# Patient Record
Sex: MALE | Race: WHITE | NOT HISPANIC OR LATINO
[De-identification: names, ages, dates, MRNs, and addresses within clinical notes are randomized per-mention and may not be internally consistent; named-entity substitution may affect disease eponyms.]

---

## 2018-07-24 ENCOUNTER — APPOINTMENT (OUTPATIENT)
Dept: UROLOGY | Facility: CLINIC | Age: 48
End: 2018-07-24

## 2018-08-02 ENCOUNTER — APPOINTMENT (OUTPATIENT)
Dept: UROLOGY | Facility: CLINIC | Age: 48
End: 2018-08-02
Payer: COMMERCIAL

## 2018-08-02 VITALS
DIASTOLIC BLOOD PRESSURE: 96 MMHG | BODY MASS INDEX: 28.7 KG/M2 | WEIGHT: 205 LBS | SYSTOLIC BLOOD PRESSURE: 140 MMHG | HEIGHT: 71 IN | RESPIRATION RATE: 18 BRPM | HEART RATE: 72 BPM

## 2018-08-02 PROCEDURE — 99204 OFFICE O/P NEW MOD 45 MIN: CPT

## 2018-08-06 LAB
LH SERPL-ACNC: 7.2 IU/L
PROLACTIN SERPL-MCNC: 13.9 NG/ML
PSA SERPL-MCNC: 0.93 NG/ML
SHBG SERPL-SCNC: 11 NMOL/L
TSH SERPL-ACNC: 4.05 UIU/ML

## 2018-08-23 ENCOUNTER — OTHER (OUTPATIENT)
Age: 48
End: 2018-08-23

## 2018-08-23 LAB
TESTOST BND SERPL-MCNC: 9.8 PG/ML
TESTOST SERPL-MCNC: 239.1 NG/DL

## 2018-11-29 ENCOUNTER — OUTPATIENT (OUTPATIENT)
Dept: OUTPATIENT SERVICES | Facility: HOSPITAL | Age: 48
LOS: 1 days | End: 2018-11-29
Payer: COMMERCIAL

## 2018-11-29 ENCOUNTER — APPOINTMENT (OUTPATIENT)
Dept: CT IMAGING | Facility: HOSPITAL | Age: 48
End: 2018-11-29
Payer: COMMERCIAL

## 2018-11-29 DIAGNOSIS — N52.9 MALE ERECTILE DYSFUNCTION, UNSPECIFIED: ICD-10-CM

## 2018-11-29 PROCEDURE — 74176 CT ABD & PELVIS W/O CONTRAST: CPT

## 2018-11-29 PROCEDURE — 74176 CT ABD & PELVIS W/O CONTRAST: CPT | Mod: 26

## 2018-12-14 ENCOUNTER — APPOINTMENT (OUTPATIENT)
Dept: UROLOGY | Facility: CLINIC | Age: 48
End: 2018-12-14
Payer: COMMERCIAL

## 2018-12-14 VITALS
HEART RATE: 76 BPM | RESPIRATION RATE: 18 BRPM | OXYGEN SATURATION: 98 % | SYSTOLIC BLOOD PRESSURE: 148 MMHG | DIASTOLIC BLOOD PRESSURE: 90 MMHG

## 2018-12-14 PROCEDURE — 99213 OFFICE O/P EST LOW 20 MIN: CPT | Mod: 25

## 2018-12-14 PROCEDURE — 52000 CYSTOURETHROSCOPY: CPT

## 2018-12-17 LAB — BACTERIA UR CULT: NORMAL

## 2018-12-20 ENCOUNTER — OUTPATIENT (OUTPATIENT)
Dept: OUTPATIENT SERVICES | Facility: HOSPITAL | Age: 48
LOS: 1 days | End: 2018-12-20
Payer: COMMERCIAL

## 2018-12-20 VITALS
DIASTOLIC BLOOD PRESSURE: 81 MMHG | WEIGHT: 218.04 LBS | RESPIRATION RATE: 18 BRPM | HEIGHT: 71 IN | SYSTOLIC BLOOD PRESSURE: 127 MMHG | TEMPERATURE: 98 F | HEART RATE: 81 BPM | OXYGEN SATURATION: 99 %

## 2018-12-20 DIAGNOSIS — N21.0 CALCULUS IN BLADDER: ICD-10-CM

## 2018-12-20 DIAGNOSIS — N32.89 OTHER SPECIFIED DISORDERS OF BLADDER: ICD-10-CM

## 2018-12-20 DIAGNOSIS — Z90.89 ACQUIRED ABSENCE OF OTHER ORGANS: Chronic | ICD-10-CM

## 2018-12-20 DIAGNOSIS — Z01.818 ENCOUNTER FOR OTHER PREPROCEDURAL EXAMINATION: ICD-10-CM

## 2018-12-20 LAB
ANION GAP SERPL CALC-SCNC: 8 MMOL/L — SIGNIFICANT CHANGE UP (ref 5–17)
APPEARANCE UR: ABNORMAL
APTT BLD: 32 SEC — SIGNIFICANT CHANGE UP (ref 27.5–36.3)
BACTERIA # UR AUTO: ABNORMAL /HPF
BILIRUB UR-MCNC: NEGATIVE — SIGNIFICANT CHANGE UP
BUN SERPL-MCNC: 28 MG/DL — HIGH (ref 7–18)
CALCIUM SERPL-MCNC: 9.2 MG/DL — SIGNIFICANT CHANGE UP (ref 8.4–10.5)
CHLORIDE SERPL-SCNC: 105 MMOL/L — SIGNIFICANT CHANGE UP (ref 96–108)
CO2 SERPL-SCNC: 29 MMOL/L — SIGNIFICANT CHANGE UP (ref 22–31)
COLOR SPEC: YELLOW — SIGNIFICANT CHANGE UP
CREAT SERPL-MCNC: 1.21 MG/DL — SIGNIFICANT CHANGE UP (ref 0.5–1.3)
DIFF PNL FLD: ABNORMAL
EPI CELLS # UR: SIGNIFICANT CHANGE UP /HPF
GLUCOSE SERPL-MCNC: 89 MG/DL — SIGNIFICANT CHANGE UP (ref 70–99)
GLUCOSE UR QL: NEGATIVE — SIGNIFICANT CHANGE UP
HCT VFR BLD CALC: 46.5 % — SIGNIFICANT CHANGE UP (ref 39–50)
HGB BLD-MCNC: 15.2 G/DL — SIGNIFICANT CHANGE UP (ref 13–17)
KETONES UR-MCNC: NEGATIVE — SIGNIFICANT CHANGE UP
LEUKOCYTE ESTERASE UR-ACNC: ABNORMAL
MCHC RBC-ENTMCNC: 30.8 PG — SIGNIFICANT CHANGE UP (ref 27–34)
MCHC RBC-ENTMCNC: 32.6 GM/DL — SIGNIFICANT CHANGE UP (ref 32–36)
MCV RBC AUTO: 94.4 FL — SIGNIFICANT CHANGE UP (ref 80–100)
NITRITE UR-MCNC: NEGATIVE — SIGNIFICANT CHANGE UP
PH UR: 6.5 — SIGNIFICANT CHANGE UP (ref 5–8)
PLATELET # BLD AUTO: 176 K/UL — SIGNIFICANT CHANGE UP (ref 150–400)
POTASSIUM SERPL-MCNC: 4.2 MMOL/L — SIGNIFICANT CHANGE UP (ref 3.5–5.3)
POTASSIUM SERPL-SCNC: 4.2 MMOL/L — SIGNIFICANT CHANGE UP (ref 3.5–5.3)
PROT UR-MCNC: 100
RBC # BLD: 4.93 M/UL — SIGNIFICANT CHANGE UP (ref 4.2–5.8)
RBC # FLD: 12.6 % — SIGNIFICANT CHANGE UP (ref 10.3–14.5)
RBC CASTS # UR COMP ASSIST: ABNORMAL /HPF (ref 0–2)
SODIUM SERPL-SCNC: 142 MMOL/L — SIGNIFICANT CHANGE UP (ref 135–145)
SP GR SPEC: 1.01 — SIGNIFICANT CHANGE UP (ref 1.01–1.02)
UROBILINOGEN FLD QL: NEGATIVE — SIGNIFICANT CHANGE UP
WBC # BLD: 10 K/UL — SIGNIFICANT CHANGE UP (ref 3.8–10.5)
WBC # FLD AUTO: 10 K/UL — SIGNIFICANT CHANGE UP (ref 3.8–10.5)
WBC UR QL: ABNORMAL /HPF (ref 0–5)

## 2018-12-20 PROCEDURE — 85730 THROMBOPLASTIN TIME PARTIAL: CPT

## 2018-12-20 PROCEDURE — 36415 COLL VENOUS BLD VENIPUNCTURE: CPT

## 2018-12-20 PROCEDURE — 85027 COMPLETE CBC AUTOMATED: CPT

## 2018-12-20 PROCEDURE — 87086 URINE CULTURE/COLONY COUNT: CPT

## 2018-12-20 PROCEDURE — 80048 BASIC METABOLIC PNL TOTAL CA: CPT

## 2018-12-20 PROCEDURE — G0463: CPT

## 2018-12-20 PROCEDURE — 81001 URINALYSIS AUTO W/SCOPE: CPT

## 2018-12-20 RX ORDER — SODIUM CHLORIDE 9 MG/ML
3 INJECTION INTRAMUSCULAR; INTRAVENOUS; SUBCUTANEOUS EVERY 8 HOURS
Qty: 0 | Refills: 0 | Status: DISCONTINUED | OUTPATIENT
Start: 2018-12-31 | End: 2018-12-31

## 2018-12-20 NOTE — H&P PST ADULT - NSANTHOSAYNRD_GEN_A_CORE
No. AMARA screening performed.  STOP BANG Legend: 0-2 = LOW Risk; 3-4 = INTERMEDIATE Risk; 5-8 = HIGH Risk

## 2018-12-20 NOTE — H&P PST ADULT - NEGATIVE GASTROINTESTINAL SYMPTOMS
no abdominal pain/no vomiting/no nausea/no diarrhea/no flatulence/no constipation/no change in bowel habits

## 2018-12-20 NOTE — H&P PST ADULT - NEGATIVE CARDIOVASCULAR SYMPTOMS
no chest pain/no paroxysmal nocturnal dyspnea/no peripheral edema/no claudication/no dyspnea on exertion/no orthopnea/no palpitations

## 2018-12-20 NOTE — H&P PST ADULT - HISTORY OF PRESENT ILLNESS
48 yr old male with 48 yr old male with PMH of hyperlipidemia, renal stones-passed them a few years ago presents with c/o intermittent hematuria and bilateral flank pain. CT scan revealed large calcified bladder mass and renal stones. Pt reports weak urinary stream and intermittent dysuria. Pt for cystolitholapaxy, transurethral resection of bladder tumor on 12/31/2018.

## 2018-12-20 NOTE — H&P PST ADULT - GASTROINTESTINAL DETAILS
no bruit/no rebound tenderness/no masses palpable/bowel sounds normal/no guarding/soft/no distention/normal

## 2018-12-20 NOTE — H&P PST ADULT - FAMILY HISTORY
Father  Still living? Yes, Estimated age: Age Unknown  Family history of Parkinson's disease, Age at diagnosis: Age Unknown     Mother  Still living? Yes, Estimated age: Age Unknown  Family history of dementia, Age at diagnosis: Age Unknown  Family history of hypertension in mother, Age at diagnosis: Age Unknown

## 2018-12-20 NOTE — H&P PST ADULT - RS GEN PE MLT RESP DETAILS PC
no chest wall tenderness/no rhonchi/breath sounds equal/respirations non-labored/no rales/no subcutaneous emphysema/normal/good air movement/airway patent/clear to auscultation bilaterally/no intercostal retractions/no wheezes

## 2018-12-20 NOTE — H&P PST ADULT - NEGATIVE ALLERGY TYPES
no indoor environmental allergies/no reactions to insect bites/no outdoor environmental allergies/no reactions to animals/no reactions to food

## 2018-12-21 LAB
CULTURE RESULTS: NO GROWTH — SIGNIFICANT CHANGE UP
SPECIMEN SOURCE: SIGNIFICANT CHANGE UP

## 2018-12-30 ENCOUNTER — TRANSCRIPTION ENCOUNTER (OUTPATIENT)
Age: 48
End: 2018-12-30

## 2018-12-31 ENCOUNTER — RESULT REVIEW (OUTPATIENT)
Age: 48
End: 2018-12-31

## 2018-12-31 ENCOUNTER — APPOINTMENT (OUTPATIENT)
Dept: UROLOGY | Facility: HOSPITAL | Age: 48
End: 2018-12-31

## 2018-12-31 ENCOUNTER — OUTPATIENT (OUTPATIENT)
Dept: OUTPATIENT SERVICES | Facility: HOSPITAL | Age: 48
LOS: 1 days | End: 2018-12-31
Payer: COMMERCIAL

## 2018-12-31 VITALS
SYSTOLIC BLOOD PRESSURE: 141 MMHG | RESPIRATION RATE: 16 BRPM | TEMPERATURE: 97 F | DIASTOLIC BLOOD PRESSURE: 83 MMHG | HEART RATE: 88 BPM | OXYGEN SATURATION: 100 %

## 2018-12-31 VITALS
RESPIRATION RATE: 17 BRPM | HEIGHT: 71 IN | WEIGHT: 218.04 LBS | HEART RATE: 99 BPM | TEMPERATURE: 97 F | DIASTOLIC BLOOD PRESSURE: 90 MMHG | OXYGEN SATURATION: 100 % | SYSTOLIC BLOOD PRESSURE: 150 MMHG

## 2018-12-31 DIAGNOSIS — N21.0 CALCULUS IN BLADDER: ICD-10-CM

## 2018-12-31 DIAGNOSIS — Z01.818 ENCOUNTER FOR OTHER PREPROCEDURAL EXAMINATION: ICD-10-CM

## 2018-12-31 DIAGNOSIS — Z90.89 ACQUIRED ABSENCE OF OTHER ORGANS: Chronic | ICD-10-CM

## 2018-12-31 LAB
TESTOST BND SERPL-MCNC: 15 PG/ML
TESTOST SERPL-MCNC: 391.8 NG/DL

## 2018-12-31 PROCEDURE — C1889: CPT

## 2018-12-31 PROCEDURE — 52240 CYSTOSCOPY AND TREATMENT: CPT

## 2018-12-31 PROCEDURE — 88307 TISSUE EXAM BY PATHOLOGIST: CPT

## 2018-12-31 PROCEDURE — 36415 COLL VENOUS BLD VENIPUNCTURE: CPT

## 2018-12-31 PROCEDURE — 52318 REMOVE BLADDER STONE: CPT

## 2018-12-31 PROCEDURE — 88307 TISSUE EXAM BY PATHOLOGIST: CPT | Mod: 26

## 2018-12-31 PROCEDURE — 82365 CALCULUS SPECTROSCOPY: CPT

## 2018-12-31 PROCEDURE — 86901 BLOOD TYPING SEROLOGIC RH(D): CPT

## 2018-12-31 PROCEDURE — 86900 BLOOD TYPING SEROLOGIC ABO: CPT

## 2018-12-31 PROCEDURE — 86850 RBC ANTIBODY SCREEN: CPT

## 2018-12-31 RX ORDER — OXYCODONE AND ACETAMINOPHEN 5; 325 MG/1; MG/1
1 TABLET ORAL EVERY 6 HOURS
Qty: 0 | Refills: 0 | Status: DISCONTINUED | OUTPATIENT
Start: 2018-12-31 | End: 2018-12-31

## 2018-12-31 RX ORDER — SODIUM CHLORIDE 9 MG/ML
1000 INJECTION, SOLUTION INTRAVENOUS
Qty: 0 | Refills: 0 | Status: DISCONTINUED | OUTPATIENT
Start: 2018-12-31 | End: 2018-12-31

## 2018-12-31 RX ORDER — HYDROMORPHONE HYDROCHLORIDE 2 MG/ML
0.5 INJECTION INTRAMUSCULAR; INTRAVENOUS; SUBCUTANEOUS
Qty: 0 | Refills: 0 | Status: DISCONTINUED | OUTPATIENT
Start: 2018-12-31 | End: 2018-12-31

## 2018-12-31 RX ORDER — ONDANSETRON 8 MG/1
4 TABLET, FILM COATED ORAL ONCE
Qty: 0 | Refills: 0 | Status: DISCONTINUED | OUTPATIENT
Start: 2018-12-31 | End: 2018-12-31

## 2018-12-31 RX ORDER — ONDANSETRON 8 MG/1
4 TABLET, FILM COATED ORAL EVERY 6 HOURS
Qty: 0 | Refills: 0 | Status: DISCONTINUED | OUTPATIENT
Start: 2018-12-31 | End: 2019-01-08

## 2018-12-31 RX ORDER — ACETAMINOPHEN WITH CODEINE 300MG-30MG
1 TABLET ORAL
Qty: 16 | Refills: 0 | OUTPATIENT
Start: 2018-12-31 | End: 2019-01-03

## 2018-12-31 RX ORDER — MOXIFLOXACIN HYDROCHLORIDE TABLETS, 400 MG 400 MG/1
1 TABLET, FILM COATED ORAL
Qty: 10 | Refills: 0 | OUTPATIENT
Start: 2018-12-31 | End: 2019-01-04

## 2018-12-31 RX ADMIN — HYDROMORPHONE HYDROCHLORIDE 0.5 MILLIGRAM(S): 2 INJECTION INTRAMUSCULAR; INTRAVENOUS; SUBCUTANEOUS at 10:32

## 2018-12-31 RX ADMIN — OXYCODONE AND ACETAMINOPHEN 1 TABLET(S): 5; 325 TABLET ORAL at 12:05

## 2018-12-31 RX ADMIN — OXYCODONE AND ACETAMINOPHEN 1 TABLET(S): 5; 325 TABLET ORAL at 12:35

## 2018-12-31 RX ADMIN — HYDROMORPHONE HYDROCHLORIDE 0.5 MILLIGRAM(S): 2 INJECTION INTRAMUSCULAR; INTRAVENOUS; SUBCUTANEOUS at 10:17

## 2018-12-31 NOTE — ASU DISCHARGE PLAN (ADULT/PEDIATRIC). - NOTIFY
Fever greater than 101/Bleeding that does not stop/Pain not relieved by Medications/Unable to Urinate

## 2018-12-31 NOTE — ASU DISCHARGE PLAN (ADULT/PEDIATRIC). - MEDICATION SUMMARY - MEDICATIONS TO TAKE
I will START or STAY ON the medications listed below when I get home from the hospital:    acetaminophen-codeine 300 mg-30 mg oral tablet  -- 1 tab(s) by mouth every 6 hours, As Needed -for moderate pain MDD:4 tablets   -- Caution federal law prohibits the transfer of this drug to any person other  than the person for whom it was prescribed.  May cause drowsiness.  Alcohol may intensify this effect.  Use care when operating dangerous machinery.  This product contains acetaminophen.  Do not use  with any other product containing acetaminophen to prevent possible liver damage.  Using more of this medication than prescribed may cause serious breathing problems.    -- Indication: For Calculus of urinary bladder    Tylenol 500 mg oral tablet  -- 2 tab(s) by mouth every 6 hours, As Needed  -- Indication: For pain    atorvastatin 10 mg oral tablet  -- 1 tab(s) by mouth once a day (at bedtime)  -- Indication: For Cholesterol    Cipro 500 mg oral tablet  -- 1 tab(s) by mouth every 12 hours   -- Avoid prolonged or excessive exposure to direct and/or artificial sunlight while taking this medication.  Check with your doctor before becoming pregnant.  Do not take dairy products, antacids, or iron preparations within one hour of this medication.  Finish all this medication unless otherwise directed by prescriber.  Medication should be taken with plenty of water.    -- Indication: For Calculus of urinary bladder

## 2018-12-31 NOTE — ASU PREOP CHECKLIST - BMI (KG/M2)

## 2019-01-02 ENCOUNTER — APPOINTMENT (OUTPATIENT)
Dept: UROLOGY | Facility: CLINIC | Age: 49
End: 2019-01-02
Payer: COMMERCIAL

## 2019-01-02 ENCOUNTER — MOBILE ON CALL (OUTPATIENT)
Age: 49
End: 2019-01-02

## 2019-01-02 ENCOUNTER — EMERGENCY (EMERGENCY)
Facility: HOSPITAL | Age: 49
LOS: 1 days | Discharge: HOME | End: 2019-01-02
Attending: EMERGENCY MEDICINE | Admitting: EMERGENCY MEDICINE
Payer: COMMERCIAL

## 2019-01-02 VITALS
RESPIRATION RATE: 16 BRPM | SYSTOLIC BLOOD PRESSURE: 144 MMHG | HEART RATE: 86 BPM | TEMPERATURE: 98 F | OXYGEN SATURATION: 96 % | DIASTOLIC BLOOD PRESSURE: 78 MMHG

## 2019-01-02 VITALS
OXYGEN SATURATION: 100 % | WEIGHT: 218.04 LBS | RESPIRATION RATE: 15 BRPM | DIASTOLIC BLOOD PRESSURE: 95 MMHG | HEIGHT: 71 IN | TEMPERATURE: 98 F | HEART RATE: 81 BPM | SYSTOLIC BLOOD PRESSURE: 152 MMHG

## 2019-01-02 VITALS
HEIGHT: 71 IN | BODY MASS INDEX: 30.52 KG/M2 | WEIGHT: 218 LBS | DIASTOLIC BLOOD PRESSURE: 88 MMHG | SYSTOLIC BLOOD PRESSURE: 146 MMHG

## 2019-01-02 DIAGNOSIS — Z90.89 ACQUIRED ABSENCE OF OTHER ORGANS: Chronic | ICD-10-CM

## 2019-01-02 PROBLEM — N21.0 CALCULUS IN BLADDER: Chronic | Status: ACTIVE | Noted: 2018-12-20

## 2019-01-02 LAB
APPEARANCE UR: ABNORMAL
BILIRUB UR-MCNC: NEGATIVE — SIGNIFICANT CHANGE UP
COLOR SPEC: YELLOW — SIGNIFICANT CHANGE UP
DIFF PNL FLD: ABNORMAL
GLUCOSE UR QL: NEGATIVE MG/DL — SIGNIFICANT CHANGE UP
KETONES UR-MCNC: NEGATIVE — SIGNIFICANT CHANGE UP
LEUKOCYTE ESTERASE UR-ACNC: ABNORMAL
NITRITE UR-MCNC: NEGATIVE — SIGNIFICANT CHANGE UP
PH UR: 6 — SIGNIFICANT CHANGE UP (ref 5–8)
PROT UR-MCNC: 30 MG/DL
SP GR SPEC: 1.01 — SIGNIFICANT CHANGE UP (ref 1.01–1.02)
UROBILINOGEN FLD QL: NEGATIVE MG/DL — SIGNIFICANT CHANGE UP

## 2019-01-02 PROCEDURE — 81001 URINALYSIS AUTO W/SCOPE: CPT

## 2019-01-02 PROCEDURE — 87086 URINE CULTURE/COLONY COUNT: CPT

## 2019-01-02 PROCEDURE — 99213 OFFICE O/P EST LOW 20 MIN: CPT

## 2019-01-02 PROCEDURE — 99283 EMERGENCY DEPT VISIT LOW MDM: CPT | Mod: 25

## 2019-01-02 PROCEDURE — 99283 EMERGENCY DEPT VISIT LOW MDM: CPT

## 2019-01-02 RX ORDER — ACETAMINOPHEN 500 MG
2 TABLET ORAL
Qty: 0 | Refills: 0 | COMMUNITY

## 2019-01-02 RX ORDER — ATORVASTATIN CALCIUM 80 MG/1
1 TABLET, FILM COATED ORAL
Qty: 0 | Refills: 0 | COMMUNITY

## 2019-01-02 NOTE — ED PROVIDER NOTE - PROGRESS NOTE DETAILS
goins placed by RN without incident, immediate return of blood stained (light pink) urine followed by yellow urine, sample collected,

## 2019-01-02 NOTE — ED PROVIDER NOTE - OBJECTIVE STATEMENT
49 y/o M pt with hx of HLD and bladder mass presents to the ED c/o urinary retention and mild hematuria since this morning.  Pt states that he had a bladder surgery for a mass removal on New Years [place] after which he had a urinary catheter placed. He states that he was seen by the urologist today in the office and had the catheter removed. He states that he did urinate immediately after the catheter removal and once after coming home at 1pm. Pt states that he has had a few drops of urine after that with blood mixed. Denies fever, chills, nausea, or vomiting. No other complaints at this time.      Dr. Correa- urologist 49 y/o M pt with hx of HLD and bladder mass presents to the ED c/o urinary retention and mild hematuria since this morning.  Pt states that he had a bladder surgery for a mass removal on New Years [place] after which he had a urinary catheter placed. He states that he was seen by the urologist today in the office and had the catheter removed. He states that he did urinate immediately after the catheter removal and once after coming home at 1pm. Pt states that he has had a few drops of urine after that with blood mixed. Denies fever, chills, nausea, or vomiting. No other complaints at this time. on an antibiotic currently from urology     Dr. Correa- urologist

## 2019-01-02 NOTE — ED ADULT TRIAGE NOTE - CHIEF COMPLAINT QUOTE
"I can't urinate, had bladder surgery new years liv and they placed catheter, it is removed today and since this afternoon, I can't urinate"

## 2019-01-02 NOTE — ED ADULT NURSE NOTE - NSIMPLEMENTINTERV_GEN_ALL_ED
Implemented All Universal Safety Interventions:  [place] to call system. Call bell, personal items and telephone within reach. Instruct patient to call for assistance. Room bathroom lighting operational. Non-slip footwear when patient is off stretcher. Physically safe environment: no spills, clutter or unnecessary equipment. Stretcher in lowest position, wheels locked, appropriate side rails in place.

## 2019-01-02 NOTE — ED ADULT NURSE NOTE - OBJECTIVE STATEMENT
48 yr old male c/o urinary retention since this am; pt had bladder surgery 12/31/2018; goins removed this am in urology office; pt voided 2x s/p removal of goins; last void at 1330. Scant amount of blood on tip of penis

## 2019-01-02 NOTE — ED ADULT NURSE REASSESSMENT NOTE - NS ED NURSE REASSESS COMMENT FT1
indwelling Greenwood placed without incident; draining well; beginning urine slightly pink urine and rest of urine clear yellow; pt tolerated well.

## 2019-01-04 ENCOUNTER — APPOINTMENT (OUTPATIENT)
Dept: UROLOGY | Facility: CLINIC | Age: 49
End: 2019-01-04
Payer: COMMERCIAL

## 2019-01-04 LAB
CULTURE RESULTS: NO GROWTH — SIGNIFICANT CHANGE UP
SPECIMEN SOURCE: SIGNIFICANT CHANGE UP

## 2019-01-04 PROCEDURE — 51702 INSERT TEMP BLADDER CATH: CPT

## 2019-01-09 ENCOUNTER — APPOINTMENT (OUTPATIENT)
Dept: UROLOGY | Facility: CLINIC | Age: 49
End: 2019-01-09
Payer: COMMERCIAL

## 2019-01-09 VITALS — SYSTOLIC BLOOD PRESSURE: 166 MMHG | HEART RATE: 114 BPM | DIASTOLIC BLOOD PRESSURE: 72 MMHG

## 2019-01-09 PROCEDURE — 99213 OFFICE O/P EST LOW 20 MIN: CPT

## 2019-01-10 ENCOUNTER — MESSAGE (OUTPATIENT)
Age: 49
End: 2019-01-10

## 2019-01-10 ENCOUNTER — EMERGENCY (EMERGENCY)
Facility: HOSPITAL | Age: 49
LOS: 1 days | Discharge: HOME | End: 2019-01-10
Attending: EMERGENCY MEDICINE | Admitting: EMERGENCY MEDICINE
Payer: COMMERCIAL

## 2019-01-10 VITALS
HEART RATE: 97 BPM | DIASTOLIC BLOOD PRESSURE: 88 MMHG | SYSTOLIC BLOOD PRESSURE: 131 MMHG | WEIGHT: 218.04 LBS | TEMPERATURE: 98 F | OXYGEN SATURATION: 95 % | RESPIRATION RATE: 15 BRPM | HEIGHT: 71 IN

## 2019-01-10 VITALS
TEMPERATURE: 98 F | HEART RATE: 82 BPM | SYSTOLIC BLOOD PRESSURE: 108 MMHG | RESPIRATION RATE: 16 BRPM | DIASTOLIC BLOOD PRESSURE: 71 MMHG | OXYGEN SATURATION: 93 %

## 2019-01-10 DIAGNOSIS — Z90.89 ACQUIRED ABSENCE OF OTHER ORGANS: Chronic | ICD-10-CM

## 2019-01-10 LAB
APPEARANCE UR: ABNORMAL
BILIRUB UR-MCNC: NEGATIVE — SIGNIFICANT CHANGE UP
COLOR SPEC: ABNORMAL
DIFF PNL FLD: ABNORMAL
EPI CELLS # UR: SIGNIFICANT CHANGE UP
GLUCOSE UR QL: NEGATIVE MG/DL — SIGNIFICANT CHANGE UP
KETONES UR-MCNC: ABNORMAL
LEUKOCYTE ESTERASE UR-ACNC: NEGATIVE — SIGNIFICANT CHANGE UP
NITRITE UR-MCNC: NEGATIVE — SIGNIFICANT CHANGE UP
PH UR: 7 — SIGNIFICANT CHANGE UP (ref 5–8)
PROT UR-MCNC: 500 MG/DL
RBC CASTS # UR COMP ASSIST: >50 /HPF (ref 0–4)
SP GR SPEC: 1.01 — SIGNIFICANT CHANGE UP (ref 1.01–1.02)
UROBILINOGEN FLD QL: NEGATIVE MG/DL — SIGNIFICANT CHANGE UP
WBC UR QL: SIGNIFICANT CHANGE UP

## 2019-01-10 PROCEDURE — 81001 URINALYSIS AUTO W/SCOPE: CPT

## 2019-01-10 PROCEDURE — 99283 EMERGENCY DEPT VISIT LOW MDM: CPT | Mod: 25

## 2019-01-10 PROCEDURE — 87086 URINE CULTURE/COLONY COUNT: CPT

## 2019-01-10 PROCEDURE — 51702 INSERT TEMP BLADDER CATH: CPT

## 2019-01-10 PROCEDURE — 99283 EMERGENCY DEPT VISIT LOW MDM: CPT

## 2019-01-10 RX ORDER — AZTREONAM 2 G
1 VIAL (EA) INJECTION
Qty: 14 | Refills: 0 | OUTPATIENT
Start: 2019-01-10 | End: 2019-01-16

## 2019-01-10 RX ADMIN — Medication 1 TABLET(S): at 21:17

## 2019-01-10 NOTE — ED PROVIDER NOTE - NS_ ATTENDINGSCRIBEDETAILS _ED_A_ED_FT
Jasson Mc MD - The scribe's documentation has been prepared under my direction and personally reviewed by me in its entirety. I confirm that the note above accurately reflects all work, treatment, procedures, and medical decision making performed by me.

## 2019-01-10 NOTE — ED PROVIDER NOTE - OBJECTIVE STATEMENT
49 y/o M pt with PMHx of bladder mass, bladder calculi presents to the ED c/o sudden onset of urinary retention, blood clots in urine today. Confirms bladder discomfort. Pt had bladder surgery 12/31/2018, his catheter came out 1/2/2018. States the night after catheter removal, he had difficulties urinating and had catheter reinserted. Pt had catheter removed again 5-6 days ago, it was then reinserted and removed again yesterday. States he was urinating normally yesterday afternoon and night. Pt took Flomax at 6:20 pm. Denies having abd pain, CVA pain. No further complaints at this time.

## 2019-01-10 NOTE — ED ADULT NURSE NOTE - OBJECTIVE STATEMENT
pt here with c/o urinary retention , s/p bladder surgery 0n 12/31/18. since then urinary catheter removed 3 times and had to insert back  due to urinary retention, pt c/o hematuria with some clots, currently have abdominal discomfort. denies fever/ chills.

## 2019-01-10 NOTE — ED ADULT TRIAGE NOTE - CHIEF COMPLAINT QUOTE
"I can't empty my bladder, I had removed goins cath yesterday."   Pt has removed mass in bladder recently and placed goins after that. took flomax .4mg at 630pm

## 2019-01-10 NOTE — ED PROVIDER NOTE - PRINCIPAL DIAGNOSIS
L pseudojones fx  - will obtain boot after surgery  - WB status based on hip postop   Urinary retention

## 2019-01-10 NOTE — ED PROVIDER NOTE - MEDICAL DECISION MAKING DETAILS
49 y/o M pt presents to the ED c/o sudden onset of urinary retention, blood clots in urine today. Will put in catheter and irrigate bladder then reassess. Pt to FU with outpatient urologist as needed.

## 2019-01-10 NOTE — ED ADULT NURSE REASSESSMENT NOTE - NS ED NURSE REASSESS COMMENT FT1
2100: 16 Fr Greenwood inserted under sterile technique, bloody urine noted in return, catheter irrigated by Dr. Mc, clots noted in return  and become clear .  2120: urine specimen sent to lab, Bactrim given as ordered, Greenwood catheter connected to leg bag. pt verbalize improvement of abdominal discomfort.  2125 : pt reevaluated by MD, d/c home with f/u instructions.

## 2019-01-10 NOTE — ED ADULT NURSE NOTE - NSIMPLEMENTINTERV_GEN_ALL_ED
Implemented All Universal Safety Interventions:  [place] to call system. Call bell, personal items and telephone within reach. Instruct patient to call for assistance. Room bathroom lighting operational. Non-slip footwear when patient is off stretcher. Physically safe environment: no spills, clutter or unnecessary equipment. Stretcher in lowest position, wheels locked, appropriate side rails in place.

## 2019-01-10 NOTE — ED PROVIDER NOTE - PROGRESS NOTE DETAILS
Greenwood placed. Clots removed thru irrigation with clearing of blood. Patient more comfortable Patient noted more clots in bag. Irrigation repeated with clearing of urine. Patient states he can see his urologist in the am

## 2019-01-10 NOTE — ED PROVIDER NOTE - GASTROINTESTINAL, MLM
Abdomen soft, no guarding. Suprapubic discomfort upon palpation with fullness suggestive of large bladder. No masses or organomegaly.

## 2019-01-11 ENCOUNTER — MESSAGE (OUTPATIENT)
Age: 49
End: 2019-01-11

## 2019-01-12 LAB
CULTURE RESULTS: SIGNIFICANT CHANGE UP
SPECIMEN SOURCE: SIGNIFICANT CHANGE UP

## 2019-01-14 ENCOUNTER — MESSAGE (OUTPATIENT)
Age: 49
End: 2019-01-14

## 2019-01-16 ENCOUNTER — APPOINTMENT (OUTPATIENT)
Dept: UROLOGY | Facility: CLINIC | Age: 49
End: 2019-01-16
Payer: COMMERCIAL

## 2019-01-16 VITALS — BODY MASS INDEX: 30.41 KG/M2 | SYSTOLIC BLOOD PRESSURE: 132 MMHG | WEIGHT: 218 LBS | DIASTOLIC BLOOD PRESSURE: 82 MMHG

## 2019-01-16 PROCEDURE — 99213 OFFICE O/P EST LOW 20 MIN: CPT

## 2019-01-16 NOTE — ASSESSMENT
[FreeTextEntry1] : pvr still al ittle high \par double tamsuloisn \par slow down miralax \par plan for restage in feb

## 2019-01-16 NOTE — HISTORY OF PRESENT ILLNESS
[FreeTextEntry1] : cc f/u turbt \par another episode of clot retention \par urine has been clear\par  goins removed yesterday \par voiding without difficulty \par bm nl on miralax

## 2019-01-23 ENCOUNTER — APPOINTMENT (OUTPATIENT)
Dept: UROLOGY | Facility: CLINIC | Age: 49
End: 2019-01-23
Payer: COMMERCIAL

## 2019-01-23 PROCEDURE — 51798 US URINE CAPACITY MEASURE: CPT

## 2019-01-23 PROCEDURE — 99214 OFFICE O/P EST MOD 30 MIN: CPT | Mod: 25

## 2019-01-28 LAB
APPEARANCE: CLEAR
BACTERIA UR CULT: ABNORMAL
BACTERIA: NEGATIVE
BILIRUBIN URINE: NEGATIVE
BLOOD URINE: ABNORMAL
COLOR: YELLOW
GLUCOSE QUALITATIVE U: NEGATIVE MG/DL
KETONES URINE: NEGATIVE
LEUKOCYTE ESTERASE URINE: NEGATIVE
MICROSCOPIC-UA: NORMAL
NITRITE URINE: POSITIVE
PH URINE: 7.5
PROTEIN URINE: NEGATIVE MG/DL
PSA FREE FLD-MCNC: 17 %
PSA FREE SERPL-MCNC: 0.24 NG/ML
PSA SERPL-MCNC: 1.45 NG/ML
RED BLOOD CELLS URINE: 17 /HPF
SPECIFIC GRAVITY URINE: 1.02
SQUAMOUS EPITHELIAL CELLS: 1 /HPF
UROBILINOGEN URINE: NEGATIVE MG/DL
WHITE BLOOD CELLS URINE: 3 /HPF

## 2019-01-29 NOTE — ASSESSMENT
[FreeTextEntry1] : path reviewed\par will need restaging turbt \par goins removed \par call for difficulty voiding \par

## 2019-01-29 NOTE — PHYSICAL EXAM
[Normal Appearance] : normal appearance [Well Groomed] : well groomed [Abdomen Soft] : soft [Abdomen Tenderness] : non-tender [Urethral Meatus] : meatus normal [Urinary Bladder Findings] : the bladder was normal on palpation

## 2019-01-30 ENCOUNTER — RX RENEWAL (OUTPATIENT)
Age: 49
End: 2019-01-30

## 2019-02-05 ENCOUNTER — APPOINTMENT (OUTPATIENT)
Dept: UROLOGY | Facility: CLINIC | Age: 49
End: 2019-02-05
Payer: COMMERCIAL

## 2019-02-05 VITALS
HEIGHT: 71 IN | DIASTOLIC BLOOD PRESSURE: 86 MMHG | SYSTOLIC BLOOD PRESSURE: 137 MMHG | HEART RATE: 103 BPM | WEIGHT: 216 LBS | TEMPERATURE: 98.1 F | RESPIRATION RATE: 18 BRPM | BODY MASS INDEX: 30.24 KG/M2

## 2019-02-05 DIAGNOSIS — Z87.448 PERSONAL HISTORY OF OTHER DISEASES OF URINARY SYSTEM: ICD-10-CM

## 2019-02-05 DIAGNOSIS — N32.89 OTHER SPECIFIED DISORDERS OF BLADDER: ICD-10-CM

## 2019-02-05 DIAGNOSIS — Z78.9 OTHER SPECIFIED HEALTH STATUS: ICD-10-CM

## 2019-02-05 DIAGNOSIS — C67.9 MALIGNANT NEOPLASM OF BLADDER, UNSPECIFIED: ICD-10-CM

## 2019-02-05 PROCEDURE — 99214 OFFICE O/P EST MOD 30 MIN: CPT

## 2019-02-06 ENCOUNTER — FORM ENCOUNTER (OUTPATIENT)
Age: 49
End: 2019-02-06

## 2019-02-06 PROBLEM — N32.89 BLADDER MASS: Status: RESOLVED | Noted: 2018-12-14 | Resolved: 2019-02-06

## 2019-02-06 PROBLEM — Z87.448 HISTORY OF BLADDER STONE: Status: RESOLVED | Noted: 2018-12-14 | Resolved: 2019-02-06

## 2019-02-06 PROBLEM — Z78.9 SOCIAL ALCOHOL USE: Status: ACTIVE | Noted: 2019-02-06

## 2019-02-06 NOTE — ASSESSMENT
[FreeTextEntry1] : Here for second opinion regarding management.\par \par Reviewed natural history of non muscle invasive bladder cancer.  Reviewed risks of recurrence and progression as well as risk classification.  \par \par Recommend to proceed with repeat transurethral resection of bladder tumor to rule out muscle invasive disease and to improve response to intravesical therapy.  If confirmed on repeat TUR to be HG TCC, T1, then will recommend induction 6 week intravesical BCG.  \par \par CT urogram ordered (prior CT was non contrast).\par \par All questions answered.

## 2019-02-06 NOTE — HISTORY OF PRESENT ILLNESS
[FreeTextEntry1] : H/o kidney stone about 3 years ago, passed spontaneously.\par 6 months ago had intermittent episodes of gross hematuria with vigorous exercise.  Frequency of hematuria worsened and then underwent CT a/p w/o contrast.  Findings showed a calcified lesion in the bladder.  Underwent bladder stone removal and TURBT on 12-31-18.  Path: HG TCC, pT1, no muscle in the specimen.  Sent home from TURBT with catheter, removed two days later.  Had three episodes of retention.  Now urine has been clear and voiding symptoms have improved.  Remains on tamsulosin.

## 2019-02-07 ENCOUNTER — OUTPATIENT (OUTPATIENT)
Dept: OUTPATIENT SERVICES | Facility: HOSPITAL | Age: 49
LOS: 1 days | End: 2019-02-07
Payer: COMMERCIAL

## 2019-02-07 ENCOUNTER — APPOINTMENT (OUTPATIENT)
Dept: CT IMAGING | Facility: IMAGING CENTER | Age: 49
End: 2019-02-07

## 2019-02-07 DIAGNOSIS — C67.2 MALIGNANT NEOPLASM OF LATERAL WALL OF BLADDER: ICD-10-CM

## 2019-02-07 DIAGNOSIS — Z90.89 ACQUIRED ABSENCE OF OTHER ORGANS: Chronic | ICD-10-CM

## 2019-02-07 PROCEDURE — 74178 CT ABD&PLV WO CNTR FLWD CNTR: CPT

## 2019-02-07 PROCEDURE — 74178 CT ABD&PLV WO CNTR FLWD CNTR: CPT | Mod: 26

## 2019-02-11 ENCOUNTER — OUTPATIENT (OUTPATIENT)
Dept: OUTPATIENT SERVICES | Facility: HOSPITAL | Age: 49
LOS: 1 days | End: 2019-02-11

## 2019-02-11 VITALS
HEIGHT: 71 IN | HEART RATE: 77 BPM | WEIGHT: 233.91 LBS | TEMPERATURE: 99 F | DIASTOLIC BLOOD PRESSURE: 72 MMHG | SYSTOLIC BLOOD PRESSURE: 142 MMHG | RESPIRATION RATE: 16 BRPM

## 2019-02-11 DIAGNOSIS — C67.2 MALIGNANT NEOPLASM OF LATERAL WALL OF BLADDER: ICD-10-CM

## 2019-02-11 DIAGNOSIS — C67.9 MALIGNANT NEOPLASM OF BLADDER, UNSPECIFIED: Chronic | ICD-10-CM

## 2019-02-11 DIAGNOSIS — Z90.89 ACQUIRED ABSENCE OF OTHER ORGANS: Chronic | ICD-10-CM

## 2019-02-11 DIAGNOSIS — N32.89 OTHER SPECIFIED DISORDERS OF BLADDER: ICD-10-CM

## 2019-02-11 LAB
ANION GAP SERPL CALC-SCNC: 13 MMO/L — SIGNIFICANT CHANGE UP (ref 7–14)
APPEARANCE UR: SIGNIFICANT CHANGE UP
BACTERIA # UR AUTO: SIGNIFICANT CHANGE UP
BASOPHILS # BLD AUTO: 0.03 K/UL — SIGNIFICANT CHANGE UP (ref 0–0.2)
BASOPHILS NFR BLD AUTO: 0.4 % — SIGNIFICANT CHANGE UP (ref 0–2)
BILIRUB UR-MCNC: NEGATIVE — SIGNIFICANT CHANGE UP
BLOOD UR QL VISUAL: SIGNIFICANT CHANGE UP
BUN SERPL-MCNC: 22 MG/DL — SIGNIFICANT CHANGE UP (ref 7–23)
CALCIUM SERPL-MCNC: 9.9 MG/DL — SIGNIFICANT CHANGE UP (ref 8.4–10.5)
CHLORIDE SERPL-SCNC: 102 MMOL/L — SIGNIFICANT CHANGE UP (ref 98–107)
CO2 SERPL-SCNC: 27 MMOL/L — SIGNIFICANT CHANGE UP (ref 22–31)
COLOR SPEC: YELLOW — SIGNIFICANT CHANGE UP
CREAT SERPL-MCNC: 1.09 MG/DL — SIGNIFICANT CHANGE UP (ref 0.5–1.3)
EOSINOPHIL # BLD AUTO: 0.09 K/UL — SIGNIFICANT CHANGE UP (ref 0–0.5)
EOSINOPHIL NFR BLD AUTO: 1.1 % — SIGNIFICANT CHANGE UP (ref 0–6)
EPI CELLS # UR: SIGNIFICANT CHANGE UP
GLUCOSE SERPL-MCNC: 97 MG/DL — SIGNIFICANT CHANGE UP (ref 70–99)
GLUCOSE UR-MCNC: NEGATIVE — SIGNIFICANT CHANGE UP
HCT VFR BLD CALC: 43.3 % — SIGNIFICANT CHANGE UP (ref 39–50)
HGB BLD-MCNC: 14.2 G/DL — SIGNIFICANT CHANGE UP (ref 13–17)
IMM GRANULOCYTES NFR BLD AUTO: 0.5 % — SIGNIFICANT CHANGE UP (ref 0–1.5)
KETONES UR-MCNC: NEGATIVE — SIGNIFICANT CHANGE UP
LEUKOCYTE ESTERASE UR-ACNC: HIGH
LYMPHOCYTES # BLD AUTO: 2.82 K/UL — SIGNIFICANT CHANGE UP (ref 1–3.3)
LYMPHOCYTES # BLD AUTO: 34.9 % — SIGNIFICANT CHANGE UP (ref 13–44)
MCHC RBC-ENTMCNC: 30.7 PG — SIGNIFICANT CHANGE UP (ref 27–34)
MCHC RBC-ENTMCNC: 32.8 % — SIGNIFICANT CHANGE UP (ref 32–36)
MCV RBC AUTO: 93.7 FL — SIGNIFICANT CHANGE UP (ref 80–100)
MONOCYTES # BLD AUTO: 0.61 K/UL — SIGNIFICANT CHANGE UP (ref 0–0.9)
MONOCYTES NFR BLD AUTO: 7.5 % — SIGNIFICANT CHANGE UP (ref 2–14)
MUCOUS THREADS # UR AUTO: SIGNIFICANT CHANGE UP
NEUTROPHILS # BLD AUTO: 4.49 K/UL — SIGNIFICANT CHANGE UP (ref 1.8–7.4)
NEUTROPHILS NFR BLD AUTO: 55.6 % — SIGNIFICANT CHANGE UP (ref 43–77)
NITRITE UR-MCNC: POSITIVE — SIGNIFICANT CHANGE UP
NRBC # FLD: 0 K/UL — LOW (ref 25–125)
PH UR: 6.5 — SIGNIFICANT CHANGE UP (ref 5–8)
PLATELET # BLD AUTO: 238 K/UL — SIGNIFICANT CHANGE UP (ref 150–400)
PMV BLD: 11.5 FL — SIGNIFICANT CHANGE UP (ref 7–13)
POTASSIUM SERPL-MCNC: 3.7 MMOL/L — SIGNIFICANT CHANGE UP (ref 3.5–5.3)
POTASSIUM SERPL-SCNC: 3.7 MMOL/L — SIGNIFICANT CHANGE UP (ref 3.5–5.3)
PROT UR-MCNC: 10 — SIGNIFICANT CHANGE UP
RBC # BLD: 4.62 M/UL — SIGNIFICANT CHANGE UP (ref 4.2–5.8)
RBC # FLD: 12.9 % — SIGNIFICANT CHANGE UP (ref 10.3–14.5)
RBC CASTS # UR COMP ASSIST: SIGNIFICANT CHANGE UP (ref 0–?)
SODIUM SERPL-SCNC: 142 MMOL/L — SIGNIFICANT CHANGE UP (ref 135–145)
SP GR SPEC: 1.02 — SIGNIFICANT CHANGE UP (ref 1–1.04)
UROBILINOGEN FLD QL: NORMAL — SIGNIFICANT CHANGE UP
WBC # BLD: 8.08 K/UL — SIGNIFICANT CHANGE UP (ref 3.8–10.5)
WBC # FLD AUTO: 8.08 K/UL — SIGNIFICANT CHANGE UP (ref 3.8–10.5)
WBC UR QL: HIGH (ref 0–?)

## 2019-02-11 NOTE — H&P PST ADULT - NEGATIVE GENERAL GENITOURINARY SYMPTOMS
no hematuria/normal urinary frequency/no dysuria/no urinary hesitancy/no flank pain L/no flank pain R

## 2019-02-11 NOTE — H&P PST ADULT - PROBLEM SELECTOR PLAN 1
Patient scheduled cystoscopy transurethral resection of the bladder tumor on 2/14/19.   Pre op instructions given and explained.  Avoid NSAIDs and OTC supplements.   Patient verbalized understanding

## 2019-02-11 NOTE — H&P PST ADULT - MUSCULOSKELETAL
detailed exam no joint warmth/no joint swelling/no joint erythema/normal strength/ROM intact details…

## 2019-02-11 NOTE — H&P PST ADULT - ASSESSMENT
47 y/o male presents to [facility] for scheduled cystoscopy transurethral resection of the bladder tumor on 2/14/19.

## 2019-02-13 ENCOUNTER — TRANSCRIPTION ENCOUNTER (OUTPATIENT)
Age: 49
End: 2019-02-13

## 2019-02-13 LAB
BACTERIA UR CULT: SIGNIFICANT CHANGE UP
SPECIMEN SOURCE: SIGNIFICANT CHANGE UP

## 2019-02-14 ENCOUNTER — APPOINTMENT (OUTPATIENT)
Dept: UROLOGY | Facility: AMBULATORY SURGERY CENTER | Age: 49
End: 2019-02-14

## 2019-02-14 ENCOUNTER — OUTPATIENT (OUTPATIENT)
Dept: OUTPATIENT SERVICES | Facility: HOSPITAL | Age: 49
LOS: 1 days | Discharge: HOME | End: 2019-02-14
Payer: COMMERCIAL

## 2019-02-14 ENCOUNTER — RESULT REVIEW (OUTPATIENT)
Age: 49
End: 2019-02-14

## 2019-02-14 VITALS
RESPIRATION RATE: 16 BRPM | HEART RATE: 108 BPM | SYSTOLIC BLOOD PRESSURE: 133 MMHG | WEIGHT: 233.91 LBS | TEMPERATURE: 100 F | HEIGHT: 71 IN | OXYGEN SATURATION: 99 % | DIASTOLIC BLOOD PRESSURE: 85 MMHG

## 2019-02-14 VITALS
OXYGEN SATURATION: 96 % | TEMPERATURE: 99 F | HEART RATE: 94 BPM | DIASTOLIC BLOOD PRESSURE: 88 MMHG | SYSTOLIC BLOOD PRESSURE: 121 MMHG

## 2019-02-14 DIAGNOSIS — C67.2 MALIGNANT NEOPLASM OF LATERAL WALL OF BLADDER: ICD-10-CM

## 2019-02-14 DIAGNOSIS — Z90.89 ACQUIRED ABSENCE OF OTHER ORGANS: Chronic | ICD-10-CM

## 2019-02-14 DIAGNOSIS — C67.9 MALIGNANT NEOPLASM OF BLADDER, UNSPECIFIED: Chronic | ICD-10-CM

## 2019-02-14 PROCEDURE — 88305 TISSUE EXAM BY PATHOLOGIST: CPT | Mod: 26

## 2019-02-14 PROCEDURE — 88307 TISSUE EXAM BY PATHOLOGIST: CPT | Mod: 26

## 2019-02-14 PROCEDURE — 52240 CYSTOSCOPY AND TREATMENT: CPT

## 2019-02-14 NOTE — ASU DISCHARGE PLAN (ADULT/PEDIATRIC). - MEDICATION SUMMARY - MEDICATIONS TO TAKE
I will START or STAY ON the medications listed below when I get home from the hospital:    Flomax 0.4 mg oral capsule  -- 2 cap(s) by mouth once a day  -- Indication: For home

## 2019-02-14 NOTE — ASU DISCHARGE PLAN (ADULT/PEDIATRIC). - NOTIFY
Bleeding that does not stop/Numbness, color, or temperature change to extremity/Persistent Nausea and Vomiting/Fever greater than 101 Unable to Urinate/Bleeding that does not stop/Numbness, color, or temperature change to extremity/Pain not relieved by Medications/Fever greater than 101/Persistent Nausea and Vomiting

## 2019-02-14 NOTE — ASU DISCHARGE PLAN (ADULT/PEDIATRIC). - INSTRUCTIONS
Progress to regular diet as tolerated.  Keep well hydrated. Call MD office for follow-up appointment

## 2019-02-14 NOTE — ASU DISCHARGE PLAN (ADULT/PEDIATRIC). - SPECIAL INSTRUCTIONS
Home with goins catheter.  goins to leg bag Large bag at night.  as per RN instructions  Follow up with Dr. Holland for gions removal on Tuesday 2/19  [phone]    Tylenol for pain as needed

## 2019-02-14 NOTE — ASU DISCHARGE PLAN (ADULT/PEDIATRIC). - ITEMS TO FOLLOWUP WITH YOUR PHYSICIAN'S
Home with goins catheter  Follow up with Dr. Holland for goins removal on Tuesday 2/19  [phone]

## 2019-02-15 LAB — SPECIMEN SOURCE: SIGNIFICANT CHANGE UP

## 2019-02-17 LAB — BACTERIA UR CULT: SIGNIFICANT CHANGE UP

## 2019-02-19 ENCOUNTER — APPOINTMENT (OUTPATIENT)
Dept: UROLOGY | Facility: CLINIC | Age: 49
End: 2019-02-19
Payer: COMMERCIAL

## 2019-02-19 VITALS
DIASTOLIC BLOOD PRESSURE: 97 MMHG | WEIGHT: 219 LBS | TEMPERATURE: 98.9 F | SYSTOLIC BLOOD PRESSURE: 175 MMHG | HEIGHT: 71 IN | BODY MASS INDEX: 30.66 KG/M2 | HEART RATE: 109 BPM | RESPIRATION RATE: 18 BRPM

## 2019-02-19 DIAGNOSIS — N13.8 BENIGN PROSTATIC HYPERPLASIA WITH LOWER URINARY TRACT SYMPMS: ICD-10-CM

## 2019-02-19 DIAGNOSIS — N40.1 BENIGN PROSTATIC HYPERPLASIA WITH LOWER URINARY TRACT SYMPMS: ICD-10-CM

## 2019-02-19 PROCEDURE — 99214 OFFICE O/P EST MOD 30 MIN: CPT

## 2019-02-22 LAB — SURGICAL PATHOLOGY STUDY: SIGNIFICANT CHANGE UP

## 2019-02-24 PROBLEM — N40.1 BENIGN LOCALIZED HYPERPLASIA OF PROSTATE WITH URINARY OBSTRUCTION: Status: ACTIVE | Noted: 2019-01-16

## 2019-02-24 NOTE — ADDENDUM
[FreeTextEntry1] : This note was authored by Radha Garcia working as a scribe for Dr. Del Holland.\par I, Dr. Del Holland, have reviewed the content of this note and confirm it is true and accurate. I personally performed the history and physical examination and made all the decisions.\par 2/19/19\par

## 2019-02-24 NOTE — PHYSICAL EXAM
[General Appearance - Well Developed] : well developed [General Appearance - Well Nourished] : well nourished [Normal Appearance] : normal appearance [Well Groomed] : well groomed [General Appearance - In No Acute Distress] : no acute distress [Abdomen Soft] : soft [Abdomen Tenderness] : non-tender [Costovertebral Angle Tenderness] : no ~M costovertebral angle tenderness [Edema] : no peripheral edema [] : no respiratory distress [Respiration, Rhythm And Depth] : normal respiratory rhythm and effort [Exaggerated Use Of Accessory Muscles For Inspiration] : no accessory muscle use [Oriented To Time, Place, And Person] : oriented to person, place, and time [Affect] : the affect was normal [Mood] : the mood was normal [Not Anxious] : not anxious [Normal Station and Gait] : the gait and station were normal for the patient's age [No Focal Deficits] : no focal deficits [No Palpable Adenopathy] : no palpable adenopathy [Heart Rate And Rhythm] : Heart rate and rhythm were normal [Skin Color & Pigmentation] : normal skin color and pigmentation [Skin Turgor] : supple [Cervical Lymph Nodes Enlarged Posterior Bilaterally] : posterior cervical [Cervical Lymph Nodes Enlarged Anterior Bilaterally] : anterior cervical [Supraclavicular Lymph Nodes Enlarged Bilaterally] : supraclavicular [FreeTextEntry1] : No dorsiflexion tenderness.

## 2019-02-24 NOTE — HISTORY OF PRESENT ILLNESS
[FreeTextEntry1] : H/o kidney stone about 3 years ago, passed spontaneously.\par 6 months ago had intermittent episodes of gross hematuria with vigorous exercise.  Frequency of hematuria worsened and then underwent CT a/p w/o contrast.  Findings showed a calcified lesion in the bladder.  Underwent bladder stone removal and TURBT on 12-31-18.  Path: HG TCC, pT1, no muscle in the specimen.  Sent home from TURBT with catheter, removed two days later.  Had three episodes of retention.  Now urine has been clear and voiding symptoms have improved.  Remains on tamsulosin. \par \par 2/19/19: The patient returned today s/p repeat TURBT five days ago by Dr. Villegas. Patient had a catheter placed after the procedure. Complains of difficulty walking and that the bottom of his feet and calf muscles hurt ever since the surgery. The pathology report isn't ready today. CT abdomen and pelvis from 2/7/19 findings showed no bladder mass and no evidence of metastatic disease. Denies tobacco use and family history of bladder cancer.

## 2019-02-25 ENCOUNTER — APPOINTMENT (OUTPATIENT)
Dept: UROLOGY | Facility: HOSPITAL | Age: 49
End: 2019-02-25

## 2019-02-26 ENCOUNTER — OTHER (OUTPATIENT)
Age: 49
End: 2019-02-26

## 2019-03-11 ENCOUNTER — RX RENEWAL (OUTPATIENT)
Age: 49
End: 2019-03-11

## 2019-03-21 ENCOUNTER — APPOINTMENT (OUTPATIENT)
Dept: UROLOGY | Facility: CLINIC | Age: 49
End: 2019-03-21
Payer: COMMERCIAL

## 2019-03-21 ENCOUNTER — OUTPATIENT (OUTPATIENT)
Dept: OUTPATIENT SERVICES | Facility: HOSPITAL | Age: 49
LOS: 1 days | End: 2019-03-21
Payer: COMMERCIAL

## 2019-03-21 VITALS
SYSTOLIC BLOOD PRESSURE: 139 MMHG | HEART RATE: 101 BPM | TEMPERATURE: 98.2 F | DIASTOLIC BLOOD PRESSURE: 91 MMHG | RESPIRATION RATE: 16 BRPM

## 2019-03-21 VITALS — SYSTOLIC BLOOD PRESSURE: 149 MMHG | DIASTOLIC BLOOD PRESSURE: 82 MMHG | HEART RATE: 98 BPM

## 2019-03-21 DIAGNOSIS — Z90.89 ACQUIRED ABSENCE OF OTHER ORGANS: Chronic | ICD-10-CM

## 2019-03-21 DIAGNOSIS — C67.9 MALIGNANT NEOPLASM OF BLADDER, UNSPECIFIED: Chronic | ICD-10-CM

## 2019-03-21 DIAGNOSIS — R35.0 FREQUENCY OF MICTURITION: ICD-10-CM

## 2019-03-21 PROCEDURE — 51720 TREATMENT OF BLADDER LESION: CPT

## 2019-03-21 RX ORDER — BACILLUS CALMETTE-GUERIN 50 MG/50ML
50 POWDER, FOR SUSPENSION INTRAVESICAL
Qty: 0 | Refills: 0 | Status: COMPLETED | OUTPATIENT
Start: 2019-03-21

## 2019-03-21 RX ORDER — BACILLUS CALMETTE-GUERIN 50 MG/50ML
50 POWDER, FOR SUSPENSION INTRAVESICAL
Qty: 1 | Refills: 0 | Status: COMPLETED | OUTPATIENT
Start: 2019-03-21 | End: 2019-03-21

## 2019-03-21 RX ADMIN — BACILLUS CALMETTE-GUERIN 0 MG: 50 POWDER, FOR SUSPENSION INTRAVESICAL at 00:00

## 2019-03-22 PROCEDURE — 51720 TREATMENT OF BLADDER LESION: CPT

## 2019-03-22 NOTE — H&P PST ADULT - HISTORY OF PRESENT ILLNESS
49 y/o male presents to [facility] for scheduled cystoscopy transurethral resection of the bladder tumor on 2/14/19. Patient s/p bladder ca and removal of mass 12/31/18 at [place], now requiring f/u. Secondary Intention Text (Leave Blank If You Do Not Want): The defect will heal with secondary intention.

## 2019-03-25 DIAGNOSIS — C67.2 MALIGNANT NEOPLASM OF LATERAL WALL OF BLADDER: ICD-10-CM

## 2019-03-28 ENCOUNTER — APPOINTMENT (OUTPATIENT)
Dept: UROLOGY | Facility: CLINIC | Age: 49
End: 2019-03-28
Payer: COMMERCIAL

## 2019-03-28 ENCOUNTER — OUTPATIENT (OUTPATIENT)
Dept: OUTPATIENT SERVICES | Facility: HOSPITAL | Age: 49
LOS: 1 days | End: 2019-03-28
Payer: COMMERCIAL

## 2019-03-28 VITALS
TEMPERATURE: 98.7 F | RESPIRATION RATE: 16 BRPM | SYSTOLIC BLOOD PRESSURE: 147 MMHG | HEIGHT: 71 IN | DIASTOLIC BLOOD PRESSURE: 90 MMHG | BODY MASS INDEX: 30.66 KG/M2 | WEIGHT: 219 LBS | HEART RATE: 102 BPM

## 2019-03-28 VITALS
TEMPERATURE: 98 F | RESPIRATION RATE: 16 BRPM | HEART RATE: 92 BPM | BODY MASS INDEX: 30.66 KG/M2 | HEIGHT: 71 IN | WEIGHT: 219 LBS | SYSTOLIC BLOOD PRESSURE: 145 MMHG | DIASTOLIC BLOOD PRESSURE: 95 MMHG

## 2019-03-28 DIAGNOSIS — Z90.89 ACQUIRED ABSENCE OF OTHER ORGANS: Chronic | ICD-10-CM

## 2019-03-28 DIAGNOSIS — R35.0 FREQUENCY OF MICTURITION: ICD-10-CM

## 2019-03-28 DIAGNOSIS — C67.9 MALIGNANT NEOPLASM OF BLADDER, UNSPECIFIED: Chronic | ICD-10-CM

## 2019-03-28 PROCEDURE — 51720 TREATMENT OF BLADDER LESION: CPT

## 2019-03-28 RX ORDER — BACILLUS CALMETTE-GUERIN 50 MG/50ML
50 POWDER, FOR SUSPENSION INTRAVESICAL
Qty: 0 | Refills: 0 | Status: COMPLETED | OUTPATIENT
Start: 2019-03-28

## 2019-03-28 RX ORDER — BACILLUS CALMETTE-GUERIN 50 MG/50ML
50 POWDER, FOR SUSPENSION INTRAVESICAL
Qty: 1 | Refills: 0 | Status: COMPLETED | OUTPATIENT
Start: 2019-03-28 | End: 2019-03-28

## 2019-03-28 RX ADMIN — BACILLUS CALMETTE-GUERIN 0 MG: 50 POWDER, FOR SUSPENSION INTRAVESICAL at 00:00

## 2019-04-04 ENCOUNTER — APPOINTMENT (OUTPATIENT)
Dept: UROLOGY | Facility: CLINIC | Age: 49
End: 2019-04-04
Payer: COMMERCIAL

## 2019-04-04 ENCOUNTER — OUTPATIENT (OUTPATIENT)
Dept: OUTPATIENT SERVICES | Facility: HOSPITAL | Age: 49
LOS: 1 days | End: 2019-04-04
Payer: COMMERCIAL

## 2019-04-04 VITALS
TEMPERATURE: 96.7 F | WEIGHT: 219 LBS | OXYGEN SATURATION: 96 % | HEART RATE: 92 BPM | BODY MASS INDEX: 30.66 KG/M2 | DIASTOLIC BLOOD PRESSURE: 84 MMHG | SYSTOLIC BLOOD PRESSURE: 150 MMHG | RESPIRATION RATE: 16 BRPM | HEIGHT: 71 IN

## 2019-04-04 DIAGNOSIS — C67.9 MALIGNANT NEOPLASM OF BLADDER, UNSPECIFIED: Chronic | ICD-10-CM

## 2019-04-04 DIAGNOSIS — R35.0 FREQUENCY OF MICTURITION: ICD-10-CM

## 2019-04-04 DIAGNOSIS — Z90.89 ACQUIRED ABSENCE OF OTHER ORGANS: Chronic | ICD-10-CM

## 2019-04-04 DIAGNOSIS — C67.2 MALIGNANT NEOPLASM OF LATERAL WALL OF BLADDER: ICD-10-CM

## 2019-04-04 PROCEDURE — 51720 TREATMENT OF BLADDER LESION: CPT

## 2019-04-04 RX ORDER — BACILLUS CALMETTE-GUERIN 50 MG/50ML
50 POWDER, FOR SUSPENSION INTRAVESICAL
Qty: 1 | Refills: 0 | Status: COMPLETED | OUTPATIENT
Start: 2019-04-04 | End: 2019-04-04

## 2019-04-04 RX ORDER — BACILLUS CALMETTE-GUERIN 50 MG/50ML
50 POWDER, FOR SUSPENSION INTRAVESICAL
Qty: 0 | Refills: 0 | Status: COMPLETED | OUTPATIENT
Start: 2019-04-04

## 2019-04-04 RX ADMIN — BACILLUS CALMETTE-GUERIN 0 MG: 50 POWDER, FOR SUSPENSION INTRAVESICAL at 00:00

## 2019-04-11 ENCOUNTER — OUTPATIENT (OUTPATIENT)
Dept: OUTPATIENT SERVICES | Facility: HOSPITAL | Age: 49
LOS: 1 days | End: 2019-04-11
Payer: COMMERCIAL

## 2019-04-11 ENCOUNTER — APPOINTMENT (OUTPATIENT)
Dept: UROLOGY | Facility: CLINIC | Age: 49
End: 2019-04-11
Payer: COMMERCIAL

## 2019-04-11 VITALS
TEMPERATURE: 98.7 F | OXYGEN SATURATION: 97 % | HEIGHT: 71 IN | HEART RATE: 87 BPM | SYSTOLIC BLOOD PRESSURE: 147 MMHG | BODY MASS INDEX: 30.66 KG/M2 | RESPIRATION RATE: 16 BRPM | WEIGHT: 219 LBS | DIASTOLIC BLOOD PRESSURE: 89 MMHG

## 2019-04-11 VITALS
TEMPERATURE: 97.7 F | WEIGHT: 219 LBS | OXYGEN SATURATION: 97 % | BODY MASS INDEX: 30.66 KG/M2 | HEIGHT: 71 IN | SYSTOLIC BLOOD PRESSURE: 139 MMHG | DIASTOLIC BLOOD PRESSURE: 84 MMHG | RESPIRATION RATE: 16 BRPM | HEART RATE: 94 BPM

## 2019-04-11 DIAGNOSIS — C67.2 MALIGNANT NEOPLASM OF LATERAL WALL OF BLADDER: ICD-10-CM

## 2019-04-11 DIAGNOSIS — R35.0 FREQUENCY OF MICTURITION: ICD-10-CM

## 2019-04-11 DIAGNOSIS — C67.9 MALIGNANT NEOPLASM OF BLADDER, UNSPECIFIED: Chronic | ICD-10-CM

## 2019-04-11 DIAGNOSIS — Z90.89 ACQUIRED ABSENCE OF OTHER ORGANS: Chronic | ICD-10-CM

## 2019-04-11 PROCEDURE — 51720 TREATMENT OF BLADDER LESION: CPT

## 2019-04-11 RX ORDER — GEMCITABINE 2 G/50ML
2 INJECTION, POWDER, LYOPHILIZED, FOR SOLUTION INTRAVENOUS
Qty: 1 | Refills: 0 | Status: DISCONTINUED | OUTPATIENT
Start: 2019-04-11 | End: 2019-04-11

## 2019-04-11 RX ORDER — BACILLUS CALMETTE-GUERIN 50 MG/50ML
50 POWDER, FOR SUSPENSION INTRAVESICAL
Qty: 0 | Refills: 0 | Status: COMPLETED | OUTPATIENT
Start: 2019-04-11

## 2019-04-11 RX ADMIN — BACILLUS CALMETTE-GUERIN 0 MG: 50 POWDER, FOR SUSPENSION INTRAVESICAL at 00:00

## 2019-04-12 DIAGNOSIS — C67.2 MALIGNANT NEOPLASM OF LATERAL WALL OF BLADDER: ICD-10-CM

## 2019-04-12 RX ORDER — BACILLUS CALMETTE-GUERIN 50 MG/50ML
50 POWDER, FOR SUSPENSION INTRAVESICAL
Qty: 1 | Refills: 0 | Status: DISCONTINUED | OUTPATIENT
Start: 2019-04-11 | End: 2019-04-12

## 2019-04-18 ENCOUNTER — OUTPATIENT (OUTPATIENT)
Dept: OUTPATIENT SERVICES | Facility: HOSPITAL | Age: 49
LOS: 1 days | End: 2019-04-18
Payer: COMMERCIAL

## 2019-04-18 ENCOUNTER — APPOINTMENT (OUTPATIENT)
Dept: UROLOGY | Facility: CLINIC | Age: 49
End: 2019-04-18
Payer: COMMERCIAL

## 2019-04-18 VITALS — HEART RATE: 94 BPM | SYSTOLIC BLOOD PRESSURE: 121 MMHG | DIASTOLIC BLOOD PRESSURE: 75 MMHG

## 2019-04-18 VITALS
DIASTOLIC BLOOD PRESSURE: 90 MMHG | HEART RATE: 91 BPM | RESPIRATION RATE: 16 BRPM | TEMPERATURE: 98 F | SYSTOLIC BLOOD PRESSURE: 150 MMHG

## 2019-04-18 DIAGNOSIS — C67.9 MALIGNANT NEOPLASM OF BLADDER, UNSPECIFIED: Chronic | ICD-10-CM

## 2019-04-18 DIAGNOSIS — Z90.89 ACQUIRED ABSENCE OF OTHER ORGANS: Chronic | ICD-10-CM

## 2019-04-18 DIAGNOSIS — R35.0 FREQUENCY OF MICTURITION: ICD-10-CM

## 2019-04-18 PROCEDURE — 51720 TREATMENT OF BLADDER LESION: CPT

## 2019-04-18 RX ORDER — BACILLUS CALMETTE-GUERIN 50 MG/50ML
50 POWDER, FOR SUSPENSION INTRAVESICAL
Qty: 0 | Refills: 0 | Status: COMPLETED | OUTPATIENT
Start: 2019-04-18

## 2019-04-18 RX ORDER — BACILLUS CALMETTE-GUERIN 50 MG/50ML
50 POWDER, FOR SUSPENSION INTRAVESICAL
Qty: 1 | Refills: 0 | Status: COMPLETED | OUTPATIENT
Start: 2019-04-18 | End: 2019-04-18

## 2019-04-18 RX ADMIN — BACILLUS CALMETTE-GUERIN 0 MG: 50 POWDER, FOR SUSPENSION INTRAVESICAL at 00:00

## 2019-04-24 DIAGNOSIS — C67.2 MALIGNANT NEOPLASM OF LATERAL WALL OF BLADDER: ICD-10-CM

## 2019-04-30 ENCOUNTER — OUTPATIENT (OUTPATIENT)
Dept: OUTPATIENT SERVICES | Facility: HOSPITAL | Age: 49
LOS: 1 days | End: 2019-04-30
Payer: COMMERCIAL

## 2019-04-30 ENCOUNTER — APPOINTMENT (OUTPATIENT)
Dept: UROLOGY | Facility: CLINIC | Age: 49
End: 2019-04-30
Payer: COMMERCIAL

## 2019-04-30 VITALS — SYSTOLIC BLOOD PRESSURE: 126 MMHG | DIASTOLIC BLOOD PRESSURE: 76 MMHG | HEART RATE: 86 BPM

## 2019-04-30 VITALS — RESPIRATION RATE: 18 BRPM | HEART RATE: 91 BPM | SYSTOLIC BLOOD PRESSURE: 151 MMHG | DIASTOLIC BLOOD PRESSURE: 92 MMHG

## 2019-04-30 DIAGNOSIS — Z90.89 ACQUIRED ABSENCE OF OTHER ORGANS: Chronic | ICD-10-CM

## 2019-04-30 DIAGNOSIS — R35.0 FREQUENCY OF MICTURITION: ICD-10-CM

## 2019-04-30 DIAGNOSIS — C67.9 MALIGNANT NEOPLASM OF BLADDER, UNSPECIFIED: Chronic | ICD-10-CM

## 2019-04-30 PROCEDURE — 51720 TREATMENT OF BLADDER LESION: CPT

## 2019-04-30 RX ORDER — BACILLUS CALMETTE-GUERIN 50 MG/50ML
50 POWDER, FOR SUSPENSION INTRAVESICAL
Qty: 0 | Refills: 0 | Status: COMPLETED | OUTPATIENT
Start: 2019-04-30

## 2019-04-30 RX ORDER — BACILLUS CALMETTE-GUERIN 50 MG/50ML
50 POWDER, FOR SUSPENSION INTRAVESICAL
Qty: 1 | Refills: 0 | Status: COMPLETED | OUTPATIENT
Start: 2019-04-30 | End: 2019-04-30

## 2019-04-30 RX ADMIN — Medication 0 MG: at 00:00

## 2019-05-07 ENCOUNTER — APPOINTMENT (OUTPATIENT)
Dept: UROLOGY | Facility: CLINIC | Age: 49
End: 2019-05-07

## 2019-05-14 DIAGNOSIS — C67.2 MALIGNANT NEOPLASM OF LATERAL WALL OF BLADDER: ICD-10-CM

## 2019-08-06 ENCOUNTER — APPOINTMENT (OUTPATIENT)
Dept: UROLOGY | Facility: CLINIC | Age: 49
End: 2019-08-06
Payer: COMMERCIAL

## 2019-08-06 ENCOUNTER — OUTPATIENT (OUTPATIENT)
Dept: OUTPATIENT SERVICES | Facility: HOSPITAL | Age: 49
LOS: 1 days | End: 2019-08-06
Payer: COMMERCIAL

## 2019-08-06 DIAGNOSIS — Z90.89 ACQUIRED ABSENCE OF OTHER ORGANS: Chronic | ICD-10-CM

## 2019-08-06 DIAGNOSIS — C67.9 MALIGNANT NEOPLASM OF BLADDER, UNSPECIFIED: Chronic | ICD-10-CM

## 2019-08-06 DIAGNOSIS — R35.0 FREQUENCY OF MICTURITION: ICD-10-CM

## 2019-08-06 PROCEDURE — 52000 CYSTOURETHROSCOPY: CPT

## 2019-08-09 LAB
ANION GAP SERPL CALC-SCNC: 22 MMOL/L
BASOPHILS # BLD AUTO: 0.04 K/UL
BASOPHILS NFR BLD AUTO: 0.4 %
BUN SERPL-MCNC: 14 MG/DL
CALCIUM SERPL-MCNC: 10 MG/DL
CHLORIDE SERPL-SCNC: 105 MMOL/L
CO2 SERPL-SCNC: 17 MMOL/L
CREAT SERPL-MCNC: 1.12 MG/DL
EOSINOPHIL # BLD AUTO: 0.06 K/UL
EOSINOPHIL NFR BLD AUTO: 0.6 %
GLUCOSE SERPL-MCNC: 77 MG/DL
HCT VFR BLD CALC: 48 %
HGB BLD-MCNC: 15.8 G/DL
IMM GRANULOCYTES NFR BLD AUTO: 0.5 %
LYMPHOCYTES # BLD AUTO: 2.56 K/UL
LYMPHOCYTES NFR BLD AUTO: 26.1 %
MAN DIFF?: NORMAL
MCHC RBC-ENTMCNC: 30.4 PG
MCHC RBC-ENTMCNC: 32.9 GM/DL
MCV RBC AUTO: 92.3 FL
MONOCYTES # BLD AUTO: 1 K/UL
MONOCYTES NFR BLD AUTO: 10.2 %
NEUTROPHILS # BLD AUTO: 6.11 K/UL
NEUTROPHILS NFR BLD AUTO: 62.2 %
PLATELET # BLD AUTO: 212 K/UL
POTASSIUM SERPL-SCNC: 3.8 MMOL/L
RBC # BLD: 5.2 M/UL
RBC # FLD: 13.5 %
SODIUM SERPL-SCNC: 144 MMOL/L
TESTOST BND SERPL-MCNC: 14.2 PG/ML
TESTOST SERPL-MCNC: 419 NG/DL
URINE CYTOLOGY: NORMAL
WBC # FLD AUTO: 9.82 K/UL

## 2019-08-13 DIAGNOSIS — C67.2 MALIGNANT NEOPLASM OF LATERAL WALL OF BLADDER: ICD-10-CM

## 2019-11-13 ENCOUNTER — OTHER (OUTPATIENT)
Age: 49
End: 2019-11-13

## 2019-12-23 ENCOUNTER — OUTPATIENT (OUTPATIENT)
Dept: OUTPATIENT SERVICES | Facility: HOSPITAL | Age: 49
LOS: 1 days | End: 2019-12-23

## 2019-12-23 VITALS
DIASTOLIC BLOOD PRESSURE: 82 MMHG | SYSTOLIC BLOOD PRESSURE: 140 MMHG | HEART RATE: 94 BPM | HEIGHT: 70 IN | RESPIRATION RATE: 16 BRPM | OXYGEN SATURATION: 98 % | WEIGHT: 220.02 LBS | TEMPERATURE: 99 F

## 2019-12-23 DIAGNOSIS — Z90.89 ACQUIRED ABSENCE OF OTHER ORGANS: Chronic | ICD-10-CM

## 2019-12-23 DIAGNOSIS — C67.9 MALIGNANT NEOPLASM OF BLADDER, UNSPECIFIED: Chronic | ICD-10-CM

## 2019-12-23 DIAGNOSIS — C67.2 MALIGNANT NEOPLASM OF LATERAL WALL OF BLADDER: ICD-10-CM

## 2019-12-23 DIAGNOSIS — T78.40XA ALLERGY, UNSPECIFIED, INITIAL ENCOUNTER: ICD-10-CM

## 2019-12-23 LAB
ANION GAP SERPL CALC-SCNC: 12 MMO/L — SIGNIFICANT CHANGE UP (ref 7–14)
BASOPHILS # BLD AUTO: 0.04 K/UL — SIGNIFICANT CHANGE UP (ref 0–0.2)
BASOPHILS NFR BLD AUTO: 0.4 % — SIGNIFICANT CHANGE UP (ref 0–2)
BUN SERPL-MCNC: 16 MG/DL — SIGNIFICANT CHANGE UP (ref 7–23)
CALCIUM SERPL-MCNC: 10 MG/DL — SIGNIFICANT CHANGE UP (ref 8.4–10.5)
CHLORIDE SERPL-SCNC: 101 MMOL/L — SIGNIFICANT CHANGE UP (ref 98–107)
CO2 SERPL-SCNC: 27 MMOL/L — SIGNIFICANT CHANGE UP (ref 22–31)
CREAT SERPL-MCNC: 1.15 MG/DL — SIGNIFICANT CHANGE UP (ref 0.5–1.3)
EOSINOPHIL # BLD AUTO: 0.07 K/UL — SIGNIFICANT CHANGE UP (ref 0–0.5)
EOSINOPHIL NFR BLD AUTO: 0.7 % — SIGNIFICANT CHANGE UP (ref 0–6)
GLUCOSE SERPL-MCNC: 63 MG/DL — LOW (ref 70–99)
HCT VFR BLD CALC: 48.5 % — SIGNIFICANT CHANGE UP (ref 39–50)
HGB BLD-MCNC: 16 G/DL — SIGNIFICANT CHANGE UP (ref 13–17)
IMM GRANULOCYTES NFR BLD AUTO: 0.3 % — SIGNIFICANT CHANGE UP (ref 0–1.5)
LYMPHOCYTES # BLD AUTO: 29.6 % — SIGNIFICANT CHANGE UP (ref 13–44)
LYMPHOCYTES # BLD AUTO: 3.04 K/UL — SIGNIFICANT CHANGE UP (ref 1–3.3)
MCHC RBC-ENTMCNC: 31.1 PG — SIGNIFICANT CHANGE UP (ref 27–34)
MCHC RBC-ENTMCNC: 33 % — SIGNIFICANT CHANGE UP (ref 32–36)
MCV RBC AUTO: 94.4 FL — SIGNIFICANT CHANGE UP (ref 80–100)
MONOCYTES # BLD AUTO: 0.94 K/UL — HIGH (ref 0–0.9)
MONOCYTES NFR BLD AUTO: 9.2 % — SIGNIFICANT CHANGE UP (ref 2–14)
NEUTROPHILS # BLD AUTO: 6.15 K/UL — SIGNIFICANT CHANGE UP (ref 1.8–7.4)
NEUTROPHILS NFR BLD AUTO: 59.8 % — SIGNIFICANT CHANGE UP (ref 43–77)
NRBC # FLD: 0 K/UL — SIGNIFICANT CHANGE UP (ref 0–0)
PLATELET # BLD AUTO: 181 K/UL — SIGNIFICANT CHANGE UP (ref 150–400)
PMV BLD: 13.5 FL — HIGH (ref 7–13)
POTASSIUM SERPL-MCNC: 3.6 MMOL/L — SIGNIFICANT CHANGE UP (ref 3.5–5.3)
POTASSIUM SERPL-SCNC: 3.6 MMOL/L — SIGNIFICANT CHANGE UP (ref 3.5–5.3)
RBC # BLD: 5.14 M/UL — SIGNIFICANT CHANGE UP (ref 4.2–5.8)
RBC # FLD: 13 % — SIGNIFICANT CHANGE UP (ref 10.3–14.5)
SODIUM SERPL-SCNC: 140 MMOL/L — SIGNIFICANT CHANGE UP (ref 135–145)
WBC # BLD: 10.27 K/UL — SIGNIFICANT CHANGE UP (ref 3.8–10.5)
WBC # FLD AUTO: 10.27 K/UL — SIGNIFICANT CHANGE UP (ref 3.8–10.5)

## 2019-12-23 NOTE — H&P PST ADULT - NSICDXPROBLEM_GEN_ALL_CORE_FT
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   Verbal and written pre-op instructions provided to patient. Patient verbalized understanding.   Pepcid for GI prophylaxis provided. 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   Verbal and written pre-op instructions provided to patient. Patient verbalized understanding.   Pepcid for GI prophylaxis provided.       Problem: Allergy  Assessment and Plan: OR booking notified of allergies.

## 2019-12-23 NOTE — H&P PST ADULT - HISTORY OF PRESENT ILLNESS
50 y/o male with history of bladder cancer presents to [facility] for scheduled for cystoscopy, bladder biopsy on 01/10/2020.  Patient s/p bladder ca and removal of mass December 2018, February 2019, now requiring f/u.

## 2019-12-23 NOTE — H&P PST ADULT - NSICDXFAMILYHX_GEN_ALL_CORE_FT
FAMILY HISTORY:  Father  Still living? Yes, Estimated age: Age Unknown  Family history of Parkinson's disease, Age at diagnosis: Age Unknown    Mother  Still living? Yes, Estimated age: Age Unknown  Family history of dementia, Age at diagnosis: Age Unknown  Family history of hypertension in mother, Age at diagnosis: Age Unknown

## 2019-12-23 NOTE — H&P PST ADULT - NEGATIVE GENERAL GENITOURINARY SYMPTOMS
normal urinary frequency/no flank pain L/no hematuria/no urinary hesitancy/no flank pain R/no dysuria

## 2019-12-23 NOTE — H&P PST ADULT - MUSCULOSKELETAL
details… detailed exam normal strength/ROM intact/no joint warmth/no joint swelling/no joint erythema

## 2019-12-25 LAB
BACTERIA UR CULT: SIGNIFICANT CHANGE UP
SPECIMEN SOURCE: SIGNIFICANT CHANGE UP

## 2019-12-30 ENCOUNTER — CHART COPY (OUTPATIENT)
Age: 49
End: 2019-12-30

## 2020-01-05 ENCOUNTER — EMERGENCY (EMERGENCY)
Facility: HOSPITAL | Age: 50
LOS: 1 days | Discharge: ANOTHER HOSPITAL | End: 2020-01-05
Attending: EMERGENCY MEDICINE | Admitting: EMERGENCY MEDICINE
Payer: COMMERCIAL

## 2020-01-05 ENCOUNTER — INPATIENT (INPATIENT)
Facility: HOSPITAL | Age: 50
LOS: 2 days | Discharge: HOME | End: 2020-01-08
Attending: UROLOGY | Admitting: UROLOGY
Payer: COMMERCIAL

## 2020-01-05 VITALS
DIASTOLIC BLOOD PRESSURE: 90 MMHG | HEART RATE: 81 BPM | TEMPERATURE: 97 F | SYSTOLIC BLOOD PRESSURE: 181 MMHG | HEIGHT: 70 IN | RESPIRATION RATE: 16 BRPM | OXYGEN SATURATION: 91 %

## 2020-01-05 VITALS
SYSTOLIC BLOOD PRESSURE: 142 MMHG | HEIGHT: 70 IN | TEMPERATURE: 97 F | WEIGHT: 220.02 LBS | HEART RATE: 82 BPM | RESPIRATION RATE: 20 BRPM | OXYGEN SATURATION: 98 % | DIASTOLIC BLOOD PRESSURE: 89 MMHG

## 2020-01-05 VITALS
TEMPERATURE: 98 F | SYSTOLIC BLOOD PRESSURE: 136 MMHG | RESPIRATION RATE: 169 BRPM | DIASTOLIC BLOOD PRESSURE: 70 MMHG | HEART RATE: 82 BPM

## 2020-01-05 DIAGNOSIS — N12 TUBULO-INTERSTITIAL NEPHRITIS, NOT SPECIFIED AS ACUTE OR CHRONIC: ICD-10-CM

## 2020-01-05 DIAGNOSIS — Z90.89 ACQUIRED ABSENCE OF OTHER ORGANS: Chronic | ICD-10-CM

## 2020-01-05 DIAGNOSIS — C67.9 MALIGNANT NEOPLASM OF BLADDER, UNSPECIFIED: Chronic | ICD-10-CM

## 2020-01-05 LAB
ALBUMIN SERPL ELPH-MCNC: 4.2 G/DL — SIGNIFICANT CHANGE UP (ref 3.3–5)
ALBUMIN SERPL ELPH-MCNC: 4.2 G/DL — SIGNIFICANT CHANGE UP (ref 3.3–5)
ALP SERPL-CCNC: 59 U/L — SIGNIFICANT CHANGE UP (ref 40–120)
ALP SERPL-CCNC: 60 U/L — SIGNIFICANT CHANGE UP (ref 30–120)
ALT FLD-CCNC: 40 U/L — SIGNIFICANT CHANGE UP (ref 4–41)
ALT FLD-CCNC: 67 U/L DA — HIGH (ref 10–60)
ANION GAP SERPL CALC-SCNC: 11 MMOL/L — SIGNIFICANT CHANGE UP (ref 5–17)
ANION GAP SERPL CALC-SCNC: 20 MMO/L — HIGH (ref 7–14)
APPEARANCE UR: ABNORMAL
AST SERPL-CCNC: 27 U/L — SIGNIFICANT CHANGE UP (ref 10–40)
AST SERPL-CCNC: 36 U/L — SIGNIFICANT CHANGE UP (ref 4–40)
BACTERIA # UR AUTO: ABNORMAL
BASOPHILS # BLD AUTO: 0.03 K/UL — SIGNIFICANT CHANGE UP (ref 0–0.2)
BASOPHILS # BLD AUTO: 0.04 K/UL — SIGNIFICANT CHANGE UP (ref 0–0.2)
BASOPHILS NFR BLD AUTO: 0.2 % — SIGNIFICANT CHANGE UP (ref 0–2)
BASOPHILS NFR BLD AUTO: 0.4 % — SIGNIFICANT CHANGE UP (ref 0–2)
BILIRUB SERPL-MCNC: 0.5 MG/DL — SIGNIFICANT CHANGE UP (ref 0.2–1.2)
BILIRUB SERPL-MCNC: 0.6 MG/DL — SIGNIFICANT CHANGE UP (ref 0.2–1.2)
BILIRUB UR-MCNC: NEGATIVE — SIGNIFICANT CHANGE UP
BUN SERPL-MCNC: 20 MG/DL — SIGNIFICANT CHANGE UP (ref 7–23)
BUN SERPL-MCNC: 22 MG/DL — SIGNIFICANT CHANGE UP (ref 7–23)
CALCIUM SERPL-MCNC: 9.1 MG/DL — SIGNIFICANT CHANGE UP (ref 8.4–10.5)
CALCIUM SERPL-MCNC: 9.3 MG/DL — SIGNIFICANT CHANGE UP (ref 8.4–10.5)
CHLORIDE SERPL-SCNC: 103 MMOL/L — SIGNIFICANT CHANGE UP (ref 96–108)
CHLORIDE SERPL-SCNC: 104 MMOL/L — SIGNIFICANT CHANGE UP (ref 98–107)
CO2 SERPL-SCNC: 16 MMOL/L — LOW (ref 22–31)
CO2 SERPL-SCNC: 25 MMOL/L — SIGNIFICANT CHANGE UP (ref 22–31)
COLOR SPEC: YELLOW — SIGNIFICANT CHANGE UP
CREAT SERPL-MCNC: 1.46 MG/DL — HIGH (ref 0.5–1.3)
CREAT SERPL-MCNC: 1.72 MG/DL — HIGH (ref 0.5–1.3)
DIFF PNL FLD: ABNORMAL
EOSINOPHIL # BLD AUTO: 0.01 K/UL — SIGNIFICANT CHANGE UP (ref 0–0.5)
EOSINOPHIL # BLD AUTO: 0.08 K/UL — SIGNIFICANT CHANGE UP (ref 0–0.5)
EOSINOPHIL NFR BLD AUTO: 0.1 % — SIGNIFICANT CHANGE UP (ref 0–6)
EOSINOPHIL NFR BLD AUTO: 0.7 % — SIGNIFICANT CHANGE UP (ref 0–6)
EPI CELLS # UR: ABNORMAL
GLUCOSE SERPL-MCNC: 154 MG/DL — HIGH (ref 70–99)
GLUCOSE SERPL-MCNC: 95 MG/DL — SIGNIFICANT CHANGE UP (ref 70–99)
GLUCOSE UR QL: NEGATIVE MG/DL — SIGNIFICANT CHANGE UP
HCT VFR BLD CALC: 44.4 % — SIGNIFICANT CHANGE UP (ref 39–50)
HCT VFR BLD CALC: 47.6 % — SIGNIFICANT CHANGE UP (ref 39–50)
HGB BLD-MCNC: 15 G/DL — SIGNIFICANT CHANGE UP (ref 13–17)
HGB BLD-MCNC: 16 G/DL — SIGNIFICANT CHANGE UP (ref 13–17)
IMM GRANULOCYTES NFR BLD AUTO: 0.5 % — SIGNIFICANT CHANGE UP (ref 0–1.5)
IMM GRANULOCYTES NFR BLD AUTO: 0.6 % — SIGNIFICANT CHANGE UP (ref 0–1.5)
KETONES UR-MCNC: ABNORMAL
LEUKOCYTE ESTERASE UR-ACNC: ABNORMAL
LIDOCAIN IGE QN: 73 U/L — SIGNIFICANT CHANGE UP (ref 73–393)
LYMPHOCYTES # BLD AUTO: 1.54 K/UL — SIGNIFICANT CHANGE UP (ref 1–3.3)
LYMPHOCYTES # BLD AUTO: 10.3 % — LOW (ref 13–44)
LYMPHOCYTES # BLD AUTO: 27.7 % — SIGNIFICANT CHANGE UP (ref 13–44)
LYMPHOCYTES # BLD AUTO: 3.11 K/UL — SIGNIFICANT CHANGE UP (ref 1–3.3)
MCHC RBC-ENTMCNC: 30.5 PG — SIGNIFICANT CHANGE UP (ref 27–34)
MCHC RBC-ENTMCNC: 30.7 PG — SIGNIFICANT CHANGE UP (ref 27–34)
MCHC RBC-ENTMCNC: 33.6 GM/DL — SIGNIFICANT CHANGE UP (ref 32–36)
MCHC RBC-ENTMCNC: 33.8 % — SIGNIFICANT CHANGE UP (ref 32–36)
MCV RBC AUTO: 90.4 FL — SIGNIFICANT CHANGE UP (ref 80–100)
MCV RBC AUTO: 91.4 FL — SIGNIFICANT CHANGE UP (ref 80–100)
MONOCYTES # BLD AUTO: 0.74 K/UL — SIGNIFICANT CHANGE UP (ref 0–0.9)
MONOCYTES # BLD AUTO: 1.15 K/UL — HIGH (ref 0–0.9)
MONOCYTES NFR BLD AUTO: 6.6 % — SIGNIFICANT CHANGE UP (ref 2–14)
MONOCYTES NFR BLD AUTO: 7.7 % — SIGNIFICANT CHANGE UP (ref 2–14)
NEUTROPHILS # BLD AUTO: 12.12 K/UL — HIGH (ref 1.8–7.4)
NEUTROPHILS # BLD AUTO: 7.19 K/UL — SIGNIFICANT CHANGE UP (ref 1.8–7.4)
NEUTROPHILS NFR BLD AUTO: 64 % — SIGNIFICANT CHANGE UP (ref 43–77)
NEUTROPHILS NFR BLD AUTO: 81.2 % — HIGH (ref 43–77)
NITRITE UR-MCNC: POSITIVE
NRBC # BLD: 0 /100 WBCS — SIGNIFICANT CHANGE UP (ref 0–0)
NRBC # FLD: 0 K/UL — SIGNIFICANT CHANGE UP (ref 0–0)
PH UR: 5 — SIGNIFICANT CHANGE UP (ref 5–8)
PLATELET # BLD AUTO: 150 K/UL — SIGNIFICANT CHANGE UP (ref 150–400)
PLATELET # BLD AUTO: 189 K/UL — SIGNIFICANT CHANGE UP (ref 150–400)
PMV BLD: 12 FL — SIGNIFICANT CHANGE UP (ref 7–13)
POTASSIUM SERPL-MCNC: 4 MMOL/L — SIGNIFICANT CHANGE UP (ref 3.5–5.3)
POTASSIUM SERPL-MCNC: 4.4 MMOL/L — SIGNIFICANT CHANGE UP (ref 3.5–5.3)
POTASSIUM SERPL-SCNC: 4 MMOL/L — SIGNIFICANT CHANGE UP (ref 3.5–5.3)
POTASSIUM SERPL-SCNC: 4.4 MMOL/L — SIGNIFICANT CHANGE UP (ref 3.5–5.3)
PROT SERPL-MCNC: 6.9 G/DL — SIGNIFICANT CHANGE UP (ref 6–8.3)
PROT SERPL-MCNC: 7.9 G/DL — SIGNIFICANT CHANGE UP (ref 6–8.3)
PROT UR-MCNC: 30 MG/DL
RBC # BLD: 4.91 M/UL — SIGNIFICANT CHANGE UP (ref 4.2–5.8)
RBC # BLD: 5.21 M/UL — SIGNIFICANT CHANGE UP (ref 4.2–5.8)
RBC # FLD: 13 % — SIGNIFICANT CHANGE UP (ref 10.3–14.5)
RBC # FLD: 13.1 % — SIGNIFICANT CHANGE UP (ref 10.3–14.5)
RBC CASTS # UR COMP ASSIST: ABNORMAL /HPF (ref 0–4)
SODIUM SERPL-SCNC: 139 MMOL/L — SIGNIFICANT CHANGE UP (ref 135–145)
SODIUM SERPL-SCNC: 140 MMOL/L — SIGNIFICANT CHANGE UP (ref 135–145)
SP GR SPEC: 1.02 — SIGNIFICANT CHANGE UP (ref 1.01–1.02)
UROBILINOGEN FLD QL: 1 MG/DL
WBC # BLD: 11.23 K/UL — HIGH (ref 3.8–10.5)
WBC # BLD: 14.92 K/UL — HIGH (ref 3.8–10.5)
WBC # FLD AUTO: 11.23 K/UL — HIGH (ref 3.8–10.5)
WBC # FLD AUTO: 14.92 K/UL — HIGH (ref 3.8–10.5)
WBC UR QL: ABNORMAL

## 2020-01-05 PROCEDURE — 36415 COLL VENOUS BLD VENIPUNCTURE: CPT

## 2020-01-05 PROCEDURE — 87086 URINE CULTURE/COLONY COUNT: CPT

## 2020-01-05 PROCEDURE — 99285 EMERGENCY DEPT VISIT HI MDM: CPT

## 2020-01-05 PROCEDURE — 99222 1ST HOSP IP/OBS MODERATE 55: CPT | Mod: AI,57

## 2020-01-05 PROCEDURE — 74176 CT ABD & PELVIS W/O CONTRAST: CPT

## 2020-01-05 PROCEDURE — 83690 ASSAY OF LIPASE: CPT

## 2020-01-05 PROCEDURE — 85027 COMPLETE CBC AUTOMATED: CPT

## 2020-01-05 PROCEDURE — 96375 TX/PRO/DX INJ NEW DRUG ADDON: CPT

## 2020-01-05 PROCEDURE — 74176 CT ABD & PELVIS W/O CONTRAST: CPT | Mod: 26

## 2020-01-05 PROCEDURE — 81001 URINALYSIS AUTO W/SCOPE: CPT

## 2020-01-05 PROCEDURE — 96376 TX/PRO/DX INJ SAME DRUG ADON: CPT

## 2020-01-05 PROCEDURE — 99285 EMERGENCY DEPT VISIT HI MDM: CPT | Mod: 25

## 2020-01-05 PROCEDURE — 80053 COMPREHEN METABOLIC PANEL: CPT

## 2020-01-05 PROCEDURE — 96365 THER/PROPH/DIAG IV INF INIT: CPT

## 2020-01-05 RX ORDER — MORPHINE SULFATE 50 MG/1
4 CAPSULE, EXTENDED RELEASE ORAL ONCE
Refills: 0 | Status: DISCONTINUED | OUTPATIENT
Start: 2020-01-05 | End: 2020-01-05

## 2020-01-05 RX ORDER — INFLUENZA VIRUS VACCINE 15; 15; 15; 15 UG/.5ML; UG/.5ML; UG/.5ML; UG/.5ML
0.5 SUSPENSION INTRAMUSCULAR ONCE
Refills: 0 | Status: COMPLETED | OUTPATIENT
Start: 2020-01-05 | End: 2020-01-05

## 2020-01-05 RX ORDER — SODIUM CHLORIDE 9 MG/ML
1000 INJECTION INTRAMUSCULAR; INTRAVENOUS; SUBCUTANEOUS ONCE
Refills: 0 | Status: COMPLETED | OUTPATIENT
Start: 2020-01-05 | End: 2020-01-05

## 2020-01-05 RX ORDER — KETOROLAC TROMETHAMINE 30 MG/ML
30 SYRINGE (ML) INJECTION ONCE
Refills: 0 | Status: DISCONTINUED | OUTPATIENT
Start: 2020-01-05 | End: 2020-01-05

## 2020-01-05 RX ORDER — HEPARIN SODIUM 5000 [USP'U]/ML
5000 INJECTION INTRAVENOUS; SUBCUTANEOUS EVERY 8 HOURS
Refills: 0 | Status: DISCONTINUED | OUTPATIENT
Start: 2020-01-05 | End: 2020-01-08

## 2020-01-05 RX ORDER — ACETAMINOPHEN 500 MG
650 TABLET ORAL EVERY 6 HOURS
Refills: 0 | Status: DISCONTINUED | OUTPATIENT
Start: 2020-01-05 | End: 2020-01-08

## 2020-01-05 RX ORDER — MORPHINE SULFATE 50 MG/1
6 CAPSULE, EXTENDED RELEASE ORAL EVERY 4 HOURS
Refills: 0 | Status: DISCONTINUED | OUTPATIENT
Start: 2020-01-05 | End: 2020-01-05

## 2020-01-05 RX ORDER — SENNA PLUS 8.6 MG/1
2 TABLET ORAL AT BEDTIME
Refills: 0 | Status: DISCONTINUED | OUTPATIENT
Start: 2020-01-05 | End: 2020-01-08

## 2020-01-05 RX ORDER — CEFTRIAXONE 500 MG/1
1000 INJECTION, POWDER, FOR SOLUTION INTRAMUSCULAR; INTRAVENOUS EVERY 24 HOURS
Refills: 0 | Status: DISCONTINUED | OUTPATIENT
Start: 2020-01-06 | End: 2020-01-07

## 2020-01-05 RX ORDER — ONDANSETRON 8 MG/1
4 TABLET, FILM COATED ORAL EVERY 6 HOURS
Refills: 0 | Status: DISCONTINUED | OUTPATIENT
Start: 2020-01-05 | End: 2020-01-08

## 2020-01-05 RX ORDER — OXYCODONE HYDROCHLORIDE 5 MG/1
5 TABLET ORAL EVERY 4 HOURS
Refills: 0 | Status: DISCONTINUED | OUTPATIENT
Start: 2020-01-05 | End: 2020-01-08

## 2020-01-05 RX ORDER — MORPHINE SULFATE 50 MG/1
2 CAPSULE, EXTENDED RELEASE ORAL ONCE
Refills: 0 | Status: DISCONTINUED | OUTPATIENT
Start: 2020-01-05 | End: 2020-01-05

## 2020-01-05 RX ORDER — CEFTRIAXONE 500 MG/1
1000 INJECTION, POWDER, FOR SOLUTION INTRAMUSCULAR; INTRAVENOUS ONCE
Refills: 0 | Status: COMPLETED | OUTPATIENT
Start: 2020-01-05 | End: 2020-01-05

## 2020-01-05 RX ORDER — ONDANSETRON 8 MG/1
4 TABLET, FILM COATED ORAL ONCE
Refills: 0 | Status: COMPLETED | OUTPATIENT
Start: 2020-01-05 | End: 2020-01-05

## 2020-01-05 RX ORDER — OXYCODONE HYDROCHLORIDE 5 MG/1
10 TABLET ORAL EVERY 6 HOURS
Refills: 0 | Status: DISCONTINUED | OUTPATIENT
Start: 2020-01-05 | End: 2020-01-08

## 2020-01-05 RX ORDER — TAMSULOSIN HYDROCHLORIDE 0.4 MG/1
0.4 CAPSULE ORAL AT BEDTIME
Refills: 0 | Status: DISCONTINUED | OUTPATIENT
Start: 2020-01-05 | End: 2020-01-08

## 2020-01-05 RX ORDER — SODIUM CHLORIDE 9 MG/ML
1000 INJECTION INTRAMUSCULAR; INTRAVENOUS; SUBCUTANEOUS
Refills: 0 | Status: DISCONTINUED | OUTPATIENT
Start: 2020-01-05 | End: 2020-01-07

## 2020-01-05 RX ORDER — MORPHINE SULFATE 50 MG/1
4 CAPSULE, EXTENDED RELEASE ORAL EVERY 4 HOURS
Refills: 0 | Status: DISCONTINUED | OUTPATIENT
Start: 2020-01-05 | End: 2020-01-05

## 2020-01-05 RX ADMIN — MORPHINE SULFATE 2 MILLIGRAM(S): 50 CAPSULE, EXTENDED RELEASE ORAL at 12:49

## 2020-01-05 RX ADMIN — Medication 30 MILLIGRAM(S): at 06:53

## 2020-01-05 RX ADMIN — MORPHINE SULFATE 2 MILLIGRAM(S): 50 CAPSULE, EXTENDED RELEASE ORAL at 08:12

## 2020-01-05 RX ADMIN — SODIUM CHLORIDE 1000 MILLILITER(S): 9 INJECTION INTRAMUSCULAR; INTRAVENOUS; SUBCUTANEOUS at 06:32

## 2020-01-05 RX ADMIN — MORPHINE SULFATE 4 MILLIGRAM(S): 50 CAPSULE, EXTENDED RELEASE ORAL at 17:54

## 2020-01-05 RX ADMIN — MORPHINE SULFATE 4 MILLIGRAM(S): 50 CAPSULE, EXTENDED RELEASE ORAL at 07:02

## 2020-01-05 RX ADMIN — SODIUM CHLORIDE 150 MILLILITER(S): 9 INJECTION INTRAMUSCULAR; INTRAVENOUS; SUBCUTANEOUS at 19:11

## 2020-01-05 RX ADMIN — SODIUM CHLORIDE 1000 MILLILITER(S): 9 INJECTION INTRAMUSCULAR; INTRAVENOUS; SUBCUTANEOUS at 17:54

## 2020-01-05 RX ADMIN — HEPARIN SODIUM 5000 UNIT(S): 5000 INJECTION INTRAVENOUS; SUBCUTANEOUS at 22:09

## 2020-01-05 RX ADMIN — ONDANSETRON 4 MILLIGRAM(S): 8 TABLET, FILM COATED ORAL at 06:23

## 2020-01-05 RX ADMIN — MORPHINE SULFATE 4 MILLIGRAM(S): 50 CAPSULE, EXTENDED RELEASE ORAL at 06:53

## 2020-01-05 RX ADMIN — MORPHINE SULFATE 4 MILLIGRAM(S): 50 CAPSULE, EXTENDED RELEASE ORAL at 22:09

## 2020-01-05 RX ADMIN — SODIUM CHLORIDE 1000 MILLILITER(S): 9 INJECTION INTRAMUSCULAR; INTRAVENOUS; SUBCUTANEOUS at 06:52

## 2020-01-05 RX ADMIN — MORPHINE SULFATE 2 MILLIGRAM(S): 50 CAPSULE, EXTENDED RELEASE ORAL at 13:04

## 2020-01-05 RX ADMIN — Medication 30 MILLIGRAM(S): at 06:23

## 2020-01-05 RX ADMIN — CEFTRIAXONE 1000 MILLIGRAM(S): 500 INJECTION, POWDER, FOR SOLUTION INTRAMUSCULAR; INTRAVENOUS at 08:32

## 2020-01-05 RX ADMIN — ONDANSETRON 4 MILLIGRAM(S): 8 TABLET, FILM COATED ORAL at 17:55

## 2020-01-05 RX ADMIN — TAMSULOSIN HYDROCHLORIDE 0.4 MILLIGRAM(S): 0.4 CAPSULE ORAL at 22:09

## 2020-01-05 RX ADMIN — MORPHINE SULFATE 4 MILLIGRAM(S): 50 CAPSULE, EXTENDED RELEASE ORAL at 22:30

## 2020-01-05 RX ADMIN — MORPHINE SULFATE 4 MILLIGRAM(S): 50 CAPSULE, EXTENDED RELEASE ORAL at 06:23

## 2020-01-05 RX ADMIN — ONDANSETRON 4 MILLIGRAM(S): 8 TABLET, FILM COATED ORAL at 13:08

## 2020-01-05 RX ADMIN — MORPHINE SULFATE 2 MILLIGRAM(S): 50 CAPSULE, EXTENDED RELEASE ORAL at 08:25

## 2020-01-05 RX ADMIN — MORPHINE SULFATE 4 MILLIGRAM(S): 50 CAPSULE, EXTENDED RELEASE ORAL at 06:32

## 2020-01-05 RX ADMIN — CEFTRIAXONE 100 MILLIGRAM(S): 500 INJECTION, POWDER, FOR SOLUTION INTRAMUSCULAR; INTRAVENOUS at 08:00

## 2020-01-05 NOTE — ED PROVIDER NOTE - OBJECTIVE STATEMENT
50y/o M w/ h/o bladder CA s/p chemotherapy (last dose 6mo ago), radiation and bladder mass removal transferred from OSH p/w R flank pain. Pt woke up at 2am this morning with R sided flank pain worse with movement went to [facility] and was found to have infected kidney stone. Was given morphine and Abx and is transferred to [facility] to see Dr. Villegas, patient's urologist. Currently has pain and nausea. Denies fevers, chills, vomiting, chest pain, sob, abdominal pain.

## 2020-01-05 NOTE — ED PROVIDER NOTE - PROGRESS NOTE DETAILS
case dw Edwin covering [place] , Tx to PV, but PV has 18 holds in the ED as per Gregor case dw Vera () and Ivis (ED at [facility])

## 2020-01-05 NOTE — ED PROVIDER NOTE - NS ED ROS FT
GENERAL: denies fever, chills  HEENT: denies headaches, lightheadedness, dizziness  CARDIAC: denies chest pain, palpitations  PULM: denies SOB, cough  GI: +nausea; denies abdominal pain, vomiting, diarrhea, constipation, melena, hematochezia  : +flank pain; denies dysuria, frequency, incontinence, hematuria  NEURO: denies motor weakness, sensory changes  MSK: denies joint, muscle pain  SKIN: denies new rashes, hives  HEME: denies active bleeding, bruising

## 2020-01-05 NOTE — ED ADULT TRIAGE NOTE - CHIEF COMPLAINT QUOTE
Pt a transfer from [place] for urology admission for right sided kidney stones. Pt needs renal stent. hx bladder cancer.

## 2020-01-05 NOTE — ED ADULT NURSE REASSESSMENT NOTE - NS ED NURSE REASSESS COMMENT FT1
antibiotic complete no s/s allergic reaction
no s/s allergic reaction
no s/s allergic reaction
pt feels better pain better pt pending transfer report to [facility] and pt to be transported within hour pt and family aware and agree with transfer
pain better after morphine but c/o mild nausea ems in ed to transfer pt and pt medicated for nausea prior to transfer
pt received on stretcher aaox3 skin warm and dry pains better after meds awaiting ct scan denies nausea
returned from ct scan c/o increase in pains antibiotics initiated and pt aware of possible cross sensitivity pt pending observation and dispo

## 2020-01-05 NOTE — ED PROVIDER NOTE - CHPI ED SYMPTOMS NEG
no chills/no blood in stool/no burning urination/no fever/no dysuria/no abdominal distension/no hematuria

## 2020-01-05 NOTE — PATIENT PROFILE ADULT - VISION (WITH CORRECTIVE LENSES IF THE PATIENT USUALLY WEARS THEM):
Normal vision: sees adequately in most situations; can see medication labels, newsprint
Multiple sclerosis  relapsing/remitting - recent exacerbation June 2015 required hospitalization & steroid treament  Ovarian cyst    Patient is [place]    Uterine leiomyoma

## 2020-01-05 NOTE — ED PROVIDER NOTE - PHYSICAL EXAMINATION
GENERAL: no acute distress, non-toxic appearing, AAOx3  HEAD: normocephalic, atraumatic  HEENT: normal conjunctiva, oral mucosa moist, neck supple  CARDIAC: regular rate and rhythm, normal S1 and S2, no appreciable murmurs  PULM: normal breath sounds, clear to ascultation bilaterally, no rales, rhonchi, or wheezing  GI: abdomen nondistended, soft, nontender, no guarding or rebound tenderness  : R sided CVA tenderness, no suprapubic tenderness  NEURO: no focal motor or sensory deficits  MSK: no peripheral edema, no visible deformities  SKIN: well-perfused, extremities warm, no visible rashes  PSYCH: appropriate mood and affect

## 2020-01-05 NOTE — H&P ADULT - HISTORY OF PRESENT ILLNESS
48y/o M w/ h/o bladder CA transferred from [facility] p/w R flank pain. Pt woke up at 2am this morning with R sided flank pain worse with movement went to [facility] and was found to have R ureteral stones x2. Currently has pain (well-controlled with morphine) and nausea. Denies fevers, chills, vomiting, chest pain, sob, abdominal pain.    PAST MEDICAL & SURGICAL HISTORY:  HLD (hyperlipidemia)  Bladder calculus  Bladder mass  Bladder cancer: mass removal x2  History of tonsillectomy    MEDICATIONS  (STANDING):  morphine  - Injectable 4 milliGRAM(s) IV Push Once  sodium chloride 0.9% Bolus 1000 milliLiter(s) IV Bolus once    FAMILY HISTORY:  Family history of hypertension in mother (Mother)  Family history of dementia (Mother)  Family history of Parkinson's disease (Father)    Allergies  penicillin (Unknown)    REVIEW OF SYSTEMS: Pertinent positives and negatives as stated in HPI, otherwise negative    Vital signs  T(C): 36.6, Max: 36.6 ( @ 12:44)  HR: 94  BP: 158/72  SpO2: 95%    Physical Exam  Gen: NAD  Abd: Soft, NT, ND  : Mild R CVAT    LABS:   @ 06:20  WBC 11.23 / Hct 47.6  / SCr 1.46       139  |  103  |  20  ----------------------------<  154<H>  4.0   |  25  |  1.46<H>    Ca    9.1      2020 06:20    TPro  7.9  /  Alb  4.2  /  TBili  0.5  /  DBili  x   /  AST  27  /  ALT  67<H>  /  AlkPhos  60      Urinalysis Basic - ( 2020 06:44 )  Color: Yellow / Appearance: Turbid / S.025 / pH: x  Gluc: x / Ketone: Trace  / Bili: Negative / Urobili: 1 mg/dL   Blood: x / Protein: 30 mg/dL / Nitrite: Positive   Leuk Esterase: Moderate / RBC: 11-25 /HPF / WBC 26-50   Sq Epi: x / Non Sq Epi: Moderate / Bacteria: Moderate    Urine Cx: p    RADIOLOGY:    FINDINGS:  LOWER CHEST: Coronary artery calcification  LIVER: Mild steatosis  BILE DUCTS: Normal caliber.  GALLBLADDER: Within normal limits.  SPLEEN: Within normal limits.  PANCREAS: Within normal limits.  ADRENALS: Within normal limits.  KIDNEYS/URETERS: Mild right hydronephrosis and proximal hydroureter secondary to 2 calculi in the proximal third of the right ureter measuring 7 mm and 5 mm respectively. Ipsilateral perirenal stranding.  BLADDER: Diffuse bladder wall thickening and stranding decreased compared to prior, may represent cystitis. Correlate with symptomatology.  REPRODUCTIVE ORGANS: Normal prostate  BOWEL: No bowel obstruction. Appendix normal  PERITONEUM: No ascites.  VESSELS: Nonaneurysmal.  RETROPERITONEUM/LYMPH NODES: No lymphadenopathy.    ABDOMINAL WALL: Mild widening right inguinal ring with fat  BONES: No acute or aggressive pathology    IMPRESSION:   Mild right hydroureteronephrosis secondary to calculi in the proximal right ureter as described.  Possible cystitis. Correlate with symptomatology and urinalysis

## 2020-01-05 NOTE — ED PROVIDER NOTE - ATTENDING CONTRIBUTION TO CARE
49M p/w R flank pain.  2AM dull pain R side, pt had diarrhea / vomiting, pt went to [place], had CT showed kidney stones and mild R hydronephrosis and found to have UTI also.  Dr Villegas from Uro was called and he's expecting the pt.  No fever.  H/o kidney stones 4 years ago as well.  No previous surgery for kidney stones.  Stones are measured as 7mm and 5mm.  Pt with h/o bladder CA resection and BCG intravesicular.  Pain is worse with movement.  No dysuria.  CTX was given.  (+)Nausea.  Still having pain now and feeling like he has to urinate.  PMHX - bladder CA.  No T.  PSHX - bladder sx, T & A.  All - PCN - rash.  Rpt labs, check bl cultures, give fluids, pain meds, nausea meds, NPO, uro consult.  VS:  unremarkable except htn    GEN - mild distress R flank pain, malaise; A+O x3   HEAD - NC/AT     ENT - PEERL, EOMI, mucous membranes dry , no discharge      NECK: Neck supple, non-tender without lymphadenopathy, no masses, no JVD  PULM - CTA b/l,  symmetric breath sounds  COR -  normal heart sounds    ABD - , ND, RLQ ttp, soft,  BACK - no CVA tenderness, nontender spine     EXTREMS - no edema, no deformity, warm and well perfused    SKIN - no rash or bruising      NEUROLOGIC - alert, CN 2-12 intact, sensation nl, motor no focal deficit.

## 2020-01-05 NOTE — ED ADULT NURSE NOTE - NSIMPLEMENTINTERV_GEN_ALL_ED
Implemented All Universal Safety Interventions:  [place] to call system. Call bell, personal items and telephone within reach. Instruct patient to call for assistance. Room bathroom lighting operational. Non-slip footwear when patient is off stretcher. Physically safe environment: no spills, clutter or unnecessary equipment. Stretcher in lowest position, wheels locked, appropriate side rails in place.

## 2020-01-05 NOTE — ED PROVIDER NOTE - CLINICAL SUMMARY MEDICAL DECISION MAKING FREE TEXT BOX
50y/o M h/o bladder CA s/p resection transfer for infected kidney stone. Will repeat labs and admit to urology for urgent stent placement.

## 2020-01-05 NOTE — H&P ADULT - ASSESSMENT
49yoM with hx bladder CA, now with R ureteral stones, UTI    -Admit to inpatient  -Urine culture  -Start CTX for presumed UTI  -Trial of medical expulsive therapy  -Flomax 0.4mg qHS   -Percocet/Morphine PRN pain  -Zofran PRN nausea  -Senna, colace, miralax  -Aggressive hydration  -Strain urine for calculi  -Add on for OR

## 2020-01-05 NOTE — ED PROVIDER NOTE - OBJECTIVE STATEMENT
Patient c/o right sided back pain for about 4 hours. Started out dull, now sharp and severe. +n/v/d. No fever. No urinary symptoms. States history of same or similar pain from kidney stones in the past. Has history of bladder ca with resection of tumor

## 2020-01-05 NOTE — ED PROVIDER NOTE - CONSTITUTIONAL, MLM
normal... Well appearing, awake, alert, oriented to person, place, time/situation and in mild apparent distress secondary to pain

## 2020-01-05 NOTE — ED CLERICAL - NS ED CLERK NOTE PRE-ARRIVAL INFORMATION; ADDITIONAL PRE-ARRIVAL INFORMATION
Pt with 2 kidney stones- sent for urology eval(Dr Villegas) - poss stent.  Elevated WBC.  Treated with MS, Zofran, Rocephen, Toradol.  Allergic to PCN.  Hx bladder Ca.

## 2020-01-05 NOTE — ED ADULT TRIAGE NOTE - PAIN RATING/NUMBER SCALE (0-10): ACTIVITY
10 pt w/uri symptoms x 1d, pt states dry cough, scratchy throat, dry cough, subjective fevers, well appearing, suspect viral etiology, no abx indicated, pt initially requested std testing (routine- pt asymptomatic) but when informed that results won't be available today - declined saying he's gonna get it done elsewhere

## 2020-01-06 ENCOUNTER — TRANSCRIPTION ENCOUNTER (OUTPATIENT)
Age: 50
End: 2020-01-06

## 2020-01-06 DIAGNOSIS — Z29.9 ENCOUNTER FOR PROPHYLACTIC MEASURES, UNSPECIFIED: ICD-10-CM

## 2020-01-06 DIAGNOSIS — E78.5 HYPERLIPIDEMIA, UNSPECIFIED: ICD-10-CM

## 2020-01-06 DIAGNOSIS — N17.9 ACUTE KIDNEY FAILURE, UNSPECIFIED: ICD-10-CM

## 2020-01-06 DIAGNOSIS — N13.2 HYDRONEPHROSIS WITH RENAL AND URETERAL CALCULOUS OBSTRUCTION: ICD-10-CM

## 2020-01-06 DIAGNOSIS — N32.89 OTHER SPECIFIED DISORDERS OF BLADDER: ICD-10-CM

## 2020-01-06 DIAGNOSIS — N30.90 CYSTITIS, UNSPECIFIED WITHOUT HEMATURIA: ICD-10-CM

## 2020-01-06 LAB
ANION GAP SERPL CALC-SCNC: 11 MMO/L — SIGNIFICANT CHANGE UP (ref 7–14)
BASOPHILS # BLD AUTO: 0.04 K/UL — SIGNIFICANT CHANGE UP (ref 0–0.2)
BASOPHILS NFR BLD AUTO: 0.3 % — SIGNIFICANT CHANGE UP (ref 0–2)
BUN SERPL-MCNC: 21 MG/DL — SIGNIFICANT CHANGE UP (ref 7–23)
CALCIUM SERPL-MCNC: 8.7 MG/DL — SIGNIFICANT CHANGE UP (ref 8.4–10.5)
CHLORIDE SERPL-SCNC: 103 MMOL/L — SIGNIFICANT CHANGE UP (ref 98–107)
CO2 SERPL-SCNC: 23 MMOL/L — SIGNIFICANT CHANGE UP (ref 22–31)
CREAT SERPL-MCNC: 2.08 MG/DL — HIGH (ref 0.5–1.3)
CULTURE RESULTS: SIGNIFICANT CHANGE UP
EOSINOPHIL # BLD AUTO: 0.01 K/UL — SIGNIFICANT CHANGE UP (ref 0–0.5)
EOSINOPHIL NFR BLD AUTO: 0.1 % — SIGNIFICANT CHANGE UP (ref 0–6)
GLUCOSE SERPL-MCNC: 113 MG/DL — HIGH (ref 70–99)
HCT VFR BLD CALC: 41.9 % — SIGNIFICANT CHANGE UP (ref 39–50)
HGB BLD-MCNC: 13.9 G/DL — SIGNIFICANT CHANGE UP (ref 13–17)
IMM GRANULOCYTES NFR BLD AUTO: 0.5 % — SIGNIFICANT CHANGE UP (ref 0–1.5)
LYMPHOCYTES # BLD AUTO: 13.8 % — SIGNIFICANT CHANGE UP (ref 13–44)
LYMPHOCYTES # BLD AUTO: 2 K/UL — SIGNIFICANT CHANGE UP (ref 1–3.3)
MCHC RBC-ENTMCNC: 30 PG — SIGNIFICANT CHANGE UP (ref 27–34)
MCHC RBC-ENTMCNC: 33.2 % — SIGNIFICANT CHANGE UP (ref 32–36)
MCV RBC AUTO: 90.5 FL — SIGNIFICANT CHANGE UP (ref 80–100)
MONOCYTES # BLD AUTO: 1.36 K/UL — HIGH (ref 0–0.9)
MONOCYTES NFR BLD AUTO: 9.4 % — SIGNIFICANT CHANGE UP (ref 2–14)
NEUTROPHILS # BLD AUTO: 11 K/UL — HIGH (ref 1.8–7.4)
NEUTROPHILS NFR BLD AUTO: 75.9 % — SIGNIFICANT CHANGE UP (ref 43–77)
NRBC # FLD: 0 K/UL — SIGNIFICANT CHANGE UP (ref 0–0)
PLATELET # BLD AUTO: 152 K/UL — SIGNIFICANT CHANGE UP (ref 150–400)
PMV BLD: 12.5 FL — SIGNIFICANT CHANGE UP (ref 7–13)
POTASSIUM SERPL-MCNC: 3.8 MMOL/L — SIGNIFICANT CHANGE UP (ref 3.5–5.3)
POTASSIUM SERPL-SCNC: 3.8 MMOL/L — SIGNIFICANT CHANGE UP (ref 3.5–5.3)
RBC # BLD: 4.63 M/UL — SIGNIFICANT CHANGE UP (ref 4.2–5.8)
RBC # FLD: 13.2 % — SIGNIFICANT CHANGE UP (ref 10.3–14.5)
SODIUM SERPL-SCNC: 137 MMOL/L — SIGNIFICANT CHANGE UP (ref 135–145)
SPECIMEN SOURCE: SIGNIFICANT CHANGE UP
WBC # BLD: 14.48 K/UL — HIGH (ref 3.8–10.5)
WBC # FLD AUTO: 14.48 K/UL — HIGH (ref 3.8–10.5)

## 2020-01-06 PROCEDURE — 99223 1ST HOSP IP/OBS HIGH 75: CPT

## 2020-01-06 RX ORDER — HYDROMORPHONE HYDROCHLORIDE 2 MG/ML
1 INJECTION INTRAMUSCULAR; INTRAVENOUS; SUBCUTANEOUS ONCE
Refills: 0 | Status: DISCONTINUED | OUTPATIENT
Start: 2020-01-06 | End: 2020-01-06

## 2020-01-06 RX ORDER — ACETAMINOPHEN 500 MG
1000 TABLET ORAL ONCE
Refills: 0 | Status: COMPLETED | OUTPATIENT
Start: 2020-01-06 | End: 2020-01-06

## 2020-01-06 RX ORDER — HYDROMORPHONE HYDROCHLORIDE 2 MG/ML
0.5 INJECTION INTRAMUSCULAR; INTRAVENOUS; SUBCUTANEOUS ONCE
Refills: 0 | Status: DISCONTINUED | OUTPATIENT
Start: 2020-01-06 | End: 2020-01-06

## 2020-01-06 RX ADMIN — CEFTRIAXONE 100 MILLIGRAM(S): 500 INJECTION, POWDER, FOR SOLUTION INTRAMUSCULAR; INTRAVENOUS at 05:33

## 2020-01-06 RX ADMIN — OXYCODONE HYDROCHLORIDE 10 MILLIGRAM(S): 5 TABLET ORAL at 23:00

## 2020-01-06 RX ADMIN — OXYCODONE HYDROCHLORIDE 10 MILLIGRAM(S): 5 TABLET ORAL at 22:12

## 2020-01-06 RX ADMIN — HYDROMORPHONE HYDROCHLORIDE 0.5 MILLIGRAM(S): 2 INJECTION INTRAMUSCULAR; INTRAVENOUS; SUBCUTANEOUS at 18:37

## 2020-01-06 RX ADMIN — TAMSULOSIN HYDROCHLORIDE 0.4 MILLIGRAM(S): 0.4 CAPSULE ORAL at 22:12

## 2020-01-06 RX ADMIN — Medication 400 MILLIGRAM(S): at 14:55

## 2020-01-06 RX ADMIN — OXYCODONE HYDROCHLORIDE 10 MILLIGRAM(S): 5 TABLET ORAL at 10:30

## 2020-01-06 RX ADMIN — OXYCODONE HYDROCHLORIDE 10 MILLIGRAM(S): 5 TABLET ORAL at 09:32

## 2020-01-06 RX ADMIN — OXYCODONE HYDROCHLORIDE 10 MILLIGRAM(S): 5 TABLET ORAL at 02:50

## 2020-01-06 RX ADMIN — HYDROMORPHONE HYDROCHLORIDE 0.5 MILLIGRAM(S): 2 INJECTION INTRAMUSCULAR; INTRAVENOUS; SUBCUTANEOUS at 18:55

## 2020-01-06 RX ADMIN — HYDROMORPHONE HYDROCHLORIDE 1 MILLIGRAM(S): 2 INJECTION INTRAMUSCULAR; INTRAVENOUS; SUBCUTANEOUS at 01:00

## 2020-01-06 RX ADMIN — HEPARIN SODIUM 5000 UNIT(S): 5000 INJECTION INTRAVENOUS; SUBCUTANEOUS at 22:12

## 2020-01-06 RX ADMIN — ONDANSETRON 4 MILLIGRAM(S): 8 TABLET, FILM COATED ORAL at 22:12

## 2020-01-06 RX ADMIN — SODIUM CHLORIDE 150 MILLILITER(S): 9 INJECTION INTRAMUSCULAR; INTRAVENOUS; SUBCUTANEOUS at 09:32

## 2020-01-06 RX ADMIN — HEPARIN SODIUM 5000 UNIT(S): 5000 INJECTION INTRAVENOUS; SUBCUTANEOUS at 05:32

## 2020-01-06 RX ADMIN — HYDROMORPHONE HYDROCHLORIDE 1 MILLIGRAM(S): 2 INJECTION INTRAMUSCULAR; INTRAVENOUS; SUBCUTANEOUS at 00:36

## 2020-01-06 RX ADMIN — OXYCODONE HYDROCHLORIDE 10 MILLIGRAM(S): 5 TABLET ORAL at 03:45

## 2020-01-06 RX ADMIN — OXYCODONE HYDROCHLORIDE 10 MILLIGRAM(S): 5 TABLET ORAL at 16:45

## 2020-01-06 RX ADMIN — OXYCODONE HYDROCHLORIDE 10 MILLIGRAM(S): 5 TABLET ORAL at 15:49

## 2020-01-06 RX ADMIN — HEPARIN SODIUM 5000 UNIT(S): 5000 INJECTION INTRAVENOUS; SUBCUTANEOUS at 14:55

## 2020-01-06 RX ADMIN — SODIUM CHLORIDE 150 MILLILITER(S): 9 INJECTION INTRAMUSCULAR; INTRAVENOUS; SUBCUTANEOUS at 05:33

## 2020-01-06 RX ADMIN — SODIUM CHLORIDE 150 MILLILITER(S): 9 INJECTION INTRAMUSCULAR; INTRAVENOUS; SUBCUTANEOUS at 22:13

## 2020-01-06 NOTE — CONSULT NOTE ADULT - SUBJECTIVE AND OBJECTIVE BOX
Rosamaria Irizarry MD  Pager [phone]    HPI:  50y/o M w/ h/o hld, bladder stone 3 years ago (passed on its own), bladder CA s/p TURBT on 2018, who was transferred from [facility] for acute onset of severe right flank pain associated with nausea/vomiting and chills. Patient woke up at 2am on  with R sided flank pain that's worse with movement. He went to [facility] where he was found to have obstructing right ureteral stone x2 with hydronephrosis and TIGRE. He was given toradol and morphine with some relief. Patient denies fever or gross hematuria. Denies h/o cardiopulmonary disease or chest pain/sob/dizziness/abdominal pain/dysuria.   In the hospital, lab notable for leukocytosis WBC 14 with pyuria, with TIGRE (creat bump from baseline 1.0 to 2.0), CT showed mild right hydronephrosis and proximal hydroureter secondary to 2 calculi in the proximal third of the right ureter measuring 7 mm and 5 mm respectively. He is afebrile but has chills and was started on Ceftriaxone.     PAST MEDICAL & SURGICAL HISTORY:  HLD (hyperlipidemia)  Bladder calculus  Bladder mass  Bladder cancer: mass removal x2  History of tonsillectomy    MEDICATIONS  (STANDING):  morphine  - Injectable 4 milliGRAM(s) IV Push Once  sodium chloride 0.9% Bolus 1000 milliLiter(s) IV Bolus once        Allergies  penicillin (Unknown)    Review of Systems:   CONSTITUTIONAL: No fever, weight loss, or fatigue  EYES: No eye pain, visual disturbances, or discharge  ENMT:  No difficulty hearing, tinnitus, vertigo; No sinus or throat pain  NECK: No pain or stiffness  BREASTS: No pain, masses, or nipple discharge  RESPIRATORY: No cough, wheezing, chills or hemoptysis; No shortness of breath  CARDIOVASCULAR: No chest pain, palpitations, dizziness, or leg swelling  GASTROINTESTINAL: No abdominal or epigastric pain. No nausea, vomiting, or hematemesis; No diarrhea or constipation. No melena or hematochezia.  GENITOURINARY: No dysuria, frequency, hematuria, or incontinence  NEUROLOGICAL: No headaches, memory loss, loss of strength, numbness, or tremors  SKIN: No itching, burning, rashes, or lesions   LYMPH NODES: No enlarged glands  ENDOCRINE: No heat or cold intolerance; No hair loss  MUSCULOSKELETAL: No joint pain or swelling; No muscle, back, or extremity pain  PSYCHIATRIC: No depression, anxiety, mood swings, or difficulty sleeping  HEME/LYMPH: No easy bruising, or bleeding gums  ALLERY AND IMMUNOLOGIC: No hives or eczema    Social History:  denies smoking/alcohol use     FAMILY HISTORY:  Family history of hypertension in mother  Family history of dementia  Family history of Parkinson's disease      Home Medications:  No medications reported:  (23 Dec 2019 18:07)      MEDICATIONS  (STANDING):  cefTRIAXone   IVPB 1000 milliGRAM(s) IV Intermittent every 24 hours  heparin  Injectable 5000 Unit(s) SubCutaneous every 8 hours  influenza   Vaccine 0.5 milliLiter(s) IntraMuscular once  sodium chloride 0.9%. 1000 milliLiter(s) (150 mL/Hr) IV Continuous <Continuous>  tamsulosin 0.4 milliGRAM(s) Oral at bedtime    MEDICATIONS  (PRN):  acetaminophen   Tablet .. 650 milliGRAM(s) Oral every 6 hours PRN Temp greater or equal to 38C (100.4F), Mild Pain (1 - 3)  ondansetron Injectable 4 milliGRAM(s) IV Push every 6 hours PRN Nausea  oxyCODONE    IR 10 milliGRAM(s) Oral every 6 hours PRN Severe Pain (7 - 10)  oxyCODONE    IR 5 milliGRAM(s) Oral every 4 hours PRN Moderate Pain (4 - 6)  senna 2 Tablet(s) Oral at bedtime PRN Constipation  LABS:   @ 06:20  WBC 11.23 / Hct 47.6  / SCr 1.46       139  |  103  |  20  ----------------------------<  154<H>  4.0   |  25  |  1.46<H>    Ca    9.1      2020 06:20    TPro  7.9  /  Alb  4.2  /  TBili  0.5  /  DBili  x   /  AST  27  /  ALT  67<H>  /  AlkPhos  60      Urinalysis Basic - ( 2020 06:44 )  Color: Yellow / Appearance: Turbid / S.025 / pH: x  Gluc: x / Ketone: Trace  / Bili: Negative / Urobili: 1 mg/dL   Blood: x / Protein: 30 mg/dL / Nitrite: Positive   Leuk Esterase: Moderate / RBC: 11-25 /HPF / WBC 26-50   Sq Epi: x / Non Sq Epi: Moderate / Bacteria: Moderate    Urine Cx: p      Vital Signs Last 24 Hrs  T(C): 37.7 (2020 09:23), Max: 37.7 (2020 09:23)  T(F): 99.8 (2020 09:23), Max: 99.8 (2020 09:23)  HR: 92 (:) (68 - 94)  BP: 115/64 (:) (115/64 - 181/90)  BP(mean): --  RR: 17 (:) (16 - 18)  SpO2: 96% (:) (91% - 100%)  CAPILLARY BLOOD GLUCOSE        I&O's Summary    2020 07:01  -  2020 07:00  --------------------------------------------------------  IN: 150 mL / OUT: 825 mL / NET: -675 mL    2020 07:01  -  2020 10:36  --------------------------------------------------------  IN: 450 mL / OUT: 200 mL / NET: 250 mL        PHYSICAL EXAM:  GENERAL: NAD, well-developed  HEAD:  Atraumatic, Normocephalic  EYES: EOMI, PERRLA, conjunctiva and sclera clear  NECK: Supple, No JVD  CHEST/LUNG: Clear to auscultation bilaterally; No wheeze  HEART: Regular rate and rhythm; No murmurs, rubs, or gallops  ABDOMEN: Soft, Nontender, mild R CVAT Nondistended; Bowel sounds present  EXTREMITIES:  2+ Peripheral Pulses, No clubbing, cyanosis, or edema  PSYCH: AAOx3  NEUROLOGY: non-focal  SKIN: No rashes or lesions    LABS:                        13.9   14.48 )-----------( 152      ( 2020 07:15 )             41.9     01-06    137  |  103  |  21  ----------------------------<  113<H>  3.8   |  23  |  2.08<H>    Ca    8.7      2020 07:15    TPro  6.9  /  Alb  4.2  /  TBili  0.6  /  DBili  x   /  AST  36  /  ALT  40  /  AlkPhos  59        Urinalysis Basic - ( 2020 06:44 )    Color: Yellow / Appearance: Turbid / S.025 / pH: x  Gluc: x / Ketone: Trace  / Bili: Negative / Urobili: 1 mg/dL   Blood: x / Protein: 30 mg/dL / Nitrite: Positive   Leuk Esterase: Moderate / RBC: 11-25 /HPF / WBC 26-50   Sq Epi: x / Non Sq Epi: Moderate / Bacteria: Moderate      Microbiology     RADIOLOGY & ADDITIONAL TESTS:    Imaging Personally Reviewed:  < from: CT Renal Stone Hunt (20 @ 07:57) >  KIDNEYS/URETERS: Mild right hydronephrosis and proximal hydroureter secondary to 2 calculi in the proximal third of the right ureter measuring 7 mm and 5 mm respectively. Ipsilateral perirenal stranding.    BLADDER: Diffuse bladder wall thickening and stranding decreased compared to prior, may represent cystitis. Correlate with symptomatology.  REPRODUCTIVE ORGANS: Normal prostate    BOWEL: No bowel obstruction. Appendix normal  PERITONEUM: No ascites.  VESSELS: Nonaneurysmal.  RETROPERITONEUM/LYMPH NODES: No lymphadenopathy.    ABDOMINAL WALL: Mild widening right inguinal ring with fat  BONES: No acute or aggressive pathology    IMPRESSION:     Mild right hydroureteronephrosis secondary to calculi in the proximal right ureter as described.  Possible cystitis. Correlate with symptomatology and urinalysis      Consultant(s) Notes Reviewed:      Care Discussed with Consultants/Other Providers: urology ZACK Bro/Yumiko, plan for cysto/stent today for obstructing stone

## 2020-01-06 NOTE — CONSULT NOTE ADULT - PROBLEM SELECTOR RECOMMENDATION 2
-acute cystitis with leukocytosis and pyuria in the setting of obstructing ureteral stones  -c/w ceftriaxone, f/u urine and OR cx

## 2020-01-06 NOTE — CONSULT NOTE ADULT - PROBLEM SELECTOR RECOMMENDATION 3
in the setting of obstructive uropathy d/t obstructing right ureteral stones  also got toradol at [facility] which can worsen TIGRE  IVF, avoid nephrotoxins (nsaids, contrast)  trend creat/UO, renal fxn should improve after cysto/stent

## 2020-01-06 NOTE — CONSULT NOTE ADULT - ASSESSMENT
48y/o M w/ h/o hld, bladder stone 3 years ago (passed on its own), bladder CA s/p TURBT on 12/31/2018, who was transferred from [facility] for acute right renal colic d/t obstructing ureteral stones x2, plan for cysto/stent today

## 2020-01-06 NOTE — CONSULT NOTE ADULT - PROBLEM SELECTOR RECOMMENDATION 5
-h/o TURBT for bladder CA in Dec 2018  -pt states he was scheduled for cysto/biopsy on 1/10/2020 with Dr. Villegas  -management per urology

## 2020-01-06 NOTE — CONSULT NOTE ADULT - PROBLEM SELECTOR RECOMMENDATION 9
-right renal colic d/t obstructing right ureteral stones x2  -CT renal hunt showed mild right hydronephrosis and proximal hydroureter secondary to 2 calculi in the proximal third of the right ureter measuring 7 mm and 5 mm  -IVF, flomax, abx (ceftriaxone), f/u cultures  -plan for cysto/stent today per urology team

## 2020-01-07 LAB
ANION GAP SERPL CALC-SCNC: 12 MMO/L — SIGNIFICANT CHANGE UP (ref 7–14)
BUN SERPL-MCNC: 16 MG/DL — SIGNIFICANT CHANGE UP (ref 7–23)
CALCIUM SERPL-MCNC: 9 MG/DL — SIGNIFICANT CHANGE UP (ref 8.4–10.5)
CHLORIDE SERPL-SCNC: 104 MMOL/L — SIGNIFICANT CHANGE UP (ref 98–107)
CO2 SERPL-SCNC: 23 MMOL/L — SIGNIFICANT CHANGE UP (ref 22–31)
CREAT SERPL-MCNC: 1.69 MG/DL — HIGH (ref 0.5–1.3)
CULTURE - ACID FAST SMEAR CONCENTRATED: SIGNIFICANT CHANGE UP
GLUCOSE SERPL-MCNC: 159 MG/DL — HIGH (ref 70–99)
HCT VFR BLD CALC: 40.3 % — SIGNIFICANT CHANGE UP (ref 39–50)
HGB BLD-MCNC: 13.7 G/DL — SIGNIFICANT CHANGE UP (ref 13–17)
MCHC RBC-ENTMCNC: 30.8 PG — SIGNIFICANT CHANGE UP (ref 27–34)
MCHC RBC-ENTMCNC: 34 % — SIGNIFICANT CHANGE UP (ref 32–36)
MCV RBC AUTO: 90.6 FL — SIGNIFICANT CHANGE UP (ref 80–100)
NRBC # FLD: 0 K/UL — SIGNIFICANT CHANGE UP (ref 0–0)
PLATELET # BLD AUTO: 132 K/UL — LOW (ref 150–400)
PMV BLD: 12.1 FL — SIGNIFICANT CHANGE UP (ref 7–13)
POTASSIUM SERPL-MCNC: 3.9 MMOL/L — SIGNIFICANT CHANGE UP (ref 3.5–5.3)
POTASSIUM SERPL-SCNC: 3.9 MMOL/L — SIGNIFICANT CHANGE UP (ref 3.5–5.3)
RBC # BLD: 4.45 M/UL — SIGNIFICANT CHANGE UP (ref 4.2–5.8)
RBC # FLD: 12.9 % — SIGNIFICANT CHANGE UP (ref 10.3–14.5)
SODIUM SERPL-SCNC: 139 MMOL/L — SIGNIFICANT CHANGE UP (ref 135–145)
SPECIMEN SOURCE: SIGNIFICANT CHANGE UP
SPECIMEN SOURCE: SIGNIFICANT CHANGE UP
WBC # BLD: 11.71 K/UL — HIGH (ref 3.8–10.5)
WBC # FLD AUTO: 11.71 K/UL — HIGH (ref 3.8–10.5)

## 2020-01-07 PROCEDURE — 74420 UROGRAPHY RTRGR +-KUB: CPT | Mod: 26

## 2020-01-07 PROCEDURE — 99233 SBSQ HOSP IP/OBS HIGH 50: CPT

## 2020-01-07 PROCEDURE — 52332 CYSTOSCOPY AND TREATMENT: CPT | Mod: RT

## 2020-01-07 RX ORDER — VANCOMYCIN HCL 1 G
750 VIAL (EA) INTRAVENOUS EVERY 12 HOURS
Refills: 0 | Status: DISCONTINUED | OUTPATIENT
Start: 2020-01-07 | End: 2020-01-08

## 2020-01-07 RX ORDER — VANCOMYCIN HCL 1 G
VIAL (EA) INTRAVENOUS
Refills: 0 | Status: DISCONTINUED | OUTPATIENT
Start: 2020-01-07 | End: 2020-01-08

## 2020-01-07 RX ORDER — SODIUM CHLORIDE 9 MG/ML
1000 INJECTION, SOLUTION INTRAVENOUS
Refills: 0 | Status: DISCONTINUED | OUTPATIENT
Start: 2020-01-07 | End: 2020-01-07

## 2020-01-07 RX ORDER — VANCOMYCIN HCL 1 G
750 VIAL (EA) INTRAVENOUS ONCE
Refills: 0 | Status: COMPLETED | OUTPATIENT
Start: 2020-01-07 | End: 2020-01-07

## 2020-01-07 RX ADMIN — Medication 250 MILLIGRAM(S): at 12:18

## 2020-01-07 RX ADMIN — HEPARIN SODIUM 5000 UNIT(S): 5000 INJECTION INTRAVENOUS; SUBCUTANEOUS at 21:08

## 2020-01-07 RX ADMIN — OXYCODONE HYDROCHLORIDE 5 MILLIGRAM(S): 5 TABLET ORAL at 04:45

## 2020-01-07 RX ADMIN — Medication 250 MILLIGRAM(S): at 23:17

## 2020-01-07 RX ADMIN — OXYCODONE HYDROCHLORIDE 5 MILLIGRAM(S): 5 TABLET ORAL at 11:30

## 2020-01-07 RX ADMIN — OXYCODONE HYDROCHLORIDE 5 MILLIGRAM(S): 5 TABLET ORAL at 15:11

## 2020-01-07 RX ADMIN — OXYCODONE HYDROCHLORIDE 5 MILLIGRAM(S): 5 TABLET ORAL at 11:00

## 2020-01-07 RX ADMIN — HEPARIN SODIUM 5000 UNIT(S): 5000 INJECTION INTRAVENOUS; SUBCUTANEOUS at 14:05

## 2020-01-07 RX ADMIN — OXYCODONE HYDROCHLORIDE 5 MILLIGRAM(S): 5 TABLET ORAL at 03:57

## 2020-01-07 RX ADMIN — HEPARIN SODIUM 5000 UNIT(S): 5000 INJECTION INTRAVENOUS; SUBCUTANEOUS at 05:20

## 2020-01-07 RX ADMIN — SODIUM CHLORIDE 150 MILLILITER(S): 9 INJECTION INTRAMUSCULAR; INTRAVENOUS; SUBCUTANEOUS at 01:45

## 2020-01-07 RX ADMIN — SODIUM CHLORIDE 75 MILLILITER(S): 9 INJECTION, SOLUTION INTRAVENOUS at 10:01

## 2020-01-07 RX ADMIN — TAMSULOSIN HYDROCHLORIDE 0.4 MILLIGRAM(S): 0.4 CAPSULE ORAL at 21:08

## 2020-01-07 RX ADMIN — OXYCODONE HYDROCHLORIDE 5 MILLIGRAM(S): 5 TABLET ORAL at 15:45

## 2020-01-07 RX ADMIN — CEFTRIAXONE 100 MILLIGRAM(S): 500 INJECTION, POWDER, FOR SOLUTION INTRAMUSCULAR; INTRAVENOUS at 05:20

## 2020-01-07 RX ADMIN — OXYCODONE HYDROCHLORIDE 5 MILLIGRAM(S): 5 TABLET ORAL at 20:25

## 2020-01-07 RX ADMIN — OXYCODONE HYDROCHLORIDE 5 MILLIGRAM(S): 5 TABLET ORAL at 19:55

## 2020-01-07 NOTE — PROGRESS NOTE ADULT - SUBJECTIVE AND OBJECTIVE BOX
POD #1  Afeb 124/58 89 93%RA    Pt has c/o frequency, R. sided pain (stent symptoms)  Abd- soft NT ND   Void 850

## 2020-01-07 NOTE — PROGRESS NOTE ADULT - PROBLEM SELECTOR PLAN 2
-acute cystitis with leukocytosis and pyuria in the setting of obstructing ureteral stones  -Ucx and OR cx coag neg staph, staph epidermidis  -was on Ceftriaxone, now changed to vancomycin d/t above Cx findings, f/u sensitivity

## 2020-01-07 NOTE — PROGRESS NOTE ADULT - SUBJECTIVE AND OBJECTIVE BOX
Dr. Rosamaria Irizarry  Pager [phone]    PROGRESS NOTE:     Patient is a 49y old  Male who presents with a chief complaint of R ureteral stones x2 (07 Jan 2020 07:41)      SUBJECTIVE / OVERNIGHT EVENTS: pt feels better, no chest pain/flank pain/fever  ADDITIONAL REVIEW OF SYSTEMS: afebrile     MEDICATIONS  (STANDING):  dextrose 5% + sodium chloride 0.45%. 1000 milliLiter(s) (75 mL/Hr) IV Continuous <Continuous>  heparin  Injectable 5000 Unit(s) SubCutaneous every 8 hours  influenza   Vaccine 0.5 milliLiter(s) IntraMuscular once  tamsulosin 0.4 milliGRAM(s) Oral at bedtime  vancomycin  IVPB      vancomycin  IVPB 750 milliGRAM(s) IV Intermittent every 12 hours    MEDICATIONS  (PRN):  acetaminophen   Tablet .. 650 milliGRAM(s) Oral every 6 hours PRN Temp greater or equal to 38C (100.4F), Mild Pain (1 - 3)  ondansetron Injectable 4 milliGRAM(s) IV Push every 6 hours PRN Nausea  oxyCODONE    IR 10 milliGRAM(s) Oral every 6 hours PRN Severe Pain (7 - 10)  oxyCODONE    IR 5 milliGRAM(s) Oral every 4 hours PRN Moderate Pain (4 - 6)  senna 2 Tablet(s) Oral at bedtime PRN Constipation      CAPILLARY BLOOD GLUCOSE        I&O's Summary    06 Jan 2020 07:01  -  07 Jan 2020 07:00  --------------------------------------------------------  IN: 1950 mL / OUT: 2150 mL / NET: -200 mL    07 Jan 2020 07:01  -  07 Jan 2020 13:18  --------------------------------------------------------  IN: 0 mL / OUT: 1100 mL / NET: -1100 mL        PHYSICAL EXAM:  Vital Signs Last 24 Hrs  T(C): 36.9 (07 Jan 2020 09:59), Max: 37.5 (06 Jan 2020 22:33)  T(F): 98.5 (07 Jan 2020 09:59), Max: 99.5 (06 Jan 2020 22:33)  HR: 88 (07 Jan 2020 09:59) (78 - 94)  BP: 135/78 (07 Jan 2020 09:59) (123/79 - 155/87)  BP(mean): 82 (07 Jan 2020 03:00) (82 - 93)  RR: 17 (07 Jan 2020 09:59) (11 - 18)  SpO2: 95% (07 Jan 2020 09:59) (92% - 99%)  CONSTITUTIONAL: NAD, well-developed  RESPIRATORY: Normal respiratory effort; lungs are clear to auscultation bilaterally  CARDIOVASCULAR: Regular rate and rhythm, normal S1 and S2, no murmur/rub/gallop; No lower extremity edema; Peripheral pulses are 2+ bilaterally  ABDOMEN: Nontender to palpation, normoactive bowel sounds, no rebound/guarding; No hepatosplenomegaly  MUSCULOSKELETAL: no clubbing or cyanosis of digits; no joint swelling or tenderness to palpation  PSYCH: A+O to person, place, and time; affect appropriate    LABS:                        13.7   11.71 )-----------( 132      ( 07 Jan 2020 06:36 )             40.3     01-07    139  |  104  |  16  ----------------------------<  159<H>  3.9   |  23  |  1.69<H>    Ca    9.0      07 Jan 2020 06:36    TPro  6.9  /  Alb  4.2  /  TBili  0.6  /  DBili  x   /  AST  36  /  ALT  40  /  AlkPhos  59  01-05      Culture - Urine (collected 05 Jan 2020 22:37)  Source: URINE MIDSTREAM  Preliminary Report (07 Jan 2020 12:49):    STEP^Staphylococcus epidermidis    COLONY COUNT: 10,000-49,000 CFU/mL    Culture - Urine (collected 05 Jan 2020 13:29)  Source: .Urine Clean Catch (Midstream)  Final Report (06 Jan 2020 19:09):    >100,000 CFU/ml Coag Negative Staphylococcus "Susceptibilities not    performed"        RADIOLOGY & ADDITIONAL TESTS:  Results Reviewed:   Imaging Personally Reviewed:  Electrocardiogram Personally Reviewed:    COORDINATION OF CARE:  Care Discussed with Consultants/Other Providers [Y/N]: urology PA Adali, s/p cysto/stent, OR cx coag neg staph, abx adjusted   Prior or Outpatient Records Reviewed [Y/N]:

## 2020-01-07 NOTE — BRIEF OPERATIVE NOTE - OPERATION/FINDINGS
coleen R RGP, R stent  hydronephrotic drip, inflamed bladder, infected appearing urine from kidney  Dictation ID# [id number]

## 2020-01-07 NOTE — PROGRESS NOTE ADULT - ASSESSMENT
50y/o M w/ h/o hld, bladder stone 3 years ago (passed on its own), bladder CA s/p TURBT on 12/31/2018, who was transferred from [facility] for acute right renal colic d/t obstructing ureteral stones x2, s/p cysto/right stent 1/7/2020

## 2020-01-07 NOTE — PROGRESS NOTE ADULT - PROBLEM SELECTOR PLAN 5
-h/o TURBT for bladder CA in Dec 2018  -pt states he was scheduled for cysto/biopsy on 1/10/2020 with Dr. Villegas  -management per urology.

## 2020-01-07 NOTE — BRIEF OPERATIVE NOTE - NSICDXBRIEFPROCEDURE_GEN_ALL_CORE_FT
PROCEDURES:  Cystoscopy, with retrograde pyelogram and ureteral stent insertion 07-Jan-2020 01:22:27  Faiza Marion

## 2020-01-07 NOTE — PROGRESS NOTE ADULT - PROBLEM SELECTOR PLAN 3
in the setting of obstructive uropathy d/t obstructing right ureteral stones  also got toradol at [facility] which can worsen TIGRE  IVF, avoid nephrotoxins (nsaids, contrast)  s/p cysto/stent, renal fxn improving, trend creat/UO

## 2020-01-07 NOTE — PROGRESS NOTE ADULT - PROBLEM SELECTOR PLAN 1
-right renal colic d/t obstructing right ureteral stones x2  -CT renal hunt showed mild right hydronephrosis and proximal hydroureter secondary to 2 calculi in the proximal third of the right ureter measuring 7 mm and 5 mm  -IVF, flomax, abx changed to vanco as Ucx and OR cx grew staph epidermidis, f/u Ucx  -s/p cysto/right ureteral stent on 1/7/2020

## 2020-01-07 NOTE — CHART NOTE - NSCHARTNOTEFT_GEN_A_CORE
Subjective  Patient denies abdominal pain, no nausea, no vomiting.  Objective    Vital signs  T(F): , Max: 100.3 (01-06-20 @ 13:10)  HR: 87 (01-07-20 @ 03:48)  BP: 145/79 (01-07-20 @ 03:48)  SpO2: 95% (01-07-20 @ 03:48)  Wt(kg): --    Output     01-05 @ 07:01  -  01-06 @ 07:00  --------------------------------------------------------  IN: 150 mL / OUT: 825 mL / NET: -675 mL    01-06 @ 07:01  -  01-07 @ 04:57  --------------------------------------------------------  IN: 1950 mL / OUT: 2150 mL / NET: -200 mL        Gen: No distress observed  Abd: Soft, non-tender  : voided    Labs      01-06 @ 07:15    WBC 14.48 / Hct 41.9  / SCr 2.08     01-05 @ 17:00    WBC 14.92 / Hct 44.4  / SCr 1.72       Urine Cx: ?  Blood Cx: ?    Imaging    A/P: s/p right stent placement  - Pain management  -IVF  - Strict I&O's

## 2020-01-07 NOTE — PHARMACOTHERAPY INTERVENTION NOTE - COMMENTS
48 yo M patient with voided urine culture growing CoNS s/p stent placement.  Pt has a documented PCN allergy in the chart.    Recommendation(s):  1)  Would wait on OR urine culture to dictate therapy.  2) CoNS would best be covered by vancomycin  mg q12h, based on his renal function.    Erick DumasD  Infectious Diseases Clinical Pharmacist  Spectra extension [phone]  .

## 2020-01-08 ENCOUNTER — TRANSCRIPTION ENCOUNTER (OUTPATIENT)
Age: 50
End: 2020-01-08

## 2020-01-08 VITALS
SYSTOLIC BLOOD PRESSURE: 138 MMHG | HEART RATE: 90 BPM | DIASTOLIC BLOOD PRESSURE: 83 MMHG | OXYGEN SATURATION: 96 % | RESPIRATION RATE: 16 BRPM | TEMPERATURE: 98 F

## 2020-01-08 LAB
-  CEFAZOLIN: SIGNIFICANT CHANGE UP
-  CIPROFLOXACIN: SIGNIFICANT CHANGE UP
-  GENTAMICIN: SIGNIFICANT CHANGE UP
-  LEVOFLOXACIN: SIGNIFICANT CHANGE UP
-  OXACILLIN: SIGNIFICANT CHANGE UP
-  RIFAMPIN.: SIGNIFICANT CHANGE UP
-  TETRACYCLINE: SIGNIFICANT CHANGE UP
-  TRIMETHOPRIM/SULFAMETHOXAZOLE: SIGNIFICANT CHANGE UP
-  VANCOMYCIN: SIGNIFICANT CHANGE UP
BACTERIA UR CULT: SIGNIFICANT CHANGE UP
METHOD TYPE: SIGNIFICANT CHANGE UP
ORGANISM # SPEC MICROSCOPIC CNT: SIGNIFICANT CHANGE UP
ORGANISM # SPEC MICROSCOPIC CNT: SIGNIFICANT CHANGE UP
SPECIMEN SOURCE: SIGNIFICANT CHANGE UP

## 2020-01-08 PROCEDURE — 99233 SBSQ HOSP IP/OBS HIGH 50: CPT

## 2020-01-08 PROCEDURE — 99238 HOSP IP/OBS DSCHRG MGMT 30/<: CPT

## 2020-01-08 RX ORDER — ACETAMINOPHEN 500 MG
2 TABLET ORAL
Qty: 0 | Refills: 0 | DISCHARGE
Start: 2020-01-08

## 2020-01-08 RX ORDER — TAMSULOSIN HYDROCHLORIDE 0.4 MG/1
1 CAPSULE ORAL
Qty: 14 | Refills: 0
Start: 2020-01-08 | End: 2020-01-21

## 2020-01-08 RX ORDER — CEPHALEXIN 500 MG
1 CAPSULE ORAL
Qty: 21 | Refills: 0
Start: 2020-01-08 | End: 2020-01-14

## 2020-01-08 RX ADMIN — HEPARIN SODIUM 5000 UNIT(S): 5000 INJECTION INTRAVENOUS; SUBCUTANEOUS at 05:10

## 2020-01-08 RX ADMIN — OXYCODONE HYDROCHLORIDE 5 MILLIGRAM(S): 5 TABLET ORAL at 05:41

## 2020-01-08 RX ADMIN — OXYCODONE HYDROCHLORIDE 5 MILLIGRAM(S): 5 TABLET ORAL at 00:56

## 2020-01-08 RX ADMIN — OXYCODONE HYDROCHLORIDE 5 MILLIGRAM(S): 5 TABLET ORAL at 09:27

## 2020-01-08 RX ADMIN — OXYCODONE HYDROCHLORIDE 5 MILLIGRAM(S): 5 TABLET ORAL at 05:11

## 2020-01-08 RX ADMIN — OXYCODONE HYDROCHLORIDE 5 MILLIGRAM(S): 5 TABLET ORAL at 10:00

## 2020-01-08 RX ADMIN — OXYCODONE HYDROCHLORIDE 5 MILLIGRAM(S): 5 TABLET ORAL at 00:26

## 2020-01-08 NOTE — PROGRESS NOTE ADULT - PROBLEM SELECTOR PLAN 2
-acute cystitis with leukocytosis and pyuria in the setting of obstructing ureteral stones  -Ucx and OR cx coag neg staph, staph epidermidis  -on vancomycin, d/c home on keflex per urology team

## 2020-01-08 NOTE — DISCHARGE NOTE NURSING/CASE MANAGEMENT/SOCIAL WORK - NSDCPNDISPN_GEN_ALL_CORE
Opioids not applicable/not prescribed/Side effects of pain management treatment/Activities of daily living, including home environment that might     exacerbate pain or reduce effectiveness of the pain management plan of care as well as strategies to address these issues/Education provided on the pain management plan of care

## 2020-01-08 NOTE — DISCHARGE NOTE PROVIDER - NSDCCPTREATMENT_GEN_ALL_CORE_FT
PRINCIPAL PROCEDURE  Procedure: Cystoscopy, with retrograde pyelogram and ureteral stent insertion  Findings and Treatment:

## 2020-01-08 NOTE — DISCHARGE NOTE NURSING/CASE MANAGEMENT/SOCIAL WORK - NSDCPECAREGIVERED_GEN_ALL_CORE
Medline and carenotes for surgical procedure Cysto with Stent placement, as well as DC Medications and side effects literature for patient reference.

## 2020-01-08 NOTE — PROGRESS NOTE ADULT - PROBLEM SELECTOR PLAN 5
-h/o TURBT for bladder CA in Dec 2018  -pt states he was scheduled for cysto/biopsy on 1/10/2020 with Dr. Villegas  -management per urology, eventual outpt plan for stone removal, bladder mas bx and stent removal

## 2020-01-08 NOTE — PROGRESS NOTE ADULT - ASSESSMENT
50y/o M w/ h/o hld, bladder stone 3 years ago (passed on its own), bladder CA s/p TURBT on 12/31/2018, who was transferred from [facility] for acute right renal colic d/t obstructing ureteral stones x2, s/p cysto/right stent 1/7/2020

## 2020-01-08 NOTE — PROGRESS NOTE ADULT - SUBJECTIVE AND OBJECTIVE BOX
Dr. Rosamaria Irizarry  Pager [phone]    PROGRESS NOTE:     Patient is a 49y old  Male who presents with a chief complaint of R ureteral stones x2 (08 Jan 2020 10:53)      SUBJECTIVE / OVERNIGHT EVENTS: pt feels better, no chest pain/sob/fever/flank pain  ADDITIONAL REVIEW OF SYSTEMS: afebrile    MEDICATIONS  (STANDING):  heparin  Injectable 5000 Unit(s) SubCutaneous every 8 hours  tamsulosin 0.4 milliGRAM(s) Oral at bedtime  vancomycin  IVPB      vancomycin  IVPB 750 milliGRAM(s) IV Intermittent every 12 hours    MEDICATIONS  (PRN):  acetaminophen   Tablet .. 650 milliGRAM(s) Oral every 6 hours PRN Temp greater or equal to 38C (100.4F), Mild Pain (1 - 3)  ondansetron Injectable 4 milliGRAM(s) IV Push every 6 hours PRN Nausea  oxyCODONE    IR 10 milliGRAM(s) Oral every 6 hours PRN Severe Pain (7 - 10)  oxyCODONE    IR 5 milliGRAM(s) Oral every 4 hours PRN Moderate Pain (4 - 6)  senna 2 Tablet(s) Oral at bedtime PRN Constipation      CAPILLARY BLOOD GLUCOSE        I&O's Summary    07 Jan 2020 07:01  -  08 Jan 2020 07:00  --------------------------------------------------------  IN: 0 mL / OUT: 2650 mL / NET: -2650 mL    08 Jan 2020 07:01  -  08 Jan 2020 12:00  --------------------------------------------------------  IN: 0 mL / OUT: 300 mL / NET: -300 mL        PHYSICAL EXAM:  Vital Signs Last 24 Hrs  T(C): 36.9 (08 Jan 2020 09:20), Max: 37.1 (07 Jan 2020 14:04)  T(F): 98.4 (08 Jan 2020 09:20), Max: 98.8 (07 Jan 2020 14:04)  HR: 90 (08 Jan 2020 09:20) (69 - 90)  BP: 138/83 (08 Jan 2020 09:20) (123/61 - 144/80)  BP(mean): --  RR: 16 (08 Jan 2020 09:20) (14 - 17)  SpO2: 96% (08 Jan 2020 09:20) (95% - 98%)  CONSTITUTIONAL: NAD, well-developed  RESPIRATORY: Normal respiratory effort; lungs are clear to auscultation bilaterally  CARDIOVASCULAR: Regular rate and rhythm, normal S1 and S2, no murmur/rub/gallop; No lower extremity edema; Peripheral pulses are 2+ bilaterally  ABDOMEN: Nontender to palpation, normoactive bowel sounds, no rebound/guarding; No hepatosplenomegaly  MUSCULOSKELETAL: no clubbing or cyanosis of digits; no joint swelling or tenderness to palpation  PSYCH: A+O to person, place, and time; affect appropriate    LABS:                        13.7   11.71 )-----------( 132      ( 07 Jan 2020 06:36 )             40.3     01-07    139  |  104  |  16  ----------------------------<  159<H>  3.9   |  23  |  1.69<H>    Ca    9.0      07 Jan 2020 06:36                Culture - Urine (collected 07 Jan 2020 01:31)  Source: URINE KIDNEY  Preliminary Report (08 Jan 2020 10:18):    GPCID^Gram Pos Cocci to be IDed    COLONY COUNT: FEW    Culture - Acid Fast Smear Concentrated (collected 07 Jan 2020 01:31)  Source: URINE KIDNEY  Final Report (07 Jan 2020 15:06):    AFB SMEAR= NO ACID FAST BACILLI SEEN    Culture - Urine (collected 05 Jan 2020 22:37)  Source: URINE MIDSTREAM  Preliminary Report (07 Jan 2020 12:49):    STEP^Staphylococcus epidermidis    COLONY COUNT: 10,000-49,000 CFU/mL    Culture - Urine (collected 05 Jan 2020 13:29)  Source: .Urine Clean Catch (Midstream)  Final Report (06 Jan 2020 19:09):    >100,000 CFU/ml Coag Negative Staphylococcus "Susceptibilities not    performed"        RADIOLOGY & ADDITIONAL TESTS:  Results Reviewed:   Imaging Personally Reviewed:  Electrocardiogram Personally Reviewed:    COORDINATION OF CARE:  Care Discussed with Consultants/Other Providers [Y/N]: urology ZACK Bro, d/c home today on keflex  Prior or Outpatient Records Reviewed [Y/N]:

## 2020-01-08 NOTE — DISCHARGE NOTE PROVIDER - NSDCMRMEDTOKEN_GEN_ALL_CORE_FT
acetaminophen 325 mg oral tablet: 2 tab(s) orally every 6 hours, As needed, Temp greater or equal to 38C (100.4F), Mild Pain (1 - 3)  Keflex 500 mg oral capsule: 1 cap(s) orally every 8 hours   tamsulosin 0.4 mg oral capsule: 1 cap(s) orally once a day (at bedtime)

## 2020-01-08 NOTE — DISCHARGE NOTE NURSING/CASE MANAGEMENT/SOCIAL WORK - NSDCFUADDAPPT_GEN_ALL_CORE_FT
Follow-up with  Dr. Villegas in the office as instructed for post-op check as well as with PMD for continuity of care.

## 2020-01-08 NOTE — DISCHARGE NOTE PROVIDER - HOSPITAL COURSE
Pt admitted with R. ureteral stones; had low grade temp; urine with staph epi; went to OR and had stent placed; no fevers; feels well; ambulating and voiding well; was on vanco for a few doses as per stanley ID; home today on keflex; will keep on culture list

## 2020-01-08 NOTE — PROGRESS NOTE ADULT - REASON FOR ADMISSION
R ureteral stones x2

## 2020-01-08 NOTE — DISCHARGE NOTE NURSING/CASE MANAGEMENT/SOCIAL WORK - NSDCPNINST_GEN_ALL_CORE
Call MD with any signs of infection ie. fever/shaking chills, cloudy foul-smelling urine, or with signs of bleeding, or persistent nausea, or with increased unrelieved flank pain. Continue to drink plenty of fluids and avoid straining as well as constipation which may be a side effect from taking narcotic pain meds. Follow-up with MD in office for post-op check as well as with PMD as advised for continuity of care.

## 2020-01-08 NOTE — PROGRESS NOTE ADULT - PROBLEM SELECTOR PLAN 3
in the setting of obstructive uropathy d/t obstructing right ureteral stones  also got toradol at [facility] which can worsen TIGRE  IVF, avoid nephrotoxins (nsaids, contrast)  s/p cysto/stent, renal fxn improving, trend creat/UO

## 2020-01-08 NOTE — DISCHARGE NOTE NURSING/CASE MANAGEMENT/SOCIAL WORK - NSDPDISTO_GEN_ALL_CORE
Pt  A+O X 4. VS stable Afebrile. pt with positive bowel sounds adia po diet. Voiding without difficulty. seen by MD and cleared for DC to home as pe safe Dc plan./Home

## 2020-01-08 NOTE — DISCHARGE NOTE NURSING/CASE MANAGEMENT/SOCIAL WORK - PATIENT PORTAL LINK FT
You can access the FollowMyHealth Patient Portal offered by [facility] by registering at the following website: [email or web address]. By joining [employer]’s FollowMyHealth portal, you will also be able to view your health information using other applications (apps) compatible with our system.

## 2020-01-08 NOTE — DISCHARGE NOTE PROVIDER - CARE PROVIDER_API CALL
Titus Villegas)  Urology  [address] [place] [address], [place] [address]  Phone: [phone]  Fax: [phone]  Follow Up Time:

## 2020-01-08 NOTE — DISCHARGE NOTE PROVIDER - NSDCCPCAREPLAN_GEN_ALL_CORE_FT
PRINCIPAL DISCHARGE DIAGNOSIS  Diagnosis: Ureteral stone with hydronephrosis  Assessment and Plan of Treatment: Drink plenty of fluids; Dr. Villegas's office will call you with OR date      SECONDARY DISCHARGE DIAGNOSES  Diagnosis: Nephrolithiasis  Assessment and Plan of Treatment:

## 2020-01-08 NOTE — PROGRESS NOTE ADULT - SUBJECTIVE AND OBJECTIVE BOX
ANESTHESIA POSTOP CHECK    49y Male POSTOP DAY 1 S/P cystoscopy with stent placement     Vital Signs Last 24 Hrs  T(C): 36.9 (08 Jan 2020 09:20), Max: 37.1 (07 Jan 2020 14:04)  T(F): 98.4 (08 Jan 2020 09:20), Max: 98.8 (07 Jan 2020 14:04)  HR: 90 (08 Jan 2020 09:20) (69 - 90)  BP: 138/83 (08 Jan 2020 09:20) (123/61 - 144/80)  BP(mean): --  RR: 16 (08 Jan 2020 09:20) (14 - 17)  SpO2: 96% (08 Jan 2020 09:20) (95% - 98%)  I&O's Summary    07 Jan 2020 07:01  -  08 Jan 2020 07:00  --------------------------------------------------------  IN: 0 mL / OUT: 2650 mL / NET: -2650 mL    08 Jan 2020 07:01  -  08 Jan 2020 10:53  --------------------------------------------------------  IN: 0 mL / OUT: 300 mL / NET: -300 mL        [ x] NO APPARENT ANESTHESIA COMPLICATIONS

## 2020-01-09 LAB
-  CEFAZOLIN: SIGNIFICANT CHANGE UP
-  CIPROFLOXACIN: SIGNIFICANT CHANGE UP
-  GENTAMICIN: SIGNIFICANT CHANGE UP
-  LEVOFLOXACIN: SIGNIFICANT CHANGE UP
-  OXACILLIN: SIGNIFICANT CHANGE UP
-  PENICILLIN: SIGNIFICANT CHANGE UP
-  RIFAMPIN.: SIGNIFICANT CHANGE UP
-  TETRACYCLINE: SIGNIFICANT CHANGE UP
-  TRIMETHOPRIM/SULFAMETHOXAZOLE: SIGNIFICANT CHANGE UP
-  VANCOMYCIN: SIGNIFICANT CHANGE UP
BACTERIA UR CULT: SIGNIFICANT CHANGE UP
METHOD TYPE: SIGNIFICANT CHANGE UP
ORGANISM # SPEC MICROSCOPIC CNT: SIGNIFICANT CHANGE UP
ORGANISM # SPEC MICROSCOPIC CNT: SIGNIFICANT CHANGE UP

## 2020-01-10 ENCOUNTER — APPOINTMENT (OUTPATIENT)
Dept: UROLOGY | Facility: AMBULATORY SURGERY CENTER | Age: 50
End: 2020-01-10

## 2020-01-14 LAB — SPECIMEN SOURCE: SIGNIFICANT CHANGE UP

## 2020-01-22 DIAGNOSIS — N20.9 URINARY CALCULUS, UNSPECIFIED: ICD-10-CM

## 2020-02-03 ENCOUNTER — OUTPATIENT (OUTPATIENT)
Dept: OUTPATIENT SERVICES | Facility: HOSPITAL | Age: 50
LOS: 1 days | End: 2020-02-03
Payer: COMMERCIAL

## 2020-02-03 VITALS
OXYGEN SATURATION: 97 % | HEART RATE: 97 BPM | SYSTOLIC BLOOD PRESSURE: 110 MMHG | RESPIRATION RATE: 16 BRPM | HEIGHT: 70 IN | DIASTOLIC BLOOD PRESSURE: 80 MMHG | WEIGHT: 223.99 LBS | TEMPERATURE: 99 F

## 2020-02-03 DIAGNOSIS — N20.1 CALCULUS OF URETER: ICD-10-CM

## 2020-02-03 DIAGNOSIS — Z90.89 ACQUIRED ABSENCE OF OTHER ORGANS: Chronic | ICD-10-CM

## 2020-02-03 DIAGNOSIS — C67.9 MALIGNANT NEOPLASM OF BLADDER, UNSPECIFIED: Chronic | ICD-10-CM

## 2020-02-03 LAB
ALBUMIN SERPL ELPH-MCNC: 4.8 G/DL — SIGNIFICANT CHANGE UP (ref 3.3–5)
ALP SERPL-CCNC: 59 U/L — SIGNIFICANT CHANGE UP (ref 40–120)
ALT FLD-CCNC: 46 U/L — HIGH (ref 4–41)
ANION GAP SERPL CALC-SCNC: 14 MMO/L — SIGNIFICANT CHANGE UP (ref 7–14)
AST SERPL-CCNC: 26 U/L — SIGNIFICANT CHANGE UP (ref 4–40)
BILIRUB SERPL-MCNC: 0.4 MG/DL — SIGNIFICANT CHANGE UP (ref 0.2–1.2)
BUN SERPL-MCNC: 29 MG/DL — HIGH (ref 7–23)
CALCIUM SERPL-MCNC: 10.3 MG/DL — SIGNIFICANT CHANGE UP (ref 8.4–10.5)
CHLORIDE SERPL-SCNC: 95 MMOL/L — LOW (ref 98–107)
CO2 SERPL-SCNC: 26 MMOL/L — SIGNIFICANT CHANGE UP (ref 22–31)
CREAT SERPL-MCNC: 1.44 MG/DL — HIGH (ref 0.5–1.3)
GLUCOSE SERPL-MCNC: 74 MG/DL — SIGNIFICANT CHANGE UP (ref 70–99)
HCT VFR BLD CALC: 44.5 % — SIGNIFICANT CHANGE UP (ref 39–50)
HGB BLD-MCNC: 15 G/DL — SIGNIFICANT CHANGE UP (ref 13–17)
MCHC RBC-ENTMCNC: 30.7 PG — SIGNIFICANT CHANGE UP (ref 27–34)
MCHC RBC-ENTMCNC: 33.7 % — SIGNIFICANT CHANGE UP (ref 32–36)
MCV RBC AUTO: 91.2 FL — SIGNIFICANT CHANGE UP (ref 80–100)
NRBC # FLD: 0 K/UL — SIGNIFICANT CHANGE UP (ref 0–0)
PLATELET # BLD AUTO: 176 K/UL — SIGNIFICANT CHANGE UP (ref 150–400)
PMV BLD: 12.9 FL — SIGNIFICANT CHANGE UP (ref 7–13)
POTASSIUM SERPL-MCNC: 3.6 MMOL/L — SIGNIFICANT CHANGE UP (ref 3.5–5.3)
POTASSIUM SERPL-SCNC: 3.6 MMOL/L — SIGNIFICANT CHANGE UP (ref 3.5–5.3)
PROT SERPL-MCNC: 7.8 G/DL — SIGNIFICANT CHANGE UP (ref 6–8.3)
RBC # BLD: 4.88 M/UL — SIGNIFICANT CHANGE UP (ref 4.2–5.8)
RBC # FLD: 13 % — SIGNIFICANT CHANGE UP (ref 10.3–14.5)
SODIUM SERPL-SCNC: 135 MMOL/L — SIGNIFICANT CHANGE UP (ref 135–145)
WBC # BLD: 9.43 K/UL — SIGNIFICANT CHANGE UP (ref 3.8–10.5)
WBC # FLD AUTO: 9.43 K/UL — SIGNIFICANT CHANGE UP (ref 3.8–10.5)

## 2020-02-03 PROCEDURE — 93010 ELECTROCARDIOGRAM REPORT: CPT

## 2020-02-03 NOTE — H&P PST ADULT - NEGATIVE MUSCULOSKELETAL SYMPTOMS
no back pain/no leg pain R/no joint swelling/no myalgia/no muscle cramps/no neck pain/no arm pain L/no stiffness/no arm pain R/no leg pain L/no arthritis/no muscle weakness

## 2020-02-03 NOTE — H&P PST ADULT - HISTORY OF PRESENT ILLNESS
50 y/o male with history of bladder cancer presents to PST w/ a preop dx of calculus of ureter and for an evaluation for a scheduled cystoscopy, right ureteroscopy with laser lithotripsy, stone extraction, ureteral stent placement, bladder biopsy on 2/10/2020. Patient is s/p bladder ca, s/p chemo via cath and removal of mass December 2018, and February 2019. Pt f/u regularly w/ cystoscopies. States last december had thee last cystoscopy. Also states went to [facility] last 1/5/2020 due to  severe flank pain. CT scan done and stones noted. P Transfered to [facility] where a stent was placed and some stones removed. Infection and mild kidney failure noted but resolved after axb treatment. pt now recommended sx to remove remaining stones, stent extraction and bladder bx.

## 2020-02-03 NOTE — H&P PST ADULT - NEGATIVE GENERAL SYMPTOMS
no sweating/no weight loss/no weight gain/no polyphagia/no fatigue/no chills/no anorexia/no polyuria/no fever/no malaise/no polydipsia

## 2020-02-03 NOTE — H&P PST ADULT - ASSESSMENT
48 yo male with history of bladder cancer w/ a dx of calculus of ureter and to be evaluated for a scheduled for cystoscopy, right ureteroscopy with laser lithotripsy, stone extraction, ureteral stent placement, bladder biopsy on 2/10/2020    Plan: 48 yo male with history of bladder cancer w/ a dx of calculus of ureter and to be evaluated for a scheduled for cystoscopy, right ureteroscopy with laser lithotripsy, stone extraction, ureteral stent placement, bladder biopsy on 2/10/2020    Plan:   Preop instructions provided including npo status, no NSAIDs or otc herbals or mvi starting today. BW sent and awaiting results.

## 2020-02-03 NOTE — H&P PST ADULT - NEGATIVE OPHTHALMOLOGIC SYMPTOMS
no photophobia/no loss of vision L/no scleral injection R/no diplopia/no lacrimation L/no lacrimation R/no blurred vision L/no pain R/no irritation L/no loss of vision R/no scleral injection L/no discharge R/no blurred vision R/no pain L/no irritation R

## 2020-02-03 NOTE — H&P PST ADULT - NEGATIVE ENMT SYMPTOMS
no dysphagia/no nasal obstruction/no ear pain/no hearing difficulty/no nasal congestion/no post-nasal discharge/no nose bleeds/no dry mouth/no nasal discharge/no throat pain/no tinnitus/no sinus symptoms/no vertigo/no recurrent cold sores/no abnormal taste sensation/no gum bleeding

## 2020-02-03 NOTE — H&P PST ADULT - NEGATIVE CARDIOVASCULAR SYMPTOMS
no claudication/no paroxysmal nocturnal dyspnea/no palpitations/no chest pain/no orthopnea/no dyspnea on exertion/no peripheral edema

## 2020-02-03 NOTE — H&P PST ADULT - RS GEN PE MLT RESP DETAILS PC
no wheezes/no chest wall tenderness/no rhonchi/clear to auscultation bilaterally/respirations non-labored/no subcutaneous emphysema/airway patent/no intercostal retractions/no rales/breath sounds equal/good air movement

## 2020-02-03 NOTE — H&P PST ADULT - NEGATIVE NEUROLOGICAL SYMPTOMS
no focal seizures/no difficulty walking/no hemiparesis/no weakness/no generalized seizures/no loss of consciousness/no headache/no confusion/no vertigo/no tremors/no loss of sensation/no facial palsy/no transient paralysis/no paresthesias

## 2020-02-03 NOTE — H&P PST ADULT - NEGATIVE SKIN SYMPTOMS
no itching/no dryness/no hair loss/no brittle nails/no change in size/color of mole/no tumor/no pitted nails/no rash

## 2020-02-03 NOTE — H&P PST ADULT - NSICDXFAMILYHX_GEN_ALL_CORE_FT
FAMILY HISTORY:  Family history of dementia  Family history of hypertension in mother  Family history of Parkinson's disease

## 2020-02-03 NOTE — H&P PST ADULT - GASTROINTESTINAL DETAILS
no guarding/no rigidity/no rebound tenderness/no distention/no organomegaly/nontender/no masses palpable/bowel sounds normal/soft

## 2020-02-03 NOTE — H&P PST ADULT - NEGATIVE GASTROINTESTINAL SYMPTOMS
no constipation/no vomiting/no nausea/no melena/no jaundice/no diarrhea/no change in bowel habits/no flatulence/no hematochezia/no hiccoughs/no abdominal pain/no steatorrhea

## 2020-02-03 NOTE — H&P PST ADULT - NEGATIVE GENERAL GENITOURINARY SYMPTOMS
no incontinence/no nocturia/no dysuria/normal urinary frequency/no hematuria/no flank pain L/no urinary hesitancy/no flank pain R/no urine discoloration/no bladder infections

## 2020-02-04 LAB — SPECIMEN SOURCE: SIGNIFICANT CHANGE UP

## 2020-02-05 LAB — BACTERIA UR CULT: SIGNIFICANT CHANGE UP

## 2020-02-09 ENCOUNTER — TRANSCRIPTION ENCOUNTER (OUTPATIENT)
Age: 50
End: 2020-02-09

## 2020-02-10 ENCOUNTER — INPATIENT (INPATIENT)
Facility: HOSPITAL | Age: 50
LOS: 0 days | Discharge: HOME | End: 2020-02-11
Attending: UROLOGY | Admitting: UROLOGY
Payer: COMMERCIAL

## 2020-02-10 ENCOUNTER — RESULT REVIEW (OUTPATIENT)
Age: 50
End: 2020-02-10

## 2020-02-10 ENCOUNTER — APPOINTMENT (OUTPATIENT)
Dept: UROLOGY | Facility: HOSPITAL | Age: 50
End: 2020-02-10

## 2020-02-10 VITALS — HEIGHT: 70 IN | TEMPERATURE: 98 F | WEIGHT: 223.99 LBS | OXYGEN SATURATION: 96 %

## 2020-02-10 DIAGNOSIS — E78.5 HYPERLIPIDEMIA, UNSPECIFIED: ICD-10-CM

## 2020-02-10 DIAGNOSIS — N32.89 OTHER SPECIFIED DISORDERS OF BLADDER: ICD-10-CM

## 2020-02-10 DIAGNOSIS — N20.1 CALCULUS OF URETER: ICD-10-CM

## 2020-02-10 DIAGNOSIS — Z29.9 ENCOUNTER FOR PROPHYLACTIC MEASURES, UNSPECIFIED: ICD-10-CM

## 2020-02-10 DIAGNOSIS — Z90.89 ACQUIRED ABSENCE OF OTHER ORGANS: Chronic | ICD-10-CM

## 2020-02-10 DIAGNOSIS — C67.9 MALIGNANT NEOPLASM OF BLADDER, UNSPECIFIED: Chronic | ICD-10-CM

## 2020-02-10 LAB
ANION GAP SERPL CALC-SCNC: 15 MMO/L — HIGH (ref 7–14)
BUN SERPL-MCNC: 15 MG/DL — SIGNIFICANT CHANGE UP (ref 7–23)
CALCIUM SERPL-MCNC: 9.6 MG/DL — SIGNIFICANT CHANGE UP (ref 8.4–10.5)
CHLORIDE SERPL-SCNC: 101 MMOL/L — SIGNIFICANT CHANGE UP (ref 98–107)
CO2 SERPL-SCNC: 22 MMOL/L — SIGNIFICANT CHANGE UP (ref 22–31)
CREAT SERPL-MCNC: 1.21 MG/DL — SIGNIFICANT CHANGE UP (ref 0.5–1.3)
GLUCOSE SERPL-MCNC: 137 MG/DL — HIGH (ref 70–99)
HCT VFR BLD CALC: 43.5 % — SIGNIFICANT CHANGE UP (ref 39–50)
HGB BLD-MCNC: 14.4 G/DL — SIGNIFICANT CHANGE UP (ref 13–17)
MCHC RBC-ENTMCNC: 30.4 PG — SIGNIFICANT CHANGE UP (ref 27–34)
MCHC RBC-ENTMCNC: 33.1 % — SIGNIFICANT CHANGE UP (ref 32–36)
MCV RBC AUTO: 91.8 FL — SIGNIFICANT CHANGE UP (ref 80–100)
NRBC # FLD: 0 K/UL — SIGNIFICANT CHANGE UP (ref 0–0)
PLATELET # BLD AUTO: 149 K/UL — LOW (ref 150–400)
PMV BLD: 13 FL — SIGNIFICANT CHANGE UP (ref 7–13)
POTASSIUM SERPL-MCNC: 4 MMOL/L — SIGNIFICANT CHANGE UP (ref 3.5–5.3)
POTASSIUM SERPL-SCNC: 4 MMOL/L — SIGNIFICANT CHANGE UP (ref 3.5–5.3)
RBC # BLD: 4.74 M/UL — SIGNIFICANT CHANGE UP (ref 4.2–5.8)
RBC # FLD: 13.2 % — SIGNIFICANT CHANGE UP (ref 10.3–14.5)
SODIUM SERPL-SCNC: 138 MMOL/L — SIGNIFICANT CHANGE UP (ref 135–145)
WBC # BLD: 13.84 K/UL — HIGH (ref 3.8–10.5)
WBC # FLD AUTO: 13.84 K/UL — HIGH (ref 3.8–10.5)

## 2020-02-10 PROCEDURE — 99223 1ST HOSP IP/OBS HIGH 75: CPT

## 2020-02-10 PROCEDURE — 88305 TISSUE EXAM BY PATHOLOGIST: CPT | Mod: 26

## 2020-02-10 PROCEDURE — 72170 X-RAY EXAM OF PELVIS: CPT | Mod: 26

## 2020-02-10 RX ORDER — PHENAZOPYRIDINE HCL 100 MG
200 TABLET ORAL ONCE
Refills: 0 | Status: COMPLETED | OUTPATIENT
Start: 2020-02-10 | End: 2020-02-10

## 2020-02-10 RX ORDER — ACETAMINOPHEN 500 MG
1000 TABLET ORAL ONCE
Refills: 0 | Status: COMPLETED | OUTPATIENT
Start: 2020-02-10 | End: 2020-02-10

## 2020-02-10 RX ORDER — ACETAMINOPHEN 500 MG
650 TABLET ORAL EVERY 6 HOURS
Refills: 0 | Status: DISCONTINUED | OUTPATIENT
Start: 2020-02-10 | End: 2020-02-11

## 2020-02-10 RX ORDER — FENTANYL CITRATE 50 UG/ML
50 INJECTION INTRAVENOUS
Refills: 0 | Status: DISCONTINUED | OUTPATIENT
Start: 2020-02-10 | End: 2020-02-10

## 2020-02-10 RX ORDER — ACETAMINOPHEN 500 MG
650 TABLET ORAL ONCE
Refills: 0 | Status: DISCONTINUED | OUTPATIENT
Start: 2020-02-10 | End: 2020-02-10

## 2020-02-10 RX ORDER — HYDROMORPHONE HYDROCHLORIDE 2 MG/ML
0.5 INJECTION INTRAMUSCULAR; INTRAVENOUS; SUBCUTANEOUS ONCE
Refills: 0 | Status: DISCONTINUED | OUTPATIENT
Start: 2020-02-10 | End: 2020-02-10

## 2020-02-10 RX ORDER — OXYCODONE HYDROCHLORIDE 5 MG/1
5 TABLET ORAL EVERY 4 HOURS
Refills: 0 | Status: DISCONTINUED | OUTPATIENT
Start: 2020-02-10 | End: 2020-02-11

## 2020-02-10 RX ORDER — HEPARIN SODIUM 5000 [USP'U]/ML
5000 INJECTION INTRAVENOUS; SUBCUTANEOUS EVERY 8 HOURS
Refills: 0 | Status: DISCONTINUED | OUTPATIENT
Start: 2020-02-10 | End: 2020-02-11

## 2020-02-10 RX ORDER — SENNA PLUS 8.6 MG/1
2 TABLET ORAL AT BEDTIME
Refills: 0 | Status: DISCONTINUED | OUTPATIENT
Start: 2020-02-10 | End: 2020-02-11

## 2020-02-10 RX ORDER — SODIUM CHLORIDE 9 MG/ML
1000 INJECTION, SOLUTION INTRAVENOUS
Refills: 0 | Status: DISCONTINUED | OUTPATIENT
Start: 2020-02-10 | End: 2020-02-11

## 2020-02-10 RX ORDER — SODIUM CHLORIDE 9 MG/ML
1000 INJECTION, SOLUTION INTRAVENOUS
Refills: 0 | Status: DISCONTINUED | OUTPATIENT
Start: 2020-02-10 | End: 2020-02-10

## 2020-02-10 RX ORDER — SODIUM CHLORIDE 9 MG/ML
500 INJECTION, SOLUTION INTRAVENOUS ONCE
Refills: 0 | Status: COMPLETED | OUTPATIENT
Start: 2020-02-10 | End: 2020-02-10

## 2020-02-10 RX ORDER — ONDANSETRON 8 MG/1
4 TABLET, FILM COATED ORAL EVERY 6 HOURS
Refills: 0 | Status: DISCONTINUED | OUTPATIENT
Start: 2020-02-10 | End: 2020-02-11

## 2020-02-10 RX ORDER — OXYCODONE HYDROCHLORIDE 5 MG/1
10 TABLET ORAL EVERY 4 HOURS
Refills: 0 | Status: DISCONTINUED | OUTPATIENT
Start: 2020-02-10 | End: 2020-02-11

## 2020-02-10 RX ORDER — ONDANSETRON 8 MG/1
4 TABLET, FILM COATED ORAL ONCE
Refills: 0 | Status: COMPLETED | OUTPATIENT
Start: 2020-02-10 | End: 2020-02-10

## 2020-02-10 RX ADMIN — OXYCODONE HYDROCHLORIDE 10 MILLIGRAM(S): 5 TABLET ORAL at 23:51

## 2020-02-10 RX ADMIN — Medication 400 MILLIGRAM(S): at 12:21

## 2020-02-10 RX ADMIN — FENTANYL CITRATE 50 MICROGRAM(S): 50 INJECTION INTRAVENOUS at 10:11

## 2020-02-10 RX ADMIN — HYDROMORPHONE HYDROCHLORIDE 0.5 MILLIGRAM(S): 2 INJECTION INTRAMUSCULAR; INTRAVENOUS; SUBCUTANEOUS at 12:25

## 2020-02-10 RX ADMIN — HEPARIN SODIUM 5000 UNIT(S): 5000 INJECTION INTRAVENOUS; SUBCUTANEOUS at 16:10

## 2020-02-10 RX ADMIN — FENTANYL CITRATE 50 MICROGRAM(S): 50 INJECTION INTRAVENOUS at 11:00

## 2020-02-10 RX ADMIN — FENTANYL CITRATE 50 MICROGRAM(S): 50 INJECTION INTRAVENOUS at 11:15

## 2020-02-10 RX ADMIN — SODIUM CHLORIDE 1000 MILLILITER(S): 9 INJECTION, SOLUTION INTRAVENOUS at 12:00

## 2020-02-10 RX ADMIN — Medication 200 MILLIGRAM(S): at 12:50

## 2020-02-10 RX ADMIN — OXYCODONE HYDROCHLORIDE 10 MILLIGRAM(S): 5 TABLET ORAL at 18:53

## 2020-02-10 RX ADMIN — Medication 1000 MILLIGRAM(S): at 12:50

## 2020-02-10 RX ADMIN — FENTANYL CITRATE 50 MICROGRAM(S): 50 INJECTION INTRAVENOUS at 10:30

## 2020-02-10 RX ADMIN — FENTANYL CITRATE 50 MICROGRAM(S): 50 INJECTION INTRAVENOUS at 09:45

## 2020-02-10 RX ADMIN — HEPARIN SODIUM 5000 UNIT(S): 5000 INJECTION INTRAVENOUS; SUBCUTANEOUS at 21:20

## 2020-02-10 RX ADMIN — FENTANYL CITRATE 50 MICROGRAM(S): 50 INJECTION INTRAVENOUS at 09:32

## 2020-02-10 RX ADMIN — Medication 200 MILLIGRAM(S): at 18:53

## 2020-02-10 RX ADMIN — ONDANSETRON 4 MILLIGRAM(S): 8 TABLET, FILM COATED ORAL at 09:25

## 2020-02-10 RX ADMIN — HYDROMORPHONE HYDROCHLORIDE 0.5 MILLIGRAM(S): 2 INJECTION INTRAMUSCULAR; INTRAVENOUS; SUBCUTANEOUS at 12:10

## 2020-02-10 RX ADMIN — OXYCODONE HYDROCHLORIDE 10 MILLIGRAM(S): 5 TABLET ORAL at 19:47

## 2020-02-10 NOTE — CONSULT NOTE ADULT - ASSESSMENT
48 y/o male with h/o hld, bladder stone 3 years ago, bladder CA s/p TURBT on 12/31/2018, recent admission in Jan for acute right renal colic d/t obstructing ureteral stones x2, s/p cysto/right stent 1/7/2020, Ucx/OR cx grew staph epidermidis, treated with vanco, now s/p cysto, right stent removal and stone extraction, bladder biopsy on 2/10

## 2020-02-10 NOTE — BRIEF OPERATIVE NOTE - OPERATION/FINDINGS
Cystoscopy, Right URS, R. ureteral stent removal, stone extraction, RPG.  Random bladder biopsy - Right Lateral, posterior, left lateral, anterior.

## 2020-02-10 NOTE — CONSULT NOTE ADULT - SUBJECTIVE AND OBJECTIVE BOX
Rosamaria Irizarry MD  Pager [phone]    HPI:  Patient is a 50 y/o male with h/o hld, bladder stone 3 years ago (passed on its own), bladder CA s/p TURBT on 12/31/2018, recent admission in Jan for acute right renal colic d/t obstructing ureteral stones x2, s/p cysto/right stent 1/7/2020, Ucx/OR cx grew staph epidermidis, treated with vanco, now admitted for cysto, right stent removal and stone extraction, bladder biopsy 2/10. Patient seen postop in PACU, hemodynamically stable, c/o right flank pain and nausea/vomiting earlier, given pain med and zofran with relief. Denies fever/chill/chest pain/sob/dizziness.     PAST MEDICAL & SURGICAL HISTORY:  HLD (hyperlipidemia)  Bladder calculus  Bladder mass  Bladder cancer: mass removal x2  History of tonsillectomy    Review of Systems:   CONSTITUTIONAL: No fever, weight loss, or fatigue  EYES: No eye pain, visual disturbances, or discharge  ENMT:  No difficulty hearing, tinnitus, vertigo; No sinus or throat pain  NECK: No pain or stiffness  BREASTS: No pain, masses, or nipple discharge  RESPIRATORY: No cough, wheezing, chills or hemoptysis; No shortness of breath  CARDIOVASCULAR: No chest pain, palpitations, dizziness, or leg swelling  GASTROINTESTINAL: No abdominal or epigastric pain. No nausea, vomiting, or hematemesis; No diarrhea or constipation. No melena or hematochezia.  GENITOURINARY: No dysuria, frequency, hematuria, or incontinence; right flank pain  NEUROLOGICAL: No headaches, memory loss, loss of strength, numbness, or tremors  SKIN: No itching, burning, rashes, or lesions   LYMPH NODES: No enlarged glands  ENDOCRINE: No heat or cold intolerance; No hair loss  MUSCULOSKELETAL: No joint pain or swelling; No muscle, back, or extremity pain  PSYCHIATRIC: No depression, anxiety, mood swings, or difficulty sleeping  HEME/LYMPH: No easy bruising, or bleeding gums  ALLERY AND IMMUNOLOGIC: No hives or eczema    Allergies    penicillin (Unknown)    Intolerances    Social History:  denies cigarette smoking or alcohol use    FAMILY HISTORY:  Family history of hypertension in mother  Family history of dementia  Family history of Parkinson's disease      Home Medications:  acetaminophen 325 mg oral tablet: 2 tab(s) orally every 6 hours, As needed, Temp greater or equal to 38C (100.4F), Mild Pain (1 - 3) (10 Feb 2020 06:31)      MEDICATIONS  (STANDING):  heparin  Injectable 5000 Unit(s) SubCutaneous every 8 hours  lactated ringers. 1000 milliLiter(s) (125 mL/Hr) IV Continuous <Continuous>  phenazopyridine 200 milliGRAM(s) Oral once    MEDICATIONS  (PRN):  acetaminophen   Tablet .. 650 milliGRAM(s) Oral every 6 hours PRN Temp greater or equal to 38C (100.4F), Mild Pain (1 - 3)  bisacodyl 5 milliGRAM(s) Oral daily PRN Constipation  ondansetron Injectable 4 milliGRAM(s) IV Push every 6 hours PRN Nausea  oxyCODONE    IR 5 milliGRAM(s) Oral every 4 hours PRN Moderate Pain (4 - 6)  oxyCODONE    IR 10 milliGRAM(s) Oral every 4 hours PRN Severe Pain (7 - 10)  senna 2 Tablet(s) Oral at bedtime PRN Constipation      Vital Signs Last 24 Hrs  T(C): 37 (10 Feb 2020 14:00), Max: 37 (10 Feb 2020 14:00)  T(F): 98.6 (10 Feb 2020 14:00), Max: 98.6 (10 Feb 2020 14:00)  HR: 96 (10 Feb 2020 13:45) (57 - 96)  BP: 139/71 (10 Feb 2020 13:45) (128/84 - 159/93)  BP(mean): 86 (10 Feb 2020 13:45) (86 - 110)  RR: 18 (10 Feb 2020 13:45) (12 - 24)  SpO2: 93% (10 Feb 2020 13:45) (93% - 99%)  CAPILLARY BLOOD GLUCOSE        I&O's Summary    10 Feb 2020 07:01  -  10 Feb 2020 14:21  --------------------------------------------------------  IN: 310 mL / OUT: 330 mL / NET: -20 mL        PHYSICAL EXAM:  GENERAL: NAD, well-developed  HEAD:  Atraumatic, Normocephalic  EYES: EOMI, PERRLA, conjunctiva and sclera clear  NECK: Supple, No JVD  CHEST/LUNG: Clear to auscultation bilaterally; No wheeze  HEART: Regular rate and rhythm; No murmurs, rubs, or gallops  ABDOMEN: Soft, Nontender, Nondistended; Bowel sounds present  : goins  EXTREMITIES:  2+ Peripheral Pulses, No clubbing, cyanosis, or edema  PSYCH: AAOx3  NEUROLOGY: non-focal  SKIN: No rashes or lesions    LABS:                        14.4   13.84 )-----------( 149      ( 10 Feb 2020 12:36 )             43.5     02-10    138  |  101  |  15  ----------------------------<  137<H>  4.0   |  22  |  1.21    Ca    9.6      10 Feb 2020 12:36      Microbiology     RADIOLOGY & ADDITIONAL TESTS:    Imaging Personally Reviewed:  < from: CT Abdomen and Pelvis w/wo IV Cont (02.07.19 @ 19:46) >  KIDNEYS/URETERS: Right renal calculi measure less than 5 mm. No   urothelial lesion.    BLADDER: Mild bladder thickening. No bladder mass. Mild left bladder   mural calcification.  REPRODUCTIVE ORGANS: The prostate is within normal limits.    BOWEL: No bowel obstruction. The appendix is normal.               PERITONEUM: No ascites.       VESSELS:  Within normal limits.  RETROPERITONEUM: No lymphadenopathy.     ABDOMINAL WALL: Within normal limits.  BONES: Within normal limits.     IMPRESSION: No bladder mass.No evidence of metastatic disease.    Consultant(s) Notes Reviewed:      Care Discussed with Consultants/Other Providers:

## 2020-02-10 NOTE — PROGRESS NOTE ADULT - SUBJECTIVE AND OBJECTIVE BOX
Post op check    This 48 yo M is s/p cysto R. URS/stent; bladder bx  PMH: chol  Pt is awake and alert  Has c/o pain; severe this AM post -op;  [place] inserted  Cystogram done - no perf    Afeb 127/81 93 100%RA  Abd- soft; appropriately tender           [facility] 785  Labs                        14.4   13.84 )-----------( 149      ( 10 Feb 2020 12:36 )             43.5   02-10    138  |  101  |  15  ----------------------------<  137<H>  4.0   |  22  |  1.21    Ca    9.6      10 Feb 2020 12:36

## 2020-02-10 NOTE — ASU DISCHARGE PLAN (ADULT/PEDIATRIC) - CARE PROVIDER_API CALL
Titus Villegas)  Urology  [address] [place] [address], [place] [address]  Phone: [phone]  Fax: [phone]  Follow Up Time:

## 2020-02-10 NOTE — CONSULT NOTE ADULT - PROBLEM SELECTOR RECOMMENDATION 9
-h/o TURBT in Dec 2018  -s/p cysto, bladder biopsy on 2/10  -postop management per urology, IVF, pain control, f/u path, incentive spirometry, dvt ppx

## 2020-02-10 NOTE — BRIEF OPERATIVE NOTE - NSICDXBRIEFPREOP_GEN_ALL_CORE_FT
PRE-OP DIAGNOSIS:  Cancer of bladder 10-Feb-2020 08:49:39  Wayne Bess  Right ureteral stone 10-Feb-2020 08:49:24  Wayne Bess

## 2020-02-10 NOTE — BRIEF OPERATIVE NOTE - NSICDXBRIEFPROCEDURE_GEN_ALL_CORE_FT
PROCEDURES:  Cystoscopy with pyelography and random biopsy of bladder 10-Feb-2020 08:49:10  Wayne Bess  Ureteroscopy, with foreign body or calculus removal 10-Feb-2020 08:48:59  Wayne Bess

## 2020-02-10 NOTE — CONSULT NOTE ADULT - PROBLEM SELECTOR RECOMMENDATION 2
-h/o right renal colic d/t obstructing R ureteral stone, requiring stent on 1/7/20, urine/OR cx (1/7) grew staph epidermidis, treated with vanco  -right stent now removed  -afebrile, leukocytosis likely d/t surgery, trend temp curve -h/o right renal colic d/t obstructing R ureteral stone, requiring stent on 1/7/20, urine/OR cx (1/7) grew staph epidermidis, treated with vanco  -s/p stone extraction and right stent removal on 2/10  -afebrile, leukocytosis likely d/t surgery, trend temp curve

## 2020-02-10 NOTE — ASU DISCHARGE PLAN (ADULT/PEDIATRIC) - ASU DC SPECIAL INSTRUCTIONSFT
You may have intermittent pink tinged urine and slight flank pain when you urinate.  This is normal and due to the procedure you underwent.   If your urine becomes bright red or with clots, please call the office.     You should follow-up with Dr. Villegas to discuss preventative measures for kidney stones and discuss pathology results.    You may take tylenol as needed for pain.

## 2020-02-10 NOTE — PROGRESS NOTE ADULT - ASSESSMENT
This 48 yo M admitted for observation, pain control s/p URS/stent  Plan:  - monitor output  - pain control  - AM labs

## 2020-02-11 ENCOUNTER — TRANSCRIPTION ENCOUNTER (OUTPATIENT)
Age: 50
End: 2020-02-11

## 2020-02-11 VITALS
DIASTOLIC BLOOD PRESSURE: 77 MMHG | HEART RATE: 89 BPM | OXYGEN SATURATION: 95 % | TEMPERATURE: 98 F | RESPIRATION RATE: 16 BRPM | SYSTOLIC BLOOD PRESSURE: 129 MMHG

## 2020-02-11 PROCEDURE — 99238 HOSP IP/OBS DSCHRG MGMT 30/<: CPT

## 2020-02-11 PROCEDURE — 99232 SBSQ HOSP IP/OBS MODERATE 35: CPT

## 2020-02-11 RX ADMIN — HEPARIN SODIUM 5000 UNIT(S): 5000 INJECTION INTRAVENOUS; SUBCUTANEOUS at 13:43

## 2020-02-11 RX ADMIN — HEPARIN SODIUM 5000 UNIT(S): 5000 INJECTION INTRAVENOUS; SUBCUTANEOUS at 06:34

## 2020-02-11 NOTE — PROGRESS NOTE ADULT - PROBLEM SELECTOR PLAN 2
-h/o right renal colic d/t obstructing R ureteral stone, requiring stent on 1/7/20, urine/OR cx (1/7) grew staph epidermidis, treated with vanco  -s/p stone extraction and right stent removal on 2/10  -afebrile, leukocytosis likely d/t surgery, trend temp curve.

## 2020-02-11 NOTE — DISCHARGE NOTE PROVIDER - HOSPITAL COURSE
Pt had Cysto, stone ext, URS, removal R. stent; was not able to go home ambi because of pain control; admitted for observation; goins inserted; stable overnite; labs stable; ambulating; goins removed this AM; voiding well, not requiring narcs for pain; home today

## 2020-02-11 NOTE — PROGRESS NOTE ADULT - PROBLEM SELECTOR PLAN 1
-h/o TURBT in Dec 2018  -s/p cysto, bladder biopsy on 2/10  -postop management per urology, IVF, pain control, f/u path, incentive spirometry, dvt ppx.  -right flank pain much improved, d/c planning per primary team

## 2020-02-11 NOTE — DISCHARGE NOTE NURSING/CASE MANAGEMENT/SOCIAL WORK - PATIENT PORTAL LINK FT
You can access the FollowMyHealth Patient Portal offered by [facility] by registering at the following website: [email or web address]. By joining [employer]’s FollowMyHealth portal, you will also be able to view your health information using other applications (apps) compatible with our system.

## 2020-02-11 NOTE — DISCHARGE NOTE PROVIDER - NSDCCPTREATMENT_GEN_ALL_CORE_FT
PRINCIPAL PROCEDURE  Procedure: Cystoscopy with pyelography and random biopsy of bladder  Findings and Treatment:

## 2020-02-11 NOTE — PROGRESS NOTE ADULT - ASSESSMENT
This 50 yo M admitted for observation, pain control s/p URS/stent; POD#1 pain is better controlled  Plan:  - d/c goins  - home later

## 2020-02-11 NOTE — DISCHARGE NOTE NURSING/CASE MANAGEMENT/SOCIAL WORK - NSDPDISTO_GEN_ALL_CORE
Home/Tolerated po and fluids.  +void.  OOB ambulating.  Call bell within reach at all times.  Venodyne on while in bed.  Incentive spirometer with proper usage.

## 2020-02-11 NOTE — DISCHARGE NOTE PROVIDER - CARE PROVIDER_API CALL
Titus Villegas)  Urology  [address] [place] [address], [place] [address]  Phone: [phone]  Fax: [phone]  Follow Up Time:

## 2020-02-11 NOTE — PROGRESS NOTE ADULT - SUBJECTIVE AND OBJECTIVE BOX
Dr. Rosamaria Irizarry  Pager [phone]    PROGRESS NOTE:     Patient is a 49y old  Male who presents with a chief complaint of s/p surgery (10 Feb 2020 14:20)      SUBJECTIVE / OVERNIGHT EVENTS: pt denies chest pain or sob, right flank pain much improved  ADDITIONAL REVIEW OF SYSTEMS: afebrile    MEDICATIONS  (STANDING):  heparin  Injectable 5000 Unit(s) SubCutaneous every 8 hours    MEDICATIONS  (PRN):  acetaminophen   Tablet .. 650 milliGRAM(s) Oral every 6 hours PRN Temp greater or equal to 38C (100.4F), Mild Pain (1 - 3)  bisacodyl 5 milliGRAM(s) Oral daily PRN Constipation  ondansetron Injectable 4 milliGRAM(s) IV Push every 6 hours PRN Nausea  oxyCODONE    IR 5 milliGRAM(s) Oral every 4 hours PRN Moderate Pain (4 - 6)  oxyCODONE    IR 10 milliGRAM(s) Oral every 4 hours PRN Severe Pain (7 - 10)  senna 2 Tablet(s) Oral at bedtime PRN Constipation      CAPILLARY BLOOD GLUCOSE        I&O's Summary    10 Feb 2020 07:01  -  11 Feb 2020 07:00  --------------------------------------------------------  IN: 1885 mL / OUT: 3395 mL / NET: -1510 mL    11 Feb 2020 07:01  -  11 Feb 2020 11:46  --------------------------------------------------------  IN: 0 mL / OUT: 300 mL / NET: -300 mL        PHYSICAL EXAM:  Vital Signs Last 24 Hrs  T(C): 37.1 (11 Feb 2020 09:37), Max: 37.1 (11 Feb 2020 09:37)  T(F): 98.7 (11 Feb 2020 09:37), Max: 98.7 (11 Feb 2020 09:37)  HR: 73 (11 Feb 2020 09:37) (60 - 100)  BP: 110/64 (11 Feb 2020 09:37) (102/61 - 158/93)  BP(mean): 81 (10 Feb 2020 16:00) (80 - 110)  RR: 16 (11 Feb 2020 09:37) (15 - 24)  SpO2: 95% (11 Feb 2020 09:37) (92% - 100%)  GENERAL: NAD, well-developed  HEAD:  Atraumatic, Normocephalic  EYES: EOMI, PERRLA, conjunctiva and sclera clear  NECK: Supple, No JVD  CHEST/LUNG: Clear to auscultation bilaterally; No wheeze  HEART: Regular rate and rhythm; No murmurs, rubs, or gallops  ABDOMEN: Soft, Nontender, Nondistended; Bowel sounds present  EXTREMITIES:  2+ Peripheral Pulses, No clubbing, cyanosis, or edema  PSYCH: AAOx3  NEUROLOGY: non-focal  SKIN: No rashes or lesions    LABS:                        14.4   13.84 )-----------( 149      ( 10 Feb 2020 12:36 )             43.5     02-10    138  |  101  |  15  ----------------------------<  137<H>  4.0   |  22  |  1.21    Ca    9.6      10 Feb 2020 12:36          RADIOLOGY & ADDITIONAL TESTS:  Results Reviewed:   Imaging Personally Reviewed:  Electrocardiogram Personally Reviewed:    COORDINATION OF CARE:  Care Discussed with Consultants/Other Providers [Y/N]: urology alessandro Moreno d/giovanna planning  Prior or Outpatient Records Reviewed [Y/N]:

## 2020-02-11 NOTE — DISCHARGE NOTE PROVIDER - NSDCCPCAREPLAN_GEN_ALL_CORE_FT
PRINCIPAL DISCHARGE DIAGNOSIS  Diagnosis: Ureteral stone  Assessment and Plan of Treatment: Drink plenty of fluids; f/u in office PRINCIPAL DISCHARGE DIAGNOSIS  Diagnosis: Ureteral stone  Assessment and Plan of Treatment: Drink plenty of fluids; take tylenol for discomfort; call office for fever over 101; nausea/vomiting; pain not controlled by tylenol; f/u in office

## 2020-02-17 LAB — ACID FAST STN SPEC: SIGNIFICANT CHANGE UP

## 2020-02-27 ENCOUNTER — APPOINTMENT (OUTPATIENT)
Dept: UROLOGY | Facility: CLINIC | Age: 50
End: 2020-02-27

## 2020-02-27 DIAGNOSIS — C67.2 MALIGNANT NEOPLASM OF LATERAL WALL OF BLADDER: ICD-10-CM

## 2020-02-27 RX ORDER — TAMSULOSIN HYDROCHLORIDE 0.4 MG/1
0.4 CAPSULE ORAL
Qty: 30 | Refills: 0 | Status: COMPLETED | COMMUNITY
Start: 2019-01-03 | End: 2020-02-27

## 2020-02-27 RX ORDER — CIPROFLOXACIN HYDROCHLORIDE 500 MG/1
500 TABLET, FILM COATED ORAL
Qty: 6 | Refills: 0 | Status: COMPLETED | COMMUNITY
Start: 2019-12-30 | End: 2020-02-27

## 2020-03-09 PROBLEM — C67.2 MALIGNANT NEOPLASM OF LATERAL WALL OF URINARY BLADDER: Noted: 2019-02-05

## 2020-03-09 NOTE — HISTORY OF PRESENT ILLNESS
[FreeTextEntry1] : Prior hx: H/o kidney stone about 3 years ago, passed spontaneously.  Aug 2018 had intermittent episodes of gross hematuria with vigorous exercise.  Frequency of hematuria worsened and then underwent CT a/p w/o contrast 11/2018..  Findings showed a calcified lesion in the bladder.  \par \par Underwent bladder stone removal and TURBT on 12-31-18.  Path: HG TCC, pT1, no muscle in the specimen. \par \par Repeat TURBT 2/14/2019: HG TCC, pTa with CIS.  No muscle invasion.\par BLI x 6 with BCG 3/21/2019 thru 4/30/2019.\par Cystoscopy 8/6/2019: no tumor seen, cytology negative.\par Cystoscopy with bladder biopsy 2/10/2020: HG TCC, pT1 right lateral wall and anterior wall. No muscle in specimen.  CIS also present.\par \par Here for pathology and treatment discussion.\par

## 2020-03-09 NOTE — ASSESSMENT
[FreeTextEntry1] : Long discussion today with patient regarding pathology results.\par Given high-grade invasive recurrence following intravesical BCG, discussed radical cystectomy and urinary diversion.  Rationale for surgery reviewed.  Discussed the potential risk of future bladder cancer progression and improved long-term disease control if early cystectomy.  Discussed different options for urinary diversion including ileal conduit, continent cutaneous catheterizable pouch, ileal neobladder.  Advantages and disadvantages of each approach were discussed with patient.  Briefly discussed potential complications from surgery.\par \par Patient is also potentially eligible for [facility] [id number] trial in which patients receive a second course of induction BCG followed by maintenance therapy.  Patients are randomized within intervention arm receiving intravenous pembrolizumab.  The clinical trial was reviewed in detail.  Study activities were reviewed.  Potential risks of the study including the medication were reviewed.\par \par Following discussion all questions were answered.  Patient will consider his options over the next week or so.  We will then contact patient for final treatment decision.

## 2020-03-12 ENCOUNTER — APPOINTMENT (OUTPATIENT)
Dept: UROLOGY | Facility: CLINIC | Age: 50
End: 2020-03-12

## 2020-03-13 ENCOUNTER — APPOINTMENT (OUTPATIENT)
Dept: UROLOGY | Facility: AMBULATORY SURGERY CENTER | Age: 50
End: 2020-03-13

## 2020-03-27 ENCOUNTER — OUTPATIENT (OUTPATIENT)
Dept: OUTPATIENT SERVICES | Facility: HOSPITAL | Age: 50
LOS: 1 days | End: 2020-03-27
Payer: COMMERCIAL

## 2020-03-27 ENCOUNTER — RESULT REVIEW (OUTPATIENT)
Age: 50
End: 2020-03-27

## 2020-03-27 ENCOUNTER — APPOINTMENT (OUTPATIENT)
Dept: CT IMAGING | Facility: IMAGING CENTER | Age: 50
End: 2020-03-27
Payer: COMMERCIAL

## 2020-03-27 DIAGNOSIS — Z90.89 ACQUIRED ABSENCE OF OTHER ORGANS: Chronic | ICD-10-CM

## 2020-03-27 DIAGNOSIS — C67.9 MALIGNANT NEOPLASM OF BLADDER, UNSPECIFIED: Chronic | ICD-10-CM

## 2020-03-27 DIAGNOSIS — C67.8 MALIGNANT NEOPLASM OF OVERLAPPING SITES OF BLADDER: ICD-10-CM

## 2020-03-27 PROCEDURE — 74178 CT ABD&PLV WO CNTR FLWD CNTR: CPT

## 2020-03-27 PROCEDURE — 74178 CT ABD&PLV WO CNTR FLWD CNTR: CPT | Mod: 26

## 2020-04-02 ENCOUNTER — EMERGENCY (EMERGENCY)
Facility: HOSPITAL | Age: 50
LOS: 1 days | Discharge: HOME | End: 2020-04-02
Attending: STUDENT IN AN ORGANIZED HEALTH CARE EDUCATION/TRAINING PROGRAM | Admitting: STUDENT IN AN ORGANIZED HEALTH CARE EDUCATION/TRAINING PROGRAM
Payer: COMMERCIAL

## 2020-04-02 ENCOUNTER — TRANSCRIPTION ENCOUNTER (OUTPATIENT)
Age: 50
End: 2020-04-02

## 2020-04-02 VITALS
SYSTOLIC BLOOD PRESSURE: 134 MMHG | RESPIRATION RATE: 18 BRPM | TEMPERATURE: 100 F | HEART RATE: 92 BPM | OXYGEN SATURATION: 94 % | DIASTOLIC BLOOD PRESSURE: 76 MMHG

## 2020-04-02 VITALS
OXYGEN SATURATION: 90 % | DIASTOLIC BLOOD PRESSURE: 85 MMHG | TEMPERATURE: 101 F | WEIGHT: 225.09 LBS | SYSTOLIC BLOOD PRESSURE: 147 MMHG | HEART RATE: 106 BPM | RESPIRATION RATE: 16 BRPM | HEIGHT: 70 IN

## 2020-04-02 DIAGNOSIS — C67.9 MALIGNANT NEOPLASM OF BLADDER, UNSPECIFIED: Chronic | ICD-10-CM

## 2020-04-02 DIAGNOSIS — Z90.89 ACQUIRED ABSENCE OF OTHER ORGANS: Chronic | ICD-10-CM

## 2020-04-02 LAB
ALBUMIN SERPL ELPH-MCNC: 3.7 G/DL — SIGNIFICANT CHANGE UP (ref 3.3–5)
ALP SERPL-CCNC: 75 U/L — SIGNIFICANT CHANGE UP (ref 40–120)
ALT FLD-CCNC: 109 U/L — HIGH (ref 12–78)
ANION GAP SERPL CALC-SCNC: 7 MMOL/L — SIGNIFICANT CHANGE UP (ref 5–17)
APPEARANCE UR: CLEAR — SIGNIFICANT CHANGE UP
APTT BLD: 34.1 SEC — SIGNIFICANT CHANGE UP (ref 28.5–37)
AST SERPL-CCNC: 82 U/L — HIGH (ref 15–37)
BASE EXCESS BLDV CALC-SCNC: 2.1 MMOL/L — HIGH (ref -2–2)
BASOPHILS # BLD AUTO: 0.01 K/UL — SIGNIFICANT CHANGE UP (ref 0–0.2)
BASOPHILS NFR BLD AUTO: 0.2 % — SIGNIFICANT CHANGE UP (ref 0–2)
BILIRUB SERPL-MCNC: 0.5 MG/DL — SIGNIFICANT CHANGE UP (ref 0.2–1.2)
BILIRUB UR-MCNC: NEGATIVE — SIGNIFICANT CHANGE UP
BLOOD GAS COMMENTS, VENOUS: SIGNIFICANT CHANGE UP
BUN SERPL-MCNC: 15 MG/DL — SIGNIFICANT CHANGE UP (ref 7–23)
CALCIUM SERPL-MCNC: 8.8 MG/DL — SIGNIFICANT CHANGE UP (ref 8.5–10.1)
CHLORIDE SERPL-SCNC: 104 MMOL/L — SIGNIFICANT CHANGE UP (ref 96–108)
CO2 SERPL-SCNC: 26 MMOL/L — SIGNIFICANT CHANGE UP (ref 22–31)
COLOR SPEC: YELLOW — SIGNIFICANT CHANGE UP
CREAT SERPL-MCNC: 1.4 MG/DL — HIGH (ref 0.5–1.3)
DIFF PNL FLD: ABNORMAL
EOSINOPHIL # BLD AUTO: 0.01 K/UL — SIGNIFICANT CHANGE UP (ref 0–0.5)
EOSINOPHIL NFR BLD AUTO: 0.2 % — SIGNIFICANT CHANGE UP (ref 0–6)
GLUCOSE SERPL-MCNC: 110 MG/DL — HIGH (ref 70–99)
GLUCOSE UR QL: NEGATIVE — SIGNIFICANT CHANGE UP
HCO3 BLDV-SCNC: 25 MMOL/L — SIGNIFICANT CHANGE UP (ref 21–29)
HCT VFR BLD CALC: 45.5 % — SIGNIFICANT CHANGE UP (ref 39–50)
HGB BLD-MCNC: 15.6 G/DL — SIGNIFICANT CHANGE UP (ref 13–17)
HOROWITZ INDEX BLDV+IHG-RTO: 21 — SIGNIFICANT CHANGE UP
IMM GRANULOCYTES NFR BLD AUTO: 0.5 % — SIGNIFICANT CHANGE UP (ref 0–1.5)
INR BLD: 1.16 RATIO — SIGNIFICANT CHANGE UP (ref 0.88–1.16)
KETONES UR-MCNC: ABNORMAL
LACTATE SERPL-SCNC: 1.4 MMOL/L — SIGNIFICANT CHANGE UP (ref 0.7–2)
LEUKOCYTE ESTERASE UR-ACNC: NEGATIVE — SIGNIFICANT CHANGE UP
LYMPHOCYTES # BLD AUTO: 1.38 K/UL — SIGNIFICANT CHANGE UP (ref 1–3.3)
LYMPHOCYTES # BLD AUTO: 22.5 % — SIGNIFICANT CHANGE UP (ref 13–44)
MCHC RBC-ENTMCNC: 30.5 PG — SIGNIFICANT CHANGE UP (ref 27–34)
MCHC RBC-ENTMCNC: 34.3 GM/DL — SIGNIFICANT CHANGE UP (ref 32–36)
MCV RBC AUTO: 88.9 FL — SIGNIFICANT CHANGE UP (ref 80–100)
MONOCYTES # BLD AUTO: 0.42 K/UL — SIGNIFICANT CHANGE UP (ref 0–0.9)
MONOCYTES NFR BLD AUTO: 6.8 % — SIGNIFICANT CHANGE UP (ref 2–14)
NEUTROPHILS # BLD AUTO: 4.29 K/UL — SIGNIFICANT CHANGE UP (ref 1.8–7.4)
NEUTROPHILS NFR BLD AUTO: 69.8 % — SIGNIFICANT CHANGE UP (ref 43–77)
NITRITE UR-MCNC: NEGATIVE — SIGNIFICANT CHANGE UP
NRBC # BLD: 0 /100 WBCS — SIGNIFICANT CHANGE UP (ref 0–0)
PCO2 BLDV: 48 MMHG — SIGNIFICANT CHANGE UP (ref 35–50)
PH BLDV: 7.36 — SIGNIFICANT CHANGE UP (ref 7.35–7.45)
PH UR: 6 — SIGNIFICANT CHANGE UP (ref 5–8)
PLATELET # BLD AUTO: 110 K/UL — LOW (ref 150–400)
PO2 BLDV: 60 MMHG — HIGH (ref 25–45)
POTASSIUM SERPL-MCNC: 3.3 MMOL/L — LOW (ref 3.5–5.3)
POTASSIUM SERPL-SCNC: 3.3 MMOL/L — LOW (ref 3.5–5.3)
PROT SERPL-MCNC: 7.6 G/DL — SIGNIFICANT CHANGE UP (ref 6–8.3)
PROT UR-MCNC: 100
PROTHROM AB SERPL-ACNC: 13 SEC — HIGH (ref 10–12.9)
RAPID RVP RESULT: SIGNIFICANT CHANGE UP
RBC # BLD: 5.12 M/UL — SIGNIFICANT CHANGE UP (ref 4.2–5.8)
RBC # FLD: 13.1 % — SIGNIFICANT CHANGE UP (ref 10.3–14.5)
SAO2 % BLDV: 90 % — HIGH (ref 67–88)
SODIUM SERPL-SCNC: 137 MMOL/L — SIGNIFICANT CHANGE UP (ref 135–145)
SP GR SPEC: 1.02 — SIGNIFICANT CHANGE UP (ref 1.01–1.02)
UROBILINOGEN FLD QL: NEGATIVE — SIGNIFICANT CHANGE UP
WBC # BLD: 6.14 K/UL — SIGNIFICANT CHANGE UP (ref 3.8–10.5)
WBC # FLD AUTO: 6.14 K/UL — SIGNIFICANT CHANGE UP (ref 3.8–10.5)

## 2020-04-02 PROCEDURE — 82728 ASSAY OF FERRITIN: CPT

## 2020-04-02 PROCEDURE — 85610 PROTHROMBIN TIME: CPT

## 2020-04-02 PROCEDURE — 87798 DETECT AGENT NOS DNA AMP: CPT

## 2020-04-02 PROCEDURE — 87633 RESP VIRUS 12-25 TARGETS: CPT

## 2020-04-02 PROCEDURE — 86140 C-REACTIVE PROTEIN: CPT

## 2020-04-02 PROCEDURE — 81001 URINALYSIS AUTO W/SCOPE: CPT

## 2020-04-02 PROCEDURE — 96360 HYDRATION IV INFUSION INIT: CPT

## 2020-04-02 PROCEDURE — 93010 ELECTROCARDIOGRAM REPORT: CPT

## 2020-04-02 PROCEDURE — 36415 COLL VENOUS BLD VENIPUNCTURE: CPT

## 2020-04-02 PROCEDURE — 87040 BLOOD CULTURE FOR BACTERIA: CPT

## 2020-04-02 PROCEDURE — 87581 M.PNEUMON DNA AMP PROBE: CPT

## 2020-04-02 PROCEDURE — 99284 EMERGENCY DEPT VISIT MOD MDM: CPT

## 2020-04-02 PROCEDURE — 83605 ASSAY OF LACTIC ACID: CPT

## 2020-04-02 PROCEDURE — 84145 PROCALCITONIN (PCT): CPT

## 2020-04-02 PROCEDURE — 99284 EMERGENCY DEPT VISIT MOD MDM: CPT | Mod: 25

## 2020-04-02 PROCEDURE — 87486 CHLMYD PNEUM DNA AMP PROBE: CPT

## 2020-04-02 PROCEDURE — 87635 SARS-COV-2 COVID-19 AMP PRB: CPT

## 2020-04-02 PROCEDURE — 85730 THROMBOPLASTIN TIME PARTIAL: CPT

## 2020-04-02 PROCEDURE — 71045 X-RAY EXAM CHEST 1 VIEW: CPT | Mod: 26

## 2020-04-02 PROCEDURE — 84484 ASSAY OF TROPONIN QUANT: CPT

## 2020-04-02 PROCEDURE — 85379 FIBRIN DEGRADATION QUANT: CPT

## 2020-04-02 PROCEDURE — 80053 COMPREHEN METABOLIC PANEL: CPT

## 2020-04-02 PROCEDURE — 93005 ELECTROCARDIOGRAM TRACING: CPT

## 2020-04-02 PROCEDURE — 71045 X-RAY EXAM CHEST 1 VIEW: CPT

## 2020-04-02 PROCEDURE — 85027 COMPLETE CBC AUTOMATED: CPT

## 2020-04-02 PROCEDURE — 82803 BLOOD GASES ANY COMBINATION: CPT

## 2020-04-02 RX ORDER — AZITHROMYCIN 500 MG/1
250 TABLET, FILM COATED ORAL
Qty: 6 | Refills: 0
Start: 2020-04-02 | End: 2020-04-06

## 2020-04-02 RX ORDER — SODIUM CHLORIDE 9 MG/ML
1000 INJECTION, SOLUTION INTRAVENOUS ONCE
Refills: 0 | Status: COMPLETED | OUTPATIENT
Start: 2020-04-02 | End: 2020-04-02

## 2020-04-02 RX ORDER — ACETAMINOPHEN 500 MG
325 TABLET ORAL ONCE
Refills: 0 | Status: COMPLETED | OUTPATIENT
Start: 2020-04-02 | End: 2020-04-02

## 2020-04-02 RX ORDER — ACETAMINOPHEN 500 MG
650 TABLET ORAL ONCE
Refills: 0 | Status: COMPLETED | OUTPATIENT
Start: 2020-04-02 | End: 2020-04-02

## 2020-04-02 RX ORDER — SODIUM CHLORIDE 9 MG/ML
1000 INJECTION INTRAMUSCULAR; INTRAVENOUS; SUBCUTANEOUS ONCE
Refills: 0 | Status: DISCONTINUED | OUTPATIENT
Start: 2020-04-02 | End: 2020-04-02

## 2020-04-02 RX ADMIN — SODIUM CHLORIDE 1000 MILLILITER(S): 9 INJECTION, SOLUTION INTRAVENOUS at 18:45

## 2020-04-02 RX ADMIN — SODIUM CHLORIDE 1000 MILLILITER(S): 9 INJECTION, SOLUTION INTRAVENOUS at 19:45

## 2020-04-02 RX ADMIN — Medication 325 MILLIGRAM(S): at 20:53

## 2020-04-02 RX ADMIN — Medication 650 MILLIGRAM(S): at 18:45

## 2020-04-02 NOTE — ED PROVIDER NOTE - NSFOLLOWUPINSTRUCTIONS_ED_ALL_ED_FT
You have been tested today for COVID 19 (Coronavirus) as you had a known exposure.  Currently you do not meet criteria for admission to the hospital.  You are being sent home at this time, but you need to put yourself on HOME ISOLATION.  It is currently recommended at this time that you isolate for the next 14 days unless further instructions are provided at a later date.  When home on home isolation please try to use your own bathroom and bedroom, in an effort to prevent the spread to anyone in your household.  Continue Tylenol per label instructions as needed for fever and body aches  Advance activity as tolerated  Please return to the ED for increased difficulty breathing or signs of respiratory distress, unable to eat / drink, dizziness , passing out, chest pains, or any other concerning symptoms.    Your will be contacted with your results at a later time, but currently testing turn around time is around one week.

## 2020-04-02 NOTE — ED PROVIDER NOTE - PROGRESS NOTE DETAILS
patient improved with ivf, laboratory results suggestive of viral syndrome likely covid. Pt. ambulated w/o oxygen w/ saturation procal low, w/ mild transaminitis. pt tolerating PO. 95% O2 sat laying on room air. Pt ambulated in ED without difficulty around 93% O2. Discussed pending COVID result. Advised likely positive. Z-bertha rxed. Advised of return precautions, covid dc packet provided.

## 2020-04-02 NOTE — ED ADULT NURSE REASSESSMENT NOTE - NS ED NURSE REASSESS COMMENT FT1
Patient ambulatory around the department. SpO2 92-94% without respiratory distress. ED MD aware.
Patient tolerating liquids without difficulty or nausea. Patient had small bites of cracker and felt mild nausea.
Tea and crackers provided for po challenge.
Received patient from [facility]. Patient alert and oriented. Vital signs as documented. Patient denies pain. Continued on pulse ox. Patient on nasal canula, plan to trial on room air. Pending urine sample collection. Safety maintained.

## 2020-04-02 NOTE — ED ADULT NURSE NOTE - OBJECTIVE STATEMENT
Presents to ER with covid-like symptoms, fever, cough, SOB, and diarrhea for a week and a half.  Febrile in ER, Tylenol given, LR infusing.

## 2020-04-02 NOTE — ED PROVIDER NOTE - CARE PLAN
Principal Discharge DX:	Viral pneumonia, unspecified  Assessment and plan of treatment:	suspected COVID 19 pneumonia w/ respiratory distress and hypoxia requiring oxygen . diarrhea likely 2/2 viral infection as well.  Secondary Diagnosis:	Hypoxia  Secondary Diagnosis:	Diarrhea Principal Discharge DX:	Viral pneumonia, unspecified  Assessment and plan of treatment:	During your ED visit you were evaluated for fever and diarrhea. You were given IVF and likely haveCOVID 19 pneumonia and diarrhea likely 2/2 viral infection as well. You had a swab preformed and will be contacted with the result within 2-5 days. Follow pre-printed discharge instructions provided containing information on self-quarantine and monitoring.    Rest.  Fluids.    Tylenol 1-2 tablets every 6 hours as needed for fever.    Practice good hand hygiene.    Follow up with your primary care physician.    Please return to ER immediately for trouble breathing, shortness of breath, or any other problems or concerns arise.  Please call ahead if possible. Return to the ED if you exhibit any new, continued or worsening symptoms.  Secondary Diagnosis:	Diarrhea

## 2020-04-02 NOTE — ED PROVIDER NOTE - CLINICAL SUMMARY MEDICAL DECISION MAKING FREE TEXT BOX
48 y/o M with PMH  hld, bladder stone , bladder CA s/p TURBT on 12/31/2018, recent admission in february cystoscopy and right stent removal and stone extraction p/w fever and cough x 1 week.  likely covid w/ dehydration and hypoxia on room air. pt will likely need admission. cbc, cmp, covid pcr, rvp, cxr, ekg, iv, LR bolus, tylenol, pulse ox, blood gas venous, UA, crp, procal. 50 y/o M with PMH  hld, bladder stone , bladder CA s/p TURBT on 12/31/2018, recent admission in february cystoscopy and right stent removal and stone extraction p/w fever and cough x 1 week.  likely covid w/ dehydration and hypoxia on room air. pt ambulatory oxygen . cbc, cmp, covid pcr, rvp, cxr, ekg, iv, LR bolus, tylenol, pulse ox, blood gas venous, UA, crp, procal.

## 2020-04-02 NOTE — ED PROVIDER NOTE - PATIENT PORTAL LINK FT
You can access the FollowMyHealth Patient Portal offered by [facility] by registering at the following website: [email or web address]. By joining [facility]’s FollowMyHealth portal, you will also be able to view your health information using other applications (apps) compatible with our system.

## 2020-04-02 NOTE — ED PROVIDER NOTE - PLAN OF CARE
suspected COVID 19 pneumonia w/ respiratory distress and hypoxia requiring oxygen . diarrhea likely 2/2 viral infection as well. During your ED visit you were evaluated for fever and diarrhea. You were given IVF and likely haveCOVID 19 pneumonia and diarrhea likely 2/2 viral infection as well. You had a swab preformed and will be contacted with the result within 2-5 days. Follow pre-printed discharge instructions provided containing information on self-quarantine and monitoring.    Rest.  Fluids.    Tylenol 1-2 tablets every 6 hours as needed for fever.    Practice good hand hygiene.    Follow up with your primary care physician.    Please return to ER immediately for trouble breathing, shortness of breath, or any other problems or concerns arise.  Please call ahead if possible. Return to the ED if you exhibit any new, continued or worsening symptoms.

## 2020-04-02 NOTE — ED PROVIDER NOTE - OBJECTIVE STATEMENT
48 y/o M with PMH  hld, bladder stone , bladder CA s/p TURBT on 12/31/2018, recent admission in february cystooscopy andd right stent removal and stone extraction p/w fever and cough x 1 week. Pt reports fever 102 w/ nonproductive cough and diarrhea x 1 week. Pt reports chest discomfort with coughing and fatigue. He states he has not been able to eat due to the diarrhea and nausea. He denies syncope. Pt reports he has not been ambulating much due to fatigue and SOB. Living with his GF, no recent chemo. 50 y/o M with PMH  hld, bladder stone , bladder CA s/p TURBT on 12/31/2018, recent admission in february cystoscopy and right stent removal and stone extraction p/w fever and cough x 1 week. Pt reports fever 102 w/ nonproductive cough and diarrhea x 1 week. Pt reports chest discomfort with coughing and fatigue. He states he has not been able to eat due to the diarrhea and nausea. He denies syncope. Pt reports he has not been ambulating much due to fatigue and SOB. Living with his GF, no recent chemo.

## 2020-04-03 ENCOUNTER — INPATIENT (INPATIENT)
Facility: HOSPITAL | Age: 50
LOS: 10 days | Discharge: HOME | End: 2020-04-14
Attending: INTERNAL MEDICINE | Admitting: INTERNAL MEDICINE
Payer: COMMERCIAL

## 2020-04-03 VITALS
RESPIRATION RATE: 28 BRPM | HEART RATE: 129 BPM | OXYGEN SATURATION: 93 % | SYSTOLIC BLOOD PRESSURE: 129 MMHG | HEIGHT: 70 IN | DIASTOLIC BLOOD PRESSURE: 84 MMHG | TEMPERATURE: 104 F

## 2020-04-03 DIAGNOSIS — Z90.89 ACQUIRED ABSENCE OF OTHER ORGANS: Chronic | ICD-10-CM

## 2020-04-03 DIAGNOSIS — Z29.9 ENCOUNTER FOR PROPHYLACTIC MEASURES, UNSPECIFIED: ICD-10-CM

## 2020-04-03 DIAGNOSIS — E87.6 HYPOKALEMIA: ICD-10-CM

## 2020-04-03 DIAGNOSIS — N32.89 OTHER SPECIFIED DISORDERS OF BLADDER: ICD-10-CM

## 2020-04-03 DIAGNOSIS — C67.9 MALIGNANT NEOPLASM OF BLADDER, UNSPECIFIED: Chronic | ICD-10-CM

## 2020-04-03 DIAGNOSIS — Z02.9 ENCOUNTER FOR ADMINISTRATIVE EXAMINATIONS, UNSPECIFIED: ICD-10-CM

## 2020-04-03 DIAGNOSIS — B34.9 VIRAL INFECTION, UNSPECIFIED: ICD-10-CM

## 2020-04-03 LAB
ALBUMIN SERPL ELPH-MCNC: 3.8 G/DL — SIGNIFICANT CHANGE UP (ref 3.3–5)
ALP SERPL-CCNC: 62 U/L — SIGNIFICANT CHANGE UP (ref 40–120)
ALT FLD-CCNC: 67 U/L — HIGH (ref 4–41)
ANION GAP SERPL CALC-SCNC: 13 MMO/L — SIGNIFICANT CHANGE UP (ref 7–14)
AST SERPL-CCNC: 63 U/L — HIGH (ref 4–40)
BASOPHILS # BLD AUTO: 0.01 K/UL — SIGNIFICANT CHANGE UP (ref 0–0.2)
BASOPHILS NFR BLD AUTO: 0.2 % — SIGNIFICANT CHANGE UP (ref 0–2)
BILIRUB SERPL-MCNC: 0.6 MG/DL — SIGNIFICANT CHANGE UP (ref 0.2–1.2)
BUN SERPL-MCNC: 12 MG/DL — SIGNIFICANT CHANGE UP (ref 7–23)
CALCIUM SERPL-MCNC: 8.4 MG/DL — SIGNIFICANT CHANGE UP (ref 8.4–10.5)
CHLORIDE SERPL-SCNC: 103 MMOL/L — SIGNIFICANT CHANGE UP (ref 98–107)
CO2 SERPL-SCNC: 22 MMOL/L — SIGNIFICANT CHANGE UP (ref 22–31)
CREAT SERPL-MCNC: 1.15 MG/DL — SIGNIFICANT CHANGE UP (ref 0.5–1.3)
CRP SERPL-MCNC: 1.79 MG/DL — HIGH (ref 0–0.4)
EOSINOPHIL # BLD AUTO: 0 K/UL — SIGNIFICANT CHANGE UP (ref 0–0.5)
EOSINOPHIL NFR BLD AUTO: 0 % — SIGNIFICANT CHANGE UP (ref 0–6)
FERRITIN SERPL-MCNC: 821 NG/ML — HIGH (ref 30–400)
GLUCOSE SERPL-MCNC: 110 MG/DL — HIGH (ref 70–99)
HCT VFR BLD CALC: 43.7 % — SIGNIFICANT CHANGE UP (ref 39–50)
HGB BLD-MCNC: 14.6 G/DL — SIGNIFICANT CHANGE UP (ref 13–17)
IMM GRANULOCYTES NFR BLD AUTO: 0.5 % — SIGNIFICANT CHANGE UP (ref 0–1.5)
LYMPHOCYTES # BLD AUTO: 0.99 K/UL — LOW (ref 1–3.3)
LYMPHOCYTES # BLD AUTO: 17 % — SIGNIFICANT CHANGE UP (ref 13–44)
MAGNESIUM SERPL-MCNC: 2 MG/DL — SIGNIFICANT CHANGE UP (ref 1.6–2.6)
MCHC RBC-ENTMCNC: 30.3 PG — SIGNIFICANT CHANGE UP (ref 27–34)
MCHC RBC-ENTMCNC: 33.4 % — SIGNIFICANT CHANGE UP (ref 32–36)
MCV RBC AUTO: 90.7 FL — SIGNIFICANT CHANGE UP (ref 80–100)
MONOCYTES # BLD AUTO: 0.36 K/UL — SIGNIFICANT CHANGE UP (ref 0–0.9)
MONOCYTES NFR BLD AUTO: 6.2 % — SIGNIFICANT CHANGE UP (ref 2–14)
NEUTROPHILS # BLD AUTO: 4.45 K/UL — SIGNIFICANT CHANGE UP (ref 1.8–7.4)
NEUTROPHILS NFR BLD AUTO: 76.1 % — SIGNIFICANT CHANGE UP (ref 43–77)
NRBC # FLD: 0 K/UL — SIGNIFICANT CHANGE UP (ref 0–0)
PHOSPHATE SERPL-MCNC: 2.4 MG/DL — LOW (ref 2.5–4.5)
PLATELET # BLD AUTO: 105 K/UL — LOW (ref 150–400)
PMV BLD: 12.3 FL — SIGNIFICANT CHANGE UP (ref 7–13)
POTASSIUM SERPL-MCNC: 3.2 MMOL/L — LOW (ref 3.5–5.3)
POTASSIUM SERPL-SCNC: 3.2 MMOL/L — LOW (ref 3.5–5.3)
PROT SERPL-MCNC: 6.4 G/DL — SIGNIFICANT CHANGE UP (ref 6–8.3)
RBC # BLD: 4.82 M/UL — SIGNIFICANT CHANGE UP (ref 4.2–5.8)
RBC # FLD: 12.9 % — SIGNIFICANT CHANGE UP (ref 10.3–14.5)
SARS-COV-2 RNA SPEC QL NAA+PROBE: DETECTED
SODIUM SERPL-SCNC: 138 MMOL/L — SIGNIFICANT CHANGE UP (ref 135–145)
WBC # BLD: 5.84 K/UL — SIGNIFICANT CHANGE UP (ref 3.8–10.5)
WBC # FLD AUTO: 5.84 K/UL — SIGNIFICANT CHANGE UP (ref 3.8–10.5)

## 2020-04-03 PROCEDURE — 71045 X-RAY EXAM CHEST 1 VIEW: CPT | Mod: 26

## 2020-04-03 RX ORDER — ACETAMINOPHEN 500 MG
975 TABLET ORAL ONCE
Refills: 0 | Status: DISCONTINUED | OUTPATIENT
Start: 2020-04-03 | End: 2020-04-03

## 2020-04-03 RX ORDER — ALBUTEROL 90 UG/1
2 AEROSOL, METERED ORAL EVERY 6 HOURS
Refills: 0 | Status: DISCONTINUED | OUTPATIENT
Start: 2020-04-03 | End: 2020-04-04

## 2020-04-03 RX ORDER — ONDANSETRON 8 MG/1
4 TABLET, FILM COATED ORAL EVERY 8 HOURS
Refills: 0 | Status: DISCONTINUED | OUTPATIENT
Start: 2020-04-03 | End: 2020-04-14

## 2020-04-03 RX ORDER — ACETAMINOPHEN 500 MG
650 TABLET ORAL EVERY 6 HOURS
Refills: 0 | Status: DISCONTINUED | OUTPATIENT
Start: 2020-04-03 | End: 2020-04-14

## 2020-04-03 RX ORDER — SODIUM CHLORIDE 9 MG/ML
1000 INJECTION INTRAMUSCULAR; INTRAVENOUS; SUBCUTANEOUS ONCE
Refills: 0 | Status: COMPLETED | OUTPATIENT
Start: 2020-04-03 | End: 2020-04-03

## 2020-04-03 RX ORDER — ACETAMINOPHEN 500 MG
975 TABLET ORAL ONCE
Refills: 0 | Status: COMPLETED | OUTPATIENT
Start: 2020-04-03 | End: 2020-04-03

## 2020-04-03 RX ORDER — ACETAMINOPHEN 500 MG
1000 TABLET ORAL ONCE
Refills: 0 | Status: DISCONTINUED | OUTPATIENT
Start: 2020-04-03 | End: 2020-04-03

## 2020-04-03 RX ORDER — ENOXAPARIN SODIUM 100 MG/ML
40 INJECTION SUBCUTANEOUS DAILY
Refills: 0 | Status: DISCONTINUED | OUTPATIENT
Start: 2020-04-03 | End: 2020-04-09

## 2020-04-03 RX ORDER — ONDANSETRON 8 MG/1
4 TABLET, FILM COATED ORAL ONCE
Refills: 0 | Status: COMPLETED | OUTPATIENT
Start: 2020-04-03 | End: 2020-04-03

## 2020-04-03 RX ORDER — POTASSIUM CHLORIDE 20 MEQ
40 PACKET (EA) ORAL ONCE
Refills: 0 | Status: COMPLETED | OUTPATIENT
Start: 2020-04-03 | End: 2020-04-04

## 2020-04-03 RX ADMIN — Medication 975 MILLIGRAM(S): at 18:20

## 2020-04-03 RX ADMIN — SODIUM CHLORIDE 1000 MILLILITER(S): 9 INJECTION INTRAMUSCULAR; INTRAVENOUS; SUBCUTANEOUS at 17:57

## 2020-04-03 RX ADMIN — ONDANSETRON 4 MILLIGRAM(S): 8 TABLET, FILM COATED ORAL at 17:57

## 2020-04-03 RX ADMIN — SODIUM CHLORIDE 1000 MILLILITER(S): 9 INJECTION INTRAMUSCULAR; INTRAVENOUS; SUBCUTANEOUS at 19:35

## 2020-04-03 NOTE — H&P ADULT - PROBLEM SELECTOR PLAN 4
Transitions of Care Status:  1.  Name of PCP:  2.  PCP Contacted on Admission: [ ] Y    [ x ] N    3.  PCP contacted at Discharge: [ ] Y    [ ] N    [ ] N/A  4.  Post-Discharge Appointment Date and Location:  5.  Summary of Handoff given to PCP: DVT PPx Lovenox SC  Regular Diet

## 2020-04-03 NOTE — H&P ADULT - HISTORY OF PRESENT ILLNESS
48 yo Male. PMHx bladder CA s/p TURBT (Dec 2018) (pt is supposed to start BCG therapy next week), kidney stones s/p stent placement and removal in Feb 2020, HLD, Presents with a chief complain of shortness of breath. Patient states that for the past week he has been experiencing fevers, dry cough and watery diarrhea. Pt went to [facility] ED yesterday and tested positive for Covid, was d/c'd with Zpak. States yesterday after being discharged, developed nbnb emesis and presents for persistence of his symptoms. pt also having chest pain, abd pain, and back due to persistent cough. He denies any nausea or vomiting and no urinary symptoms.   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

## 2020-04-03 NOTE — H&P ADULT - PROBLEM SELECTOR PLAN 1
Likely secondary to Viral URI, Patient tested positive for COVID 19 on 4/2/19.  CXR - Bilateral mid lung peripheral hazy opacities, which can represent infection in the correct clinical context.  Albuterol PRN SOB/WHeezing  Tylenol PRN Fever  Maintain O2% >90%, currently on RA.

## 2020-04-03 NOTE — ED PROVIDER NOTE - OBJECTIVE STATEMENT
48 yo M PMHx bladder CA s/p TURBT (Dec 2018), kidney stones s/p stent placement and removal in Feb 2020, HLD, presents to the ED c/o 1 week of SOB, cough and watery diarrhea. Pt went to [facility] ED yesterday and tested positive for Covid 50 yo M PMHx bladder CA s/p TURBT (Dec 2018) (pt is supposed to start BCG therapy next week), kidney stones s/p stent placement and removal in Feb 2020, HLD, presents to the ED c/o 1 week of SOB, fevers, cough and watery diarrhea. Pt went to [facility] ED yesterday and tested positive for Covid, was d/c'd with Zpak. States yesterday after being discharged, developed nbnb emesis and presents for persistence of his symptoms. pt also having CP, abd pain, and back due to cough.

## 2020-04-03 NOTE — ED PROVIDER NOTE - ATTENDING CONTRIBUTION TO CARE
DR. POLLACK, ATTENDING MD-  I performed a face to face bedside interview with the patient regarding history of present illness, review of symptoms and past medical history. I completed an independent physical exam.  I have discussed the patient's plan of care with the resident.   Documentation as above in the note.    50 y/o male h/o bladder ca d/t restart chemo next week here with sob, fever, cough, diarrhea x1 wk.  COVID pos yest.  Discharge with Zpack.  Satting 90% with exertion at that time.  Today feeling more sob with n/v and worsening of sx.  Likely COVID pna with worsening hypoxia, eval for lyte abn given gi sx.  Obtain cbc, cmp, cxr, give ivf, antipyretic, supp o2, admit for further care and evaluation.

## 2020-04-03 NOTE — H&P ADULT - ASSESSMENT
48 yo Male. PMHx bladder CA s/p TURBT (Dec 2018) (pt is supposed to start BCG therapy next week), kidney stones s/p stent placement and removal in Feb 2020, HLD, Presents with shortness of breath, fevers, dry cough and watery diarrhea. Pt went to [facility] ED yesterday and tested positive COVID 19, was d/c'd with Zpak. States yesterday after being discharged with worsening symptoms.

## 2020-04-03 NOTE — ED PROVIDER NOTE - NS ED ROS FT
Constitutional: fevers; no chills  HEENT: no visual changes, no sore throat, no rhinorrhea  CV: no cp or palpitations  Resp: +sob +cough  GI: abd pain, nausea, vomiting, diarrhea, no constipation  : no dysuria, no hematuria  MSK: no myalgais or arthralgias  skin: no rashes  neuro: no HA, no numbness; no weakness, no tingling  ROS statement: all other ROS negative except as per HPI

## 2020-04-03 NOTE — H&P ADULT - NSHPPHYSICALEXAM_GEN_ALL_CORE
GENERAL:  in no respiratory distress  HEAD: Atraumatic, Normocephalic;  NECK: No JVD; FROM  EYES: PERRL, EOMs intact b/l w/out deficits  CHEST/LUNG: sating 94% on RA; crackles at RLL  HEART: tachycardic; no murmur/gallops/rubs  ABDOMEN: +BS, soft, NT, ND  EXTREMITIES: No LE edema, +2 radial pulses b/l  MUSCULOSKELETAL: FROM of all 4 extremities;  NERVOUS SYSTEM:  A&Ox3, No motor deficits or sensory deficits; CNII-XII intact; no focal neurologic deficits  SKIN:  No new rashes

## 2020-04-03 NOTE — ED PROVIDER NOTE - CLINICAL SUMMARY MEDICAL DECISION MAKING FREE TEXT BOX
Ray Hathaway MD. 50 yo M presents for cough, sob, diarrhea, fevers x1 week. pt [facility], was d/c'd with zpak, and was found to be covid positive. yesterday, developed nbnb emesis and presents for persistence of symptoms. pt febrile, tachy, crackles at base. will hydrate, zofran, labs, xray, ekg, reassess.

## 2020-04-03 NOTE — ED ADULT TRIAGE NOTE - CHIEF COMPLAINT QUOTE
pt c/o flu like symptoms with fevers chills sob nausea and chest pain x several days. difficulty speaking in full sentences.  pmh bladder cancer that relapsed, due to start chemo in the coming weeks,

## 2020-04-03 NOTE — ED PROVIDER NOTE - PHYSICAL EXAMINATION
PHYSICAL EXAM:  GENERAL:  in no respiratory distress  HEAD: Atraumatic, Normocephalic;  NECK: No JVD; FROM  EYES: PERRL, EOMs intact b/l w/out deficits  CHEST/LUNG: sating 94% on RA; crackles at RLL  HEART: tachycardic; no murmur/gallops/rubs  ABDOMEN: +BS, soft, NT, ND  EXTREMITIES: No LE edema, +2 radial pulses b/l  MUSCULOSKELETAL: FROM of all 4 extremities;  NERVOUS SYSTEM:  A&Ox3, No motor deficits or sensory deficits; CNII-XII intact; no focal neurologic deficits  SKIN:  No new rashes

## 2020-04-04 DIAGNOSIS — D69.6 THROMBOCYTOPENIA, UNSPECIFIED: ICD-10-CM

## 2020-04-04 LAB
ALBUMIN SERPL ELPH-MCNC: 3.6 G/DL — SIGNIFICANT CHANGE UP (ref 3.3–5)
ALP SERPL-CCNC: 63 U/L — SIGNIFICANT CHANGE UP (ref 40–120)
ALT FLD-CCNC: 69 U/L — HIGH (ref 4–41)
ANION GAP SERPL CALC-SCNC: 11 MMO/L — SIGNIFICANT CHANGE UP (ref 7–14)
AST SERPL-CCNC: 74 U/L — HIGH (ref 4–40)
BASOPHILS # BLD AUTO: 0.01 K/UL — SIGNIFICANT CHANGE UP (ref 0–0.2)
BASOPHILS NFR BLD AUTO: 0.2 % — SIGNIFICANT CHANGE UP (ref 0–2)
BILIRUB SERPL-MCNC: 0.5 MG/DL — SIGNIFICANT CHANGE UP (ref 0.2–1.2)
BUN SERPL-MCNC: 11 MG/DL — SIGNIFICANT CHANGE UP (ref 7–23)
CALCIUM SERPL-MCNC: 8.9 MG/DL — SIGNIFICANT CHANGE UP (ref 8.4–10.5)
CHLORIDE SERPL-SCNC: 103 MMOL/L — SIGNIFICANT CHANGE UP (ref 98–107)
CO2 SERPL-SCNC: 23 MMOL/L — SIGNIFICANT CHANGE UP (ref 22–31)
CREAT SERPL-MCNC: 1.07 MG/DL — SIGNIFICANT CHANGE UP (ref 0.5–1.3)
EOSINOPHIL # BLD AUTO: 0 K/UL — SIGNIFICANT CHANGE UP (ref 0–0.5)
EOSINOPHIL NFR BLD AUTO: 0 % — SIGNIFICANT CHANGE UP (ref 0–6)
GLUCOSE SERPL-MCNC: 114 MG/DL — HIGH (ref 70–99)
HCT VFR BLD CALC: 41.8 % — SIGNIFICANT CHANGE UP (ref 39–50)
HGB BLD-MCNC: 14.4 G/DL — SIGNIFICANT CHANGE UP (ref 13–17)
IMM GRANULOCYTES NFR BLD AUTO: 0.6 % — SIGNIFICANT CHANGE UP (ref 0–1.5)
LYMPHOCYTES # BLD AUTO: 0.98 K/UL — LOW (ref 1–3.3)
LYMPHOCYTES # BLD AUTO: 18 % — SIGNIFICANT CHANGE UP (ref 13–44)
MAGNESIUM SERPL-MCNC: 2.3 MG/DL — SIGNIFICANT CHANGE UP (ref 1.6–2.6)
MCHC RBC-ENTMCNC: 30.9 PG — SIGNIFICANT CHANGE UP (ref 27–34)
MCHC RBC-ENTMCNC: 34.4 % — SIGNIFICANT CHANGE UP (ref 32–36)
MCV RBC AUTO: 89.7 FL — SIGNIFICANT CHANGE UP (ref 80–100)
MONOCYTES # BLD AUTO: 0.24 K/UL — SIGNIFICANT CHANGE UP (ref 0–0.9)
MONOCYTES NFR BLD AUTO: 4.4 % — SIGNIFICANT CHANGE UP (ref 2–14)
NEUTROPHILS # BLD AUTO: 4.17 K/UL — SIGNIFICANT CHANGE UP (ref 1.8–7.4)
NEUTROPHILS NFR BLD AUTO: 76.8 % — SIGNIFICANT CHANGE UP (ref 43–77)
NRBC # FLD: 0 K/UL — SIGNIFICANT CHANGE UP (ref 0–0)
PHOSPHATE SERPL-MCNC: 1.7 MG/DL — LOW (ref 2.5–4.5)
PLATELET # BLD AUTO: 109 K/UL — LOW (ref 150–400)
PMV BLD: 12.7 FL — SIGNIFICANT CHANGE UP (ref 7–13)
POTASSIUM SERPL-MCNC: 3.6 MMOL/L — SIGNIFICANT CHANGE UP (ref 3.5–5.3)
POTASSIUM SERPL-SCNC: 3.6 MMOL/L — SIGNIFICANT CHANGE UP (ref 3.5–5.3)
PROT SERPL-MCNC: 6.7 G/DL — SIGNIFICANT CHANGE UP (ref 6–8.3)
RBC # BLD: 4.66 M/UL — SIGNIFICANT CHANGE UP (ref 4.2–5.8)
RBC # FLD: 13.2 % — SIGNIFICANT CHANGE UP (ref 10.3–14.5)
SODIUM SERPL-SCNC: 137 MMOL/L — SIGNIFICANT CHANGE UP (ref 135–145)
WBC # BLD: 5.43 K/UL — SIGNIFICANT CHANGE UP (ref 3.8–10.5)
WBC # FLD AUTO: 5.43 K/UL — SIGNIFICANT CHANGE UP (ref 3.8–10.5)

## 2020-04-04 PROCEDURE — 99233 SBSQ HOSP IP/OBS HIGH 50: CPT

## 2020-04-04 RX ORDER — ALBUTEROL 90 UG/1
2 AEROSOL, METERED ORAL EVERY 6 HOURS
Refills: 0 | Status: DISCONTINUED | OUTPATIENT
Start: 2020-04-04 | End: 2020-04-14

## 2020-04-04 RX ORDER — POTASSIUM CHLORIDE 20 MEQ
40 PACKET (EA) ORAL ONCE
Refills: 0 | Status: COMPLETED | OUTPATIENT
Start: 2020-04-04 | End: 2020-04-04

## 2020-04-04 RX ADMIN — Medication 650 MILLIGRAM(S): at 05:11

## 2020-04-04 RX ADMIN — ALBUTEROL 2 PUFF(S): 90 AEROSOL, METERED ORAL at 00:12

## 2020-04-04 RX ADMIN — ALBUTEROL 2 PUFF(S): 90 AEROSOL, METERED ORAL at 22:51

## 2020-04-04 RX ADMIN — Medication 40 MILLIEQUIVALENT(S): at 22:51

## 2020-04-04 RX ADMIN — ONDANSETRON 4 MILLIGRAM(S): 8 TABLET, FILM COATED ORAL at 19:01

## 2020-04-04 RX ADMIN — Medication 650 MILLIGRAM(S): at 00:13

## 2020-04-04 RX ADMIN — Medication 650 MILLIGRAM(S): at 14:14

## 2020-04-04 RX ADMIN — ONDANSETRON 4 MILLIGRAM(S): 8 TABLET, FILM COATED ORAL at 00:13

## 2020-04-04 RX ADMIN — Medication 40 MILLIEQUIVALENT(S): at 05:10

## 2020-04-04 RX ADMIN — Medication 650 MILLIGRAM(S): at 22:51

## 2020-04-04 RX ADMIN — ENOXAPARIN SODIUM 40 MILLIGRAM(S): 100 INJECTION SUBCUTANEOUS at 11:43

## 2020-04-04 NOTE — ED ADULT NURSE REASSESSMENT NOTE - NS ED NURSE REASSESS COMMENT FT1
Break coverage- Pt medicated as ordered. Will give potassium when pt can tolerate when nausea improves. Rpt given to essu2 RN Mirna, pt to be transported there when bed is clean. Pt in NAD at present. Will monitor.
pt c/o feeling cold, has chest pains and frequent dry cough. rpt VS as noted. pt on phone with MD Raymundo, who is aware of pt symptoms.
report received from RN Megan, pt A&Ox4, ambulatory c/o some chest tightness denies SOB. respirations even and non labored 96% O2 on room air.  VS as noted.

## 2020-04-04 NOTE — PROGRESS NOTE ADULT - PROBLEM SELECTOR PLAN 1
positive for COVID 19 on 4/2/19.  CXR - Bilateral mid lung peripheral hazy opacities,  Albuterol HFA ATC, Tylenol PRN Fever, avoid NSAIDS, bipap, HFNC, and nebs  Maintain O2% >90%, wean to RA

## 2020-04-04 NOTE — PROGRESS NOTE ADULT - SUBJECTIVE AND OBJECTIVE BOX
Patient is a 49y old  Male who presents with a chief complaint of Shortness of Breath (2020 22:31)      SUBJECTIVE / OVERNIGHT EVENTS: Febrile to 100.7 overnight. Had 1 episode watery diarrhea so far today and 3 episodes yesterday. Reports some chest tightness and cough    MEDICATIONS  (STANDING):  enoxaparin Injectable 40 milliGRAM(s) SubCutaneous daily    MEDICATIONS  (PRN):  acetaminophen   Tablet .. 650 milliGRAM(s) Oral every 6 hours PRN Temp greater or equal to 38C (100.4F), Mild Pain (1 - 3)  ALBUTerol    90 MICROgram(s) HFA Inhaler 2 Puff(s) Inhalation every 6 hours PRN Shortness of Breath and/or Wheezing  ondansetron Injectable 4 milliGRAM(s) IV Push every 8 hours PRN Nausea and/or Vomiting      T(C): 38.1 (20 @ 14:05), Max: 38.5 (20 @ 22:30)  HR: 95 (20 @ 14:05) (87 - 98)  BP: 125/97 (20 @ 11:39) (121/64 - 141/74)  RR: 18 (20 @ 11:39) (17 - 22)  SpO2: 97% (20 @ 14:05) (93% - 97%)  CAPILLARY BLOOD GLUCOSE        I&O's Summary      PHYSICAL EXAM:  GENERAL: not in distress, on NC  EYES: open, sclera clear b/l  CHEST/LUNG: normal respiratory effort, speaking in full sentences without difficulty  HEART: no lower extremity edema b/l  ABDOMEN: Soft, Nontender, Nondistended  EXTREMITIES: Warm and well perfused  NEUROLOGY: awake, alert, responds to Qs appropriately, no gross deficits  PSYCH: calm, cooperative  SKIN: No visible rashes or lesions    LABS:                        14.6   5.84  )-----------( 105      ( 2020 17:56 )             43.7     04-03    138  |  103  |  12  ----------------------------<  110<H>  3.2<L>   |  22  |  1.15    Ca    8.4      2020 20:08  Phos  2.4     04-03  Mg     2.0     04-03    TPro  6.4  /  Alb  3.8  /  TBili  0.6  /  DBili  x   /  AST  63<H>  /  ALT  67<H>  /  AlkPhos  62  04-03    PT/INR - ( 2020 19:10 )   PT: 13.0 sec;   INR: 1.16 ratio         PTT - ( 2020 19:10 )  PTT:34.1 sec  CARDIAC MARKERS ( 2020 19:10 )  <.015 ng/mL / x     / x     / x     / x          Urinalysis Basic - ( 2020 22:06 )    Color: Yellow / Appearance: Clear / S.020 / pH: x  Gluc: x / Ketone: Trace  / Bili: Negative / Urobili: Negative   Blood: x / Protein: 100 / Nitrite: Negative   Leuk Esterase: Negative / RBC: 0-2 /HPF / WBC 0-2   Sq Epi: x / Non Sq Epi: Occasional / Bacteria: Occasional        RADIOLOGY & ADDITIONAL TESTS:

## 2020-04-04 NOTE — PROGRESS NOTE ADULT - ASSESSMENT
48 yo Male. PMHx bladder CA s/p TURBT (Dec 2018) (plans to start BCG therapy next week), kidney stones s/p stent placement and removal in Feb 2020, HLD, p/w SOB, fevers, dry cough and watery diarrhea. Pt went to [facility] ED yesterday and tested positive COVID 19, was d/c'd with Zpak. After being discharged, had worsening symptoms.

## 2020-04-05 DIAGNOSIS — J96.01 ACUTE RESPIRATORY FAILURE WITH HYPOXIA: ICD-10-CM

## 2020-04-05 LAB
ALBUMIN SERPL ELPH-MCNC: 3.6 G/DL — SIGNIFICANT CHANGE UP (ref 3.3–5)
ALP SERPL-CCNC: 64 U/L — SIGNIFICANT CHANGE UP (ref 40–120)
ALT FLD-CCNC: 73 U/L — HIGH (ref 4–41)
ANION GAP SERPL CALC-SCNC: 18 MMO/L — HIGH (ref 7–14)
ANION GAP SERPL CALC-SCNC: 18 MMO/L — HIGH (ref 7–14)
AST SERPL-CCNC: 87 U/L — HIGH (ref 4–40)
BASOPHILS # BLD AUTO: 0.01 K/UL — SIGNIFICANT CHANGE UP (ref 0–0.2)
BASOPHILS NFR BLD AUTO: 0.1 % — SIGNIFICANT CHANGE UP (ref 0–2)
BILIRUB SERPL-MCNC: 0.5 MG/DL — SIGNIFICANT CHANGE UP (ref 0.2–1.2)
BUN SERPL-MCNC: 13 MG/DL — SIGNIFICANT CHANGE UP (ref 7–23)
BUN SERPL-MCNC: 13 MG/DL — SIGNIFICANT CHANGE UP (ref 7–23)
CALCIUM SERPL-MCNC: 9.2 MG/DL — SIGNIFICANT CHANGE UP (ref 8.4–10.5)
CALCIUM SERPL-MCNC: 9.2 MG/DL — SIGNIFICANT CHANGE UP (ref 8.4–10.5)
CHLORIDE SERPL-SCNC: 100 MMOL/L — SIGNIFICANT CHANGE UP (ref 98–107)
CHLORIDE SERPL-SCNC: 100 MMOL/L — SIGNIFICANT CHANGE UP (ref 98–107)
CO2 SERPL-SCNC: 16 MMOL/L — LOW (ref 22–31)
CO2 SERPL-SCNC: 16 MMOL/L — LOW (ref 22–31)
CREAT SERPL-MCNC: 1.05 MG/DL — SIGNIFICANT CHANGE UP (ref 0.5–1.3)
CREAT SERPL-MCNC: 1.05 MG/DL — SIGNIFICANT CHANGE UP (ref 0.5–1.3)
CRP SERPL-MCNC: 101.3 MG/L — HIGH
EOSINOPHIL # BLD AUTO: 0 K/UL — SIGNIFICANT CHANGE UP (ref 0–0.5)
EOSINOPHIL NFR BLD AUTO: 0 % — SIGNIFICANT CHANGE UP (ref 0–6)
FERRITIN SERPL-MCNC: 1428 NG/ML — HIGH (ref 30–400)
GLUCOSE SERPL-MCNC: 115 MG/DL — HIGH (ref 70–99)
GLUCOSE SERPL-MCNC: 115 MG/DL — HIGH (ref 70–99)
HAPTOGLOB SERPL-MCNC: 338 MG/DL — HIGH (ref 34–200)
HCT VFR BLD CALC: 42.2 % — SIGNIFICANT CHANGE UP (ref 39–50)
HGB BLD-MCNC: 14.5 G/DL — SIGNIFICANT CHANGE UP (ref 13–17)
IMM GRANULOCYTES NFR BLD AUTO: 0.6 % — SIGNIFICANT CHANGE UP (ref 0–1.5)
LDH SERPL L TO P-CCNC: 608 U/L — HIGH (ref 135–225)
LYMPHOCYTES # BLD AUTO: 1.11 K/UL — SIGNIFICANT CHANGE UP (ref 1–3.3)
LYMPHOCYTES # BLD AUTO: 16.3 % — SIGNIFICANT CHANGE UP (ref 13–44)
MAGNESIUM SERPL-MCNC: 2.3 MG/DL — SIGNIFICANT CHANGE UP (ref 1.6–2.6)
MAGNESIUM SERPL-MCNC: 2.3 MG/DL — SIGNIFICANT CHANGE UP (ref 1.6–2.6)
MCHC RBC-ENTMCNC: 31.2 PG — SIGNIFICANT CHANGE UP (ref 27–34)
MCHC RBC-ENTMCNC: 34.4 % — SIGNIFICANT CHANGE UP (ref 32–36)
MCV RBC AUTO: 90.8 FL — SIGNIFICANT CHANGE UP (ref 80–100)
MONOCYTES # BLD AUTO: 0.3 K/UL — SIGNIFICANT CHANGE UP (ref 0–0.9)
MONOCYTES NFR BLD AUTO: 4.4 % — SIGNIFICANT CHANGE UP (ref 2–14)
NEUTROPHILS # BLD AUTO: 5.35 K/UL — SIGNIFICANT CHANGE UP (ref 1.8–7.4)
NEUTROPHILS NFR BLD AUTO: 78.6 % — HIGH (ref 43–77)
NRBC # FLD: 0 K/UL — SIGNIFICANT CHANGE UP (ref 0–0)
PHOSPHATE SERPL-MCNC: 2.2 MG/DL — LOW (ref 2.5–4.5)
PHOSPHATE SERPL-MCNC: 2.2 MG/DL — LOW (ref 2.5–4.5)
PLATELET # BLD AUTO: 118 K/UL — LOW (ref 150–400)
PMV BLD: 12.5 FL — SIGNIFICANT CHANGE UP (ref 7–13)
POTASSIUM SERPL-MCNC: 3.7 MMOL/L — SIGNIFICANT CHANGE UP (ref 3.5–5.3)
POTASSIUM SERPL-MCNC: 3.7 MMOL/L — SIGNIFICANT CHANGE UP (ref 3.5–5.3)
POTASSIUM SERPL-SCNC: 3.7 MMOL/L — SIGNIFICANT CHANGE UP (ref 3.5–5.3)
POTASSIUM SERPL-SCNC: 3.7 MMOL/L — SIGNIFICANT CHANGE UP (ref 3.5–5.3)
PROCALCITONIN SERPL-MCNC: 0.38 NG/ML — HIGH (ref 0.02–0.1)
PROT SERPL-MCNC: 7 G/DL — SIGNIFICANT CHANGE UP (ref 6–8.3)
RBC # BLD: 4.65 M/UL — SIGNIFICANT CHANGE UP (ref 4.2–5.8)
RBC # FLD: 13.1 % — SIGNIFICANT CHANGE UP (ref 10.3–14.5)
SODIUM SERPL-SCNC: 134 MMOL/L — LOW (ref 135–145)
SODIUM SERPL-SCNC: 134 MMOL/L — LOW (ref 135–145)
WBC # BLD: 6.81 K/UL — SIGNIFICANT CHANGE UP (ref 3.8–10.5)
WBC # FLD AUTO: 6.81 K/UL — SIGNIFICANT CHANGE UP (ref 3.8–10.5)

## 2020-04-05 PROCEDURE — 99233 SBSQ HOSP IP/OBS HIGH 50: CPT

## 2020-04-05 PROCEDURE — 93010 ELECTROCARDIOGRAM REPORT: CPT

## 2020-04-05 RX ORDER — POTASSIUM PHOSPHATE, MONOBASIC POTASSIUM PHOSPHATE, DIBASIC 236; 224 MG/ML; MG/ML
15 INJECTION, SOLUTION INTRAVENOUS ONCE
Refills: 0 | Status: COMPLETED | OUTPATIENT
Start: 2020-04-05 | End: 2020-04-05

## 2020-04-05 RX ORDER — HYDROXYCHLOROQUINE SULFATE 200 MG
200 TABLET ORAL EVERY 12 HOURS
Refills: 0 | Status: COMPLETED | OUTPATIENT
Start: 2020-04-06 | End: 2020-04-10

## 2020-04-05 RX ORDER — HYDROXYCHLOROQUINE SULFATE 200 MG
400 TABLET ORAL EVERY 12 HOURS
Refills: 0 | Status: COMPLETED | OUTPATIENT
Start: 2020-04-05 | End: 2020-04-06

## 2020-04-05 RX ORDER — HYDROXYCHLOROQUINE SULFATE 200 MG
TABLET ORAL
Refills: 0 | Status: COMPLETED | OUTPATIENT
Start: 2020-04-05 | End: 2020-04-10

## 2020-04-05 RX ORDER — IPRATROPIUM BROMIDE 0.2 MG/ML
1 SOLUTION, NON-ORAL INHALATION EVERY 6 HOURS
Refills: 0 | Status: DISCONTINUED | OUTPATIENT
Start: 2020-04-05 | End: 2020-04-14

## 2020-04-05 RX ADMIN — ONDANSETRON 4 MILLIGRAM(S): 8 TABLET, FILM COATED ORAL at 11:11

## 2020-04-05 RX ADMIN — ALBUTEROL 2 PUFF(S): 90 AEROSOL, METERED ORAL at 10:58

## 2020-04-05 RX ADMIN — Medication 400 MILLIGRAM(S): at 14:45

## 2020-04-05 RX ADMIN — POTASSIUM PHOSPHATE, MONOBASIC POTASSIUM PHOSPHATE, DIBASIC 62.5 MILLIMOLE(S): 236; 224 INJECTION, SOLUTION INTRAVENOUS at 14:46

## 2020-04-05 RX ADMIN — Medication 200 MILLIGRAM(S): at 17:19

## 2020-04-05 RX ADMIN — ENOXAPARIN SODIUM 40 MILLIGRAM(S): 100 INJECTION SUBCUTANEOUS at 11:10

## 2020-04-05 RX ADMIN — ONDANSETRON 4 MILLIGRAM(S): 8 TABLET, FILM COATED ORAL at 19:09

## 2020-04-05 RX ADMIN — ALBUTEROL 2 PUFF(S): 90 AEROSOL, METERED ORAL at 17:20

## 2020-04-05 RX ADMIN — Medication 650 MILLIGRAM(S): at 17:20

## 2020-04-05 NOTE — PROGRESS NOTE ADULT - ASSESSMENT
50 yo Male. PMHx bladder CA s/p TURBT (Dec 2018) (plans to start BCG therapy next week), kidney stones s/p stent placement and removal in Feb 2020, HLD, p/w SOB, fevers, dry cough and watery diarrhea. Pt went to [facility] ED yesterday and tested positive COVID 19, was d/c'd with Zpak. After being discharged, had worsening symptoms. 50 yo Male. PMHx bladder CA s/p TURBT (Dec 2018) (plans to start BCG therapy next week), kidney stones s/p stent placement and removal in Feb 2020, HLD, p/w SOB, fevers, dry cough and watery diarrhea. Tested positive COVID 19 in [facility] ED, was d/c'd with Zpak. After being discharged, had worsening symptoms.

## 2020-04-05 NOTE — PROGRESS NOTE ADULT - SUBJECTIVE AND OBJECTIVE BOX
Patient is a 49y old  Male who presents with a chief complaint of Shortness of Breath (04 Apr 2020 16:11)      SUBJECTIVE / OVERNIGHT EVENTS: Reports having 2 episodes of diarrhea yesterday and one episode overnight. Reports decreased appetite and nausea but no vomiting. + cough and SOB    MEDICATIONS  (STANDING):  ALBUTerol    90 MICROgram(s) HFA Inhaler 2 Puff(s) Inhalation every 6 hours  enoxaparin Injectable 40 milliGRAM(s) SubCutaneous daily  hydroxychloroquine 400 milliGRAM(s) Oral every 12 hours  hydroxychloroquine   Oral     MEDICATIONS  (PRN):  acetaminophen   Tablet .. 650 milliGRAM(s) Oral every 6 hours PRN Temp greater or equal to 38C (100.4F), Mild Pain (1 - 3)  ondansetron Injectable 4 milliGRAM(s) IV Push every 8 hours PRN Nausea and/or Vomiting      T(C): 37.8 (04-05-20 @ 06:04), Max: 38.2 (04-04-20 @ 22:42)  HR: 99 (04-05-20 @ 06:04) (99 - 111)  BP: 127/74 (04-05-20 @ 06:04) (127/74 - 133/82)  RR: 18 (04-05-20 @ 06:04) (18 - 18)  SpO2: 94% (04-05-20 @ 06:04) (94% - 96%)  CAPILLARY BLOOD GLUCOSE        I&O's Summary      PHYSICAL EXAM:  GENERAL: not in distress, on NC  EYES: open, sclera clear b/l  CHEST/LUNG: normal respiratory effort, speaking in full sentences without difficulty  HEART: no lower extremity edema b/l  ABDOMEN: Soft, Nontender, Nondistended  EXTREMITIES: Warm and well perfused  NEUROLOGY: awake, alert, responds to Qs appropriately, no gross deficits  PSYCH: appears anxious  SKIN: No visible rashes or lesions    LABS:                        14.5   6.81  )-----------( 118      ( 05 Apr 2020 05:00 )             42.2     04-05    134<L>  |  100  |  13  ----------------------------<  115<H>  3.7   |  16<L>  |  1.05    Ca    9.2      05 Apr 2020 05:00  Phos  2.2     04-05  Mg     2.3     04-05    TPro  7.0  /  Alb  3.6  /  TBili  0.5  /  DBili  x   /  AST  87<H>  /  ALT  73<H>  /  AlkPhos  64  04-05              RADIOLOGY & ADDITIONAL TESTS:

## 2020-04-05 NOTE — PROGRESS NOTE ADULT - PROBLEM SELECTOR PLAN 1
COVID19 PCR result: positive on 4/2/20  Acute Hypoxic Respiratory Failure: currently on NC . Wean off oxygen as tolerated  Maintain prone positioning while awake and asleep as tolerated  Low clinical suspicion for bacteremia, hold off on sending blood cultures  Avoid HFNC, Nebulized treatments, and NIPPV with BIPAP/CPAP.  Albuterol HFA and/or Atrovent q6 ATC  Avoid NSAIDs. Use Tylenol PRN for fever, pain instead  For hypoxia: trial of Plaquenil 400mg BID x 2 doses then 200 mg BID x 4 days. Avoid Plaquenil if QTC prolonged. Avoid Azithromycin due to adverse effects in combination with Plaquenil  Start solumedrol 1-1.5mg/kg BID x 3-5 days if worsening hypoxia  Guaifenesin ATC x 2-3 days for cough, can add tessalon perles PRN, chest PT, incentive spirometry  Trend CRP and Ferritin to repeat q 3 days  ICU consult for early intubation if worsening hypoxia on NRB, increased work of breathing and inability to maintain airway

## 2020-04-06 LAB
ALBUMIN SERPL ELPH-MCNC: 3.8 G/DL — SIGNIFICANT CHANGE UP (ref 3.3–5)
ALP SERPL-CCNC: 62 U/L — SIGNIFICANT CHANGE UP (ref 40–120)
ALT FLD-CCNC: 109 U/L — HIGH (ref 4–41)
ANION GAP SERPL CALC-SCNC: 14 MMO/L — SIGNIFICANT CHANGE UP (ref 7–14)
ANISOCYTOSIS BLD QL: SLIGHT — SIGNIFICANT CHANGE UP
AST SERPL-CCNC: 154 U/L — HIGH (ref 4–40)
BASOPHILS # BLD AUTO: 0.01 K/UL — SIGNIFICANT CHANGE UP (ref 0–0.2)
BASOPHILS NFR BLD AUTO: 0.1 % — SIGNIFICANT CHANGE UP (ref 0–2)
BASOPHILS NFR SPEC: 0 % — SIGNIFICANT CHANGE UP (ref 0–2)
BILIRUB SERPL-MCNC: 0.5 MG/DL — SIGNIFICANT CHANGE UP (ref 0.2–1.2)
BUN SERPL-MCNC: 15 MG/DL — SIGNIFICANT CHANGE UP (ref 7–23)
CALCIUM SERPL-MCNC: 9 MG/DL — SIGNIFICANT CHANGE UP (ref 8.4–10.5)
CHLORIDE SERPL-SCNC: 101 MMOL/L — SIGNIFICANT CHANGE UP (ref 98–107)
CO2 SERPL-SCNC: 21 MMOL/L — LOW (ref 22–31)
CREAT SERPL-MCNC: 1.17 MG/DL — SIGNIFICANT CHANGE UP (ref 0.5–1.3)
EOSINOPHIL # BLD AUTO: 0 K/UL — SIGNIFICANT CHANGE UP (ref 0–0.5)
EOSINOPHIL NFR BLD AUTO: 0 % — SIGNIFICANT CHANGE UP (ref 0–6)
EOSINOPHIL NFR FLD: 0 % — SIGNIFICANT CHANGE UP (ref 0–6)
FERRITIN SERPL-MCNC: 2126 NG/ML — HIGH (ref 30–400)
GLUCOSE SERPL-MCNC: 116 MG/DL — HIGH (ref 70–99)
HCT VFR BLD CALC: 43.3 % — SIGNIFICANT CHANGE UP (ref 39–50)
HGB BLD-MCNC: 14.5 G/DL — SIGNIFICANT CHANGE UP (ref 13–17)
IMM GRANULOCYTES NFR BLD AUTO: 0.5 % — SIGNIFICANT CHANGE UP (ref 0–1.5)
LG PLATELETS BLD QL AUTO: SLIGHT — SIGNIFICANT CHANGE UP
LYMPHOCYTES # BLD AUTO: 1.13 K/UL — SIGNIFICANT CHANGE UP (ref 1–3.3)
LYMPHOCYTES # BLD AUTO: 14.1 % — SIGNIFICANT CHANGE UP (ref 13–44)
LYMPHOCYTES NFR SPEC AUTO: 4 % — LOW (ref 13–44)
MAGNESIUM SERPL-MCNC: 2.3 MG/DL — SIGNIFICANT CHANGE UP (ref 1.6–2.6)
MANUAL SMEAR VERIFICATION: SIGNIFICANT CHANGE UP
MCHC RBC-ENTMCNC: 30.3 PG — SIGNIFICANT CHANGE UP (ref 27–34)
MCHC RBC-ENTMCNC: 33.5 % — SIGNIFICANT CHANGE UP (ref 32–36)
MCV RBC AUTO: 90.4 FL — SIGNIFICANT CHANGE UP (ref 80–100)
MONOCYTES # BLD AUTO: 0.28 K/UL — SIGNIFICANT CHANGE UP (ref 0–0.9)
MONOCYTES NFR BLD AUTO: 3.5 % — SIGNIFICANT CHANGE UP (ref 2–14)
MONOCYTES NFR BLD: 2 % — SIGNIFICANT CHANGE UP (ref 2–9)
NEUTROPHIL AB SER-ACNC: 70 % — SIGNIFICANT CHANGE UP (ref 43–77)
NEUTROPHILS # BLD AUTO: 6.53 K/UL — SIGNIFICANT CHANGE UP (ref 1.8–7.4)
NEUTROPHILS NFR BLD AUTO: 81.8 % — HIGH (ref 43–77)
NEUTS BAND # BLD: 20 % — HIGH (ref 0–6)
NRBC # BLD: 0 /100WBC — SIGNIFICANT CHANGE UP
NRBC # FLD: 0 K/UL — SIGNIFICANT CHANGE UP (ref 0–0)
PHOSPHATE SERPL-MCNC: 2.2 MG/DL — LOW (ref 2.5–4.5)
PLATELET # BLD AUTO: 133 K/UL — LOW (ref 150–400)
PLATELET COUNT - ESTIMATE: SIGNIFICANT CHANGE UP
PMV BLD: 12.9 FL — SIGNIFICANT CHANGE UP (ref 7–13)
POTASSIUM SERPL-MCNC: 3.3 MMOL/L — LOW (ref 3.5–5.3)
POTASSIUM SERPL-SCNC: 3.3 MMOL/L — LOW (ref 3.5–5.3)
PROT SERPL-MCNC: 6.9 G/DL — SIGNIFICANT CHANGE UP (ref 6–8.3)
RBC # BLD: 4.79 M/UL — SIGNIFICANT CHANGE UP (ref 4.2–5.8)
RBC # FLD: 13.1 % — SIGNIFICANT CHANGE UP (ref 10.3–14.5)
REVIEW TO FOLLOW: YES — SIGNIFICANT CHANGE UP
SODIUM SERPL-SCNC: 136 MMOL/L — SIGNIFICANT CHANGE UP (ref 135–145)
VARIANT LYMPHS # BLD: 4 % — SIGNIFICANT CHANGE UP
WBC # BLD: 7.99 K/UL — SIGNIFICANT CHANGE UP (ref 3.8–10.5)
WBC # FLD AUTO: 7.99 K/UL — SIGNIFICANT CHANGE UP (ref 3.8–10.5)

## 2020-04-06 PROCEDURE — 99233 SBSQ HOSP IP/OBS HIGH 50: CPT

## 2020-04-06 RX ORDER — POTASSIUM CHLORIDE 20 MEQ
40 PACKET (EA) ORAL ONCE
Refills: 0 | Status: COMPLETED | OUTPATIENT
Start: 2020-04-06 | End: 2020-04-06

## 2020-04-06 RX ORDER — SODIUM,POTASSIUM PHOSPHATES 278-250MG
1 POWDER IN PACKET (EA) ORAL
Refills: 0 | Status: COMPLETED | OUTPATIENT
Start: 2020-04-06 | End: 2020-04-07

## 2020-04-06 RX ADMIN — Medication 1 TABLET(S): at 19:13

## 2020-04-06 RX ADMIN — ALBUTEROL 2 PUFF(S): 90 AEROSOL, METERED ORAL at 19:11

## 2020-04-06 RX ADMIN — Medication 1 PUFF(S): at 13:56

## 2020-04-06 RX ADMIN — Medication 200 MILLIGRAM(S): at 19:09

## 2020-04-06 RX ADMIN — Medication 200 MILLIGRAM(S): at 13:56

## 2020-04-06 RX ADMIN — Medication 1 PUFF(S): at 19:11

## 2020-04-06 RX ADMIN — Medication 1 TABLET(S): at 14:31

## 2020-04-06 RX ADMIN — ALBUTEROL 2 PUFF(S): 90 AEROSOL, METERED ORAL at 13:56

## 2020-04-06 RX ADMIN — Medication 70 MILLIGRAM(S): at 19:10

## 2020-04-06 RX ADMIN — Medication 40 MILLIEQUIVALENT(S): at 09:50

## 2020-04-06 RX ADMIN — ALBUTEROL 2 PUFF(S): 90 AEROSOL, METERED ORAL at 08:04

## 2020-04-06 RX ADMIN — ONDANSETRON 4 MILLIGRAM(S): 8 TABLET, FILM COATED ORAL at 05:55

## 2020-04-06 RX ADMIN — Medication 1 TABLET(S): at 23:22

## 2020-04-06 RX ADMIN — ALBUTEROL 2 PUFF(S): 90 AEROSOL, METERED ORAL at 01:22

## 2020-04-06 RX ADMIN — Medication 200 MILLIGRAM(S): at 23:22

## 2020-04-06 RX ADMIN — Medication 1 PUFF(S): at 08:06

## 2020-04-06 RX ADMIN — Medication 400 MILLIGRAM(S): at 01:22

## 2020-04-06 RX ADMIN — Medication 650 MILLIGRAM(S): at 16:34

## 2020-04-06 RX ADMIN — ENOXAPARIN SODIUM 40 MILLIGRAM(S): 100 INJECTION SUBCUTANEOUS at 13:56

## 2020-04-06 RX ADMIN — Medication 200 MILLIGRAM(S): at 05:55

## 2020-04-06 RX ADMIN — Medication 1 PUFF(S): at 01:23

## 2020-04-06 RX ADMIN — Medication 200 MILLIGRAM(S): at 01:22

## 2020-04-06 NOTE — CONSULT NOTE ADULT - ASSESSMENT
49 year old with history of bladder cancer presents iwth shortness of breath.    Multifocal pneumonia due to COVID    Complicated  by hypoxia.  Prominent GI symptoms    On trial of Plaquenil and steroids.     Continue supportive care     Airborne/ contact precautions

## 2020-04-06 NOTE — CONSULT NOTE ADULT - SUBJECTIVE AND OBJECTIVE BOX
HPI:  48 yo Male with bladder CA s/p TURBT with plan to start BCG therapy next week,  kidney stones s/p stent placement and removal in Feb 2020, HLD,       He presented on 4/4 with shortness of breath. Patient states that for the week prior-  he has been experiencing fevers, dry cough and watery diarrhea.     He notes shortness of breath- feels better with NRB.    Now complaining of diarrhea.         PAST MEDICAL & SURGICAL HISTORY:  HLD (hyperlipidemia)  Bladder calculus  Bladder mass  Bladder cancer: mass removal x2  History of tonsillectomy      Allergies    penicillin (Unknown)    Intolerances        ANTIMICROBIALS:  hydroxychloroquine 200 every 12 hours  hydroxychloroquine        OTHER MEDS:  acetaminophen   Tablet .. 650 milliGRAM(s) Oral every 6 hours PRN  ALBUTerol    90 MICROgram(s) HFA Inhaler 2 Puff(s) Inhalation every 6 hours  enoxaparin Injectable 40 milliGRAM(s) SubCutaneous daily  guaiFENesin   Syrup  (Sugar-Free) 200 milliGRAM(s) Oral every 6 hours  ipratropium 17 MICROgram(s) HFA Inhaler 1 Puff(s) Inhalation every 6 hours  methylPREDNISolone sodium succinate Injectable 70 milliGRAM(s) IV Push two times a day  ondansetron Injectable 4 milliGRAM(s) IV Push every 8 hours PRN  potassium acid phosphate/sodium acid phosphate tablet (K-PHOS No. 2) 1 Tablet(s) Oral four times a day with meals      SOCIAL HISTORY:  no travel    FAMILY HISTORY:  Family history of hypertension in mother  Family history of dementia  Family history of Parkinson's disease      REVIEW OF SYSTEMS  [  ] ROS unobtainable because:    [x  ] All other systems negative except as noted below:	    Constitutional:  [x ] fever [ ] chills  [ ] weight loss  [ x] weakness  Skin:  [ ] rash [ ] phlebitis	  Eyes: [ ] icterus [ ] pain  [ ] discharge	  ENMT: [ ] sore throat  [ ] thrush [ ] ulcers [ ] exudates  Respiratory: [ x] dyspnea [ ] hemoptysis [ ] cough [ ] sputum	  Cardiovascular:  [ ] chest pain [ ] palpitations [ ] edema	  Gastrointestinal:  [ ] nausea [ ] vomiting [ x] diarrhea [ ] constipation [ ] pain	  Genitourinary:  [ ] dysuria [ ] frequency [ ] hematuria [ ] discharge [ ] flank pain  [ ] incontinence  Musculoskeletal:  [ ] myalgias [ ] arthralgias [ ] arthritis  [ ] back pain  Neurological:  [ ] headache [ ] seizures  [ ] confusion/altered mental status  Psychiatric:  [ ] anxiety [ ] depression	  Hematology/Lymphatics:  [ ] lymphadenopathy  Endocrine:  [ ] adrenal [ ] thyroid  Allergic/Immunologic:	 [ ] transplant [ ] seasonal    PHYSICAL EXAM:  General: [x ]NRB  HEAD/EYES: [ ] PERRL [x ] white sclera [ ] icterus  ENT:  [ ] normal [ ] supple [ ] thrush [ ] pharyngeal exudate  Cardiovascular:   [ ] murmur [ ] normal [ ] PPM/AICD  Respiratory:  [x ] course BS  GI:  [x ] soft, non-tender, normal bowel sounds  :  [ ] goins [ ] no CVA tenderness   Musculoskeletal:  [ ] no synovitis  Neurologic:  [ ] non-focal exam   Skin:  [x ] no rash  Lymph: [ ] no lymphadenopathy  Psychiatric:  [ ] appropriate affect [ ] alert & oriented  Lines:  [ ] no phlebitis [ ] central line          Drug Dosing Weight  Height (cm): 177.8 (03 Apr 2020 15:42)  Weight (kg): 102.1 (02 Apr 2020 17:39)  BMI (kg/m2): 32.3 (03 Apr 2020 15:42)  BSA (m2): 2.19 (03 Apr 2020 15:42)    Vital Signs Last 24 Hrs  T(F): 100.4 (04-06-20 @ 16:31), Max: 103.9 (04-03-20 @ 15:42)    Vital Signs Last 24 Hrs  HR: 100 (04-06-20 @ 16:31) (92 - 100)  BP: 117/73 (04-06-20 @ 16:31) (106/79 - 133/76)  RR: 32 (04-06-20 @ 16:31)  SpO2: 94% (04-06-20 @ 16:31) (91% - 95%)  Wt(kg): --                          14.5   7.99  )-----------( 133      ( 06 Apr 2020 04:50 )             43.3       04-06    136  |  101  |  15  ----------------------------<  116<H>  3.3<L>   |  21<L>  |  1.17    Ca    9.0      06 Apr 2020 04:50  Phos  2.2     04-06  Mg     2.3     04-06    TPro  6.9  /  Alb  3.8  /  TBili  0.5  /  DBili  x   /  AST  154<H>  /  ALT  109<H>  /  AlkPhos  62  04-06          MICROBIOLOGY:  < from: Xray Chest 1 View AP/PA (04.03.20 @ 18:02) >  IMPRESSION:     Bilateral lower lung hazy opacities, grossly unchanged.    < end of copied text >      RADIOLOGY:    COVID-19 PCR . (04.02.20 @ 23:34)    COVID-19 PCR: Detected: This test has been validated by [facility] to be accurate;  though it has not been FDA cleared/approved by the usual pathway.  As with all laboratory tests, results should be correlated with clinical  findings.  https://www.fda.gov/media/506369/download  https://www.fda.gov/media/212749/download

## 2020-04-06 NOTE — PROGRESS NOTE ADULT - PROBLEM SELECTOR PLAN 1
COVID19 PCR result: positive on 4/2/20  Acute Hypoxic Respiratory Failure: currently on NRB . Wean off oxygen as tolerated  Maintain prone positioning while awake and asleep as tolerated  Low clinical suspicion for bacteremia, hold off on sending blood cultures  Avoid HFNC, Nebulized treatments, and NIPPV with BIPAP/CPAP.  Albuterol HFA and/or Atrovent q6 ATC  Avoid NSAIDs. Use Tylenol PRN for fever, pain instead  For hypoxia: trial of Plaquenil 400mg BID x 2 doses then 200 mg BID x 4 days. Avoid Plaquenil if QTC prolonged. Avoid Azithromycin due to adverse effects in combination with Plaquenil  Start solumedrol 1-1.5mg/kg BID x 5 days given increasing O2 requirements  Guaifenesin ATC x 2-3 days for cough, can add tessalon perles PRN, chest PT, incentive spirometry  Trend CRP and Ferritin to repeat q 3 days: ferritin rising and + bandemia  ICU consult for early intubation if worsening hypoxia on NRB, increased work of breathing and inability to maintain airway

## 2020-04-06 NOTE — PROGRESS NOTE ADULT - SUBJECTIVE AND OBJECTIVE BOX
Patient is a 49y old  Male who presents with a chief complaint of Shortness of Breath (05 Apr 2020 14:58)      SUBJECTIVE / OVERNIGHT EVENTS: Staets he had 3 episodes of watery diarrhea yesterday and one since midnight. Had one episode of bilious vomiting overnight. Had worsening SOB and now feels better on NRB. Cough medicine helping.    MEDICATIONS  (STANDING):  ALBUTerol    90 MICROgram(s) HFA Inhaler 2 Puff(s) Inhalation every 6 hours  enoxaparin Injectable 40 milliGRAM(s) SubCutaneous daily  guaiFENesin   Syrup  (Sugar-Free) 200 milliGRAM(s) Oral every 6 hours  hydroxychloroquine 200 milliGRAM(s) Oral every 12 hours  hydroxychloroquine   Oral   ipratropium 17 MICROgram(s) HFA Inhaler 1 Puff(s) Inhalation every 6 hours  methylPREDNISolone sodium succinate Injectable 70 milliGRAM(s) IV Push two times a day  potassium acid phosphate/sodium acid phosphate tablet (K-PHOS No. 2) 1 Tablet(s) Oral four times a day with meals    MEDICATIONS  (PRN):  acetaminophen   Tablet .. 650 milliGRAM(s) Oral every 6 hours PRN Temp greater or equal to 38C (100.4F), Mild Pain (1 - 3)  ondansetron Injectable 4 milliGRAM(s) IV Push every 8 hours PRN Nausea and/or Vomiting      T(C): 36.7 (04-06-20 @ 09:30), Max: 37.9 (04-05-20 @ 17:15)  HR: 98 (04-06-20 @ 09:30) (92 - 103)  BP: 128/80 (04-06-20 @ 09:30) (106/79 - 133/76)  RR: 20 (04-06-20 @ 09:35) (18 - 30)  SpO2: 95% (04-06-20 @ 09:35) (91% - 95%)  CAPILLARY BLOOD GLUCOSE        I&O's Summary      PHYSICAL EXAM:  GENERAL: not in distress, on NRB  EYES: open, sclera clear b/l  CHEST/LUNG: normal respiratory effort, speaking in full sentences without difficulty  HEART: no lower extremity edema b/l  ABDOMEN: Soft, Nontender, Nondistended  EXTREMITIES: Warm and well perfused  NEUROLOGY: awake, alert, responds to Qs appropriately, no gross deficits  PSYCH: appears anxious  SKIN: No visible rashes or lesions    LABS:                        14.5   7.99  )-----------( 133      ( 06 Apr 2020 04:50 )             43.3     04-06    136  |  101  |  15  ----------------------------<  116<H>  3.3<L>   |  21<L>  |  1.17    Ca    9.0      06 Apr 2020 04:50  Phos  2.2     04-06  Mg     2.3     04-06    TPro  6.9  /  Alb  3.8  /  TBili  0.5  /  DBili  x   /  AST  154<H>  /  ALT  109<H>  /  AlkPhos  62  04-06              RADIOLOGY & ADDITIONAL TESTS:

## 2020-04-06 NOTE — PROGRESS NOTE ADULT - ASSESSMENT
48 yo Male. PMHx bladder CA s/p TURBT (Dec 2018) (plans to start BCG therapy next week), kidney stones s/p stent placement and removal in Feb 2020, HLD, p/w SOB, fevers, dry cough and watery diarrhea. Tested positive COVID 19 in [facility] ED, was d/c'd with Zpak. After being discharged, had worsening symptoms.

## 2020-04-06 NOTE — PROVIDER CONTACT NOTE (OTHER) - ASSESSMENT
[facility]      Triage Note    [address] Patient Status:  Observation    10/6/1991 MRN [id number]   Location [address] Attending [address] [place], [place]   Hosp Day # 0 PCP None Pcp       Sergey Peck Additional Comments Comments: 23.4 wks c/o pedal edema, low abd pain and sl. h/a refused med. UA WNR, V/s WNR, +FHTs. no u/c.  Discuss w/Dr Luís Antonio, discharge home f/u in office     Reason for visit:       Connie Calvert  12/22/2018 1:02 PM    Dx /73, SpO2 94% on non-rebreather mask  Pt is laying on his side

## 2020-04-07 ENCOUNTER — TRANSCRIPTION ENCOUNTER (OUTPATIENT)
Age: 50
End: 2020-04-07

## 2020-04-07 LAB
ALBUMIN SERPL ELPH-MCNC: 3.9 G/DL — SIGNIFICANT CHANGE UP (ref 3.3–5)
ALP SERPL-CCNC: 83 U/L — SIGNIFICANT CHANGE UP (ref 40–120)
ALT FLD-CCNC: 255 U/L — HIGH (ref 4–41)
ANION GAP SERPL CALC-SCNC: 16 MMO/L — HIGH (ref 7–14)
AST SERPL-CCNC: 327 U/L — HIGH (ref 4–40)
BASOPHILS # BLD AUTO: 0.01 K/UL — SIGNIFICANT CHANGE UP (ref 0–0.2)
BASOPHILS NFR BLD AUTO: 0.1 % — SIGNIFICANT CHANGE UP (ref 0–2)
BILIRUB SERPL-MCNC: 0.6 MG/DL — SIGNIFICANT CHANGE UP (ref 0.2–1.2)
BUN SERPL-MCNC: 20 MG/DL — SIGNIFICANT CHANGE UP (ref 7–23)
CALCIUM SERPL-MCNC: 9.5 MG/DL — SIGNIFICANT CHANGE UP (ref 8.4–10.5)
CHLORIDE SERPL-SCNC: 103 MMOL/L — SIGNIFICANT CHANGE UP (ref 98–107)
CO2 SERPL-SCNC: 22 MMOL/L — SIGNIFICANT CHANGE UP (ref 22–31)
CREAT SERPL-MCNC: 1.02 MG/DL — SIGNIFICANT CHANGE UP (ref 0.5–1.3)
CULTURE RESULTS: SIGNIFICANT CHANGE UP
CULTURE RESULTS: SIGNIFICANT CHANGE UP
EOSINOPHIL # BLD AUTO: 0 K/UL — SIGNIFICANT CHANGE UP (ref 0–0.5)
EOSINOPHIL NFR BLD AUTO: 0 % — SIGNIFICANT CHANGE UP (ref 0–6)
GLUCOSE SERPL-MCNC: 149 MG/DL — HIGH (ref 70–99)
HCT VFR BLD CALC: 46.8 % — SIGNIFICANT CHANGE UP (ref 39–50)
HGB BLD-MCNC: 15.5 G/DL — SIGNIFICANT CHANGE UP (ref 13–17)
IMM GRANULOCYTES NFR BLD AUTO: 1 % — SIGNIFICANT CHANGE UP (ref 0–1.5)
LYMPHOCYTES # BLD AUTO: 0.9 K/UL — LOW (ref 1–3.3)
LYMPHOCYTES # BLD AUTO: 11.7 % — LOW (ref 13–44)
MAGNESIUM SERPL-MCNC: 2.9 MG/DL — HIGH (ref 1.6–2.6)
MANUAL SMEAR VERIFICATION: SIGNIFICANT CHANGE UP
MCHC RBC-ENTMCNC: 29.9 PG — SIGNIFICANT CHANGE UP (ref 27–34)
MCHC RBC-ENTMCNC: 33.1 % — SIGNIFICANT CHANGE UP (ref 32–36)
MCV RBC AUTO: 90.2 FL — SIGNIFICANT CHANGE UP (ref 80–100)
MONOCYTES # BLD AUTO: 0.17 K/UL — SIGNIFICANT CHANGE UP (ref 0–0.9)
MONOCYTES NFR BLD AUTO: 2.2 % — SIGNIFICANT CHANGE UP (ref 2–14)
NEUTROPHILS # BLD AUTO: 6.51 K/UL — SIGNIFICANT CHANGE UP (ref 1.8–7.4)
NEUTROPHILS NFR BLD AUTO: 85 % — HIGH (ref 43–77)
NRBC # FLD: 0 K/UL — SIGNIFICANT CHANGE UP (ref 0–0)
PHOSPHATE SERPL-MCNC: 3.9 MG/DL — SIGNIFICANT CHANGE UP (ref 2.5–4.5)
PLATELET # BLD AUTO: 168 K/UL — SIGNIFICANT CHANGE UP (ref 150–400)
PMV BLD: 13 FL — SIGNIFICANT CHANGE UP (ref 7–13)
POTASSIUM SERPL-MCNC: 3.8 MMOL/L — SIGNIFICANT CHANGE UP (ref 3.5–5.3)
POTASSIUM SERPL-SCNC: 3.8 MMOL/L — SIGNIFICANT CHANGE UP (ref 3.5–5.3)
PROT SERPL-MCNC: 7.9 G/DL — SIGNIFICANT CHANGE UP (ref 6–8.3)
RBC # BLD: 5.19 M/UL — SIGNIFICANT CHANGE UP (ref 4.2–5.8)
RBC # FLD: 13.2 % — SIGNIFICANT CHANGE UP (ref 10.3–14.5)
SODIUM SERPL-SCNC: 141 MMOL/L — SIGNIFICANT CHANGE UP (ref 135–145)
SPECIMEN SOURCE: SIGNIFICANT CHANGE UP
SPECIMEN SOURCE: SIGNIFICANT CHANGE UP
WBC # BLD: 7.67 K/UL — SIGNIFICANT CHANGE UP (ref 3.8–10.5)
WBC # FLD AUTO: 7.67 K/UL — SIGNIFICANT CHANGE UP (ref 3.8–10.5)

## 2020-04-07 PROCEDURE — 99233 SBSQ HOSP IP/OBS HIGH 50: CPT

## 2020-04-07 RX ORDER — HYDROXYZINE HCL 10 MG
25 TABLET ORAL ONCE
Refills: 0 | Status: COMPLETED | OUTPATIENT
Start: 2020-04-07 | End: 2020-04-07

## 2020-04-07 RX ORDER — HYDROXYZINE HCL 10 MG
25 TABLET ORAL
Refills: 0 | Status: DISCONTINUED | OUTPATIENT
Start: 2020-04-07 | End: 2020-04-14

## 2020-04-07 RX ORDER — POTASSIUM CHLORIDE 20 MEQ
40 PACKET (EA) ORAL ONCE
Refills: 0 | Status: COMPLETED | OUTPATIENT
Start: 2020-04-07 | End: 2020-04-07

## 2020-04-07 RX ADMIN — Medication 1 PUFF(S): at 02:57

## 2020-04-07 RX ADMIN — Medication 70 MILLIGRAM(S): at 17:33

## 2020-04-07 RX ADMIN — Medication 200 MILLIGRAM(S): at 13:05

## 2020-04-07 RX ADMIN — Medication 1 PUFF(S): at 13:06

## 2020-04-07 RX ADMIN — ALBUTEROL 2 PUFF(S): 90 AEROSOL, METERED ORAL at 07:52

## 2020-04-07 RX ADMIN — Medication 200 MILLIGRAM(S): at 17:41

## 2020-04-07 RX ADMIN — Medication 200 MILLIGRAM(S): at 07:52

## 2020-04-07 RX ADMIN — Medication 40 MILLIEQUIVALENT(S): at 10:03

## 2020-04-07 RX ADMIN — Medication 200 MILLIGRAM(S): at 07:51

## 2020-04-07 RX ADMIN — ALBUTEROL 2 PUFF(S): 90 AEROSOL, METERED ORAL at 13:06

## 2020-04-07 RX ADMIN — Medication 200 MILLIGRAM(S): at 13:06

## 2020-04-07 RX ADMIN — ALBUTEROL 2 PUFF(S): 90 AEROSOL, METERED ORAL at 17:42

## 2020-04-07 RX ADMIN — Medication 1 TABLET(S): at 07:51

## 2020-04-07 RX ADMIN — Medication 70 MILLIGRAM(S): at 07:52

## 2020-04-07 RX ADMIN — Medication 1 PUFF(S): at 07:52

## 2020-04-07 RX ADMIN — Medication 1 PUFF(S): at 17:41

## 2020-04-07 RX ADMIN — ENOXAPARIN SODIUM 40 MILLIGRAM(S): 100 INJECTION SUBCUTANEOUS at 13:05

## 2020-04-07 RX ADMIN — ALBUTEROL 2 PUFF(S): 90 AEROSOL, METERED ORAL at 02:57

## 2020-04-07 RX ADMIN — Medication 25 MILLIGRAM(S): at 10:03

## 2020-04-07 NOTE — DISCHARGE NOTE PROVIDER - CARE PROVIDER_API CALL
Geovanny Fagan)  Internal Medicine  [address], 12th [address]  [place] [address]  Phone: [phone]  Fax: [phone]  Established Patient  Follow Up Time: 2 weeks Rajendra Vasquez)  Internal Medicine  [address], [address]  [place] [address]  Phone: [phone]  Fax: [phone]  Established Patient  Follow Up Time: 1 week

## 2020-04-07 NOTE — DISCHARGE NOTE PROVIDER - NSDCFUSCHEDAPPT_GEN_ALL_CORE_FT
LARA ARIAS ; 04/22/2020 ; [facility] [address] [place] [address] LARA ARIAS ; 04/22/2020 ; [facility] [address] [place] [address]

## 2020-04-07 NOTE — PROGRESS NOTE ADULT - SUBJECTIVE AND OBJECTIVE BOX
Patient is a 49y old  Male who presents with a chief complaint of Shortness of Breath (07 Apr 2020 11:47)      SUBJECTIVE / OVERNIGHT EVENTS:   Patient noted that overnight was agitated and could not sleep   On NRB (15L) and saturating 93%     MEDICATIONS  (STANDING):  ALBUTerol    90 MICROgram(s) HFA Inhaler 2 Puff(s) Inhalation every 6 hours  enoxaparin Injectable 40 milliGRAM(s) SubCutaneous daily  guaiFENesin   Syrup  (Sugar-Free) 200 milliGRAM(s) Oral every 6 hours  hydroxychloroquine 200 milliGRAM(s) Oral every 12 hours  hydroxychloroquine   Oral   ipratropium 17 MICROgram(s) HFA Inhaler 1 Puff(s) Inhalation every 6 hours  methylPREDNISolone sodium succinate Injectable 70 milliGRAM(s) IV Push two times a day    MEDICATIONS  (PRN):  acetaminophen   Tablet .. 650 milliGRAM(s) Oral every 6 hours PRN Temp greater or equal to 38C (100.4F), Mild Pain (1 - 3)  ondansetron Injectable 4 milliGRAM(s) IV Push every 8 hours PRN Nausea and/or Vomiting      T(C): 36.4 (04-07-20 @ 07:49), Max: 38 (04-06-20 @ 16:31)  HR: 88 (04-07-20 @ 07:49) (88 - 100)  BP: 141/84 (04-07-20 @ 07:49) (117/73 - 141/84)  RR: 18 (04-07-20 @ 07:49) (18 - 32)  SpO2: 93% (04-07-20 @ 07:49) (93% - 95%)  CAPILLARY BLOOD GLUCOSE        I&O's Summary      PHYSICAL EXAM:  GENERAL: mild distress, on non rebreather   EYES: open, sclera clear b/l  CHEST/LUNG: normal respiratory effort, not tachypneic, speaking in full sentences without difficulty  HEART: Not tachycardic, no lower extremity edema b/l  ABDOMEN: Soft, Nontender, Nondistended  EXTREMITIES: Warm and well perfused  NEUROLOGY: awake, alert, responds to Qs appropriately, no gross deficits  PSYCH: calm, cooperative  SKIN: No visible rashes or lesions    LABS:                        15.5   7.67  )-----------( 168      ( 07 Apr 2020 08:50 )             46.8     04-07    141  |  103  |  20  ----------------------------<  149<H>  3.8   |  22  |  1.02    Ca    9.5      07 Apr 2020 08:50  Phos  3.9     04-07  Mg     2.9     04-07    TPro  7.9  /  Alb  3.9  /  TBili  0.6  /  DBili  x   /  AST  327<H>  /  ALT  255<H>  /  AlkPhos  83  04-07              RADIOLOGY & ADDITIONAL TESTS:

## 2020-04-07 NOTE — DISCHARGE NOTE PROVIDER - NSDCFUADDINST_GEN_ALL_CORE_FT
Patient sent home with home O2, and portable pulse ox. Maintain pulse ox >92%. Patient to be followed by Dr. Vasquez within one week of discharge.

## 2020-04-07 NOTE — DISCHARGE NOTE PROVIDER - HOSPITAL COURSE
48 yo Male. PMHx bladder CA s/p TURBT (Dec 2018) (pt is supposed to start BCG therapy next week), kidney stones s/p stent placement and removal in Feb 2020, HLD, Presents with shortness of breath, fevers, dry cough and watery diarrhea. Pt went to [facility] ED yesterday and tested positive COVID 19, was d/c'd with Zpak. States yesterday after being discharged with worsening symptoms.        +COVID- Plaquenil started 4/5    CXR - Bilateral mid lung peripheral hazy opacities, which can represent infection in the correct clinical context. 50 yo Male. PMHx bladder CA s/p TURBT (Dec 2018) (pt is supposed to start BCG therapy next week), kidney stones s/p stent placement and removal in Feb 2020, HLD, Presents with shortness of breath, fevers, dry cough and watery diarrhea. [facility] and tested positive COVID 19, was d/c'd with Zpak. States yesterday after being discharged with worsening symptoms.        +COVID- Plaquenil started 4/5    CXR - Bilateral mid lung peripheral hazy opacities, which can represent infection in the correct clinical context.        Patient has been requiring NC 3L in the hospital, and has been recommended home O2.     Patient home physician I     s Dr. Vasquez, a pulmonologist who will be managing him outpatient.     -completed plaquenil 4/5-4/10, cs solumedrol 4/6-4/11. Anakinra 4/9-4/14    -he has improved inflammatory markers.         Patient has a history of Bladder CA s/p TURBT, supposed to start BCG next week.         Patient had diarrhea, which has resolved. Throbocytopenia has also resolved.     Patient will be discharged on Xarelto 10mg QD for 4 weeks for DVT ppx.             On 4/14, case was discussed with Dr. Lisker, patient is medically cleared and optimized for discharge today, to home with home O2. All medications were reviewed with attending, and sent to mutually agreed upon pharmacy 50 yo Male. PMHx bladder CA s/p TURBT (Dec 2018) (pt is supposed to start BCG therapy next week), kidney stones s/p stent placement and removal in Feb 2020, HLD, Presents with shortness of breath, fevers, dry cough and watery diarrhea. Pt went to [facility]"" ED yesterday and tested positive COVID 19, was d/c'd with Zpak. States yesterday after being discharged with worsening symptoms.        +COVID- Plaquenil started 4/5    CXR - Bilateral mid lung peripheral hazy opacities, which can represent infection in the correct clinical context.        Patient has been requiring NC 3L in the hospital, and has been recommended home O2.     Patient home physician I     s Dr. Vasquez, a pulmonologist who will be managing him outpatient.     -completed plaquenil 4/5-4/10, cs solumedrol 4/6-4/11. Anakinra 4/9-4/14    -he has improved inflammatory markers.         Patient has a history of Bladder CA s/p TURBT, supposed to start BCG next week.         Patient had diarrhea, which has resolved. Throbocytopenia has also resolved.     Patient will be discharged on Xarelto 10mg QD for 4 weeks for DVT ppx.             On 4/14, case was discussed with Dr. Lisker, patient is medically cleared and optimized for discharge today, to home with home O2. Patient received home O2 portable today, teach back was given regarding its use. Patient expressed understanding of home to use the home 02, All medications were reviewed with attending, and sent to mutually agreed upon pharmacy.

## 2020-04-07 NOTE — DISCHARGE NOTE PROVIDER - NSDCCPCAREPLAN_GEN_ALL_CORE_FT
PRINCIPAL DISCHARGE DIAGNOSIS  Diagnosis: Pneumonia due to 2019 novel coronavirus  Assessment and Plan of Treatment: You have been diagnosed with the COVID-19 virus during your hospital stay. You must self quarantine to complete a 14 day time period.  Monitor for fevers, shortness of breath and cough primarily.  Monitor your temperature daily to not any changes and increases.    It has been determined that you no longer need hospitalization and can recover while remaining in self-quarantine at home. You should follow the prevention steps below until a healthcare provider or local or state health department says you can return to your normal activities.  1. You should restrict activities outside your home, except for getting medical care.  2. Do not go to work, school, or public areas.  3. Avoid using public transportation, ride-sharing, or taxis.  4. Separate yourself from other people and animals in your home.  5. Call ahead before visiting your doctor.  6. Wear a facemask.  7. Cover your coughs and sneezes.  8. Clean your hands often.  9. Avoid sharing personal household items.  10. Clean all “high-touch” surfaces everyday.  11. Monitor your symptoms.  If you have a medical emergency and need to call 911, notify the dispatch personnel that you have COVID-19 If possible, put on a facemask before emergency medical services arrive.  12. Stopping home isolation.  Patients with confirmed COVID-19 should remain under home isolation precautions for 14 days since the positive COVID-19 test and until the risk of secondary transmission to others is thought to be low. The decision to discontinue home isolation precautions should be made on a case-by-case basis, in consultation with healthcare providers and state and local health departments. Your [facility] Department of Health can be reached at [phone] for further information about COVID-19.        SECONDARY DISCHARGE DIAGNOSES  Diagnosis: Hypoxia  Assessment and Plan of Treatment: PRINCIPAL DISCHARGE DIAGNOSIS  Diagnosis: Pneumonia due to 2019 novel coronavirus  Assessment and Plan of Treatment: You have been diagnosed with the COVID-19 virus during your hospital stay. You must self quarantine to complete a 14 day time period.  Monitor for fevers, shortness of breath and cough primarily.  Monitor your temperature daily to not any changes and increases.    It has been determined that you no longer need hospitalization and can recover while remaining in self-quarantine at home. You should follow the prevention steps below until a healthcare provider or local or state health department says you can return to your normal activities.  1. You should restrict activities outside your home, except for getting medical care.  2. Do not go to work, school, or public areas.  3. Avoid using public transportation, ride-sharing, or taxis.  4. Separate yourself from other people and animals in your home.  5. Call ahead before visiting your doctor.  6. Wear a facemask.  7. Cover your coughs and sneezes.  8. Clean your hands often.  9. Avoid sharing personal household items.  10. Clean all “high-touch” surfaces everyday.  11. Monitor your symptoms.  If you have a medical emergency and need to call 911, notify the dispatch personnel that you have COVID-19 If possible, put on a facemask before emergency medical services arrive.  12. Stopping home isolation.  Patients with confirmed COVID-19 should remain under home isolation precautions for 14 days since the positive COVID-19 test and until the risk of secondary transmission to others is thought to be low. The decision to discontinue home isolation precautions should be made on a case-by-case basis, in consultation with healthcare providers and state and local health departments. Your [facility] Department of Health can be reached at [phone] for further information about COVID-19.        SECONDARY DISCHARGE DIAGNOSES  Diagnosis: Bladder mass  Assessment and Plan of Treatment: continue with follow up with your oncologist/urologist regarding treatment planning and management. PRINCIPAL DISCHARGE DIAGNOSIS  Diagnosis: Pneumonia due to 2019 novel coronavirus  Assessment and Plan of Treatment: You have been diagnosed with the COVID-19 virus during your hospital stay. You must self quarantine to complete a 14 day time period.  Monitor for fevers, shortness of breath and cough primarily.  Monitor your temperature daily to not any changes and increases.    It has been determined that you no longer need hospitalization and can recover while remaining in self-quarantine at home. You should follow the prevention steps below until a healthcare provider or local or state health department says you can return to your normal activities.  1. You should restrict activities outside your home, except for getting medical care.  2. Do not go to work, school, or public areas.  3. Avoid using public transportation, ride-sharing, or taxis.  4. Separate yourself from other people and animals in your home.  5. Call ahead before visiting your doctor.  6. Wear a facemask.  7. Cover your coughs and sneezes.  8. Clean your hands often.  9. Avoid sharing personal household items.  10. Clean all “high-touch” surfaces everyday.  11. Monitor your symptoms.  If you have a medical emergency and need to call 911, notify the dispatch personnel that you have COVID-19 If possible, put on a facemask before emergency medical services arrive.  12. Stopping home isolation.  Patients with confirmed COVID-19 should remain under home isolation precautions for 14 days since the positive COVID-19 test and until the risk of secondary transmission to others is thought to be low. The decision to discontinue home isolation precautions should be made on a case-by-case basis, in consultation with healthcare providers and state and local health departments. Your [facility] Department of Health can be reached at [phone] for further information about COVID-19.        SECONDARY DISCHARGE DIAGNOSES  Diagnosis: Bladder mass  Assessment and Plan of Treatment: continue with follow up with your oncologist/urologist regarding treatment planning and management.

## 2020-04-07 NOTE — DISCHARGE NOTE PROVIDER - NSDCMRMEDTOKEN_GEN_ALL_CORE_FT
acetaminophen 325 mg oral tablet: 2 tab(s) orally every 6 hours, As needed, Temp greater or equal to 38C (100.4F), Mild Pain (1 - 3)  Azithromycin 5 Day Dose Pack 250 mg oral tablet: 250 milligram(s) orally once a day As prescribed acetaminophen 500 mg oral tablet: 500 milligram(s) orally every 6 hours, As Needed -for fever - for moderate pain   albuterol 90 mcg/inh inhalation aerosol: 2 puff(s) inhaled every 6 hours  guaiFENesin 100 mg/5 mL oral liquid: 10 milliliter(s) orally every 6 hours  ipratropium CFC free 17 mcg/inh inhalation aerosol: 1 puff(s) inhaled every 6 hours  rivaroxaban 10 mg oral tablet: 1 tab(s) orally once a day for four weeks for DVT (blood clot) prevention

## 2020-04-07 NOTE — PROGRESS NOTE ADULT - PROBLEM SELECTOR PLAN 1
COVID19 PCR result: positive on 4/2/20  Acute Hypoxic Respiratory Failure:  - currently on NRB . Wean off oxygen as tolerated  - Maintain prone positioning while awake and asleep as tolerated  - Low clinical suspicion for bacteremia, hold off on sending blood cultures  - Avoid HFNC, Nebulized treatments, and NIPPV with BIPAP/CPAP.  - Albuterol HFA and/or Atrovent q6 ATC  - Avoid NSAIDs. Use Tylenol PRN for fever, pain instead  - For hypoxia: trial of Plaquenil 400mg BID x 2 doses then 200 mg BID x 4 days. - Avoid Plaquenil if QTC prolonged. Avoid Azithromycin due to adverse effects in combination with Plaquenil  - on solumedrol 1-1.5mg/kg BID x 5 days given increasing O2 requirements  - Guaifenesin ATC x 2-3 days for cough, can add tessalon perles PRN, chest PT, incentive spirometry  - Trend CRP and Ferritin to repeat q 48-72 hrs : ferritin rising   - ICU consult for early intubation if worsening hypoxia on NRB, increased work of breathing and inability to maintain airway

## 2020-04-07 NOTE — DISCHARGE NOTE PROVIDER - PROVIDER TOKENS
PROVIDER:[TOKEN:[[id number]],FOLLOWUP:[2 weeks],ESTABLISHEDPATIENT:[T]] PROVIDER:[TOKEN:[[id number]],FOLLOWUP:[1 week],ESTABLISHEDPATIENT:[T]]

## 2020-04-08 LAB
ALBUMIN SERPL ELPH-MCNC: 3.6 G/DL — SIGNIFICANT CHANGE UP (ref 3.3–5)
ALP SERPL-CCNC: 106 U/L — SIGNIFICANT CHANGE UP (ref 40–120)
ALT FLD-CCNC: 452 U/L — HIGH (ref 4–41)
ANION GAP SERPL CALC-SCNC: 15 MMO/L — HIGH (ref 7–14)
AST SERPL-CCNC: 357 U/L — HIGH (ref 4–40)
BILIRUB SERPL-MCNC: 0.5 MG/DL — SIGNIFICANT CHANGE UP (ref 0.2–1.2)
BUN SERPL-MCNC: 28 MG/DL — HIGH (ref 7–23)
CALCIUM SERPL-MCNC: 9.7 MG/DL — SIGNIFICANT CHANGE UP (ref 8.4–10.5)
CHLORIDE SERPL-SCNC: 108 MMOL/L — HIGH (ref 98–107)
CO2 SERPL-SCNC: 22 MMOL/L — SIGNIFICANT CHANGE UP (ref 22–31)
CREAT SERPL-MCNC: 0.99 MG/DL — SIGNIFICANT CHANGE UP (ref 0.5–1.3)
CRP SERPL-MCNC: 49.4 MG/L — HIGH
FERRITIN SERPL-MCNC: 5980 NG/ML — HIGH (ref 30–400)
GLUCOSE SERPL-MCNC: 164 MG/DL — HIGH (ref 70–99)
POTASSIUM SERPL-MCNC: 4 MMOL/L — SIGNIFICANT CHANGE UP (ref 3.5–5.3)
POTASSIUM SERPL-SCNC: 4 MMOL/L — SIGNIFICANT CHANGE UP (ref 3.5–5.3)
PROT SERPL-MCNC: 7.3 G/DL — SIGNIFICANT CHANGE UP (ref 6–8.3)
SODIUM SERPL-SCNC: 145 MMOL/L — SIGNIFICANT CHANGE UP (ref 135–145)

## 2020-04-08 PROCEDURE — 99233 SBSQ HOSP IP/OBS HIGH 50: CPT

## 2020-04-08 RX ORDER — LANOLIN ALCOHOL/MO/W.PET/CERES
6 CREAM (GRAM) TOPICAL AT BEDTIME
Refills: 0 | Status: COMPLETED | OUTPATIENT
Start: 2020-04-08 | End: 2020-04-09

## 2020-04-08 RX ORDER — LANOLIN ALCOHOL/MO/W.PET/CERES
6 CREAM (GRAM) TOPICAL ONCE
Refills: 0 | Status: COMPLETED | OUTPATIENT
Start: 2020-04-08 | End: 2020-04-08

## 2020-04-08 RX ADMIN — Medication 1 PUFF(S): at 13:22

## 2020-04-08 RX ADMIN — Medication 25 MILLIGRAM(S): at 00:23

## 2020-04-08 RX ADMIN — ALBUTEROL 2 PUFF(S): 90 AEROSOL, METERED ORAL at 13:22

## 2020-04-08 RX ADMIN — ALBUTEROL 2 PUFF(S): 90 AEROSOL, METERED ORAL at 06:49

## 2020-04-08 RX ADMIN — Medication 1 PUFF(S): at 19:57

## 2020-04-08 RX ADMIN — Medication 70 MILLIGRAM(S): at 16:49

## 2020-04-08 RX ADMIN — Medication 200 MILLIGRAM(S): at 00:22

## 2020-04-08 RX ADMIN — ENOXAPARIN SODIUM 40 MILLIGRAM(S): 100 INJECTION SUBCUTANEOUS at 12:48

## 2020-04-08 RX ADMIN — Medication 6 MILLIGRAM(S): at 01:55

## 2020-04-08 RX ADMIN — ALBUTEROL 2 PUFF(S): 90 AEROSOL, METERED ORAL at 12:50

## 2020-04-08 RX ADMIN — ALBUTEROL 2 PUFF(S): 90 AEROSOL, METERED ORAL at 00:28

## 2020-04-08 RX ADMIN — Medication 200 MILLIGRAM(S): at 12:49

## 2020-04-08 RX ADMIN — Medication 70 MILLIGRAM(S): at 06:48

## 2020-04-08 RX ADMIN — Medication 1 PUFF(S): at 00:28

## 2020-04-08 RX ADMIN — Medication 200 MILLIGRAM(S): at 19:57

## 2020-04-08 RX ADMIN — ALBUTEROL 2 PUFF(S): 90 AEROSOL, METERED ORAL at 19:57

## 2020-04-08 RX ADMIN — Medication 200 MILLIGRAM(S): at 06:49

## 2020-04-08 RX ADMIN — Medication 1 PUFF(S): at 06:49

## 2020-04-08 RX ADMIN — Medication 200 MILLIGRAM(S): at 13:43

## 2020-04-08 NOTE — PROGRESS NOTE ADULT - SUBJECTIVE AND OBJECTIVE BOX
Patient is a 49y old  Male who presents with a chief complaint of Shortness of Breath (07 Apr 2020 12:43)      SUBJECTIVE / OVERNIGHT EVENTS: No acute events overnight.  Patient still on NRB saturating > 92% and notes that hes feeling a little better       MEDICATIONS  (STANDING):  ALBUTerol    90 MICROgram(s) HFA Inhaler 2 Puff(s) Inhalation every 6 hours  enoxaparin Injectable 40 milliGRAM(s) SubCutaneous daily  guaiFENesin   Syrup  (Sugar-Free) 200 milliGRAM(s) Oral every 6 hours  hydroxychloroquine 200 milliGRAM(s) Oral every 12 hours  hydroxychloroquine   Oral   ipratropium 17 MICROgram(s) HFA Inhaler 1 Puff(s) Inhalation every 6 hours  methylPREDNISolone sodium succinate Injectable 70 milliGRAM(s) IV Push two times a day    MEDICATIONS  (PRN):  acetaminophen   Tablet .. 650 milliGRAM(s) Oral every 6 hours PRN Temp greater or equal to 38C (100.4F), Mild Pain (1 - 3)  hydrOXYzine hydrochloride 25 milliGRAM(s) Oral two times a day PRN Anxiety  ondansetron Injectable 4 milliGRAM(s) IV Push every 8 hours PRN Nausea and/or Vomiting      T(C): 36.4 (04-08-20 @ 06:49), Max: 36.5 (04-08-20 @ 01:55)  HR: 84 (04-08-20 @ 06:49) (84 - 90)  BP: 117/84 (04-08-20 @ 06:49) (117/84 - 146/94)  RR: 18 (04-08-20 @ 06:49) (18 - 23)  SpO2: 94% (04-08-20 @ 06:49) (87% - 96%)  CAPILLARY BLOOD GLUCOSE        I&O's Summary    07 Apr 2020 07:01  -  08 Apr 2020 07:00  --------------------------------------------------------  IN: 180 mL / OUT: 480 mL / NET: -300 mL        PHYSICAL EXAM:  GENERAL: not in distress, on non- rebreather   EYES: open, sclera clear b/l  CHEST/LUNG: normal respiratory effort, not tachypneic, speaking in full sentences without difficulty  HEART: Not tachycardic, no lower extremity edema b/l  ABDOMEN: Soft, Nontender, Nondistended  EXTREMITIES: Warm and well perfused  NEUROLOGY: awake, alert, responds to Qs appropriately, no gross deficits  PSYCH: calm, cooperative  SKIN: No visible rashes or lesions    LABS:                        15.5   7.67  )-----------( 168      ( 07 Apr 2020 08:50 )             46.8     04-08    145  |  108<H>  |  28<H>  ----------------------------<  164<H>  4.0   |  22  |  0.99    Ca    9.7      08 Apr 2020 08:25  Phos  3.9     04-07  Mg     2.9     04-07    TPro  7.3  /  Alb  3.6  /  TBili  0.5  /  DBili  x   /  AST  357<H>  /  ALT  452<H>  /  AlkPhos  106  04-08              RADIOLOGY & ADDITIONAL TESTS:

## 2020-04-08 NOTE — PROGRESS NOTE ADULT - ASSESSMENT
50 yo Male. PMHx bladder CA s/p TURBT (Dec 2018) (plans to start BCG therapy next week), kidney stones s/p stent placement and removal in Feb 2020, HLD, p/w SOB, fevers, dry cough and watery diarrhea. Tested positive COVID 19 in [facility] ED, was d/c'd with Zpak. After being discharged, had worsening symptoms.

## 2020-04-08 NOTE — PROGRESS NOTE ADULT - PROBLEM SELECTOR PLAN 1
COVID19 PCR result: positive on 4/2/20  Acute Hypoxic Respiratory Failure:  - currently on NRB . Wean off oxygen as tolerated  - Maintain prone positioning while awake and asleep as tolerated  - Low clinical suspicion for bacteremia, hold off on sending blood cultures  - Avoid HFNC, Nebulized treatments, and NIPPV with BIPAP/CPAP.  - Albuterol HFA and/or Atrovent q6 ATC  - Avoid NSAIDs. Use Tylenol PRN for fever, pain instead  - For hypoxia: trial of Plaquenil 400mg BID x 2 doses then 200 mg BID x 4 days. - Avoid Plaquenil if QTC prolonged. Avoid Azithromycin due to adverse effects in combination with Plaquenil  - on solumedrol 1-1.5mg/kg BID x 5 days given increasing O2 requirements  - Guaifenesin ATC x 2-3 days for cough, can add tessalon perles PRN, chest PT, incentive spirometry  - Trend CRP and Ferritin to repeat q 48-72 hrs : ferritin rising   - ICU consult for early intubation if worsening hypoxia on NRB, increased work of breathing and inability to maintain airway  - will discuss whether patient is a candidate for Anakinra (in light of rising Ferritin) or Sarilumab

## 2020-04-09 LAB
CULTURE RESULTS: SIGNIFICANT CHANGE UP
SPECIMEN SOURCE: SIGNIFICANT CHANGE UP

## 2020-04-09 PROCEDURE — 99233 SBSQ HOSP IP/OBS HIGH 50: CPT

## 2020-04-09 RX ORDER — LANOLIN ALCOHOL/MO/W.PET/CERES
3 CREAM (GRAM) TOPICAL AT BEDTIME
Refills: 0 | Status: DISCONTINUED | OUTPATIENT
Start: 2020-04-09 | End: 2020-04-14

## 2020-04-09 RX ORDER — ANAKINRA 100MG/0.67
100 SYRINGE (ML) SUBCUTANEOUS EVERY 6 HOURS
Refills: 0 | Status: COMPLETED | OUTPATIENT
Start: 2020-04-09 | End: 2020-04-12

## 2020-04-09 RX ORDER — ENOXAPARIN SODIUM 100 MG/ML
40 INJECTION SUBCUTANEOUS EVERY 12 HOURS
Refills: 0 | Status: DISCONTINUED | OUTPATIENT
Start: 2020-04-09 | End: 2020-04-14

## 2020-04-09 RX ORDER — ENOXAPARIN SODIUM 100 MG/ML
95 INJECTION SUBCUTANEOUS EVERY 12 HOURS
Refills: 0 | Status: DISCONTINUED | OUTPATIENT
Start: 2020-04-09 | End: 2020-04-09

## 2020-04-09 RX ORDER — ANAKINRA 100MG/0.67
94 SYRINGE (ML) SUBCUTANEOUS
Refills: 0 | Status: DISCONTINUED | OUTPATIENT
Start: 2020-04-09 | End: 2020-04-09

## 2020-04-09 RX ADMIN — Medication 200 MILLIGRAM(S): at 00:15

## 2020-04-09 RX ADMIN — ENOXAPARIN SODIUM 40 MILLIGRAM(S): 100 INJECTION SUBCUTANEOUS at 18:11

## 2020-04-09 RX ADMIN — Medication 200 MILLIGRAM(S): at 18:11

## 2020-04-09 RX ADMIN — Medication 1 PUFF(S): at 14:42

## 2020-04-09 RX ADMIN — Medication 200 MILLIGRAM(S): at 10:55

## 2020-04-09 RX ADMIN — Medication 6 MILLIGRAM(S): at 00:15

## 2020-04-09 RX ADMIN — Medication 1 PUFF(S): at 08:02

## 2020-04-09 RX ADMIN — Medication 200 MILLIGRAM(S): at 06:30

## 2020-04-09 RX ADMIN — Medication 70 MILLIGRAM(S): at 18:11

## 2020-04-09 RX ADMIN — Medication 100 MILLIGRAM(S): at 12:27

## 2020-04-09 RX ADMIN — Medication 200 MILLIGRAM(S): at 10:56

## 2020-04-09 RX ADMIN — Medication 25 MILLIGRAM(S): at 00:15

## 2020-04-09 RX ADMIN — Medication 70 MILLIGRAM(S): at 06:30

## 2020-04-09 RX ADMIN — ALBUTEROL 2 PUFF(S): 90 AEROSOL, METERED ORAL at 08:01

## 2020-04-09 RX ADMIN — ALBUTEROL 2 PUFF(S): 90 AEROSOL, METERED ORAL at 20:37

## 2020-04-09 RX ADMIN — ALBUTEROL 2 PUFF(S): 90 AEROSOL, METERED ORAL at 02:08

## 2020-04-09 RX ADMIN — Medication 100 MILLIGRAM(S): at 18:11

## 2020-04-09 RX ADMIN — Medication 1 PUFF(S): at 02:08

## 2020-04-09 RX ADMIN — Medication 1 PUFF(S): at 20:37

## 2020-04-09 RX ADMIN — ALBUTEROL 2 PUFF(S): 90 AEROSOL, METERED ORAL at 14:42

## 2020-04-09 NOTE — PROGRESS NOTE ADULT - SUBJECTIVE AND OBJECTIVE BOX
Patient is a 49y old  Male who presents with a chief complaint of Shortness of Breath (08 Apr 2020 12:37)      SUBJECTIVE / OVERNIGHT EVENTS: No acute events overnight. Reports improvement in SOB but still with cough and having difficulty sleeping. States does well with melatonin    MEDICATIONS  (STANDING):  ALBUTerol    90 MICROgram(s) HFA Inhaler 2 Puff(s) Inhalation every 6 hours  anakinra Injectable 100 milliGRAM(s) SubCutaneous every 6 hours  enoxaparin Injectable 40 milliGRAM(s) SubCutaneous every 12 hours  guaiFENesin   Syrup  (Sugar-Free) 200 milliGRAM(s) Oral every 6 hours  hydroxychloroquine 200 milliGRAM(s) Oral every 12 hours  hydroxychloroquine   Oral   ipratropium 17 MICROgram(s) HFA Inhaler 1 Puff(s) Inhalation every 6 hours  melatonin 3 milliGRAM(s) Oral at bedtime  methylPREDNISolone sodium succinate Injectable 70 milliGRAM(s) IV Push two times a day    MEDICATIONS  (PRN):  acetaminophen   Tablet .. 650 milliGRAM(s) Oral every 6 hours PRN Temp greater or equal to 38C (100.4F), Mild Pain (1 - 3)  hydrOXYzine hydrochloride 25 milliGRAM(s) Oral two times a day PRN Anxiety  ondansetron Injectable 4 milliGRAM(s) IV Push every 8 hours PRN Nausea and/or Vomiting      T(C): 36.3 (04-09-20 @ 12:10), Max: 36.7 (04-08-20 @ 21:40)  HR: 99 (04-09-20 @ 12:10) (73 - 99)  BP: 126/79 (04-09-20 @ 12:10) (114/72 - 126/79)  RR: 20 (04-09-20 @ 05:44) (20 - 28)  SpO2: 95% (04-09-20 @ 12:10) (95% - 98%)  CAPILLARY BLOOD GLUCOSE        I&O's Summary      PHYSICAL EXAM:  GENERAL: not in distress, on NRB  EYES: open, sclera clear b/l  CHEST/LUNG: normal respiratory effort, speaking in full sentences without difficulty  HEART: no lower extremity edema b/l  ABDOMEN: Soft, Nontender, Nondistended  EXTREMITIES: Warm and well perfused  NEUROLOGY: awake, alert, responds to Qs appropriately, no gross deficits  PSYCH: appears anxious  SKIN: No visible rashes or lesions    LABS:    04-08    145  |  108<H>  |  28<H>  ----------------------------<  164<H>  4.0   |  22  |  0.99    Ca    9.7      08 Apr 2020 08:25    TPro  7.3  /  Alb  3.6  /  TBili  0.5  /  DBili  x   /  AST  357<H>  /  ALT  452<H>  /  AlkPhos  106  04-08              RADIOLOGY & ADDITIONAL TESTS:

## 2020-04-09 NOTE — PROGRESS NOTE ADULT - PROBLEM SELECTOR PLAN 1
COVID19 PCR result: positive on 4/2/20  Acute Hypoxic Respiratory Failure on NRB. Wean off oxygen as tolerated  - Maintain prone positioning while awake and asleep as tolerated  - Avoid HFNC, Nebulized treatments, and NIPPV with BIPAP/CPAP.  - Albuterol HFA and/or Atrovent q6 ATC  - Avoid NSAIDs. Use Tylenol PRN for fever, pain instead  - on Plaquenil 400mg BID x 2 doses then 200 mg BID x 4 days (4/5-). - Avoid Plaquenil if QTC prolonged. Avoid Azithromycin due to adverse effects in combination with Plaquenil  - on solumedrol (4/6-)  - Guaifenesin ATC x 2-3 days for cough, can add tessalon perles PRN, chest PT, incentive spirometry  - Trend CRP and Ferritin to repeat q 48-72 hrs : ferritin rising, repeat Ddimer  - start Anakinra 100 SC q6 x3 days  - ICU consult for early intubation if worsening hypoxia on NRB, increased work of breathing and inability to maintain airway

## 2020-04-09 NOTE — PHARMACOTHERAPY INTERVENTION NOTE - COMMENTS
Anakinra ordered as 95 mg q12hrs x 3 days. S/w prescriber, recommended 100 mg q6hrs for 3 days per protocol.      Leighann Gomez, Pharm.D.  Clinical Pharmacy Coordinator  Spectra: [phone]

## 2020-04-10 DIAGNOSIS — R19.7 DIARRHEA, UNSPECIFIED: ICD-10-CM

## 2020-04-10 LAB
ANION GAP SERPL CALC-SCNC: 12 MMO/L — SIGNIFICANT CHANGE UP (ref 7–14)
BUN SERPL-MCNC: 25 MG/DL — HIGH (ref 7–23)
C DIFF TOX GENS STL QL NAA+PROBE: SIGNIFICANT CHANGE UP
CALCIUM SERPL-MCNC: 9.3 MG/DL — SIGNIFICANT CHANGE UP (ref 8.4–10.5)
CHLORIDE SERPL-SCNC: 107 MMOL/L — SIGNIFICANT CHANGE UP (ref 98–107)
CO2 SERPL-SCNC: 24 MMOL/L — SIGNIFICANT CHANGE UP (ref 22–31)
CREAT SERPL-MCNC: 0.82 MG/DL — SIGNIFICANT CHANGE UP (ref 0.5–1.3)
CRP SERPL-MCNC: 11.6 MG/L — HIGH
CULTURE RESULTS: SIGNIFICANT CHANGE UP
D DIMER BLD IA.RAPID-MCNC: 286 NG/ML — SIGNIFICANT CHANGE UP
FERRITIN SERPL-MCNC: 1475 NG/ML — HIGH (ref 30–400)
GLUCOSE SERPL-MCNC: 160 MG/DL — HIGH (ref 70–99)
HCT VFR BLD CALC: 40.6 % — SIGNIFICANT CHANGE UP (ref 39–50)
HGB BLD-MCNC: 13.7 G/DL — SIGNIFICANT CHANGE UP (ref 13–17)
LDH SERPL L TO P-CCNC: 351 U/L — HIGH (ref 135–225)
MAGNESIUM SERPL-MCNC: 3 MG/DL — HIGH (ref 1.6–2.6)
MCHC RBC-ENTMCNC: 31.2 PG — SIGNIFICANT CHANGE UP (ref 27–34)
MCHC RBC-ENTMCNC: 33.7 % — SIGNIFICANT CHANGE UP (ref 32–36)
MCV RBC AUTO: 92.5 FL — SIGNIFICANT CHANGE UP (ref 80–100)
NRBC # FLD: 0 K/UL — SIGNIFICANT CHANGE UP (ref 0–0)
PHOSPHATE SERPL-MCNC: 3.5 MG/DL — SIGNIFICANT CHANGE UP (ref 2.5–4.5)
PLATELET # BLD AUTO: 210 K/UL — SIGNIFICANT CHANGE UP (ref 150–400)
PMV BLD: 12.9 FL — SIGNIFICANT CHANGE UP (ref 7–13)
POTASSIUM SERPL-MCNC: 4.1 MMOL/L — SIGNIFICANT CHANGE UP (ref 3.5–5.3)
POTASSIUM SERPL-SCNC: 4.1 MMOL/L — SIGNIFICANT CHANGE UP (ref 3.5–5.3)
PROCALCITONIN SERPL-MCNC: 0.07 NG/ML — SIGNIFICANT CHANGE UP (ref 0.02–0.1)
RBC # BLD: 4.39 M/UL — SIGNIFICANT CHANGE UP (ref 4.2–5.8)
RBC # FLD: 13.2 % — SIGNIFICANT CHANGE UP (ref 10.3–14.5)
SODIUM SERPL-SCNC: 143 MMOL/L — SIGNIFICANT CHANGE UP (ref 135–145)
SPECIMEN SOURCE: SIGNIFICANT CHANGE UP
WBC # BLD: 11.21 K/UL — HIGH (ref 3.8–10.5)
WBC # FLD AUTO: 11.21 K/UL — HIGH (ref 3.8–10.5)

## 2020-04-10 PROCEDURE — 99233 SBSQ HOSP IP/OBS HIGH 50: CPT

## 2020-04-10 RX ORDER — LOPERAMIDE HCL 2 MG
2 TABLET ORAL
Refills: 0 | Status: DISCONTINUED | OUTPATIENT
Start: 2020-04-10 | End: 2020-04-14

## 2020-04-10 RX ADMIN — Medication 70 MILLIGRAM(S): at 17:36

## 2020-04-10 RX ADMIN — Medication 3 MILLIGRAM(S): at 22:06

## 2020-04-10 RX ADMIN — Medication 200 MILLIGRAM(S): at 17:36

## 2020-04-10 RX ADMIN — Medication 1 PUFF(S): at 02:03

## 2020-04-10 RX ADMIN — ALBUTEROL 2 PUFF(S): 90 AEROSOL, METERED ORAL at 07:21

## 2020-04-10 RX ADMIN — Medication 1 PUFF(S): at 13:25

## 2020-04-10 RX ADMIN — ENOXAPARIN SODIUM 40 MILLIGRAM(S): 100 INJECTION SUBCUTANEOUS at 17:36

## 2020-04-10 RX ADMIN — Medication 1 PUFF(S): at 07:21

## 2020-04-10 RX ADMIN — Medication 200 MILLIGRAM(S): at 06:43

## 2020-04-10 RX ADMIN — Medication 100 MILLIGRAM(S): at 06:43

## 2020-04-10 RX ADMIN — Medication 70 MILLIGRAM(S): at 06:42

## 2020-04-10 RX ADMIN — ALBUTEROL 2 PUFF(S): 90 AEROSOL, METERED ORAL at 22:08

## 2020-04-10 RX ADMIN — Medication 100 MILLIGRAM(S): at 17:37

## 2020-04-10 RX ADMIN — Medication 200 MILLIGRAM(S): at 00:15

## 2020-04-10 RX ADMIN — ENOXAPARIN SODIUM 40 MILLIGRAM(S): 100 INJECTION SUBCUTANEOUS at 06:43

## 2020-04-10 RX ADMIN — Medication 100 MILLIGRAM(S): at 13:25

## 2020-04-10 RX ADMIN — Medication 25 MILLIGRAM(S): at 00:15

## 2020-04-10 RX ADMIN — Medication 200 MILLIGRAM(S): at 13:25

## 2020-04-10 RX ADMIN — ALBUTEROL 2 PUFF(S): 90 AEROSOL, METERED ORAL at 13:25

## 2020-04-10 RX ADMIN — Medication 3 MILLIGRAM(S): at 00:15

## 2020-04-10 RX ADMIN — Medication 100 MILLIGRAM(S): at 00:15

## 2020-04-10 RX ADMIN — ALBUTEROL 2 PUFF(S): 90 AEROSOL, METERED ORAL at 02:03

## 2020-04-10 RX ADMIN — Medication 1 PUFF(S): at 22:07

## 2020-04-10 RX ADMIN — Medication 100 MILLIGRAM(S): at 22:12

## 2020-04-10 NOTE — PROGRESS NOTE ADULT - PROBLEM SELECTOR PLAN 1
COVID19 PCR result: positive on 4/2/20  Acute Hypoxic Respiratory Failure on NRB. Wean off oxygen as tolerated  - Maintain prone positioning while awake and asleep as tolerated  - Avoid HFNC, Nebulized treatments, and NIPPV with BIPAP/CPAP.  - Albuterol HFA and/or Atrovent q6 ATC  - Avoid NSAIDs. Use Tylenol PRN for fever, pain instead  - Completed Plaquenil (4/5-4/10), c/w solumedrol (4/6-4/11)  - Guaifenesin ATC x 2-3 days for cough, can add tessalon perles PRN, chest PT, incentive spirometry  - Trend CRP and Ferritin to repeat q 48-72 hrs : ferritin rising, repeat Ddimer  - started Anakinra 100 SC q6 x3 days (4/9-)  - ICU consult for early intubation if worsening hypoxia on NRB, increased work of breathing and inability to maintain airway COVID19 PCR result: positive on 4/2/20  Acute Hypoxic Respiratory Failure on NRB. Wean off oxygen as tolerated  - Maintain prone positioning while awake and asleep as tolerated  - Avoid HFNC, Nebulized treatments, and NIPPV with BIPAP/CPAP.  - Albuterol HFA and/or Atrovent q6 ATC  - Avoid NSAIDs. Use Tylenol PRN for fever, pain instead  - Completed Plaquenil (4/5-4/10), c/w solumedrol (4/6-4/11)  - Guaifenesin ATC x 2-3 days for cough, can add tessalon perles PRN, chest PT, incentive spirometry  - Trend CRP and Ferritin to repeat q 48-72 hrs : ferritin rising, rpt Ddimer: 286  - started Anakinra 100 SC q6 x3 days (4/9-)  - ICU consult for early intubation if worsening hypoxia on NRB, increased work of breathing and inability to maintain airway

## 2020-04-10 NOTE — PROGRESS NOTE ADULT - SUBJECTIVE AND OBJECTIVE BOX
INCOMPLETE Patient is a 49y old  Male who presents with a chief complaint of Shortness of Breath (10 Apr 2020 08:53)      SUBJECTIVE / OVERNIGHT EVENTS: No acute events overnight. Reports feeling better. SOB improving. Slept well with melatonin    MEDICATIONS  (STANDING):  ALBUTerol    90 MICROgram(s) HFA Inhaler 2 Puff(s) Inhalation every 6 hours  anakinra Injectable 100 milliGRAM(s) SubCutaneous every 6 hours  enoxaparin Injectable 40 milliGRAM(s) SubCutaneous every 12 hours  guaiFENesin   Syrup  (Sugar-Free) 200 milliGRAM(s) Oral every 6 hours  hydrocodone/homatropine Syrup 5 milliLiter(s) Oral at bedtime  ipratropium 17 MICROgram(s) HFA Inhaler 1 Puff(s) Inhalation every 6 hours  melatonin 3 milliGRAM(s) Oral at bedtime  methylPREDNISolone sodium succinate Injectable 70 milliGRAM(s) IV Push two times a day    MEDICATIONS  (PRN):  acetaminophen   Tablet .. 650 milliGRAM(s) Oral every 6 hours PRN Temp greater or equal to 38C (100.4F), Mild Pain (1 - 3)  hydrOXYzine hydrochloride 25 milliGRAM(s) Oral two times a day PRN Anxiety  loperamide 2 milliGRAM(s) Oral two times a day PRN Diarrhea  ondansetron Injectable 4 milliGRAM(s) IV Push every 8 hours PRN Nausea and/or Vomiting      T(C): 36.7 (04-10-20 @ 05:16), Max: 36.7 (04-10-20 @ 05:16)  HR: 68 (04-10-20 @ 05:16) (68 - 86)  BP: 123/74 (04-10-20 @ 05:16) (103/60 - 150/-)  RR: 20 (04-10-20 @ 05:16) (20 - 20)  SpO2: 99% (04-10-20 @ 05:16) (97% - 99%)  CAPILLARY BLOOD GLUCOSE        I&O's Summary      PHYSICAL EXAM:  GENERAL: not in distress, on NRB  EYES: open, sclera clear b/l  CHEST/LUNG: normal respiratory effort, speaking in full sentences without difficulty  HEART: no lower extremity edema b/l  ABDOMEN: Soft, Nontender, Nondistended  EXTREMITIES: Warm and well perfused  NEUROLOGY: awake, alert, responds to Qs appropriately, no gross deficits  PSYCH: calm  SKIN: No visible rashes or lesions    LABS:                        13.7   11.21 )-----------( 210      ( 10 Apr 2020 07:20 )             40.6     04-10    143  |  107  |  25<H>  ----------------------------<  160<H>  4.1   |  24  |  0.82    Ca    9.3      10 Apr 2020 07:20  Phos  3.5     04-10  Mg     3.0     04-10                GI PCR Panel, Stool (collected 04-09-20 @ 12:40)  Source: .Stool Feces  Final Report (04-09-20 @ 18:49):    GI PCR Results: NOT detected    *******Please Note:*******    GI panel PCR evaluates for:    Campylobacter, Plesiomonas shigelloides, Salmonella,    Vibrio, Yersinia enterocolitica, Enteroaggregative    Escherichia coli (EAEC), Enteropathogenic E.coli (EPEC),    Enterotoxigenic E. coli (ETEC) lt/st, Shiga-like    toxin-producing E. coli (STEC) stx1/stx2,    Shigella/ Enteroinvasive E. coli (EIEC), Cryptosporidium,    Cyclospora cayetanensis, Entamoeba histolytica,    Giardia lamblia, Adenovirus F 40/41, Astrovirus,    Norovirus GI/GII, Rotavirus A, Sapovirus      RADIOLOGY & ADDITIONAL TESTS:

## 2020-04-11 LAB
ALBUMIN SERPL ELPH-MCNC: 3.6 G/DL — SIGNIFICANT CHANGE UP (ref 3.3–5)
ALP SERPL-CCNC: 76 U/L — SIGNIFICANT CHANGE UP (ref 40–120)
ALT FLD-CCNC: 299 U/L — HIGH (ref 4–41)
ANION GAP SERPL CALC-SCNC: 13 MMO/L — SIGNIFICANT CHANGE UP (ref 7–14)
AST SERPL-CCNC: 77 U/L — HIGH (ref 4–40)
BILIRUB SERPL-MCNC: 0.6 MG/DL — SIGNIFICANT CHANGE UP (ref 0.2–1.2)
BUN SERPL-MCNC: 22 MG/DL — SIGNIFICANT CHANGE UP (ref 7–23)
CALCIUM SERPL-MCNC: 9.3 MG/DL — SIGNIFICANT CHANGE UP (ref 8.4–10.5)
CHLORIDE SERPL-SCNC: 102 MMOL/L — SIGNIFICANT CHANGE UP (ref 98–107)
CO2 SERPL-SCNC: 24 MMOL/L — SIGNIFICANT CHANGE UP (ref 22–31)
CREAT SERPL-MCNC: 0.8 MG/DL — SIGNIFICANT CHANGE UP (ref 0.5–1.3)
GLUCOSE SERPL-MCNC: 157 MG/DL — HIGH (ref 70–99)
HCT VFR BLD CALC: 41 % — SIGNIFICANT CHANGE UP (ref 39–50)
HGB BLD-MCNC: 13.6 G/DL — SIGNIFICANT CHANGE UP (ref 13–17)
MAGNESIUM SERPL-MCNC: 2.7 MG/DL — HIGH (ref 1.6–2.6)
MCHC RBC-ENTMCNC: 30.3 PG — SIGNIFICANT CHANGE UP (ref 27–34)
MCHC RBC-ENTMCNC: 33.2 % — SIGNIFICANT CHANGE UP (ref 32–36)
MCV RBC AUTO: 91.3 FL — SIGNIFICANT CHANGE UP (ref 80–100)
NRBC # FLD: 0 K/UL — SIGNIFICANT CHANGE UP (ref 0–0)
PHOSPHATE SERPL-MCNC: 3 MG/DL — SIGNIFICANT CHANGE UP (ref 2.5–4.5)
PLATELET # BLD AUTO: 210 K/UL — SIGNIFICANT CHANGE UP (ref 150–400)
PMV BLD: 12.3 FL — SIGNIFICANT CHANGE UP (ref 7–13)
POTASSIUM SERPL-MCNC: 4.1 MMOL/L — SIGNIFICANT CHANGE UP (ref 3.5–5.3)
POTASSIUM SERPL-SCNC: 4.1 MMOL/L — SIGNIFICANT CHANGE UP (ref 3.5–5.3)
PROT SERPL-MCNC: 6.6 G/DL — SIGNIFICANT CHANGE UP (ref 6–8.3)
RBC # BLD: 4.49 M/UL — SIGNIFICANT CHANGE UP (ref 4.2–5.8)
RBC # FLD: 12.9 % — SIGNIFICANT CHANGE UP (ref 10.3–14.5)
SODIUM SERPL-SCNC: 139 MMOL/L — SIGNIFICANT CHANGE UP (ref 135–145)
WBC # BLD: 11.35 K/UL — HIGH (ref 3.8–10.5)
WBC # FLD AUTO: 11.35 K/UL — HIGH (ref 3.8–10.5)

## 2020-04-11 PROCEDURE — 99233 SBSQ HOSP IP/OBS HIGH 50: CPT

## 2020-04-11 RX ADMIN — Medication 200 MILLIGRAM(S): at 22:16

## 2020-04-11 RX ADMIN — ALBUTEROL 2 PUFF(S): 90 AEROSOL, METERED ORAL at 15:55

## 2020-04-11 RX ADMIN — ENOXAPARIN SODIUM 40 MILLIGRAM(S): 100 INJECTION SUBCUTANEOUS at 17:40

## 2020-04-11 RX ADMIN — Medication 100 MILLIGRAM(S): at 22:16

## 2020-04-11 RX ADMIN — Medication 200 MILLIGRAM(S): at 05:58

## 2020-04-11 RX ADMIN — Medication 200 MILLIGRAM(S): at 11:57

## 2020-04-11 RX ADMIN — Medication 100 MILLIGRAM(S): at 17:40

## 2020-04-11 RX ADMIN — Medication 200 MILLIGRAM(S): at 17:41

## 2020-04-11 RX ADMIN — ALBUTEROL 2 PUFF(S): 90 AEROSOL, METERED ORAL at 22:16

## 2020-04-11 RX ADMIN — Medication 1 PUFF(S): at 22:17

## 2020-04-11 RX ADMIN — ENOXAPARIN SODIUM 40 MILLIGRAM(S): 100 INJECTION SUBCUTANEOUS at 05:58

## 2020-04-11 RX ADMIN — Medication 1 PUFF(S): at 07:56

## 2020-04-11 RX ADMIN — ALBUTEROL 2 PUFF(S): 90 AEROSOL, METERED ORAL at 07:56

## 2020-04-11 RX ADMIN — Medication 100 MILLIGRAM(S): at 05:58

## 2020-04-11 RX ADMIN — Medication 3 MILLIGRAM(S): at 22:16

## 2020-04-11 RX ADMIN — Medication 100 MILLIGRAM(S): at 11:57

## 2020-04-11 RX ADMIN — Medication 70 MILLIGRAM(S): at 05:57

## 2020-04-11 RX ADMIN — Medication 1 PUFF(S): at 15:55

## 2020-04-11 NOTE — PROGRESS NOTE ADULT - PROBLEM SELECTOR PLAN 1
COVID19 PCR result: positive on 4/2/20  Acute Hypoxic Respiratory Failure on NRB. Wean off oxygen as tolerated  - Maintain prone positioning while awake and asleep as tolerated  - Avoid HFNC, Nebulized treatments, and NIPPV with BIPAP/CPAP.  - Albuterol HFA and/or Atrovent q6 ATC  - Avoid NSAIDs. Use Tylenol PRN for fever, pain instead  - Completed Plaquenil (4/5-4/10), c/w solumedrol (4/6-4/11)  - Guaifenesin ATC x 2-3 days for cough, can add tessalon perles PRN, chest PT, incentive spirometry  - Trend CRP and Ferritin to repeat q 48-72 hrs : ferritin rising, rpt Ddimer: 286  - started Anakinra 100 SC q6 x3 days (4/9-)  - ICU consult for early intubation if worsening hypoxia on NRB, increased work of breathing and inability to maintain airway COVID19 PCR result: positive on 4/2/20  Acute Hypoxic Respiratory Failure on nasal canula. Wean off oxygen as tolerated  - Maintain prone positioning while awake and asleep as tolerated  - Avoid HFNC, Nebulized treatments, and NIPPV with BIPAP/CPAP.  - Albuterol HFA and/or Atrovent q6 ATC  - Avoid NSAIDs. Use Tylenol PRN for fever, pain instead  - Completed Plaquenil (4/5-4/10), c/w solumedrol (4/6-4/11)  - Guaifenesin ATC x 2-3 days for cough, can add tessalon perles PRN, chest PT, incentive spirometry  - Trend CRP and Ferritin to repeat q 48-72 hrs : ferritin rising, rpt Ddimer: 286  - started Anakinra 100 SC q6 x3 days (4/9-)  - ICU consult for early intubation if worsening hypoxia on NRB, increased work of breathing and inability to maintain airway

## 2020-04-11 NOTE — PROGRESS NOTE ADULT - SUBJECTIVE AND OBJECTIVE BOX
Medicine Progress Note    Patient is a 49y old  Male who presents with a chief complaint of Shortness of Breath (10 Apr 2020 08:53)      SUBJECTIVE / OVERNIGHT EVENTS:  Patient is saturating 95 % on 5L NC; no complaints     ADDITIONAL REVIEW OF SYSTEMS:    MEDICATIONS  (STANDING):  ALBUTerol    90 MICROgram(s) HFA Inhaler 2 Puff(s) Inhalation every 6 hours  anakinra Injectable 100 milliGRAM(s) SubCutaneous every 6 hours  enoxaparin Injectable 40 milliGRAM(s) SubCutaneous every 12 hours  guaiFENesin   Syrup  (Sugar-Free) 200 milliGRAM(s) Oral every 6 hours  ipratropium 17 MICROgram(s) HFA Inhaler 1 Puff(s) Inhalation every 6 hours  melatonin 3 milliGRAM(s) Oral at bedtime    MEDICATIONS  (PRN):  acetaminophen   Tablet .. 650 milliGRAM(s) Oral every 6 hours PRN Temp greater or equal to 38C (100.4F), Mild Pain (1 - 3)  hydrOXYzine hydrochloride 25 milliGRAM(s) Oral two times a day PRN Anxiety  loperamide 2 milliGRAM(s) Oral two times a day PRN Diarrhea  ondansetron Injectable 4 milliGRAM(s) IV Push every 8 hours PRN Nausea and/or Vomiting    CAPILLARY BLOOD GLUCOSE        I&O's Summary      PHYSICAL EXAM:  Vital Signs Last 24 Hrs  T(C): 36.2 (11 Apr 2020 05:59), Max: 36.3 (10 Apr 2020 14:20)  T(F): 97.2 (11 Apr 2020 05:59), Max: 97.4 (10 Apr 2020 14:20)  HR: 62 (11 Apr 2020 05:59) (62 - 86)  BP: 144/90 (11 Apr 2020 05:59) (118/82 - 144/90)  BP(mean): --  RR: 18 (11 Apr 2020 05:59) (18 - 18)  SpO2: 95% (11 Apr 2020 05:59) (95% - 95%)  CONSTITUTIONAL: NAD, well-developed, well-groomed  ENMT: Moist oral mucosa, no pharyngeal injection or exudates; normal dentition  RESPIRATORY: Normal respiratory effort; lungs are clear to auscultation bilaterally  CARDIOVASCULAR: Regular rate and rhythm, normal S1 and S2, no murmur/rub/gallop; No lower extremity edema; Peripheral pulses are 2+ bilaterally  ABDOMEN: Nontender to palpation, normoactive bowel sounds, no rebound/guarding; No hepatosplenomegaly  PSYCH: A+O to person, place, and time; affect appropriate  NEUROLOGY: CN 2-12 are intact and symmetric; no gross sensory deficits   SKIN: No rashes; no palpable lesions    LABS:                        13.6   11.35 )-----------( 210      ( 11 Apr 2020 07:26 )             41.0     04-11    139  |  102  |  22  ----------------------------<  157<H>  4.1   |  24  |  0.80    Ca    9.3      11 Apr 2020 07:26  Phos  3.0     04-11  Mg     2.7     04-11    TPro  6.6  /  Alb  3.6  /  TBili  0.6  /  DBili  x   /  AST  77<H>  /  ALT  299<H>  /  AlkPhos  76  04-11              GI PCR Panel, Stool (collected 09 Apr 2020 12:40)  Source: .Stool Feces  Final Report (09 Apr 2020 18:49):    GI PCR Results: NOT detected    *******Please Note:*******    GI panel PCR evaluates for:    Campylobacter, Plesiomonas shigelloides, Salmonella,    Vibrio, Yersinia enterocolitica, Enteroaggregative    Escherichia coli (EAEC), Enteropathogenic E.coli (EPEC),    Enterotoxigenic E. coli (ETEC) lt/st, Shiga-like    toxin-producing E. coli (STEC) stx1/stx2,    Shigella/ Enteroinvasive E. coli (EIEC), Cryptosporidium,    Cyclospora cayetanensis, Entamoeba histolytica,    Giardia lamblia, Adenovirus F 40/41, Astrovirus,    Norovirus GI/GII, Rotavirus A, Sapovirus      COVID-19 PCR: Detected (02 Apr 2020 23:34)      RADIOLOGY & ADDITIONAL TESTS:  Imaging from Last 24 Hours:    Electrocardiogram/QTc Interval:    COORDINATION OF CARE:  Care Discussed with Consultants/Other Providers: Medicine Progress Note    Patient is a 49y old  Male who presents with a chief complaint of Shortness of Breath (10 Apr 2020 08:53)      SUBJECTIVE / OVERNIGHT EVENTS:  Patient is saturating 95 % on 5L NC; no complaints on Anakinra and completed Hydroxychloroquine and Solumedrol     ADDITIONAL REVIEW OF SYSTEMS:    MEDICATIONS  (STANDING):  ALBUTerol    90 MICROgram(s) HFA Inhaler 2 Puff(s) Inhalation every 6 hours  anakinra Injectable 100 milliGRAM(s) SubCutaneous every 6 hours  enoxaparin Injectable 40 milliGRAM(s) SubCutaneous every 12 hours  guaiFENesin   Syrup  (Sugar-Free) 200 milliGRAM(s) Oral every 6 hours  ipratropium 17 MICROgram(s) HFA Inhaler 1 Puff(s) Inhalation every 6 hours  melatonin 3 milliGRAM(s) Oral at bedtime    MEDICATIONS  (PRN):  acetaminophen   Tablet .. 650 milliGRAM(s) Oral every 6 hours PRN Temp greater or equal to 38C (100.4F), Mild Pain (1 - 3)  hydrOXYzine hydrochloride 25 milliGRAM(s) Oral two times a day PRN Anxiety  loperamide 2 milliGRAM(s) Oral two times a day PRN Diarrhea  ondansetron Injectable 4 milliGRAM(s) IV Push every 8 hours PRN Nausea and/or Vomiting    CAPILLARY BLOOD GLUCOSE        I&O's Summary      PHYSICAL EXAM:  Vital Signs Last 24 Hrs  T(C): 36.2 (11 Apr 2020 05:59), Max: 36.3 (10 Apr 2020 14:20)  T(F): 97.2 (11 Apr 2020 05:59), Max: 97.4 (10 Apr 2020 14:20)  HR: 62 (11 Apr 2020 05:59) (62 - 86)  BP: 144/90 (11 Apr 2020 05:59) (118/82 - 144/90)  BP(mean): --  RR: 18 (11 Apr 2020 05:59) (18 - 18)  SpO2: 95% (11 Apr 2020 05:59) (95% - 95%)    CONSTITUTIONAL: NAD, well-developed, well-groomed  ENMT: Moist oral mucosa, no pharyngeal injection or exudates; normal dentition  RESPIRATORY: Normal respiratory effort; lungs are clear to auscultation bilaterally  CARDIOVASCULAR: Regular rate and rhythm, normal S1 and S2, no murmur/rub/gallop; No lower extremity edema; Peripheral pulses are 2+ bilaterally  ABDOMEN: Nontender to palpation, normoactive bowel sounds, no rebound/guarding; No hepatosplenomegaly  PSYCH: A+O to person, place, and time; affect appropriate  NEUROLOGY: CN 2-12 are intact and symmetric; no gross sensory deficits   SKIN: No rashes; no palpable lesions    LABS:                        13.6   11.35 )-----------( 210      ( 11 Apr 2020 07:26 )             41.0     04-11    139  |  102  |  22  ----------------------------<  157<H>  4.1   |  24  |  0.80    Ca    9.3      11 Apr 2020 07:26  Phos  3.0     04-11  Mg     2.7     04-11    TPro  6.6  /  Alb  3.6  /  TBili  0.6  /  DBili  x   /  AST  77<H>  /  ALT  299<H>  /  AlkPhos  76  04-11              GI PCR Panel, Stool (collected 09 Apr 2020 12:40)  Source: .Stool Feces  Final Report (09 Apr 2020 18:49):    GI PCR Results: NOT detected    *******Please Note:*******    GI panel PCR evaluates for:    Campylobacter, Plesiomonas shigelloides, Salmonella,    Vibrio, Yersinia enterocolitica, Enteroaggregative    Escherichia coli (EAEC), Enteropathogenic E.coli (EPEC),    Enterotoxigenic E. coli (ETEC) lt/st, Shiga-like    toxin-producing E. coli (STEC) stx1/stx2,    Shigella/ Enteroinvasive E. coli (EIEC), Cryptosporidium,    Cyclospora cayetanensis, Entamoeba histolytica,    Giardia lamblia, Adenovirus F 40/41, Astrovirus,    Norovirus GI/GII, Rotavirus A, Sapovirus      COVID-19 PCR: Detected (02 Apr 2020 23:34)      RADIOLOGY & ADDITIONAL TESTS:  Imaging from Last 24 Hours:    Electrocardiogram/QTc Interval:    COORDINATION OF CARE:  Care Discussed with Consultants/Other Providers:

## 2020-04-12 LAB
ALBUMIN SERPL ELPH-MCNC: 3.4 G/DL — SIGNIFICANT CHANGE UP (ref 3.3–5)
ALP SERPL-CCNC: 72 U/L — SIGNIFICANT CHANGE UP (ref 40–120)
ALT FLD-CCNC: 250 U/L — HIGH (ref 4–41)
ANION GAP SERPL CALC-SCNC: 11 MMO/L — SIGNIFICANT CHANGE UP (ref 7–14)
AST SERPL-CCNC: 45 U/L — HIGH (ref 4–40)
BILIRUB SERPL-MCNC: 0.6 MG/DL — SIGNIFICANT CHANGE UP (ref 0.2–1.2)
BUN SERPL-MCNC: 21 MG/DL — SIGNIFICANT CHANGE UP (ref 7–23)
CALCIUM SERPL-MCNC: 9.3 MG/DL — SIGNIFICANT CHANGE UP (ref 8.4–10.5)
CHLORIDE SERPL-SCNC: 100 MMOL/L — SIGNIFICANT CHANGE UP (ref 98–107)
CO2 SERPL-SCNC: 27 MMOL/L — SIGNIFICANT CHANGE UP (ref 22–31)
CREAT SERPL-MCNC: 0.8 MG/DL — SIGNIFICANT CHANGE UP (ref 0.5–1.3)
CRP SERPL-MCNC: < 4 MG/L — SIGNIFICANT CHANGE UP
FERRITIN SERPL-MCNC: 1083 NG/ML — HIGH (ref 30–400)
GLUCOSE SERPL-MCNC: 152 MG/DL — HIGH (ref 70–99)
HCT VFR BLD CALC: 42 % — SIGNIFICANT CHANGE UP (ref 39–50)
HGB BLD-MCNC: 14 G/DL — SIGNIFICANT CHANGE UP (ref 13–17)
MAGNESIUM SERPL-MCNC: 2.7 MG/DL — HIGH (ref 1.6–2.6)
MCHC RBC-ENTMCNC: 30.4 PG — SIGNIFICANT CHANGE UP (ref 27–34)
MCHC RBC-ENTMCNC: 33.3 % — SIGNIFICANT CHANGE UP (ref 32–36)
MCV RBC AUTO: 91.3 FL — SIGNIFICANT CHANGE UP (ref 80–100)
NRBC # FLD: 0 K/UL — SIGNIFICANT CHANGE UP (ref 0–0)
PHOSPHATE SERPL-MCNC: 2.9 MG/DL — SIGNIFICANT CHANGE UP (ref 2.5–4.5)
PLATELET # BLD AUTO: 220 K/UL — SIGNIFICANT CHANGE UP (ref 150–400)
PMV BLD: 12.5 FL — SIGNIFICANT CHANGE UP (ref 7–13)
POTASSIUM SERPL-MCNC: 3.9 MMOL/L — SIGNIFICANT CHANGE UP (ref 3.5–5.3)
POTASSIUM SERPL-SCNC: 3.9 MMOL/L — SIGNIFICANT CHANGE UP (ref 3.5–5.3)
PROT SERPL-MCNC: 6.4 G/DL — SIGNIFICANT CHANGE UP (ref 6–8.3)
RBC # BLD: 4.6 M/UL — SIGNIFICANT CHANGE UP (ref 4.2–5.8)
RBC # FLD: 12.6 % — SIGNIFICANT CHANGE UP (ref 10.3–14.5)
SODIUM SERPL-SCNC: 138 MMOL/L — SIGNIFICANT CHANGE UP (ref 135–145)
WBC # BLD: 11.77 K/UL — HIGH (ref 3.8–10.5)
WBC # FLD AUTO: 11.77 K/UL — HIGH (ref 3.8–10.5)

## 2020-04-12 PROCEDURE — 99233 SBSQ HOSP IP/OBS HIGH 50: CPT

## 2020-04-12 RX ADMIN — Medication 1 PUFF(S): at 14:19

## 2020-04-12 RX ADMIN — Medication 1 PUFF(S): at 05:38

## 2020-04-12 RX ADMIN — ENOXAPARIN SODIUM 40 MILLIGRAM(S): 100 INJECTION SUBCUTANEOUS at 05:38

## 2020-04-12 RX ADMIN — ALBUTEROL 2 PUFF(S): 90 AEROSOL, METERED ORAL at 19:10

## 2020-04-12 RX ADMIN — Medication 100 MILLIGRAM(S): at 05:38

## 2020-04-12 RX ADMIN — Medication 200 MILLIGRAM(S): at 05:37

## 2020-04-12 RX ADMIN — Medication 200 MILLIGRAM(S): at 17:32

## 2020-04-12 RX ADMIN — Medication 1 PUFF(S): at 19:10

## 2020-04-12 RX ADMIN — ALBUTEROL 2 PUFF(S): 90 AEROSOL, METERED ORAL at 05:38

## 2020-04-12 RX ADMIN — ALBUTEROL 2 PUFF(S): 90 AEROSOL, METERED ORAL at 14:18

## 2020-04-12 RX ADMIN — Medication 200 MILLIGRAM(S): at 11:53

## 2020-04-12 RX ADMIN — ENOXAPARIN SODIUM 40 MILLIGRAM(S): 100 INJECTION SUBCUTANEOUS at 17:32

## 2020-04-12 NOTE — PROGRESS NOTE ADULT - SUBJECTIVE AND OBJECTIVE BOX
Medicine Progress Note    Patient is a 49y old  Male who presents with a chief complaint of Shortness of Breath (11 Apr 2020 09:11)      SUBJECTIVE / OVERNIGHT EVENTS:  Patient sitting up in chair stating that he is feeling better on nasal canula 99% on 6L NC (continuing to downtitrate)     ADDITIONAL REVIEW OF SYSTEMS:    MEDICATIONS  (STANDING):  ALBUTerol    90 MICROgram(s) HFA Inhaler 2 Puff(s) Inhalation every 6 hours  enoxaparin Injectable 40 milliGRAM(s) SubCutaneous every 12 hours  guaiFENesin   Syrup  (Sugar-Free) 200 milliGRAM(s) Oral every 6 hours  ipratropium 17 MICROgram(s) HFA Inhaler 1 Puff(s) Inhalation every 6 hours  melatonin 3 milliGRAM(s) Oral at bedtime    MEDICATIONS  (PRN):  acetaminophen   Tablet .. 650 milliGRAM(s) Oral every 6 hours PRN Temp greater or equal to 38C (100.4F), Mild Pain (1 - 3)  hydrOXYzine hydrochloride 25 milliGRAM(s) Oral two times a day PRN Anxiety  loperamide 2 milliGRAM(s) Oral two times a day PRN Diarrhea  ondansetron Injectable 4 milliGRAM(s) IV Push every 8 hours PRN Nausea and/or Vomiting    CAPILLARY BLOOD GLUCOSE        I&O's Summary    11 Apr 2020 07:01  -  12 Apr 2020 07:00  --------------------------------------------------------  IN: 700 mL / OUT: 700 mL / NET: 0 mL        PHYSICAL EXAM:  Vital Signs Last 24 Hrs  T(C): 36.3 (12 Apr 2020 10:02), Max: 36.9 (11 Apr 2020 22:01)  T(F): 97.3 (12 Apr 2020 10:02), Max: 98.4 (11 Apr 2020 22:01)  HR: 80 (12 Apr 2020 10:02) (60 - 110)  BP: 132/79 (12 Apr 2020 10:02) (123/76 - 136/81)  BP(mean): --  RR: 22 (12 Apr 2020 10:02) (20 - 22)  SpO2: 99% (12 Apr 2020 10:02) (96% - 99%)    CONSTITUTIONAL: NAD, well-developed, well-groomed; on nasal canula   ENMT: Moist oral mucosa, no pharyngeal injection or exudates; normal dentition  RESPIRATORY: Normal respiratory effort; lungs are clear to auscultation bilaterally  CARDIOVASCULAR: Regular rate and rhythm, normal S1 and S2, no murmur/rub/gallop; No lower extremity edema; Peripheral pulses are 2+ bilaterally  ABDOMEN: Nontender to palpation, normoactive bowel sounds, no rebound/guarding; No hepatosplenomegaly  PSYCH: A+O to person, place, and time; affect appropriate  NEUROLOGY: CN 2-12 are intact and symmetric; no gross sensory deficits   SKIN: No rashes; no palpable lesions    LABS:                        14.0   11.77 )-----------( 220      ( 12 Apr 2020 06:45 )             42.0     04-12    138  |  100  |  21  ----------------------------<  152<H>  3.9   |  27  |  0.80    Ca    9.3      12 Apr 2020 06:45  Phos  2.9     04-12  Mg     2.7     04-12    TPro  6.4  /  Alb  3.4  /  TBili  0.6  /  DBili  x   /  AST  45<H>  /  ALT  250<H>  /  AlkPhos  72  04-12              GI PCR Panel, Stool (collected 09 Apr 2020 12:40)  Source: .Stool Feces  Final Report (09 Apr 2020 18:49):    GI PCR Results: NOT detected    *******Please Note:*******    GI panel PCR evaluates for:    Campylobacter, Plesiomonas shigelloides, Salmonella,    Vibrio, Yersinia enterocolitica, Enteroaggregative    Escherichia coli (EAEC), Enteropathogenic E.coli (EPEC),    Enterotoxigenic E. coli (ETEC) lt/st, Shiga-like    toxin-producing E. coli (STEC) stx1/stx2,    Shigella/ Enteroinvasive E. coli (EIEC), Cryptosporidium,    Cyclospora cayetanensis, Entamoeba histolytica,    Giardia lamblia, Adenovirus F 40/41, Astrovirus,    Norovirus GI/GII, Rotavirus A, Sapovirus      COVID-19 PCR: Detected (02 Apr 2020 23:34)      RADIOLOGY & ADDITIONAL TESTS:  Imaging from Last 24 Hours:    Electrocardiogram/QTc Interval:    COORDINATION OF CARE:  Care Discussed with Consultants/Other Providers:

## 2020-04-12 NOTE — PROGRESS NOTE ADULT - PROBLEM SELECTOR PLAN 1
COVID19 PCR result: positive on 4/2/20  Acute Hypoxic Respiratory Failure   - on nasal canula. Wean off oxygen as tolerated  - Maintain prone positioning while awake and asleep as tolerated  - Avoid HFNC, Nebulized treatments, and NIPPV with BIPAP/CPAP.  - Albuterol HFA and/or Atrovent q6 ATC  - Avoid NSAIDs. Use Tylenol PRN for fever, pain instead  - Completed Plaquenil (4/5-4/10), c/w solumedrol (4/6-4/11) and Anakinra 100 SC q6 x3 days (4/9-4/12)  - Guaifenesin ATC x 2-3 days for cough, can add tessalon perles PRN, chest PT, incentive spirometry  - Trend CRP and Ferritin to repeat q 48-72 hrs : ferritin downtrending (1038), rpt Ddimer: 286  - ICU consult for early intubation if worsening hypoxia on NRB, increased work of breathing and inability to maintain airway

## 2020-04-13 LAB
ALBUMIN SERPL ELPH-MCNC: 3.1 G/DL — LOW (ref 3.3–5)
ALP SERPL-CCNC: 66 U/L — SIGNIFICANT CHANGE UP (ref 40–120)
ALT FLD-CCNC: 212 U/L — HIGH (ref 4–41)
ANION GAP SERPL CALC-SCNC: 12 MMO/L — SIGNIFICANT CHANGE UP (ref 7–14)
AST SERPL-CCNC: 44 U/L — HIGH (ref 4–40)
BASOPHILS # BLD AUTO: 0.08 K/UL — SIGNIFICANT CHANGE UP (ref 0–0.2)
BASOPHILS NFR BLD AUTO: 1 % — SIGNIFICANT CHANGE UP (ref 0–2)
BASOPHILS NFR SPEC: 0 % — SIGNIFICANT CHANGE UP (ref 0–2)
BILIRUB SERPL-MCNC: 0.6 MG/DL — SIGNIFICANT CHANGE UP (ref 0.2–1.2)
BLASTS # FLD: 0 % — SIGNIFICANT CHANGE UP (ref 0–0)
BUN SERPL-MCNC: 27 MG/DL — HIGH (ref 7–23)
CALCIUM SERPL-MCNC: 8.8 MG/DL — SIGNIFICANT CHANGE UP (ref 8.4–10.5)
CHLORIDE SERPL-SCNC: 101 MMOL/L — SIGNIFICANT CHANGE UP (ref 98–107)
CO2 SERPL-SCNC: 24 MMOL/L — SIGNIFICANT CHANGE UP (ref 22–31)
CREAT SERPL-MCNC: 0.91 MG/DL — SIGNIFICANT CHANGE UP (ref 0.5–1.3)
EOSINOPHIL # BLD AUTO: 0.02 K/UL — SIGNIFICANT CHANGE UP (ref 0–0.5)
EOSINOPHIL NFR BLD AUTO: 0.3 % — SIGNIFICANT CHANGE UP (ref 0–6)
EOSINOPHIL NFR FLD: 0 % — SIGNIFICANT CHANGE UP (ref 0–6)
GIANT PLATELETS BLD QL SMEAR: PRESENT — SIGNIFICANT CHANGE UP
GLUCOSE SERPL-MCNC: 90 MG/DL — SIGNIFICANT CHANGE UP (ref 70–99)
HCT VFR BLD CALC: 41.5 % — SIGNIFICANT CHANGE UP (ref 39–50)
HGB BLD-MCNC: 14.1 G/DL — SIGNIFICANT CHANGE UP (ref 13–17)
IMM GRANULOCYTES NFR BLD AUTO: 5.8 % — HIGH (ref 0–1.5)
LYMPHOCYTES # BLD AUTO: 1.93 K/UL — SIGNIFICANT CHANGE UP (ref 1–3.3)
LYMPHOCYTES # BLD AUTO: 24.2 % — SIGNIFICANT CHANGE UP (ref 13–44)
LYMPHOCYTES NFR SPEC AUTO: 16.4 % — SIGNIFICANT CHANGE UP (ref 13–44)
MAGNESIUM SERPL-MCNC: 2.3 MG/DL — SIGNIFICANT CHANGE UP (ref 1.6–2.6)
MCHC RBC-ENTMCNC: 31 PG — SIGNIFICANT CHANGE UP (ref 27–34)
MCHC RBC-ENTMCNC: 34 % — SIGNIFICANT CHANGE UP (ref 32–36)
MCV RBC AUTO: 91.2 FL — SIGNIFICANT CHANGE UP (ref 80–100)
METAMYELOCYTES # FLD: 0.9 % — SIGNIFICANT CHANGE UP (ref 0–1)
MONOCYTES # BLD AUTO: 0.73 K/UL — SIGNIFICANT CHANGE UP (ref 0–0.9)
MONOCYTES NFR BLD AUTO: 9.1 % — SIGNIFICANT CHANGE UP (ref 2–14)
MONOCYTES NFR BLD: 4.6 % — SIGNIFICANT CHANGE UP (ref 2–9)
MYELOCYTES NFR BLD: 1.8 % — HIGH (ref 0–0)
NEUTROPHIL AB SER-ACNC: 71.8 % — SIGNIFICANT CHANGE UP (ref 43–77)
NEUTROPHILS # BLD AUTO: 4.77 K/UL — SIGNIFICANT CHANGE UP (ref 1.8–7.4)
NEUTROPHILS NFR BLD AUTO: 59.6 % — SIGNIFICANT CHANGE UP (ref 43–77)
NEUTS BAND # BLD: 1.8 % — SIGNIFICANT CHANGE UP (ref 0–6)
NRBC # FLD: 0 K/UL — SIGNIFICANT CHANGE UP (ref 0–0)
OTHER - HEMATOLOGY %: 0 — SIGNIFICANT CHANGE UP
PHOSPHATE SERPL-MCNC: 3.7 MG/DL — SIGNIFICANT CHANGE UP (ref 2.5–4.5)
PLATELET # BLD AUTO: 229 K/UL — SIGNIFICANT CHANGE UP (ref 150–400)
PLATELET COUNT - ESTIMATE: NORMAL — SIGNIFICANT CHANGE UP
PMV BLD: 12 FL — SIGNIFICANT CHANGE UP (ref 7–13)
POIKILOCYTOSIS BLD QL AUTO: SLIGHT — SIGNIFICANT CHANGE UP
POTASSIUM SERPL-MCNC: 3.9 MMOL/L — SIGNIFICANT CHANGE UP (ref 3.5–5.3)
POTASSIUM SERPL-SCNC: 3.9 MMOL/L — SIGNIFICANT CHANGE UP (ref 3.5–5.3)
PROMYELOCYTES # FLD: 0 % — SIGNIFICANT CHANGE UP (ref 0–0)
PROT SERPL-MCNC: 5.9 G/DL — LOW (ref 6–8.3)
RBC # BLD: 4.55 M/UL — SIGNIFICANT CHANGE UP (ref 4.2–5.8)
RBC # FLD: 12.8 % — SIGNIFICANT CHANGE UP (ref 10.3–14.5)
SMUDGE CELLS # BLD: PRESENT — SIGNIFICANT CHANGE UP
SODIUM SERPL-SCNC: 137 MMOL/L — SIGNIFICANT CHANGE UP (ref 135–145)
SPHEROCYTES BLD QL SMEAR: SIGNIFICANT CHANGE UP
VARIANT LYMPHS # BLD: 2.7 % — SIGNIFICANT CHANGE UP
WBC # BLD: 7.99 K/UL — SIGNIFICANT CHANGE UP (ref 3.8–10.5)
WBC # FLD AUTO: 7.99 K/UL — SIGNIFICANT CHANGE UP (ref 3.8–10.5)

## 2020-04-13 PROCEDURE — 99233 SBSQ HOSP IP/OBS HIGH 50: CPT

## 2020-04-13 RX ORDER — ANAKINRA 100MG/0.67
100 SYRINGE (ML) SUBCUTANEOUS EVERY 12 HOURS
Refills: 0 | Status: DISCONTINUED | OUTPATIENT
Start: 2020-04-13 | End: 2020-04-14

## 2020-04-13 RX ADMIN — ALBUTEROL 2 PUFF(S): 90 AEROSOL, METERED ORAL at 19:28

## 2020-04-13 RX ADMIN — Medication 200 MILLIGRAM(S): at 00:44

## 2020-04-13 RX ADMIN — Medication 1 PUFF(S): at 19:28

## 2020-04-13 RX ADMIN — Medication 1 PUFF(S): at 13:34

## 2020-04-13 RX ADMIN — ALBUTEROL 2 PUFF(S): 90 AEROSOL, METERED ORAL at 07:10

## 2020-04-13 RX ADMIN — ENOXAPARIN SODIUM 40 MILLIGRAM(S): 100 INJECTION SUBCUTANEOUS at 07:10

## 2020-04-13 RX ADMIN — Medication 200 MILLIGRAM(S): at 07:10

## 2020-04-13 RX ADMIN — ALBUTEROL 2 PUFF(S): 90 AEROSOL, METERED ORAL at 13:34

## 2020-04-13 RX ADMIN — ENOXAPARIN SODIUM 40 MILLIGRAM(S): 100 INJECTION SUBCUTANEOUS at 18:00

## 2020-04-13 RX ADMIN — ALBUTEROL 2 PUFF(S): 90 AEROSOL, METERED ORAL at 02:11

## 2020-04-13 RX ADMIN — Medication 1 PUFF(S): at 02:11

## 2020-04-13 RX ADMIN — Medication 1 PUFF(S): at 07:10

## 2020-04-13 RX ADMIN — Medication 25 MILLIGRAM(S): at 00:44

## 2020-04-13 RX ADMIN — Medication 100 MILLIGRAM(S): at 12:33

## 2020-04-13 RX ADMIN — Medication 3 MILLIGRAM(S): at 00:44

## 2020-04-13 RX ADMIN — Medication 200 MILLIGRAM(S): at 13:34

## 2020-04-13 RX ADMIN — Medication 200 MILLIGRAM(S): at 17:30

## 2020-04-13 NOTE — DIETITIAN INITIAL EVALUATION ADULT. - PERTINENT MEDS FT
MEDICATIONS  (STANDING):  ALBUTerol    90 MICROgram(s) HFA Inhaler 2 Puff(s) Inhalation every 6 hours  anakinra Injectable 100 milliGRAM(s) SubCutaneous every 12 hours  enoxaparin Injectable 40 milliGRAM(s) SubCutaneous every 12 hours  guaiFENesin   Syrup  (Sugar-Free) 200 milliGRAM(s) Oral every 6 hours  ipratropium 17 MICROgram(s) HFA Inhaler 1 Puff(s) Inhalation every 6 hours  melatonin 3 milliGRAM(s) Oral at bedtime

## 2020-04-13 NOTE — CHART NOTE - NSCHARTNOTEFT_GEN_A_CORE
50 yo Male. PMHx bladder CA s/p TURBT (Dec 2018) (plans to start BCG therapy next week), kidney stones s/p stent placement and removal in Feb 2020, HLD, p/w SOB, fevers, dry cough and watery diarrhea. Tested positive COVID 19 in [facility] ED, was d/c'd with Zpak. After being discharged, had worsening symptoms.   Now with much improved respiratory status on supplemental oxygen therapy. Patient is desaturating on 82% on RA, and saturating at 98% on 3L NC.   Patient will require home O2 of 3L NC to maintain oxygen saturation.   Please accommodate this request.

## 2020-04-13 NOTE — PROGRESS NOTE ADULT - SUBJECTIVE AND OBJECTIVE BOX
Medicine Progress note    Patient is a 49y old  Male who presents with a chief complaint of Shortness of Breath (12 Apr 2020 11:40)      SUBJECTIVE / OVERNIGHT EVENTS: Feels okay no chest pain or dyspnea.  He wants to go home.        MEDICATIONS  (STANDING):  ALBUTerol    90 MICROgram(s) HFA Inhaler 2 Puff(s) Inhalation every 6 hours  anakinra Injectable 100 milliGRAM(s) SubCutaneous every 12 hours  enoxaparin Injectable 40 milliGRAM(s) SubCutaneous every 12 hours  guaiFENesin   Syrup  (Sugar-Free) 200 milliGRAM(s) Oral every 6 hours  ipratropium 17 MICROgram(s) HFA Inhaler 1 Puff(s) Inhalation every 6 hours  melatonin 3 milliGRAM(s) Oral at bedtime    MEDICATIONS  (PRN):  acetaminophen   Tablet .. 650 milliGRAM(s) Oral every 6 hours PRN Temp greater or equal to 38C (100.4F), Mild Pain (1 - 3)  hydrOXYzine hydrochloride 25 milliGRAM(s) Oral two times a day PRN Anxiety  loperamide 2 milliGRAM(s) Oral two times a day PRN Diarrhea  ondansetron Injectable 4 milliGRAM(s) IV Push every 8 hours PRN Nausea and/or Vomiting      CAPILLARY BLOOD GLUCOSE        I&O's Summary      PHYSICAL EXAM:  Vital Signs Last 24 Hrs  T(C): 36.4 (13 Apr 2020 05:42), Max: 36.5 (13 Apr 2020 02:12)  T(F): 97.5 (13 Apr 2020 05:42), Max: 97.7 (13 Apr 2020 02:12)  HR: 60 (13 Apr 2020 05:42) (60 - 74)  BP: 102/69 (13 Apr 2020 05:42) (102/69 - 113/77)  BP(mean): --  RR: 20 (13 Apr 2020 05:42) (20 - 21)  SpO2: 98% (13 Apr 2020 05:42) (97% - 98%)  CONSTITUTIONAL: NAD, well-developed  RESPIRATORY: Normal respiratory effort; lungs are clear to auscultation bilaterally  CARDIOVASCULAR: Regular rate and rhythm, No lower extremity edema  ABDOMEN: Nontender to palpation, normoactive bowel sounds, no rebound/guarding  PSYCH/NEURO: A+O; affect appropriate  SKIN: No rashes; no palpable lesions    LABS:                        14.1   7.99  )-----------( 229      ( 13 Apr 2020 05:10 )             41.5     04-13    137  |  101  |  27<H>  ----------------------------<  90  3.9   |  24  |  0.91    Ca    8.8      13 Apr 2020 05:10  Phos  3.7     04-13  Mg     2.3     04-13    TPro  5.9<L>  /  Alb  3.1<L>  /  TBili  0.6  /  DBili  x   /  AST  44<H>  /  ALT  212<H>  /  AlkPhos  66  04-13              COVID-19 PCR: Detected (04-02-20 @ 23:34)      RADIOLOGY & ADDITIONAL TESTS:     Case discussed with: PA

## 2020-04-13 NOTE — PROGRESS NOTE ADULT - ASSESSMENT
48 yo Male. PMHx bladder CA s/p TURBT (Dec 2018) (plans to start BCG therapy next week), kidney stones s/p stent placement and removal in Feb 2020, HLD, p/w SOB, fevers, dry cough and watery diarrhea. Tested positive COVID 19 in [facility] ED, was d/c'd with Zpak. After being discharged, had worsening symptoms.   Now with much improved respiratory status on lower doses of oxygen and wishes to go home.  Discussed the need for meticulous hand washing and avoiding others who are at risk (mom).  He may be discharged home on oxygen and optimally would complete the anakinra regimen at home.  He should also continue xarelto for DVT prophylaxis for 4 weeks. 10 mg qd.

## 2020-04-13 NOTE — DIETITIAN INITIAL EVALUATION ADULT. - OTHER INFO
50 y/o male admitted with dx of acute respiratory failure with hypoxia 2/2 COVID-19+. Unable to conduct a face to face interview or nutrition-focused physical exam due to limited contact restrictions related to pt's medical condition and isolation precautions. Collateral obtained from chart review. Unable to assess PO intake. No chewing or swallowing difficulties at this time per chart. Unable to assess wt history. Review of EMC reveals that pt experienced fever, cough, SOB with diarrhea for several days PTA. Oral intake could have also been compromised. Diarrhea has continued with C.Diff negative result. Attempted to contact pt and family via telephone, however calls were either not answered or went to voicemail. If oral intake is still suboptimal, consider Ensure Clear 2x daily (360 adolfo and 16 gm protein) to optimize nutrition. Weight in related to BMI is in Obesity Class I. RDN services to remain available as needed.

## 2020-04-13 NOTE — PROGRESS NOTE ADULT - PROBLEM SELECTOR PLAN 1
COVID19 PCR result: positive on 4/2/20  Acute Hypoxic Respiratory Failure   - on nasal canula. Wean off oxygen as tolerated May benefit from home oxygen for a short course. His doctor, Christina, a pulmonologist can manange.  - Albuterol HFA and/or Atrovent q6 ATC   - Completed Plaquenil (4/5-4/10), c/w solumedrol (4/6-4/11) and Anakinra 100 SC q6 x3 days (4/9-4/12)  - Improved inflammatory markers.

## 2020-04-13 NOTE — DIETITIAN INITIAL EVALUATION ADULT. - ADD RECOMMEND
1). Suggest Ensure Clear 2x daily (360 adolfo and 16 gm protein) if warranted for suboptimal oral intake. 2). Monitor weights, labs, BM's, skin integrity, p.o. intake. 3). Follow pt as per protocol.

## 2020-04-14 ENCOUNTER — TRANSCRIPTION ENCOUNTER (OUTPATIENT)
Age: 50
End: 2020-04-14

## 2020-04-14 VITALS
HEART RATE: 87 BPM | RESPIRATION RATE: 19 BRPM | DIASTOLIC BLOOD PRESSURE: 76 MMHG | SYSTOLIC BLOOD PRESSURE: 112 MMHG | TEMPERATURE: 98 F | OXYGEN SATURATION: 98 %

## 2020-04-14 DIAGNOSIS — R09.02 HYPOXEMIA: ICD-10-CM

## 2020-04-14 LAB
ALBUMIN SERPL ELPH-MCNC: 3.3 G/DL — SIGNIFICANT CHANGE UP (ref 3.3–5)
ALP SERPL-CCNC: 72 U/L — SIGNIFICANT CHANGE UP (ref 40–120)
ALT FLD-CCNC: 411 U/L — HIGH (ref 4–41)
ANION GAP SERPL CALC-SCNC: 11 MMO/L — SIGNIFICANT CHANGE UP (ref 7–14)
AST SERPL-CCNC: 139 U/L — HIGH (ref 4–40)
BASOPHILS # BLD AUTO: 0.05 K/UL — SIGNIFICANT CHANGE UP (ref 0–0.2)
BASOPHILS NFR BLD AUTO: 0.8 % — SIGNIFICANT CHANGE UP (ref 0–2)
BILIRUB SERPL-MCNC: 0.7 MG/DL — SIGNIFICANT CHANGE UP (ref 0.2–1.2)
BUN SERPL-MCNC: 20 MG/DL — SIGNIFICANT CHANGE UP (ref 7–23)
CALCIUM SERPL-MCNC: 8.9 MG/DL — SIGNIFICANT CHANGE UP (ref 8.4–10.5)
CHLORIDE SERPL-SCNC: 100 MMOL/L — SIGNIFICANT CHANGE UP (ref 98–107)
CO2 SERPL-SCNC: 26 MMOL/L — SIGNIFICANT CHANGE UP (ref 22–31)
CREAT SERPL-MCNC: 0.9 MG/DL — SIGNIFICANT CHANGE UP (ref 0.5–1.3)
CRP SERPL-MCNC: < 4 MG/L — SIGNIFICANT CHANGE UP
EOSINOPHIL # BLD AUTO: 0.04 K/UL — SIGNIFICANT CHANGE UP (ref 0–0.5)
EOSINOPHIL NFR BLD AUTO: 0.6 % — SIGNIFICANT CHANGE UP (ref 0–6)
FERRITIN SERPL-MCNC: 1120 NG/ML — HIGH (ref 30–400)
GLUCOSE SERPL-MCNC: 86 MG/DL — SIGNIFICANT CHANGE UP (ref 70–99)
HCT VFR BLD CALC: 42.8 % — SIGNIFICANT CHANGE UP (ref 39–50)
HGB BLD-MCNC: 14.4 G/DL — SIGNIFICANT CHANGE UP (ref 13–17)
IMM GRANULOCYTES NFR BLD AUTO: 6.1 % — HIGH (ref 0–1.5)
LYMPHOCYTES # BLD AUTO: 1.84 K/UL — SIGNIFICANT CHANGE UP (ref 1–3.3)
LYMPHOCYTES # BLD AUTO: 27.8 % — SIGNIFICANT CHANGE UP (ref 13–44)
MAGNESIUM SERPL-MCNC: 2.3 MG/DL — SIGNIFICANT CHANGE UP (ref 1.6–2.6)
MCHC RBC-ENTMCNC: 30.5 PG — SIGNIFICANT CHANGE UP (ref 27–34)
MCHC RBC-ENTMCNC: 33.6 % — SIGNIFICANT CHANGE UP (ref 32–36)
MCV RBC AUTO: 90.7 FL — SIGNIFICANT CHANGE UP (ref 80–100)
MONOCYTES # BLD AUTO: 0.6 K/UL — SIGNIFICANT CHANGE UP (ref 0–0.9)
MONOCYTES NFR BLD AUTO: 9.1 % — SIGNIFICANT CHANGE UP (ref 2–14)
NEUTROPHILS # BLD AUTO: 3.68 K/UL — SIGNIFICANT CHANGE UP (ref 1.8–7.4)
NEUTROPHILS NFR BLD AUTO: 55.6 % — SIGNIFICANT CHANGE UP (ref 43–77)
NRBC # FLD: 0 K/UL — SIGNIFICANT CHANGE UP (ref 0–0)
PHOSPHATE SERPL-MCNC: 3.4 MG/DL — SIGNIFICANT CHANGE UP (ref 2.5–4.5)
PLATELET # BLD AUTO: 219 K/UL — SIGNIFICANT CHANGE UP (ref 150–400)
PMV BLD: 11.5 FL — SIGNIFICANT CHANGE UP (ref 7–13)
POTASSIUM SERPL-MCNC: 3.7 MMOL/L — SIGNIFICANT CHANGE UP (ref 3.5–5.3)
POTASSIUM SERPL-SCNC: 3.7 MMOL/L — SIGNIFICANT CHANGE UP (ref 3.5–5.3)
PROT SERPL-MCNC: 5.9 G/DL — LOW (ref 6–8.3)
RBC # BLD: 4.72 M/UL — SIGNIFICANT CHANGE UP (ref 4.2–5.8)
RBC # FLD: 12.9 % — SIGNIFICANT CHANGE UP (ref 10.3–14.5)
SODIUM SERPL-SCNC: 137 MMOL/L — SIGNIFICANT CHANGE UP (ref 135–145)
WBC # BLD: 6.61 K/UL — SIGNIFICANT CHANGE UP (ref 3.8–10.5)
WBC # FLD AUTO: 6.61 K/UL — SIGNIFICANT CHANGE UP (ref 3.8–10.5)

## 2020-04-14 PROCEDURE — 99239 HOSP IP/OBS DSCHRG MGMT >30: CPT

## 2020-04-14 RX ORDER — RIVAROXABAN 15 MG-20MG
1 KIT ORAL
Qty: 30 | Refills: 0
Start: 2020-04-14 | End: 2020-05-13

## 2020-04-14 RX ORDER — ACETAMINOPHEN 500 MG
500 TABLET ORAL
Qty: 60 | Refills: 0
Start: 2020-04-14 | End: 2020-04-20

## 2020-04-14 RX ORDER — IPRATROPIUM BROMIDE 0.2 MG/ML
1 SOLUTION, NON-ORAL INHALATION
Qty: 1 | Refills: 0
Start: 2020-04-14

## 2020-04-14 RX ORDER — ALBUTEROL 90 UG/1
2 AEROSOL, METERED ORAL
Qty: 1 | Refills: 0
Start: 2020-04-14

## 2020-04-14 RX ADMIN — Medication 100 MILLIGRAM(S): at 00:58

## 2020-04-14 RX ADMIN — ALBUTEROL 2 PUFF(S): 90 AEROSOL, METERED ORAL at 01:00

## 2020-04-14 RX ADMIN — Medication 1 PUFF(S): at 07:04

## 2020-04-14 RX ADMIN — Medication 1 PUFF(S): at 13:42

## 2020-04-14 RX ADMIN — ENOXAPARIN SODIUM 40 MILLIGRAM(S): 100 INJECTION SUBCUTANEOUS at 07:04

## 2020-04-14 RX ADMIN — ALBUTEROL 2 PUFF(S): 90 AEROSOL, METERED ORAL at 07:05

## 2020-04-14 RX ADMIN — Medication 1 PUFF(S): at 01:00

## 2020-04-14 RX ADMIN — Medication 200 MILLIGRAM(S): at 00:58

## 2020-04-14 RX ADMIN — Medication 3 MILLIGRAM(S): at 00:59

## 2020-04-14 RX ADMIN — Medication 200 MILLIGRAM(S): at 07:04

## 2020-04-14 RX ADMIN — Medication 100 MILLIGRAM(S): at 11:22

## 2020-04-14 RX ADMIN — ALBUTEROL 2 PUFF(S): 90 AEROSOL, METERED ORAL at 13:42

## 2020-04-14 RX ADMIN — Medication 200 MILLIGRAM(S): at 11:22

## 2020-04-14 RX ADMIN — Medication 25 MILLIGRAM(S): at 00:59

## 2020-04-14 NOTE — PROGRESS NOTE ADULT - ASSESSMENT
50 yo Male. PMHx bladder CA s/p TURBT (Dec 2018) (plans to start BCG therapy next week), kidney stones s/p stent placement and removal in Feb 2020, HLD, p/w SOB, fevers, dry cough and watery diarrhea. Tested positive COVID 19 in [facility], was d/c'd with Zpak. After being discharged, had worsening symptoms.  Now with much improved respiratory status on lower doses of oxygen and wishes to go home.  Discussed the need for meticulous hand washing and avoiding others who are at risk at home.  He may be discharged home on oxygen and may d/c anakinra after today's dose.  He should also continue xarelto for DVT prophylaxis for 4 weeks. 10 mg qd.  I reviewed these plans with his PMD/who will montior him as well.

## 2020-04-14 NOTE — PROGRESS NOTE ADULT - PROBLEM SELECTOR PROBLEM 1
Acute respiratory failure with hypoxia
Viral syndrome

## 2020-04-14 NOTE — PROGRESS NOTE ADULT - PROBLEM SELECTOR PLAN 6
Transitions of Care Status: Home with oxygen and an  1.  Name of PCP: Rajendra Horan  2.  PCP contacted at Discharge: [ x ] Y    [ ] N    [ ] N/A
Transitions of Care Status:  1.  Name of PCP:  2.  PCP Contacted on Admission: [ ] Y    [ x ] N    3.  PCP contacted at Discharge: [ ] Y    [ ] N    [ ] N/A  4.  Post-Discharge Appointment Date and Location:  5.  Summary of Handoff given to PCP:
Transitions of Care Status: Home with oxygen and an  1.  Name of PCP: Rajendra Horan  2.  PCP contacted at Discharge: [ x ] Y    [ ] N    [ ] N/A

## 2020-04-14 NOTE — PROGRESS NOTE ADULT - PROBLEM SELECTOR PROBLEM 3
Diarrhea, unspecified type
Hypokalemia
Hypokalemia
Thrombocytopenia

## 2020-04-14 NOTE — DISCHARGE NOTE NURSING/CASE MANAGEMENT/SOCIAL WORK - NSDCPEXARELTO_GEN_ALL_CORE
Rivaroxaban/Xarelto - Potential for adverse drug reactions and interactions/Rivaroxaban/Xarelto - Follow up monitoring/Rivaroxaban/Xarelto - Compliance/Rivaroxaban/Xarelto - Dietary Advice

## 2020-04-14 NOTE — PROGRESS NOTE ADULT - PROBLEM SELECTOR PLAN 2
Hx bladder CA s/p TURBT (Dec 2018)  pt is supposed to start BCG therapy next week  Zofran PRN for N/V
Hx bladder CA s/p TURBT (Dec 2018)  pt is supposed to start BCG therapy next week
Hx bladder CA s/p TURBT (Dec 2018)  pt is supposed to start BCG therapy next week  Zofran PRN for N/V

## 2020-04-14 NOTE — PROGRESS NOTE ADULT - PROBLEM SELECTOR PROBLEM 4
Thrombocytopenia
Prophylactic measure
Thrombocytopenia

## 2020-04-14 NOTE — PROGRESS NOTE ADULT - PROBLEM SELECTOR PLAN 4
resolved
DVT PPx Lovenox SC  Regular Diet
DVT PPx Lovenox SC 40mg BID  Regular Diet
likely BM suppression in setting of infection  Platelets improving
likely BM suppression in setting of infection  Repeat platelets, trend LDH. Check hapto
resolved

## 2020-04-14 NOTE — PROGRESS NOTE ADULT - REASON FOR ADMISSION
Shortness of Breath

## 2020-04-14 NOTE — PROGRESS NOTE ADULT - PROBLEM SELECTOR PROBLEM 5
Prophylactic measure
Discharge planning issues
Prophylactic measure

## 2020-04-14 NOTE — PROGRESS NOTE ADULT - PROBLEM SELECTOR PROBLEM 2
Bladder mass

## 2020-04-14 NOTE — PROGRESS NOTE ADULT - PROBLEM SELECTOR PLAN 5
DVT PPx Lovenox SC 40mg BID  Would be discharged on Xarelto 10 mg qd for 4 weeks.
DVT PPx Lovenox SC 40mg BID  Regular Diet
DVT PPx Lovenox SC 40mg BID  Would be discharged on Xarelto 10 mg qd for 4 weeks.
DVT PPx Lovenox SC: DC if plts<50  Regular Diet
DVT PPx Lovenox SC: DC if plts<50  Regular Diet
Transitions of Care Status:  1.  Name of PCP:  2.  PCP Contacted on Admission: [ ] Y    [ x ] N    3.  PCP contacted at Discharge: [ ] Y    [ ] N    [ ] N/A  4.  Post-Discharge Appointment Date and Location:  5.  Summary of Handoff given to PCP:

## 2020-04-14 NOTE — PROGRESS NOTE ADULT - PROBLEM SELECTOR PLAN 3
likely sequelae of COVID infection
likely BM suppression in setting of infection  - platelets improved
likely BM suppression in setting of infection  Platelets improving
likely sequelae of COVID infection
likely sequelae of COVID infection  Cdiff and GI PCR negative  Imodium PRN
resolved
s/p repletion  Repeat BMP in AM

## 2020-04-14 NOTE — DISCHARGE NOTE NURSING/CASE MANAGEMENT/SOCIAL WORK - PATIENT PORTAL LINK FT
You can access the FollowMyHealth Patient Portal offered by [facility] by registering at the following website: [email or web address]. By joining [employer]’s FollowMyHealth portal, you will also be able to view your health information using other applications (apps) compatible with our system.

## 2020-04-15 LAB — STONE ANALYSIS-IMP: SIGNIFICANT CHANGE UP

## 2020-04-22 ENCOUNTER — APPOINTMENT (OUTPATIENT)
Dept: UROLOGY | Facility: CLINIC | Age: 50
End: 2020-04-22

## 2020-04-30 ENCOUNTER — APPOINTMENT (OUTPATIENT)
Dept: UROLOGY | Facility: CLINIC | Age: 50
End: 2020-04-30
Payer: COMMERCIAL

## 2020-04-30 ENCOUNTER — OUTPATIENT (OUTPATIENT)
Dept: OUTPATIENT SERVICES | Facility: HOSPITAL | Age: 50
LOS: 1 days | End: 2020-04-30
Payer: COMMERCIAL

## 2020-04-30 VITALS
RESPIRATION RATE: 20 BRPM | TEMPERATURE: 98.1 F | HEART RATE: 104 BPM | OXYGEN SATURATION: 94 % | DIASTOLIC BLOOD PRESSURE: 88 MMHG | SYSTOLIC BLOOD PRESSURE: 127 MMHG

## 2020-04-30 DIAGNOSIS — Z90.89 ACQUIRED ABSENCE OF OTHER ORGANS: Chronic | ICD-10-CM

## 2020-04-30 DIAGNOSIS — R35.0 FREQUENCY OF MICTURITION: ICD-10-CM

## 2020-04-30 DIAGNOSIS — C67.9 MALIGNANT NEOPLASM OF BLADDER, UNSPECIFIED: Chronic | ICD-10-CM

## 2020-04-30 PROCEDURE — 51720 TREATMENT OF BLADDER LESION: CPT

## 2020-04-30 RX ORDER — BACILLUS CALMETTE-GUERIN 50 MG/50ML
50 POWDER, FOR SUSPENSION INTRAVESICAL
Qty: 0 | Refills: 0 | Status: COMPLETED | OUTPATIENT
Start: 2020-04-30

## 2020-04-30 RX ORDER — BACILLUS CALMETTE-GUERIN 50 MG/50ML
50 POWDER, FOR SUSPENSION INTRAVESICAL
Qty: 1 | Refills: 0 | Status: COMPLETED | OUTPATIENT
Start: 2020-04-30 | End: 2020-04-30

## 2020-05-01 DIAGNOSIS — C67.8 MALIGNANT NEOPLASM OF OVERLAPPING SITES OF BLADDER: ICD-10-CM

## 2020-05-07 ENCOUNTER — APPOINTMENT (OUTPATIENT)
Dept: UROLOGY | Facility: CLINIC | Age: 50
End: 2020-05-07
Payer: COMMERCIAL

## 2020-05-07 ENCOUNTER — OUTPATIENT (OUTPATIENT)
Dept: OUTPATIENT SERVICES | Facility: HOSPITAL | Age: 50
LOS: 1 days | End: 2020-05-07
Payer: COMMERCIAL

## 2020-05-07 VITALS
RESPIRATION RATE: 20 BRPM | HEART RATE: 101 BPM | SYSTOLIC BLOOD PRESSURE: 134 MMHG | TEMPERATURE: 98.1 F | DIASTOLIC BLOOD PRESSURE: 87 MMHG

## 2020-05-07 VITALS — SYSTOLIC BLOOD PRESSURE: 145 MMHG | RESPIRATION RATE: 20 BRPM | DIASTOLIC BLOOD PRESSURE: 88 MMHG | HEART RATE: 106 BPM

## 2020-05-07 DIAGNOSIS — R35.0 FREQUENCY OF MICTURITION: ICD-10-CM

## 2020-05-07 DIAGNOSIS — Z90.89 ACQUIRED ABSENCE OF OTHER ORGANS: Chronic | ICD-10-CM

## 2020-05-07 DIAGNOSIS — C67.9 MALIGNANT NEOPLASM OF BLADDER, UNSPECIFIED: Chronic | ICD-10-CM

## 2020-05-07 PROCEDURE — 51720 TREATMENT OF BLADDER LESION: CPT

## 2020-05-07 RX ORDER — BACILLUS CALMETTE-GUERIN 50 MG/50ML
50 POWDER, FOR SUSPENSION INTRAVESICAL
Qty: 0 | Refills: 0 | Status: COMPLETED | OUTPATIENT
Start: 2020-05-07

## 2020-05-08 DIAGNOSIS — C67.8 MALIGNANT NEOPLASM OF OVERLAPPING SITES OF BLADDER: ICD-10-CM

## 2020-05-14 ENCOUNTER — OUTPATIENT (OUTPATIENT)
Dept: OUTPATIENT SERVICES | Facility: HOSPITAL | Age: 50
LOS: 1 days | End: 2020-05-14
Payer: COMMERCIAL

## 2020-05-14 ENCOUNTER — APPOINTMENT (OUTPATIENT)
Dept: UROLOGY | Facility: CLINIC | Age: 50
End: 2020-05-14
Payer: COMMERCIAL

## 2020-05-14 VITALS
RESPIRATION RATE: 18 BRPM | SYSTOLIC BLOOD PRESSURE: 164 MMHG | TEMPERATURE: 98.3 F | HEART RATE: 112 BPM | DIASTOLIC BLOOD PRESSURE: 92 MMHG

## 2020-05-14 DIAGNOSIS — R35.0 FREQUENCY OF MICTURITION: ICD-10-CM

## 2020-05-14 DIAGNOSIS — C67.9 MALIGNANT NEOPLASM OF BLADDER, UNSPECIFIED: Chronic | ICD-10-CM

## 2020-05-14 DIAGNOSIS — Z90.89 ACQUIRED ABSENCE OF OTHER ORGANS: Chronic | ICD-10-CM

## 2020-05-14 PROCEDURE — 51720 TREATMENT OF BLADDER LESION: CPT

## 2020-05-15 DIAGNOSIS — C67.8 MALIGNANT NEOPLASM OF OVERLAPPING SITES OF BLADDER: ICD-10-CM

## 2020-05-21 ENCOUNTER — APPOINTMENT (OUTPATIENT)
Dept: UROLOGY | Facility: CLINIC | Age: 50
End: 2020-05-21
Payer: COMMERCIAL

## 2020-05-21 ENCOUNTER — OUTPATIENT (OUTPATIENT)
Dept: OUTPATIENT SERVICES | Facility: HOSPITAL | Age: 50
LOS: 1 days | End: 2020-05-21
Payer: COMMERCIAL

## 2020-05-21 VITALS — HEART RATE: 97 BPM | SYSTOLIC BLOOD PRESSURE: 159 MMHG | DIASTOLIC BLOOD PRESSURE: 76 MMHG

## 2020-05-21 DIAGNOSIS — R35.0 FREQUENCY OF MICTURITION: ICD-10-CM

## 2020-05-21 DIAGNOSIS — Z90.89 ACQUIRED ABSENCE OF OTHER ORGANS: Chronic | ICD-10-CM

## 2020-05-21 DIAGNOSIS — C67.9 MALIGNANT NEOPLASM OF BLADDER, UNSPECIFIED: Chronic | ICD-10-CM

## 2020-05-21 PROCEDURE — 51700 IRRIGATION OF BLADDER: CPT

## 2020-05-21 PROCEDURE — 51720 TREATMENT OF BLADDER LESION: CPT

## 2020-05-21 RX ORDER — BACILLUS CALMETTE-GUERIN 50 MG/50ML
50 POWDER, FOR SUSPENSION INTRAVESICAL
Qty: 0 | Refills: 0 | Status: COMPLETED | COMMUNITY
Start: 2020-05-14

## 2020-05-21 RX ADMIN — BACILLUS CALMETTE-GUERIN 0 MG: 50 POWDER, FOR SUSPENSION INTRAVESICAL at 00:00

## 2020-05-22 DIAGNOSIS — C67.8 MALIGNANT NEOPLASM OF OVERLAPPING SITES OF BLADDER: ICD-10-CM

## 2020-05-27 ENCOUNTER — TRANSCRIPTION ENCOUNTER (OUTPATIENT)
Age: 50
End: 2020-05-27

## 2020-05-28 ENCOUNTER — APPOINTMENT (OUTPATIENT)
Dept: UROLOGY | Facility: CLINIC | Age: 50
End: 2020-05-28
Payer: COMMERCIAL

## 2020-05-28 ENCOUNTER — OUTPATIENT (OUTPATIENT)
Dept: OUTPATIENT SERVICES | Facility: HOSPITAL | Age: 50
LOS: 1 days | End: 2020-05-28
Payer: COMMERCIAL

## 2020-05-28 VITALS — TEMPERATURE: 97.8 F

## 2020-05-28 VITALS
BODY MASS INDEX: 30.66 KG/M2 | DIASTOLIC BLOOD PRESSURE: 88 MMHG | HEIGHT: 71 IN | WEIGHT: 219 LBS | TEMPERATURE: 97.8 F | SYSTOLIC BLOOD PRESSURE: 125 MMHG | HEART RATE: 103 BPM

## 2020-05-28 DIAGNOSIS — C67.9 MALIGNANT NEOPLASM OF BLADDER, UNSPECIFIED: Chronic | ICD-10-CM

## 2020-05-28 DIAGNOSIS — R35.0 FREQUENCY OF MICTURITION: ICD-10-CM

## 2020-05-28 DIAGNOSIS — Z90.89 ACQUIRED ABSENCE OF OTHER ORGANS: Chronic | ICD-10-CM

## 2020-05-28 PROCEDURE — 51720 TREATMENT OF BLADDER LESION: CPT

## 2020-05-28 RX ORDER — BACILLUS CALMETTE-GUERIN 50 MG/50ML
50 POWDER, FOR SUSPENSION INTRAVESICAL
Qty: 1 | Refills: 0 | Status: COMPLETED | OUTPATIENT
Start: 2020-05-07 | End: 2020-05-28

## 2020-05-29 ENCOUNTER — TRANSCRIPTION ENCOUNTER (OUTPATIENT)
Age: 50
End: 2020-05-29

## 2020-05-29 LAB
APPEARANCE: ABNORMAL
BACTERIA: ABNORMAL
BILIRUBIN URINE: NEGATIVE
BLOOD URINE: NEGATIVE
COLOR: YELLOW
GLUCOSE QUALITATIVE U: NEGATIVE
HYALINE CASTS: 2 /LPF
KETONES URINE: NEGATIVE
LEUKOCYTE ESTERASE URINE: ABNORMAL
MICROSCOPIC-UA: NORMAL
NITRITE URINE: NEGATIVE
PH URINE: 6
PROTEIN URINE: NORMAL
RED BLOOD CELLS URINE: 0 /HPF
SPECIFIC GRAVITY URINE: 1.03
SQUAMOUS EPITHELIAL CELLS: 4 /HPF
UROBILINOGEN URINE: NORMAL
WHITE BLOOD CELLS URINE: 5 /HPF

## 2020-05-31 LAB — BACTERIA UR CULT: ABNORMAL

## 2020-05-31 RX ORDER — CIPROFLOXACIN HYDROCHLORIDE 500 MG/1
500 TABLET, FILM COATED ORAL
Qty: 10 | Refills: 0 | Status: COMPLETED | COMMUNITY
Start: 2020-05-31 | End: 2020-06-05

## 2020-05-31 RX ORDER — BACILLUS CALMETTE-GUERIN 50 MG/50ML
50 POWDER, FOR SUSPENSION INTRAVESICAL
Qty: 0 | Refills: 0 | Status: COMPLETED | COMMUNITY
Start: 2020-05-28

## 2020-06-01 DIAGNOSIS — C67.8 MALIGNANT NEOPLASM OF OVERLAPPING SITES OF BLADDER: ICD-10-CM

## 2020-06-04 ENCOUNTER — APPOINTMENT (OUTPATIENT)
Dept: UROLOGY | Facility: CLINIC | Age: 50
End: 2020-06-04
Payer: COMMERCIAL

## 2020-06-04 ENCOUNTER — OUTPATIENT (OUTPATIENT)
Dept: OUTPATIENT SERVICES | Facility: HOSPITAL | Age: 50
LOS: 1 days | End: 2020-06-04
Payer: COMMERCIAL

## 2020-06-04 VITALS — TEMPERATURE: 98.2 F

## 2020-06-04 VITALS — SYSTOLIC BLOOD PRESSURE: 127 MMHG | DIASTOLIC BLOOD PRESSURE: 93 MMHG | HEART RATE: 94 BPM

## 2020-06-04 DIAGNOSIS — C67.8 MALIGNANT NEOPLASM OF OVERLAPPING SITES OF BLADDER: ICD-10-CM

## 2020-06-04 DIAGNOSIS — R35.0 FREQUENCY OF MICTURITION: ICD-10-CM

## 2020-06-04 DIAGNOSIS — C67.9 MALIGNANT NEOPLASM OF BLADDER, UNSPECIFIED: Chronic | ICD-10-CM

## 2020-06-04 DIAGNOSIS — N52.9 MALE ERECTILE DYSFUNCTION, UNSPECIFIED: ICD-10-CM

## 2020-06-04 DIAGNOSIS — Z90.89 ACQUIRED ABSENCE OF OTHER ORGANS: Chronic | ICD-10-CM

## 2020-06-04 PROCEDURE — 51720 TREATMENT OF BLADDER LESION: CPT

## 2020-06-04 RX ORDER — SILDENAFIL 20 MG/1
20 TABLET ORAL
Qty: 90 | Refills: 0 | Status: COMPLETED | COMMUNITY
Start: 2018-12-14 | End: 2020-06-04

## 2020-06-04 RX ORDER — BACILLUS CALMETTE-GUERIN 50 MG/50ML
50 POWDER, FOR SUSPENSION INTRAVESICAL
Qty: 1 | Refills: 0 | Status: COMPLETED | OUTPATIENT
Start: 2020-06-04 | End: 2020-06-04

## 2020-06-04 RX ADMIN — BACILLUS CALMETTE-GUERIN 0 MG: 50 POWDER, FOR SUSPENSION INTRAVESICAL at 00:00

## 2020-06-05 LAB — BACTERIA UR CULT: NORMAL

## 2020-06-09 ENCOUNTER — RX CHANGE (OUTPATIENT)
Age: 50
End: 2020-06-09

## 2020-06-25 ENCOUNTER — APPOINTMENT (OUTPATIENT)
Dept: RADIOLOGY | Facility: IMAGING CENTER | Age: 50
End: 2020-06-25
Payer: COMMERCIAL

## 2020-06-25 ENCOUNTER — OUTPATIENT (OUTPATIENT)
Dept: OUTPATIENT SERVICES | Facility: HOSPITAL | Age: 50
LOS: 1 days | End: 2020-06-25
Payer: COMMERCIAL

## 2020-06-25 DIAGNOSIS — U07.1 COVID-19: ICD-10-CM

## 2020-06-25 DIAGNOSIS — Z00.8 ENCOUNTER FOR OTHER GENERAL EXAMINATION: ICD-10-CM

## 2020-06-25 DIAGNOSIS — Z90.89 ACQUIRED ABSENCE OF OTHER ORGANS: Chronic | ICD-10-CM

## 2020-06-25 DIAGNOSIS — C67.9 MALIGNANT NEOPLASM OF BLADDER, UNSPECIFIED: Chronic | ICD-10-CM

## 2020-06-25 DIAGNOSIS — R93.89 ABNORMAL FINDINGS ON DIAGNOSTIC IMAGING OF OTHER SPECIFIED BODY STRUCTURES: ICD-10-CM

## 2020-06-25 PROCEDURE — 71046 X-RAY EXAM CHEST 2 VIEWS: CPT

## 2020-06-25 PROCEDURE — 71046 X-RAY EXAM CHEST 2 VIEWS: CPT | Mod: 26

## 2020-07-06 RX ORDER — SILDENAFIL 50 MG/1
50 TABLET ORAL
Qty: 10 | Refills: 11 | Status: COMPLETED | COMMUNITY
Start: 2020-06-04 | End: 2020-07-06

## 2020-07-08 ENCOUNTER — OUTPATIENT (OUTPATIENT)
Dept: OUTPATIENT SERVICES | Facility: HOSPITAL | Age: 50
LOS: 1 days | End: 2020-07-08
Payer: COMMERCIAL

## 2020-07-08 VITALS
DIASTOLIC BLOOD PRESSURE: 94 MMHG | HEIGHT: 70 IN | OXYGEN SATURATION: 97 % | SYSTOLIC BLOOD PRESSURE: 140 MMHG | RESPIRATION RATE: 14 BRPM | HEART RATE: 85 BPM | TEMPERATURE: 99 F | WEIGHT: 227.96 LBS

## 2020-07-08 DIAGNOSIS — C67.9 MALIGNANT NEOPLASM OF BLADDER, UNSPECIFIED: Chronic | ICD-10-CM

## 2020-07-08 DIAGNOSIS — C67.8 MALIGNANT NEOPLASM OF OVERLAPPING SITES OF BLADDER: ICD-10-CM

## 2020-07-08 DIAGNOSIS — Z90.89 ACQUIRED ABSENCE OF OTHER ORGANS: Chronic | ICD-10-CM

## 2020-07-08 DIAGNOSIS — C67.9 MALIGNANT NEOPLASM OF BLADDER, UNSPECIFIED: ICD-10-CM

## 2020-07-08 DIAGNOSIS — Z98.890 OTHER SPECIFIED POSTPROCEDURAL STATES: Chronic | ICD-10-CM

## 2020-07-08 LAB
ANION GAP SERPL CALC-SCNC: 14 MMO/L — SIGNIFICANT CHANGE UP (ref 7–14)
BUN SERPL-MCNC: 13 MG/DL — SIGNIFICANT CHANGE UP (ref 7–23)
CALCIUM SERPL-MCNC: 10.2 MG/DL — SIGNIFICANT CHANGE UP (ref 8.4–10.5)
CHLORIDE SERPL-SCNC: 99 MMOL/L — SIGNIFICANT CHANGE UP (ref 98–107)
CO2 SERPL-SCNC: 25 MMOL/L — SIGNIFICANT CHANGE UP (ref 22–31)
CREAT SERPL-MCNC: 0.97 MG/DL — SIGNIFICANT CHANGE UP (ref 0.5–1.3)
GLUCOSE SERPL-MCNC: 80 MG/DL — SIGNIFICANT CHANGE UP (ref 70–99)
HCT VFR BLD CALC: 45.3 % — SIGNIFICANT CHANGE UP (ref 39–50)
HGB BLD-MCNC: 15.4 G/DL — SIGNIFICANT CHANGE UP (ref 13–17)
MCHC RBC-ENTMCNC: 30.4 PG — SIGNIFICANT CHANGE UP (ref 27–34)
MCHC RBC-ENTMCNC: 34 % — SIGNIFICANT CHANGE UP (ref 32–36)
MCV RBC AUTO: 89.5 FL — SIGNIFICANT CHANGE UP (ref 80–100)
NRBC # FLD: 0 K/UL — SIGNIFICANT CHANGE UP (ref 0–0)
PLATELET # BLD AUTO: 211 K/UL — SIGNIFICANT CHANGE UP (ref 150–400)
PMV BLD: 12.4 FL — SIGNIFICANT CHANGE UP (ref 7–13)
POTASSIUM SERPL-MCNC: 3.6 MMOL/L — SIGNIFICANT CHANGE UP (ref 3.5–5.3)
POTASSIUM SERPL-SCNC: 3.6 MMOL/L — SIGNIFICANT CHANGE UP (ref 3.5–5.3)
RBC # BLD: 5.06 M/UL — SIGNIFICANT CHANGE UP (ref 4.2–5.8)
RBC # FLD: 13.2 % — SIGNIFICANT CHANGE UP (ref 10.3–14.5)
SODIUM SERPL-SCNC: 138 MMOL/L — SIGNIFICANT CHANGE UP (ref 135–145)
WBC # BLD: 8.75 K/UL — SIGNIFICANT CHANGE UP (ref 3.8–10.5)
WBC # FLD AUTO: 8.75 K/UL — SIGNIFICANT CHANGE UP (ref 3.8–10.5)

## 2020-07-08 PROCEDURE — 93010 ELECTROCARDIOGRAM REPORT: CPT

## 2020-07-08 NOTE — H&P PST ADULT - NEGATIVE SKIN SYMPTOMS
Problem: At Risk for Falls  Goal: # Takes action to control fall-related risks  Outcome: Outcome Met, Continue evaluating goal progress toward completion  Patient understands fall precautions and agrees to call for assistance when needed.    Problem: Pain  Goal: #Acceptable pain level achieved/maintained at rest using NRS/Faces  This goal is used for patients who can self-report.  Acceptable means the level is at or below the identified comfort/function goal.  Outcome: Outcome Met, Continue evaluating goal progress toward completion  Patient reports no pain and wishes to continue plan of care.       no tumor/no itching/no dryness/no brittle nails/no rash

## 2020-07-08 NOTE — H&P PST ADULT - NSICDXPASTMEDICALHX_GEN_ALL_CORE_FT
PAST MEDICAL HISTORY:  Bladder calculus     Bladder mass     HLD (hyperlipidemia) PAST MEDICAL HISTORY:  Bladder calculus     HLD (hyperlipidemia)     Malignant neoplasm of bladder tx bcg 2018, 2019

## 2020-07-08 NOTE — H&P PST ADULT - NEGATIVE BREAST SYMPTOMS
no breast lump R/no breast lump L/no nipple discharge L/no breast tenderness R/no breast tenderness L/no nipple discharge R

## 2020-07-08 NOTE — H&P PST ADULT - HISTORY OF PRESENT ILLNESS
50y/o male scheduled for cystoscopy, bladder biopsy, possible transurethral resection of bladder tumor on 7/13/2020.  Pt states, "hx bladder cancer, and ureter stones, s/p bladder ca tx with bcg tx 2018, 2019.  Underwent multiple cystoscopies,  last  cystoscopy, right ureteroscopy with laser lithotripsy, stone extraction, ureteral stent placement, bladder biopsy 1/2020 and 2/10/2020, followed by bcg tx.  Bx of bladder at that time positive for malignancy." 48y/o male scheduled for cystoscopy, bladder biopsy, possible transurethral resection of bladder tumor on 7/13/2020.  Pt states, "hx bladder cancer, and ureter stones, s/p bladder ca tx with bcg X2  2018, 2019.  Underwent multiple cystoscopies,  last  cystoscopy, right ureteroscopy with laser lithotripsy, stone extraction, ureteral stent placement, bladder biopsy 1/2020 and 2/10/2020, followed by bcg tx.  Bx of bladder at that time positive for malignancy."

## 2020-07-08 NOTE — H&P PST ADULT - NSICDXPROBLEM_GEN_ALL_CORE_FT
PROBLEM DIAGNOSES  Problem: Malignant neoplasm of bladder  Assessment and Plan: Pt scheduled for cystoscopy, bladder biopsy, possible transurethral resection of bladder tumoron 7/13/2020.  labs done results pending, ekg done.  Covid instructions provided.  Preop teaching done, pt able to verbalize understanding.   bp 140/94 pt scheduled for medical eval as per surgeons request, pst will also request.

## 2020-07-08 NOTE — H&P PST ADULT - NEGATIVE ALLERGY TYPES
no reactions to animals/no indoor environmental allergies/no reactions to insect bites/no reactions to food/no outdoor environmental allergies

## 2020-07-08 NOTE — H&P PST ADULT - NSICDXPASTSURGICALHX_GEN_ALL_CORE_FT
PAST SURGICAL HISTORY:  Bladder cancer mass removal x2    History of tonsillectomy PAST SURGICAL HISTORY:  History of tonsillectomy     S/P cystoscopy right ureteroscopy, laser lithotripsy, stone extraction, ureteral stent placement bladder bx 1/2020, 2/2020- tx with bcg

## 2020-07-08 NOTE — H&P PST ADULT - NEGATIVE GENERAL SYMPTOMS
no sweating/no malaise/no polydipsia/no fever/no chills/no weight gain/no polyphagia/no polyuria/no anorexia/no fatigue/no weight loss

## 2020-07-08 NOTE — H&P PST ADULT - MUSCULOSKELETAL
details… detailed exam ROM intact/no joint erythema/normal strength/no joint swelling/no joint warmth/no calf tenderness ROM intact/normal strength

## 2020-07-08 NOTE — H&P PST ADULT - GASTROINTESTINAL DETAILS
soft/no organomegaly/no masses palpable/bowel sounds normal/no guarding/no rebound tenderness/no rigidity/no distention/nontender no guarding/soft/no masses palpable/bowel sounds normal/no rebound tenderness/no rigidity/no organomegaly/nontender/no distention/no bruit

## 2020-07-08 NOTE — H&P PST ADULT - NEGATIVE CARDIOVASCULAR SYMPTOMS
no dyspnea on exertion/no chest pain/no palpitations/no peripheral edema/no claudication/no orthopnea/no paroxysmal nocturnal dyspnea

## 2020-07-08 NOTE — H&P PST ADULT - NEGATIVE GENERAL GENITOURINARY SYMPTOMS
no nocturia/no flank pain R/no bladder infections/no flank pain L/no urine discoloration/no incontinence/no hematuria/no dysuria/no urinary hesitancy/normal urinary frequency

## 2020-07-08 NOTE — H&P PST ADULT - RS GEN PE MLT RESP DETAILS PC
good air movement/breath sounds equal/airway patent/no subcutaneous emphysema/respirations non-labored/no intercostal retractions/clear to auscultation bilaterally/no chest wall tenderness/no rhonchi/no rales/no wheezes good air movement/clear to auscultation bilaterally/breath sounds equal/no wheezes/no chest wall tenderness/no intercostal retractions/no subcutaneous emphysema/no rales/respirations non-labored/no rhonchi

## 2020-07-09 LAB
CULTURE RESULTS: NO GROWTH — SIGNIFICANT CHANGE UP
SPECIMEN SOURCE: SIGNIFICANT CHANGE UP

## 2020-07-10 ENCOUNTER — APPOINTMENT (OUTPATIENT)
Dept: DISASTER EMERGENCY | Facility: CLINIC | Age: 50
End: 2020-07-10

## 2020-07-10 VITALS
TEMPERATURE: 98 F | SYSTOLIC BLOOD PRESSURE: 132 MMHG | HEIGHT: 70 IN | RESPIRATION RATE: 16 BRPM | OXYGEN SATURATION: 97 % | WEIGHT: 227.96 LBS | DIASTOLIC BLOOD PRESSURE: 87 MMHG | HEART RATE: 70 BPM

## 2020-07-10 LAB — SARS-COV-2 N GENE NPH QL NAA+PROBE: NOT DETECTED

## 2020-07-10 NOTE — ASU PREOPERATIVE ASSESSMENT, ADULT ([facility] ONLY) - NS AMB LOCKER KEY
Pt states no further needs/concerns. resp even, unlabored. No distress noted. Pt AAOx3. Will d/c per order.   staff

## 2020-07-12 ENCOUNTER — TRANSCRIPTION ENCOUNTER (OUTPATIENT)
Age: 50
End: 2020-07-12

## 2020-07-13 ENCOUNTER — OUTPATIENT (OUTPATIENT)
Dept: OUTPATIENT SERVICES | Facility: HOSPITAL | Age: 50
LOS: 1 days | Discharge: HOME | End: 2020-07-13
Payer: COMMERCIAL

## 2020-07-13 ENCOUNTER — RESULT REVIEW (OUTPATIENT)
Age: 50
End: 2020-07-13

## 2020-07-13 ENCOUNTER — APPOINTMENT (OUTPATIENT)
Dept: UROLOGY | Facility: AMBULATORY SURGERY CENTER | Age: 50
End: 2020-07-13

## 2020-07-13 VITALS
TEMPERATURE: 98 F | HEART RATE: 68 BPM | DIASTOLIC BLOOD PRESSURE: 71 MMHG | OXYGEN SATURATION: 100 % | RESPIRATION RATE: 17 BRPM | SYSTOLIC BLOOD PRESSURE: 145 MMHG

## 2020-07-13 DIAGNOSIS — Z90.89 ACQUIRED ABSENCE OF OTHER ORGANS: Chronic | ICD-10-CM

## 2020-07-13 DIAGNOSIS — C67.8 MALIGNANT NEOPLASM OF OVERLAPPING SITES OF BLADDER: ICD-10-CM

## 2020-07-13 DIAGNOSIS — Z98.890 OTHER SPECIFIED POSTPROCEDURAL STATES: Chronic | ICD-10-CM

## 2020-07-13 PROCEDURE — 52204 CYSTOSCOPY W/BIOPSY(S): CPT

## 2020-07-13 PROCEDURE — 88360 TUMOR IMMUNOHISTOCHEM/MANUAL: CPT | Mod: 26

## 2020-07-13 PROCEDURE — 88305 TISSUE EXAM BY PATHOLOGIST: CPT | Mod: 26

## 2020-07-13 PROCEDURE — 88112 CYTOPATH CELL ENHANCE TECH: CPT | Mod: 26

## 2020-07-13 RX ORDER — SODIUM CHLORIDE 9 MG/ML
1000 INJECTION, SOLUTION INTRAVENOUS
Refills: 0 | Status: DISCONTINUED | OUTPATIENT
Start: 2020-07-13 | End: 2020-07-28

## 2020-07-13 NOTE — ASU DISCHARGE PLAN (ADULT/PEDIATRIC) - CARE PROVIDER_API CALL
Titus Villegas  UROLOGY  [address] [place] [id number]  Phone: [phone]  Fax: [phone]  Follow Up Time: 1 week

## 2020-07-13 NOTE — ASU DISCHARGE PLAN (ADULT/PEDIATRIC) - ASU DC SPECIAL INSTRUCTIONSFT
Please follow up with Dr. Villegas in 7-10 days, call the office to make and confirm your appointment  You may see some blood in your urine after the procedure which is normal, but please contact the office if you are unable to urinate, or are passing a large amount of clots

## 2020-07-13 NOTE — ASU DISCHARGE PLAN (ADULT/PEDIATRIC) - CALL YOUR DOCTOR IF YOU HAVE ANY OF THE FOLLOWING:
Bleeding that does not stop/Swelling that gets worse/Unable to urinate/Fever greater than (need to indicate Fahrenheit or Celsius)/Pain not relieved by Medications Unable to urinate/Swelling that gets worse/Pain not relieved by Medications/Fever greater than (need to indicate Fahrenheit or Celsius)/Bleeding that does not stop/Wound/Surgical Site with redness, or foul smelling discharge or pus

## 2020-07-14 LAB — NON-GYNECOLOGICAL CYTOLOGY STUDY: SIGNIFICANT CHANGE UP

## 2020-07-29 PROBLEM — C67.9 MALIGNANT NEOPLASM OF BLADDER, UNSPECIFIED: Chronic | Status: ACTIVE | Noted: 2020-07-08

## 2020-08-02 ENCOUNTER — OUTPATIENT (OUTPATIENT)
Dept: OUTPATIENT SERVICES | Facility: HOSPITAL | Age: 50
LOS: 1 days | End: 2020-08-02
Payer: COMMERCIAL

## 2020-08-02 ENCOUNTER — APPOINTMENT (OUTPATIENT)
Dept: CT IMAGING | Facility: IMAGING CENTER | Age: 50
End: 2020-08-02
Payer: COMMERCIAL

## 2020-08-02 ENCOUNTER — RESULT REVIEW (OUTPATIENT)
Age: 50
End: 2020-08-02

## 2020-08-02 DIAGNOSIS — Z98.890 OTHER SPECIFIED POSTPROCEDURAL STATES: Chronic | ICD-10-CM

## 2020-08-02 DIAGNOSIS — Z90.89 ACQUIRED ABSENCE OF OTHER ORGANS: Chronic | ICD-10-CM

## 2020-08-02 DIAGNOSIS — C67.8 MALIGNANT NEOPLASM OF OVERLAPPING SITES OF BLADDER: ICD-10-CM

## 2020-08-02 PROCEDURE — 74178 CT ABD&PLV WO CNTR FLWD CNTR: CPT

## 2020-08-02 PROCEDURE — 74178 CT ABD&PLV WO CNTR FLWD CNTR: CPT | Mod: 26

## 2020-08-15 ENCOUNTER — OUTPATIENT (OUTPATIENT)
Dept: OUTPATIENT SERVICES | Facility: HOSPITAL | Age: 50
LOS: 1 days | Discharge: HOME | End: 2020-08-15

## 2020-08-15 DIAGNOSIS — Z98.890 OTHER SPECIFIED POSTPROCEDURAL STATES: Chronic | ICD-10-CM

## 2020-08-15 DIAGNOSIS — Z90.89 ACQUIRED ABSENCE OF OTHER ORGANS: Chronic | ICD-10-CM

## 2020-08-15 DIAGNOSIS — C67.9 MALIGNANT NEOPLASM OF BLADDER, UNSPECIFIED: ICD-10-CM

## 2020-08-18 ENCOUNTER — APPOINTMENT (OUTPATIENT)
Dept: HEMATOLOGY ONCOLOGY | Facility: CLINIC | Age: 50
End: 2020-08-18
Payer: COMMERCIAL

## 2020-08-18 ENCOUNTER — RESULT REVIEW (OUTPATIENT)
Age: 50
End: 2020-08-18

## 2020-08-18 VITALS
WEIGHT: 224.87 LBS | DIASTOLIC BLOOD PRESSURE: 85 MMHG | SYSTOLIC BLOOD PRESSURE: 119 MMHG | HEART RATE: 84 BPM | OXYGEN SATURATION: 95 % | BODY MASS INDEX: 32.19 KG/M2 | HEIGHT: 69.92 IN | RESPIRATION RATE: 18 BRPM | TEMPERATURE: 98.7 F

## 2020-08-18 DIAGNOSIS — E78.5 HYPERLIPIDEMIA, UNSPECIFIED: ICD-10-CM

## 2020-08-18 DIAGNOSIS — U07.1 COVID-19: ICD-10-CM

## 2020-08-18 DIAGNOSIS — Z81.8 FAMILY HISTORY OF OTHER MENTAL AND BEHAVIORAL DISORDERS: ICD-10-CM

## 2020-08-18 DIAGNOSIS — Z82.0 FAMILY HISTORY OF EPILEPSY AND OTHER DISEASES OF THE NERVOUS SYSTEM: ICD-10-CM

## 2020-08-18 LAB
BASOPHILS # BLD AUTO: 0.04 K/UL — SIGNIFICANT CHANGE UP (ref 0–0.2)
BASOPHILS NFR BLD AUTO: 0.5 % — SIGNIFICANT CHANGE UP (ref 0–2)
EOSINOPHIL # BLD AUTO: 0.07 K/UL — SIGNIFICANT CHANGE UP (ref 0–0.5)
EOSINOPHIL NFR BLD AUTO: 0.9 % — SIGNIFICANT CHANGE UP (ref 0–6)
HCT VFR BLD CALC: 45.7 % — SIGNIFICANT CHANGE UP (ref 39–50)
HGB BLD-MCNC: 15.6 G/DL — SIGNIFICANT CHANGE UP (ref 13–17)
IMM GRANULOCYTES NFR BLD AUTO: 0.3 % — SIGNIFICANT CHANGE UP (ref 0–1.5)
LYMPHOCYTES # BLD AUTO: 2.01 K/UL — SIGNIFICANT CHANGE UP (ref 1–3.3)
LYMPHOCYTES # BLD AUTO: 26.9 % — SIGNIFICANT CHANGE UP (ref 13–44)
MCHC RBC-ENTMCNC: 30.9 PG — SIGNIFICANT CHANGE UP (ref 27–34)
MCHC RBC-ENTMCNC: 34.1 GM/DL — SIGNIFICANT CHANGE UP (ref 32–36)
MCV RBC AUTO: 90.5 FL — SIGNIFICANT CHANGE UP (ref 80–100)
MONOCYTES # BLD AUTO: 0.59 K/UL — SIGNIFICANT CHANGE UP (ref 0–0.9)
MONOCYTES NFR BLD AUTO: 7.9 % — SIGNIFICANT CHANGE UP (ref 2–14)
NEUTROPHILS # BLD AUTO: 4.75 K/UL — SIGNIFICANT CHANGE UP (ref 1.8–7.4)
NEUTROPHILS NFR BLD AUTO: 63.5 % — SIGNIFICANT CHANGE UP (ref 43–77)
NRBC # BLD: 0 /100 WBCS — SIGNIFICANT CHANGE UP (ref 0–0)
PLATELET # BLD AUTO: 159 K/UL — SIGNIFICANT CHANGE UP (ref 150–400)
RBC # BLD: 5.05 M/UL — SIGNIFICANT CHANGE UP (ref 4.2–5.8)
RBC # FLD: 12.3 % — SIGNIFICANT CHANGE UP (ref 10.3–14.5)
SARS-COV-2 RNA SPEC QL NAA+PROBE: SIGNIFICANT CHANGE UP
WBC # BLD: 7.48 K/UL — SIGNIFICANT CHANGE UP (ref 3.8–10.5)
WBC # FLD AUTO: 7.48 K/UL — SIGNIFICANT CHANGE UP (ref 3.8–10.5)

## 2020-08-18 PROCEDURE — 99205 OFFICE O/P NEW HI 60 MIN: CPT

## 2020-08-18 RX ORDER — TAMSULOSIN HYDROCHLORIDE 0.4 MG/1
0.4 CAPSULE ORAL
Qty: 60 | Refills: 5 | Status: DISCONTINUED | COMMUNITY
Start: 2019-01-16 | End: 2020-08-18

## 2020-08-18 NOTE — REVIEW OF SYSTEMS
[Negative] : Gastrointestinal [Anxiety] : anxiety [Fever] : no fever [Chills] : no chills [Chest Pain] : no chest pain [Fatigue] : no fatigue [Recent Change In Weight] : ~T no recent weight change [Palpitations] : no palpitations [Lower Ext Edema] : no lower extremity edema [Cough] : no cough [Wheezing] : no wheezing [SOB on Exertion] : no shortness of breath during exertion [Dysuria] : no dysuria [Joint Stiffness] : no joint stiffness [Joint Pain] : no joint pain [Incontinence] : no incontinence [Muscle Pain] : no muscle pain [Skin Rash] : no skin rash [Dizziness] : no dizziness [Muscle Weakness] : no muscle weakness [Depression] : no depression [Easy Bruising] : no tendency for easy bruising [Easy Bleeding] : no tendency for easy bleeding [FreeTextEntry7] : minor discomfort, see HPI [de-identified] : No HA, no paresthesias  [FreeTextEntry8] : nocturia x 3-4

## 2020-08-18 NOTE — HISTORY OF PRESENT ILLNESS
[Disease: _____________________] : Disease: [unfilled] [T: ___] : T[unfilled] [N: ___] : N[unfilled] [M: ___] : M[unfilled] [AJCC Stage: ____] : AJCC Stage: [unfilled] [de-identified] : Av Bañuelos is seen in the office in consultation today, August 18, 2020.  He had a kidney stone approximately in 3 to 4 years ago which passed spontaneously.  In August 2018 he had episodes of intermittent gross hematuria, usually associated with vigorous exercise.  Frequency of hematuria worsens, he had a CT scan performed in November 2018, revealing a calcified lesion within the bladder.  He underwent removal of bladder stone at that time with a transurethral resection of the bladder tumor on December 31 of 2018, with pathology revealing high-grade transitional cell carcinoma, pathologic T1, with no muscle in the specimen.\par Repeat transurethral resection of bladder tumor was performed on February 14, 2019, revealing high-grade transitional cell carcinoma, pTa with carcinoma in situ.  There is no muscle invasion.  He was treated with BCG from March 21 through April 30, 2019.  Cystoscopy in August 2019 did not reveal any tumor, the cytology was negative.\par Follow-up cystoscopy was performed in February 2020, and this revealed high-grade transitional cell carcinoma, pT1 in the right lateral wall and anterior wall.  There was no muscle in the specimen.  Carcinoma in situ was also present.\par Dr. Villegas had a discussion with him in regards to his high-grade recurrence after intravesical BCG.  Radical cystectomy with urinary diversion was discussed.  \par He feels "great". Appetite is good, changed to vegetarian diet 3 weeks ago, dropped a few pounds. No blood in the urine at this time. No dysuria, no incontinence No urgency noted. Nocturia x 2. NO bone pains. Occ has some uncomfortable sensation in the left lower quadrant, about 2 times a week, lasts a few seconds. No fatigue of note. No cough, No DEAN>  Has had ED for about 2 years, not full, able to penetrate at some times. Has not used the sildenafil as yet.

## 2020-08-18 NOTE — RESULTS/DATA
[FreeTextEntry1] : Final Diagnosis  FEB 2019\par 1.Bladder, left lateral wall tumor, TUR\par      -Non-invasive high grade papillary urothelial carcinoma.\par      -Flat urothelail carcinoma in situ, focal\par      -Muscularis propria (detrusor muscle) is present and not involved.\par      -Changes compatible with prior procedure/treatment effect\par 2.Bladder, posterior wall, biopsy\par      -Flat urothelial carcinoma in-situ, focal\par     -Muscularis propria (detrusor muscle) is not present for evaluation.  \par 3.Bladder, right lateral wall, biopsy\par      -Urothelial atypia\par      -Marked chronic inflammation\par      -Changes compatible with prior procedure/treatment effect\par      -Muscularis propria (detrusor muscle) is present \par Verified by: Sb Worrell MD\par (Electronic Signature)\par Reported on: 02/22/19 \par ***************************************************\par Final Diagnosis   FEB 2020\par 1. Bladder, right lateral wall, biopsy\par      -Infiltrating high grade urothelial carcinoma.\par      -The carcinoma invades lamina propria, focally and superficially.\par      -Muscularis propria (detrusor muscle) is not present for evaluation.  \par      -High grade papillary urothelial carcinoma is also present.\par 2. Bladder, posterior wall, biopsy\par      -Benign urothelial tissue. \par      -Muscularis propria (detrusor muscle) is not present for evaluation.  \par 3. Bladder, left lateral wall, biopsy\par      -Benign extensively denuded urothelial tissue with moderate chronic inflammation. \par      -Muscularis propria (detrusor muscle) is not present for evaluation.  \par 4. Bladder, anterior wall, biopsy\par      -Infiltrating high grade urothelial carcinoma.\par      -The carcinoma invades lamina propria.\par      -Muscularis propria (detrusor muscle) is not present for evaluation.  \par      -Focal flat urothelial carcinoma in situ is also present.  \par Verified by: Chela Kapoor M.D., Chief of Genitourinary Pathology\par (Electronic Signature)\par Reported on: 02/11/20 \par **********************************************************\par Final Diagnosis  JULY 2020\par 1. Bladder, posterior wall, biopsy\par      -Flat urothelial carcinoma in situ.  \par      -Muscularis propria (detrusor muscle) is not present for evaluation. \par 2. Bladder, right lateral wall, biopsy\par      -Non-invasive high grade papillary urothelial carcinoma.\par      -Muscularis propria (detrusor muscle) is not present for evaluation.  \par 3. Bladder, left lateral wall, biopsy\par      -Non-invasive high grade papillary urothelial carcinoma.\par      -Muscularis propria (detrusor muscle) is not present for evaluation.  \par 4. Bladder, superior wall, biopsy\par      -Infiltrating high grade urothelial carcinoma, conventional and focally micropapillary variant.\par      -The carcinoma invades lamina propria.\par      -Muscularis propria (detrusor muscle) is not present for evaluation.  \par      -High grade papillary urothelial carcinoma and flat urothelial carcinoma in situ are also present.\par Comment:\par This case was reviewed for  at the urologic pathology conference on 7/16/20.\par Verified by: Chela Kapoor M.D., Chief of Genitourinary Pathology\par (Electronic Signature)\par Reported on: 07/16/20 \par *************************************************************\par

## 2020-08-18 NOTE — ASSESSMENT
[Palliative Care Plan] : not applicable at this time [FreeTextEntry1] : Av Bañuelos was seen in consultation in the office today, August 18, 2020.  There was some confusion.  His appointment was at 840 in the morning, but he had a piece of paper that told him 10:00 with his appointment.  He has had issues with intermittent gross hematuria in the past often associated with vigorous exercise.  He had a kidney stone in 2018 and also a bladder stone.  He had a transurethral resection of a bladder tumor and received 2 courses of BCG in the past for T1 disease at worst.  In February, he had repeat him had a repeat transurethral resection and there was T1 disease with high-grade focally micropapillary carcinoma without muscle in the specimen.  Dr. Villegas had is discussion with him about cystectomy, and he would prefer not to undergo a cystectomy at this time.  He is here to discuss the role of pembrolizumab in the management of BCG resistant high-grade urothelial carcinoma.\par \par Comprehensive history was obtained, and a physical examination was performed.  We reviewed his pathology results as well as his imaging studies.  I discussed the role of pembrolizumab in the management of his disease.  We discussed the adverse effects that may occur, both expected as well as unexpected.  He was given written information in regards to pembrolizumab.  I discussed the mechanism of action to some degree, and written informed consent was obtained from the patient.\par \par He has no evidence of systemic disease.  I have requested that his pathology specimen from February be stained for PD–L1, as patients with low values negative values often do not respond to this therapy.\par \par All questions were answered to the best of my ability and to his apparent satisfaction.  He will have routine blood work performed today and will be scheduled for his first infusion of pembrolizumab sometime next week.  I explained the logistics of treatment as well as the follow-up necessary.  Arrangements will be made for his follow-up once his treatment schedule is finalized.

## 2020-08-18 NOTE — PHYSICAL EXAM
[Fully active, able to carry on all pre-disease performance without restriction] : Status 0 - Fully active, able to carry on all pre-disease performance without restriction [Normal] : normal appearance, no rash, nodules, vesicles, ulcers, erythema [de-identified] : no edema

## 2020-08-19 LAB
ALBUMIN SERPL ELPH-MCNC: 5 G/DL
ALP BLD-CCNC: 65 U/L
ALT SERPL-CCNC: 45 U/L
ANION GAP SERPL CALC-SCNC: 13 MMOL/L
AST SERPL-CCNC: 33 U/L
BILIRUB SERPL-MCNC: 0.7 MG/DL
BUN SERPL-MCNC: 12 MG/DL
CALCIUM SERPL-MCNC: 10.1 MG/DL
CHLORIDE SERPL-SCNC: 102 MMOL/L
CO2 SERPL-SCNC: 26 MMOL/L
CREAT SERPL-MCNC: 0.98 MG/DL
GLUCOSE SERPL-MCNC: 92 MG/DL
HBV CORE IGG+IGM SER QL: NONREACTIVE
HBV SURFACE AB SER QL: NONREACTIVE
HBV SURFACE AG SER QL: NONREACTIVE
HCV AB SER QL: NONREACTIVE
HCV S/CO RATIO: 0.15 S/CO
POTASSIUM SERPL-SCNC: 4.1 MMOL/L
PROT SERPL-MCNC: 6.7 G/DL
SODIUM SERPL-SCNC: 141 MMOL/L
T4 FREE SERPL-MCNC: 1.4 NG/DL
TSH SERPL-ACNC: 1.96 UIU/ML

## 2020-08-25 ENCOUNTER — APPOINTMENT (OUTPATIENT)
Dept: INFUSION THERAPY | Facility: HOSPITAL | Age: 50
End: 2020-08-25

## 2020-08-25 RX ORDER — ATORVASTATIN CALCIUM 80 MG/1
1 TABLET, FILM COATED ORAL
Qty: 0 | Refills: 0 | DISCHARGE

## 2020-09-01 ENCOUNTER — APPOINTMENT (OUTPATIENT)
Dept: HEMATOLOGY ONCOLOGY | Facility: CLINIC | Age: 50
End: 2020-09-01
Payer: COMMERCIAL

## 2020-09-01 ENCOUNTER — RESULT REVIEW (OUTPATIENT)
Age: 50
End: 2020-09-01

## 2020-09-01 VITALS
TEMPERATURE: 98 F | SYSTOLIC BLOOD PRESSURE: 125 MMHG | DIASTOLIC BLOOD PRESSURE: 87 MMHG | HEART RATE: 74 BPM | RESPIRATION RATE: 17 BRPM | OXYGEN SATURATION: 96 % | BODY MASS INDEX: 32.51 KG/M2 | WEIGHT: 227.07 LBS | HEIGHT: 70.08 IN

## 2020-09-01 LAB
BASOPHILS # BLD AUTO: 0.04 K/UL — SIGNIFICANT CHANGE UP (ref 0–0.2)
BASOPHILS NFR BLD AUTO: 0.5 % — SIGNIFICANT CHANGE UP (ref 0–2)
EOSINOPHIL # BLD AUTO: 0.24 K/UL — SIGNIFICANT CHANGE UP (ref 0–0.5)
EOSINOPHIL NFR BLD AUTO: 3.2 % — SIGNIFICANT CHANGE UP (ref 0–6)
HCT VFR BLD CALC: 44 % — SIGNIFICANT CHANGE UP (ref 39–50)
HGB BLD-MCNC: 14.9 G/DL — SIGNIFICANT CHANGE UP (ref 13–17)
IMM GRANULOCYTES NFR BLD AUTO: 0.3 % — SIGNIFICANT CHANGE UP (ref 0–1.5)
LYMPHOCYTES # BLD AUTO: 2.11 K/UL — SIGNIFICANT CHANGE UP (ref 1–3.3)
LYMPHOCYTES # BLD AUTO: 27.9 % — SIGNIFICANT CHANGE UP (ref 13–44)
MCHC RBC-ENTMCNC: 30.5 PG — SIGNIFICANT CHANGE UP (ref 27–34)
MCHC RBC-ENTMCNC: 33.9 GM/DL — SIGNIFICANT CHANGE UP (ref 32–36)
MCV RBC AUTO: 90.2 FL — SIGNIFICANT CHANGE UP (ref 80–100)
MONOCYTES # BLD AUTO: 0.73 K/UL — SIGNIFICANT CHANGE UP (ref 0–0.9)
MONOCYTES NFR BLD AUTO: 9.7 % — SIGNIFICANT CHANGE UP (ref 2–14)
NEUTROPHILS # BLD AUTO: 4.41 K/UL — SIGNIFICANT CHANGE UP (ref 1.8–7.4)
NEUTROPHILS NFR BLD AUTO: 58.4 % — SIGNIFICANT CHANGE UP (ref 43–77)
NRBC # BLD: 0 /100 WBCS — SIGNIFICANT CHANGE UP (ref 0–0)
PLATELET # BLD AUTO: 154 K/UL — SIGNIFICANT CHANGE UP (ref 150–400)
RBC # BLD: 4.88 M/UL — SIGNIFICANT CHANGE UP (ref 4.2–5.8)
RBC # FLD: 12.6 % — SIGNIFICANT CHANGE UP (ref 10.3–14.5)
WBC # BLD: 7.55 K/UL — SIGNIFICANT CHANGE UP (ref 3.8–10.5)
WBC # FLD AUTO: 7.55 K/UL — SIGNIFICANT CHANGE UP (ref 3.8–10.5)

## 2020-09-01 PROCEDURE — 99214 OFFICE O/P EST MOD 30 MIN: CPT

## 2020-09-01 RX ORDER — MULTIVITAMIN
TABLET ORAL
Refills: 0 | Status: ACTIVE | COMMUNITY

## 2020-09-02 LAB
ALBUMIN SERPL ELPH-MCNC: 4.9 G/DL
ALP BLD-CCNC: 62 U/L
ALT SERPL-CCNC: 42 U/L
ANION GAP SERPL CALC-SCNC: 13 MMOL/L
AST SERPL-CCNC: 28 U/L
BILIRUB SERPL-MCNC: 0.3 MG/DL
BUN SERPL-MCNC: 13 MG/DL
CALCIUM SERPL-MCNC: 10 MG/DL
CHLORIDE SERPL-SCNC: 106 MMOL/L
CO2 SERPL-SCNC: 24 MMOL/L
CORTIS SERPL-MCNC: 8.1 UG/DL
CREAT SERPL-MCNC: 1.03 MG/DL
GLUCOSE SERPL-MCNC: 97 MG/DL
POTASSIUM SERPL-SCNC: 4.3 MMOL/L
PROT SERPL-MCNC: 6.6 G/DL
SODIUM SERPL-SCNC: 143 MMOL/L
T4 FREE SERPL-MCNC: 1.2 NG/DL
TSH SERPL-ACNC: 1.64 UIU/ML

## 2020-09-04 NOTE — ASSESSMENT
[Palliative Care Plan] : not applicable at this time [FreeTextEntry1] : Mr. Bañuelos is seen in the office today in followup. He received dose # 1 of pembrolizumab on August 25. He states that he feels "pretty good." His appetite has been "very good," and he gained 1 kg. since his last office visit. He denies dyspepsia, nausea, or vomiting. He denies constipation or diarrheal stools. He denies any headaches or dizziness. He denies cough or shortness of breath. There are no skin rashes or pruritus. There is no edema in the extremities. He denies any complaints of pain at this time. He denies any significant fatigue. He denies hematuria, dysuria, incontinence, or urgency. There are no paresthesias. He is currently working full-time from home. He is independent in his activities of daily living.\par \par On physical examination, he appears well. The oral mucous membrane is well-hydrated. There were no palpable lymph nodes in the examination of the neck. The lungs were clear to auscultation bilaterally. The heart examination was normal. There is no spinal column or chest wall tenderness to palpation or percussion. There were no palpable abnormalities in the examination of the abdomen. There is no edema in the extremities.  \par \par He is on pembrolizumab in the management of BCG resistant high-grade urothelial carcinoma. He has no evidence of systemic disease. His PD-L1 Tumor Proportion Score was 80%. He tolerated the first dose of pembrolizumab with no major adverse events. He will continue with the treatment at this time. He will have repeat imaging studies done after the fourth cycle to assess for benefit. \par \par Today's CBC revealed a white blood cell count of 7.55 with a hemoglobin value of 14.9 g/dL and a hematocrit of 44%. His platelet count was 154,000. The rest of the laboratory values are still pending. He is scheduled for dose # 2 of pembrolizumab on September 15 along with an office followup on the same day with Dr. Wayne Hope. All questions were answered to the best of my ability and to his apparent satisfaction.

## 2020-09-04 NOTE — HISTORY OF PRESENT ILLNESS
[Disease: _____________________] : Disease: [unfilled] [T: ___] : T[unfilled] [N: ___] : N[unfilled] [M: ___] : M[unfilled] [AJCC Stage: ____] : AJCC Stage: [unfilled] [Date: ____________] : Patient's last distress assessment performed on [unfilled]. [0 - No Distress] : Distress Level: 0 [Patient given social work contact information and resource sheet] : Patient was given social work contact information and resource sheet [de-identified] : Av Bañuelos is seen in the office in consultation today, August 18, 2020.  He had a kidney stone approximately in 3 to 4 years ago which passed spontaneously.  In August 2018 he had episodes of intermittent gross hematuria, usually associated with vigorous exercise.  Frequency of hematuria worsens, he had a CT scan performed in November 2018, revealing a calcified lesion within the bladder.  He underwent removal of bladder stone at that time with a transurethral resection of the bladder tumor on December 31 of 2018, with pathology revealing high-grade transitional cell carcinoma, pathologic T1, with no muscle in the specimen.\par Repeat transurethral resection of bladder tumor was performed on February 14, 2019, revealing high-grade transitional cell carcinoma, pTa with carcinoma in situ.  There is no muscle invasion.  He was treated with BCG from March 21 through April 30, 2019.  Cystoscopy in August 2019 did not reveal any tumor, the cytology was negative.\par Follow-up cystoscopy was performed in February 2020, and this revealed high-grade transitional cell carcinoma, pT1 in the right lateral wall and anterior wall.  There was no muscle in the specimen.  Carcinoma in situ was also present.\par Dr. Villegas had a discussion with him in regards to his high-grade recurrence after intravesical BCG.  Radical cystectomy with urinary diversion was discussed.  \par He feels "great". Appetite is good, changed to vegetarian diet 3 weeks ago, dropped a few pounds. No blood in the urine at this time. No dysuria, no incontinence No urgency noted. Nocturia x 2. NO bone pains. Occ has some uncomfortable sensation in the left lower quadrant, about 2 times a week, lasts a few seconds. No fatigue of note. No cough, No DEAN>  Has had ED for about 2 years, not full, able to penetrate at some times. Has not used the sildenafil as yet.  [de-identified] : PD-L1 immunohistochemistry\par Antibody: [place] PDL1 ()\par Tissue type: Bladder; urothelial carcinoma\par Block: 4A\par Result: Tumor Proportion Score (TPS) 80% [FreeTextEntry1] : started pembrolizumab on August 25, 2020 [de-identified] : Rec'd dose # 1 of pembrolizumab on August 25. Feels "pretty good." Appetite is "very good," gained 1 kg. No dyspepsia, nausea, or vomiting. No constipation or diarrheal stools. No headaches or dizziness. No cough or DEAN. No skin rashes or pruritus. No edema. No complaints of pain. No significant fatigue. No hematuria or dysuria. No paresthesias. Working full-time from home at this time. Independent in ADLs.

## 2020-09-04 NOTE — REVIEW OF SYSTEMS
[Negative] : ENT [Fever] : no fever [Chills] : no chills [Night Sweats] : no night sweats [Fatigue] : no fatigue [Recent Change In Weight] : ~T no recent weight change [Eye Pain] : no eye pain [Red Eyes] : eyes not red [Dry Eyes] : no dryness of the eyes [Vision Problems] : no vision problems [Chest Pain] : no chest pain [Palpitations] : no palpitations [Lower Ext Edema] : no lower extremity edema [Cough] : no cough [SOB on Exertion] : no shortness of breath during exertion [Abdominal Pain] : no abdominal pain [Vomiting] : no vomiting [Constipation] : no constipation [Diarrhea] : no diarrhea [Dysuria] : no dysuria [Incontinence] : no incontinence [Joint Stiffness] : no joint stiffness [Joint Pain] : no joint pain [Muscle Pain] : no muscle pain [Dizziness] : no dizziness [Skin Rash] : no skin rash [Difficulty Walking] : no difficulty walking [Anxiety] : no anxiety [Depression] : no depression [Muscle Weakness] : no muscle weakness [Easy Bleeding] : no tendency for easy bleeding [Easy Bruising] : no tendency for easy bruising [FreeTextEntry2] : appetite is "very good," gained 1 kg [FreeTextEntry8] : no hematuria, no urgency [FreeTextEntry7] : no nausea [FreeTextEntry3] : needs new glasses for reading [de-identified] : no pruritus [FreeTextEntry9] : no cramps [de-identified] : no HA, no paresthesias

## 2020-09-04 NOTE — PHYSICAL EXAM
[Fully active, able to carry on all pre-disease performance without restriction] : Status 0 - Fully active, able to carry on all pre-disease performance without restriction [Normal] : affect appropriate [de-identified] : no gynecomastia

## 2020-09-11 ENCOUNTER — OUTPATIENT (OUTPATIENT)
Dept: OUTPATIENT SERVICES | Facility: HOSPITAL | Age: 50
LOS: 1 days | Discharge: HOME | End: 2020-09-11

## 2020-09-11 DIAGNOSIS — Z90.89 ACQUIRED ABSENCE OF OTHER ORGANS: Chronic | ICD-10-CM

## 2020-09-11 DIAGNOSIS — Z98.890 OTHER SPECIFIED POSTPROCEDURAL STATES: Chronic | ICD-10-CM

## 2020-09-12 DIAGNOSIS — C67.9 MALIGNANT NEOPLASM OF BLADDER, UNSPECIFIED: ICD-10-CM

## 2020-09-15 ENCOUNTER — APPOINTMENT (OUTPATIENT)
Dept: INFUSION THERAPY | Facility: HOSPITAL | Age: 50
End: 2020-09-15

## 2020-09-15 ENCOUNTER — APPOINTMENT (OUTPATIENT)
Dept: HEMATOLOGY ONCOLOGY | Facility: CLINIC | Age: 50
End: 2020-09-15
Payer: COMMERCIAL

## 2020-09-15 VITALS
SYSTOLIC BLOOD PRESSURE: 135 MMHG | WEIGHT: 228.17 LBS | HEART RATE: 95 BPM | BODY MASS INDEX: 32.3 KG/M2 | DIASTOLIC BLOOD PRESSURE: 94 MMHG | OXYGEN SATURATION: 95 % | TEMPERATURE: 98.5 F | RESPIRATION RATE: 18 BRPM | HEIGHT: 70.47 IN

## 2020-09-15 PROCEDURE — 99213 OFFICE O/P EST LOW 20 MIN: CPT

## 2020-09-15 NOTE — HISTORY OF PRESENT ILLNESS
[T: ___] : T[unfilled] [Disease: _____________________] : Disease: [unfilled] [N: ___] : N[unfilled] [M: ___] : M[unfilled] [AJCC Stage: ____] : AJCC Stage: [unfilled] [de-identified] : Av Bañuelos is seen in the office in consultation today, August 18, 2020.  He had a kidney stone approximately in 3 to 4 years ago which passed spontaneously.  In August 2018 he had episodes of intermittent gross hematuria, usually associated with vigorous exercise.  Frequency of hematuria worsens, he had a CT scan performed in November 2018, revealing a calcified lesion within the bladder.  He underwent removal of bladder stone at that time with a transurethral resection of the bladder tumor on December 31 of 2018, with pathology revealing high-grade transitional cell carcinoma, pathologic T1, with no muscle in the specimen.\par Repeat transurethral resection of bladder tumor was performed on February 14, 2019, revealing high-grade transitional cell carcinoma, pTa with carcinoma in situ.  There is no muscle invasion.  He was treated with BCG from March 21 through April 30, 2019.  Cystoscopy in August 2019 did not reveal any tumor, the cytology was negative.\par Follow-up cystoscopy was performed in February 2020, and this revealed high-grade transitional cell carcinoma, pT1 in the right lateral wall and anterior wall.  There was no muscle in the specimen.  Carcinoma in situ was also present.\par Dr. Villegas had a discussion with him in regards to his high-grade recurrence after intravesical BCG.  Radical cystectomy with urinary diversion was discussed.  \par He feels "great". Appetite is good, changed to vegetarian diet 3 weeks ago, dropped a few pounds. No blood in the urine at this time. No dysuria, no incontinence No urgency noted. Nocturia x 2. NO bone pains. Occ has some uncomfortable sensation in the left lower quadrant, about 2 times a week, lasts a few seconds. No fatigue of note. No cough, No DEAN>  Has had ED for about 2 years, not full, able to penetrate at some times. Has not used the sildenafil as yet. \par \par 9/1/20...Rec'd dose # 1 of pembrolizumab on August 25. Feels "pretty good." Appetite is "very good," gained 1 kg. No dyspepsia, nausea, or vomiting. No constipation or diarrheal stools. No headaches or dizziness. No cough or DEAN. No skin rashes or pruritus. No edema. No complaints of pain. No significant fatigue. No hematuria or dysuria. No paresthesias. Working full-time from home at this time. Independent in ADLs. [de-identified] : PD-L1 immunohistochemistry\par Antibody: [place] PDL1 ()\par Tissue type: Bladder; urothelial carcinoma\par Block: 4A\par Result: Tumor Proportion Score (TPS) 80% [FreeTextEntry1] : started pembrolizumab on August 25, 2020 [de-identified] : Av states that he feels "great".  He reported no adverse effects.  He has some mild fatigue but he believes this is secondary to his return to work.  He works as a  [employer] [place] [employer].  There were no fevers, chills, or sweats.  His appetite is good and his weight is stable.  There is no skin rash or pruritus.  He denies diarrhea, nausea or vomiting.  There is no cough or shortness of breath.  Urinary flow is good, but occasionally the flow is splayed.  There is no sensation of incomplete voiding.  Nocturia occurs 1 time per night, and was previously 2 or 3 times per night.  There is no hematuria, dysuria, or incontinence.  He does have erectile dysfunction which is been present for 2 years.  He has used sildenafil in the past.  Libido is normal, but he has not been sexually active recently.  There are no headaches, and is no paresthesias.

## 2020-09-15 NOTE — ASSESSMENT
[Palliative Care Plan] : not applicable at this time [FreeTextEntry1] : Av is doing well at this time.  Dose #2 of pembrolizumab yesterday.  He reports no adverse effects.  He feels well.  He believes that his urinary flow is better and nocturia is less at this point.  All questions were answered to the best my ability and to his apparent satisfaction.  He had questions regards to the mechanism of action of this drug, and we discussed this at length.  He already has appointment set up for his subsequent treatments as well as follow-ups in the office.

## 2020-09-15 NOTE — REVIEW OF SYSTEMS
[Negative] : Respiratory [Fever] : no fever [Chills] : no chills [Recent Change In Weight] : ~T no recent weight change [Abdominal Pain] : no abdominal pain [Vomiting] : no vomiting [Constipation] : no constipation [Dysuria] : no dysuria [Incontinence] : no incontinence [Joint Stiffness] : no joint stiffness [Joint Pain] : no joint pain [Muscle Pain] : no muscle pain [Skin Rash] : no skin rash [Dizziness] : no dizziness [Anxiety] : no anxiety [Depression] : no depression [Muscle Weakness] : no muscle weakness [Easy Bleeding] : no tendency for easy bleeding [Easy Bruising] : no tendency for easy bruising [FreeTextEntry2] : mild fatigue this week [de-identified] : No HA, no paresthesias

## 2020-09-15 NOTE — PHYSICAL EXAM
[Fully active, able to carry on all pre-disease performance without restriction] : Status 0 - Fully active, able to carry on all pre-disease performance without restriction [Normal] : affect appropriate [de-identified] : no edema

## 2020-09-16 DIAGNOSIS — R11.2 NAUSEA WITH VOMITING, UNSPECIFIED: ICD-10-CM

## 2020-09-16 DIAGNOSIS — Z51.11 ENCOUNTER FOR ANTINEOPLASTIC CHEMOTHERAPY: ICD-10-CM

## 2020-09-21 NOTE — PROGRESS NOTE ADULT - SUBJECTIVE AND OBJECTIVE BOX
Cuco León is a 32 y.o. female  Chief Complaint   Patient presents with    Wrist Pain     1. Have you been to the ER, urgent care clinic since your last visit? Hospitalized since your last visit?no    2. Have you seen or consulted any other health care providers outside of the [address] [place] [address] since your last visit? Include any pap smears or colon screening.  no POD #1  Afeb 102/61 86 93%RA    Pt has no c/o; pain is better controlled  Abd- soft NT [place] 1940

## 2020-10-05 ENCOUNTER — RESULT REVIEW (OUTPATIENT)
Age: 50
End: 2020-10-05

## 2020-10-05 ENCOUNTER — APPOINTMENT (OUTPATIENT)
Dept: HEMATOLOGY ONCOLOGY | Facility: CLINIC | Age: 50
End: 2020-10-05

## 2020-10-05 LAB
BASOPHILS # BLD AUTO: 0.04 K/UL — SIGNIFICANT CHANGE UP (ref 0–0.2)
BASOPHILS NFR BLD AUTO: 0.4 % — SIGNIFICANT CHANGE UP (ref 0–2)
EOSINOPHIL # BLD AUTO: 0.56 K/UL — HIGH (ref 0–0.5)
EOSINOPHIL NFR BLD AUTO: 6.1 % — HIGH (ref 0–6)
HCT VFR BLD CALC: 44.2 % — SIGNIFICANT CHANGE UP (ref 39–50)
HGB BLD-MCNC: 15 G/DL — SIGNIFICANT CHANGE UP (ref 13–17)
IMM GRANULOCYTES NFR BLD AUTO: 0.5 % — SIGNIFICANT CHANGE UP (ref 0–1.5)
LYMPHOCYTES # BLD AUTO: 2.33 K/UL — SIGNIFICANT CHANGE UP (ref 1–3.3)
LYMPHOCYTES # BLD AUTO: 25.5 % — SIGNIFICANT CHANGE UP (ref 13–44)
MCHC RBC-ENTMCNC: 30.9 PG — SIGNIFICANT CHANGE UP (ref 27–34)
MCHC RBC-ENTMCNC: 33.9 G/DL — SIGNIFICANT CHANGE UP (ref 32–36)
MCV RBC AUTO: 91.1 FL — SIGNIFICANT CHANGE UP (ref 80–100)
MONOCYTES # BLD AUTO: 0.7 K/UL — SIGNIFICANT CHANGE UP (ref 0–0.9)
MONOCYTES NFR BLD AUTO: 7.7 % — SIGNIFICANT CHANGE UP (ref 2–14)
NEUTROPHILS # BLD AUTO: 5.45 K/UL — SIGNIFICANT CHANGE UP (ref 1.8–7.4)
NEUTROPHILS NFR BLD AUTO: 59.8 % — SIGNIFICANT CHANGE UP (ref 43–77)
NRBC # BLD: 0 /100 WBCS — SIGNIFICANT CHANGE UP (ref 0–0)
PLATELET # BLD AUTO: 182 K/UL — SIGNIFICANT CHANGE UP (ref 150–400)
RBC # BLD: 4.85 M/UL — SIGNIFICANT CHANGE UP (ref 4.2–5.8)
RBC # FLD: 13.2 % — SIGNIFICANT CHANGE UP (ref 10.3–14.5)
WBC # BLD: 9.13 K/UL — SIGNIFICANT CHANGE UP (ref 3.8–10.5)
WBC # FLD AUTO: 9.13 K/UL — SIGNIFICANT CHANGE UP (ref 3.8–10.5)

## 2020-10-06 ENCOUNTER — APPOINTMENT (OUTPATIENT)
Dept: INFUSION THERAPY | Facility: HOSPITAL | Age: 50
End: 2020-10-06

## 2020-10-06 ENCOUNTER — LABORATORY RESULT (OUTPATIENT)
Age: 50
End: 2020-10-06

## 2020-10-06 ENCOUNTER — APPOINTMENT (OUTPATIENT)
Dept: HEMATOLOGY ONCOLOGY | Facility: CLINIC | Age: 50
End: 2020-10-06
Payer: COMMERCIAL

## 2020-10-06 VITALS
TEMPERATURE: 96.6 F | RESPIRATION RATE: 18 BRPM | SYSTOLIC BLOOD PRESSURE: 132 MMHG | BODY MASS INDEX: 32.68 KG/M2 | DIASTOLIC BLOOD PRESSURE: 82 MMHG | HEIGHT: 70.47 IN | OXYGEN SATURATION: 96 % | WEIGHT: 230.82 LBS | HEART RATE: 91 BPM

## 2020-10-06 LAB
ALBUMIN SERPL ELPH-MCNC: 4.9 G/DL
ALP BLD-CCNC: 80 U/L
ALT SERPL-CCNC: 58 U/L
ANION GAP SERPL CALC-SCNC: 15 MMOL/L
AST SERPL-CCNC: 44 U/L
BILIRUB SERPL-MCNC: 0.6 MG/DL
BUN SERPL-MCNC: 18 MG/DL
CALCIUM SERPL-MCNC: 10.4 MG/DL
CHLORIDE SERPL-SCNC: 99 MMOL/L
CO2 SERPL-SCNC: 25 MMOL/L
CORTIS SERPL-MCNC: 3 UG/DL
CREAT SERPL-MCNC: 1.14 MG/DL
GLUCOSE SERPL-MCNC: 88 MG/DL
POTASSIUM SERPL-SCNC: 4.2 MMOL/L
PROT SERPL-MCNC: 6.9 G/DL
SODIUM SERPL-SCNC: 139 MMOL/L
T4 FREE SERPL-MCNC: 1.1 NG/DL
TSH SERPL-ACNC: 2.53 UIU/ML

## 2020-10-06 PROCEDURE — 99214 OFFICE O/P EST MOD 30 MIN: CPT

## 2020-10-21 ENCOUNTER — OUTPATIENT (OUTPATIENT)
Dept: OUTPATIENT SERVICES | Facility: HOSPITAL | Age: 50
LOS: 1 days | Discharge: HOME | End: 2020-10-21

## 2020-10-21 DIAGNOSIS — C67.9 MALIGNANT NEOPLASM OF BLADDER, UNSPECIFIED: ICD-10-CM

## 2020-10-21 DIAGNOSIS — Z98.890 OTHER SPECIFIED POSTPROCEDURAL STATES: Chronic | ICD-10-CM

## 2020-10-21 DIAGNOSIS — Z90.89 ACQUIRED ABSENCE OF OTHER ORGANS: Chronic | ICD-10-CM

## 2020-10-27 ENCOUNTER — APPOINTMENT (OUTPATIENT)
Dept: HEMATOLOGY ONCOLOGY | Facility: CLINIC | Age: 50
End: 2020-10-27
Payer: COMMERCIAL

## 2020-10-27 ENCOUNTER — APPOINTMENT (OUTPATIENT)
Dept: INFUSION THERAPY | Facility: HOSPITAL | Age: 50
End: 2020-10-27

## 2020-10-27 VITALS
OXYGEN SATURATION: 96 % | DIASTOLIC BLOOD PRESSURE: 93 MMHG | HEIGHT: 69.96 IN | SYSTOLIC BLOOD PRESSURE: 137 MMHG | RESPIRATION RATE: 16 BRPM | TEMPERATURE: 98 F | HEART RATE: 86 BPM | WEIGHT: 230.6 LBS | BODY MASS INDEX: 33.01 KG/M2

## 2020-10-27 PROCEDURE — 99072 ADDL SUPL MATRL&STAF TM PHE: CPT

## 2020-10-27 PROCEDURE — 99213 OFFICE O/P EST LOW 20 MIN: CPT

## 2020-10-27 NOTE — PHYSICAL EXAM
[Fully active, able to carry on all pre-disease performance without restriction] : Status 0 - Fully active, able to carry on all pre-disease performance without restriction [Normal] : affect appropriate [de-identified] : no edema

## 2020-10-27 NOTE — REVIEW OF SYSTEMS
[Fatigue] : fatigue [Negative] : Cardiovascular [Fever] : no fever [Chills] : no chills [Night Sweats] : no night sweats [Recent Change In Weight] : ~T no recent weight change [Wheezing] : no wheezing [Cough] : no cough [Abdominal Pain] : no abdominal pain [Vomiting] : no vomiting [Constipation] : no constipation [Diarrhea] : no diarrhea [Dysuria] : no dysuria [Incontinence] : no incontinence [Joint Stiffness] : no joint stiffness [Muscle Pain] : no muscle pain [Skin Rash] : no skin rash [Dizziness] : no dizziness [Anxiety] : no anxiety [Depression] : no depression [Hot Flashes] : no hot flashes [Muscle Weakness] : no muscle weakness [Easy Bleeding] : no tendency for easy bleeding [Easy Bruising] : no tendency for easy bruising [FreeTextEntry9] : no cramps [de-identified] : no pruritus [de-identified] : No HA, no paresthesias

## 2020-10-27 NOTE — HISTORY OF PRESENT ILLNESS
[Disease: _____________________] : Disease: [unfilled] [T: ___] : T[unfilled] [N: ___] : N[unfilled] [M: ___] : M[unfilled] [AJCC Stage: ____] : AJCC Stage: [unfilled] [de-identified] : Av Bañuelos is seen in the office in consultation today, August 18, 2020.  He had a kidney stone approximately in 3 to 4 years ago which passed spontaneously.  In August 2018 he had episodes of intermittent gross hematuria, usually associated with vigorous exercise.  Frequency of hematuria worsens, he had a CT scan performed in November 2018, revealing a calcified lesion within the bladder.  He underwent removal of bladder stone at that time with a transurethral resection of the bladder tumor on December 31 of 2018, with pathology revealing high-grade transitional cell carcinoma, pathologic T1, with no muscle in the specimen.\par Repeat transurethral resection of bladder tumor was performed on February 14, 2019, revealing high-grade transitional cell carcinoma, pTa with carcinoma in situ.  There is no muscle invasion.  He was treated with BCG from March 21 through April 30, 2019.  Cystoscopy in August 2019 did not reveal any tumor, the cytology was negative.\par Follow-up cystoscopy was performed in February 2020, and this revealed high-grade transitional cell carcinoma, pT1 in the right lateral wall and anterior wall.  There was no muscle in the specimen.  Carcinoma in situ was also present.\par Dr. Villegas had a discussion with him in regards to his high-grade recurrence after intravesical BCG.  Radical cystectomy with urinary diversion was discussed.  \par He feels "great". Appetite is good, changed to vegetarian diet 3 weeks ago, dropped a few pounds. No blood in the urine at this time. No dysuria, no incontinence No urgency noted. Nocturia x 2. NO bone pains. Occ has some uncomfortable sensation in the left lower quadrant, about 2 times a week, lasts a few seconds. No fatigue of note. No cough, No DEAN>  Has had ED for about 2 years, not full, able to penetrate at some times. Has not used the sildenafil as yet. \par \par 9/1/20...Rec'd dose # 1 of pembrolizumab on August 25. Feels "pretty good." Appetite is "very good," gained 1 kg. No dyspepsia, nausea, or vomiting. No constipation or diarrheal stools. No headaches or dizziness. No cough or DEAN. No skin rashes or pruritus. No edema. No complaints of pain. No significant fatigue. No hematuria or dysuria. No paresthesias. Working full-time from home at this time. Independent in ADLs.\par \par 9/15/20...Av states that he feels "great".  He reported no adverse effects.  He has some mild fatigue but he believes this is secondary to his return to work.  He works as a  [place] [employer].  There were no fevers, chills, or sweats.  His appetite is good and his weight is stable.  There is no skin rash or pruritus.  He denies diarrhea, nausea or vomiting.  There is no cough or shortness of breath.  Urinary flow is good, but occasionally the flow is splayed.  There is no sensation of incomplete voiding.  Nocturia occurs 1 time per night, and was previously 2 or 3 times per night.  There is no hematuria, dysuria, or incontinence.  He does have erectile dysfunction which is been present for 2 years.  He has used sildenafil in the past.  Libido is normal, but he has not been sexually active recently.  There are no headaches, and is no paresthesias.\par \par 10/6/20...Dose # 3 of pembrolizumab today. Feels "pretty good." Appetite is "good," gained 1.2 kg since last visit. No dyspepsia, nausea, or vomiting. No constipation or diarrheal stools. No headaches or dizziness. No cough or DEAN. No skin rashes or pruritus. No pains. No significant fatigue. Stressful at work. No paresthesias. Urine flow is "pretty good," nocturia x 1. No hematuria, dysuria, incontinence, or urgency. No edema. No fevers or chills.  [de-identified] : PD-L1 immunohistochemistry\par Antibody: [place] PDL1 ()\par Tissue type: Bladder; urothelial carcinoma\par Block: 4A\par Result: Tumor Proportion Score (TPS) 80% [FreeTextEntry1] : started pembrolizumab on August 25, 2020 [de-identified] : Mike #4 today. Feels "good", minor fatigue, working FT. NO pruritus, no pains. APpetite is good, no weight loss, gained some weight. No cough,  no DEAN. No diarrheal stools. NO headaches, no dizziness, no paresthesias. Mild fatigue. Works as a , does counselling.

## 2020-10-27 NOTE — ASSESSMENT
[Palliative Care Plan] : not applicable at this time [FreeTextEntry1] : Av is seen in the office today for recurrent high-grade T1 bladder cancer.  He has been receiving pembrolizumab, and today's dose #4.  He states that he feels well.  He has some minor fatigue.  He is working full-time and he works as a  performing counseling work.  He reports no skin rashes or pruritus.  There are no pains.  His appetite is good and there is no weight loss.  He is actually gained weight in recent times.  There is no cough or shortness of breath.  He has no diarrheal stools.  There are no headaches, dizziness, or paresthesias.\par \par On physical examination, he appears well.  He is in no acute distress.  There is no jaundice present.  There are no palpable neck nodes.  The chest is clear and the heart examination is normal.  There were no palpable abnormalities upon examination of the abdomen.  There is no edema of the extremities.\par \par Today's dose #4 pembrolizumab.  He is tolerated therapy well without any major adverse effects.  I have sent an email to Dr. Villegas to let him know that we have completed 3 months of therapy as of today.  He would likely arrange for a repeat cystoscopy to be done to assess efficacy of therapy.  Av has an appointment on November 17 for his next cycle and will be seen by Adam Elkins NP at that time.\par \par His PDL–L1 TPS score was 80%.  Hopefully he will have a major response with control of disease.  He will continue on therapy.  He has had Covid antibody testing.  He was sick and hospitalized for 3 weeks earlier in the year.  He is curious to see if his antibody is still present, and we will have that performed the next time he has blood work done with the next cycle.\par \par All questions were answered to the best of my ability and to his apparent satisfaction.  He will receive dose #4 today.

## 2020-10-28 DIAGNOSIS — R11.2 NAUSEA WITH VOMITING, UNSPECIFIED: ICD-10-CM

## 2020-10-28 DIAGNOSIS — Z51.11 ENCOUNTER FOR ANTINEOPLASTIC CHEMOTHERAPY: ICD-10-CM

## 2020-11-10 ENCOUNTER — OUTPATIENT (OUTPATIENT)
Dept: OUTPATIENT SERVICES | Facility: HOSPITAL | Age: 50
LOS: 1 days | End: 2020-11-10

## 2020-11-10 VITALS
SYSTOLIC BLOOD PRESSURE: 130 MMHG | HEART RATE: 98 BPM | TEMPERATURE: 97 F | HEIGHT: 70 IN | DIASTOLIC BLOOD PRESSURE: 88 MMHG | OXYGEN SATURATION: 98 % | WEIGHT: 227.96 LBS | RESPIRATION RATE: 16 BRPM

## 2020-11-10 DIAGNOSIS — Z98.890 OTHER SPECIFIED POSTPROCEDURAL STATES: Chronic | ICD-10-CM

## 2020-11-10 DIAGNOSIS — C67.8 MALIGNANT NEOPLASM OF OVERLAPPING SITES OF BLADDER: ICD-10-CM

## 2020-11-10 DIAGNOSIS — Z90.89 ACQUIRED ABSENCE OF OTHER ORGANS: Chronic | ICD-10-CM

## 2020-11-10 DIAGNOSIS — C67.9 MALIGNANT NEOPLASM OF BLADDER, UNSPECIFIED: ICD-10-CM

## 2020-11-10 LAB
HCT VFR BLD CALC: 47 % — SIGNIFICANT CHANGE UP (ref 39–50)
HGB BLD-MCNC: 15.4 G/DL — SIGNIFICANT CHANGE UP (ref 13–17)
MCHC RBC-ENTMCNC: 29.6 PG — SIGNIFICANT CHANGE UP (ref 27–34)
MCHC RBC-ENTMCNC: 32.8 % — SIGNIFICANT CHANGE UP (ref 32–36)
MCV RBC AUTO: 90.2 FL — SIGNIFICANT CHANGE UP (ref 80–100)
NRBC # FLD: 0 K/UL — SIGNIFICANT CHANGE UP (ref 0–0)
PLATELET # BLD AUTO: 196 K/UL — SIGNIFICANT CHANGE UP (ref 150–400)
PMV BLD: 12.3 FL — SIGNIFICANT CHANGE UP (ref 7–13)
RBC # BLD: 5.21 M/UL — SIGNIFICANT CHANGE UP (ref 4.2–5.8)
RBC # FLD: 12.9 % — SIGNIFICANT CHANGE UP (ref 10.3–14.5)
WBC # BLD: 9.51 K/UL — SIGNIFICANT CHANGE UP (ref 3.8–10.5)
WBC # FLD AUTO: 9.51 K/UL — SIGNIFICANT CHANGE UP (ref 3.8–10.5)

## 2020-11-10 RX ORDER — TAMSULOSIN HYDROCHLORIDE 0.4 MG/1
2 CAPSULE ORAL
Qty: 0 | Refills: 0 | DISCHARGE

## 2020-11-10 NOTE — H&P PST ADULT - NSICDXPASTMEDICALHX_GEN_ALL_CORE_FT
PAST MEDICAL HISTORY:  Bladder calculus     HLD (hyperlipidemia)     Malignant neoplasm of bladder tx bcg 2018, 2019

## 2020-11-10 NOTE — H&P PST ADULT - HISTORY OF PRESENT ILLNESS
51 y/o male with HLD presents to PST preop for cystoscopy bladder biopsy, possible transurethral resection of bladder tumor on 11/13/20. preop dx of malignant neoplasm of overlapping sites of bladder. pt diagnosed with bladder CA in 2018. s/p multiple cystoscopy' s and  BCG treatments. pt states bladder cancer has returned. now for surgery.

## 2020-11-10 NOTE — H&P PST ADULT - NEGATIVE CARDIOVASCULAR SYMPTOMS
no palpitations/no dyspnea on exertion/no chest pain/no orthopnea/no peripheral edema/no claudication/no paroxysmal nocturnal dyspnea

## 2020-11-10 NOTE — H&P PST ADULT - NEGATIVE GENERAL GENITOURINARY SYMPTOMS
no urine discoloration/no flank pain R/no bladder infections/normal urinary frequency/no flank pain L/no incontinence/no nocturia/no hematuria/no dysuria/no urinary hesitancy

## 2020-11-10 NOTE — H&P PST ADULT - NSICDXPASTSURGICALHX_GEN_ALL_CORE_FT
PAST SURGICAL HISTORY:  H/O cystoscopy july 2020    History of tonsillectomy     S/P cystoscopy right ureteroscopy, laser lithotripsy, stone extraction, ureteral stent placement bladder bx 1/2020, 2/2020- tx with bcg     PAST SURGICAL HISTORY:  H/O cystoscopy and bladder biopsy- july 2020    History of tonsillectomy     S/P cystoscopy right ureteroscopy, laser lithotripsy, stone extraction, ureteral stent placement bladder bx 1/2020, 2/2020- tx with bcg

## 2020-11-10 NOTE — H&P PST ADULT - NSICDXPROBLEM_GEN_ALL_CORE_FT
PROBLEM DIAGNOSES  Problem: Malignant neoplasm of bladder  Assessment and Plan: preop for cystoscopy bladder biopsy, possible transurethral resection of bladder tumor on 11/13/20  preop instructions given, pt verbalized understanding  GI prophylaxis provided   pt instructed to get COVID testing preop, referred back to surgeons office regarding testing

## 2020-11-10 NOTE — H&P PST ADULT - RS GEN PE MLT RESP DETAILS PC
good air movement/no rales/no subcutaneous emphysema/no wheezes/respirations non-labored/breath sounds equal/clear to auscultation bilaterally/no intercostal retractions/no chest wall tenderness/no rhonchi good air movement/no rhonchi/no subcutaneous emphysema/no wheezes/breath sounds equal/respirations non-labored/clear to auscultation bilaterally/airway patent/no chest wall tenderness/no intercostal retractions/no rales

## 2020-11-10 NOTE — H&P PST ADULT - NEGATIVE GENERAL SYMPTOMS
no polydipsia/no sweating/no anorexia/no weight loss/no polyuria/no fatigue/no weight gain/no polyphagia/no malaise/no fever/no chills

## 2020-11-10 NOTE — H&P PST ADULT - GASTROINTESTINAL DETAILS
soft/no guarding/nontender/no rebound tenderness/no distention/no rigidity/no organomegaly/no masses palpable/bowel sounds normal/no bruit soft/nontender/no rebound tenderness/no distention/no masses palpable/bowel sounds normal/no bruit

## 2020-11-11 ENCOUNTER — LABORATORY RESULT (OUTPATIENT)
Age: 50
End: 2020-11-11

## 2020-11-11 ENCOUNTER — APPOINTMENT (OUTPATIENT)
Dept: DISASTER EMERGENCY | Facility: CLINIC | Age: 50
End: 2020-11-11

## 2020-11-11 LAB
CULTURE RESULTS: NO GROWTH — SIGNIFICANT CHANGE UP
SPECIMEN SOURCE: SIGNIFICANT CHANGE UP

## 2020-11-12 ENCOUNTER — TRANSCRIPTION ENCOUNTER (OUTPATIENT)
Age: 50
End: 2020-11-12

## 2020-11-12 VITALS
SYSTOLIC BLOOD PRESSURE: 130 MMHG | RESPIRATION RATE: 16 BRPM | DIASTOLIC BLOOD PRESSURE: 88 MMHG | HEIGHT: 70 IN | WEIGHT: 227.96 LBS | TEMPERATURE: 97 F | HEART RATE: 98 BPM | OXYGEN SATURATION: 98 %

## 2020-11-12 NOTE — ASU PREOPERATIVE ASSESSMENT, ADULT ([facility] ONLY) - TEACHING/LEARNING LEARNING PREFERENCES
group instruction/skill demonstration/individual instruction/pictorial/written material/verbal instruction

## 2020-11-12 NOTE — ASU PREOPERATIVE ASSESSMENT, ADULT (IPARK ONLY) - TEACHING/LEARNING CULTURAL CONSIDERATIONS
[address] [place] [address] [id number] [place] [address] Road [facility] 
[phone] Patient: Linus Morales MRN: [id number] QKE:[id number]/80/[id number] Visit Information Date & Time Provider Department Dept. Phone Encounter #  
 4/3/2018  8:00 AM Joseph Lynn, Bryce [address] [facility] [phone] [id number] Follow-up Instructions Return in about 4 months (around 8/3/2018). Upcoming Health Maintenance Date Due Hepatitis C Screening 1954 FOOT EXAM Q1 1/15/1964 MICROALBUMIN Q1 1/15/1964 EYE EXAM RETINAL OR DILATED Q1 1/15/1964 Pneumococcal 19-64 Medium Risk (1 of 1 - PPSV23) 1/15/1973 DTaP/Tdap/Td series (1 - Tdap) 1/15/1975 BREAST CANCER SCRN MAMMOGRAM 1/15/2004 ZOSTER VACCINE AGE 60> 11/15/2013 HEMOGLOBIN A1C Q6M 11/28/2014 LIPID PANEL Q1 1/16/2015 FOBT Q 1 YEAR AGE 50-75 1/21/2015 PAP AKA CERVICAL CYTOLOGY 1/6/2017 Influenza Age 5 to Adult 8/1/2017 MEDICARE YEARLY EXAM 3/14/2018 Allergies as of 4/3/2018  Review Complete On: 4/3/2018 By: Joseph Lynn MD  
  
 Severity Noted Reaction Type Reactions Flexall [Menthol-aloe Vera Extract]  05/28/2014    Other (comments) Sudafed [Pseudoephedrine Hcl]  04/13/2011    Unknown (comments) Current Immunizations  Never Reviewed No immunizations on file. Not reviewed this visit You Were Diagnosed With   
  
 Codes Comments Partial epilepsy with impairment of consciousness, intractable ([facility] [address]    -  Primary ICD-10-CM: B31.785 ICD-9-CM: 345.41 Intractable migraine without aura and without status migrainosus     ICD-10-CM: G43.019 
ICD-9-CM: 346.11 BMI 35.0-35.9,adult     ICD-10-CM: I22.38 ICD-9-CM: V85.35 Fall, initial encounter     ICD-10-CM: W19. Aquiles Seal ICD-9-CM: E888.9 Medication monitoring encounter     ICD-10-CM: Z51.81 
ICD-9-CM: V58.83 Vitals BP Pulse Temp Resp Height(growth percentile) Weight(growth percentile) 132/90 73 98 °F (36.7 °C) 17 5' 1\" (1.549 m) 187 lb (84.8 kg) SpO2 BMI OB Status Smoking Status 98% 35.33 kg/m2 Postmenopausal Never Smoker Vitals History BMI and BSA Data Body Mass Index Body Surface Area  
 35.33 kg/m 2 1.91 m 2 Preferred Pharmacy Pharmacy Name Phone [facility] [address] [place] [address], [address] [place] [address] [phone] Your Updated Medication List  
  
   
This list is accurate as of 4/3/18  8:24 AM.  Always use your most recent med list.  
  
  
  
  
 ACTOS 15 mg tablet Generic drug:  pioglitazone Take  by mouth. 10 MG BID BENADRYL ALLERGY 25 mg tablet Generic drug:  diphenhydrAMINE Take 25 mg by mouth every six (6) hours as needed. CRESTOR PO Take  by mouth. divalproex  mg tablet Commonly known as:  DEPAKOTE  
TAKE 3 TABLETS BY MOUTH EVERY MORNING AND 3 TABLETS EVERY EVENING  
  
 FLONASE 50 mcg/actuation nasal spray Generic drug:  fluticasone 2 Sprays by Both Nostrils route daily. gabapentin 300 mg capsule Commonly known as:  NEURONTIN Take 300 mg by mouth nightly. glipiZIDE 5 mg tablet Commonly known as:  [place] Take 10 mg by mouth two (2) times a day. HYDROcodone-acetaminophen 7.5-325 mg per tablet Commonly known as:  Carreon Faizan Take  by mouth. JANUMET 50-1,000 mg per tablet Generic drug:  SITagliptin-metFORMIN Take 1 Tab by mouth two (2) times daily (with meals). PAXIL 20 mg tablet Generic drug:  PARoxetine Take  by mouth daily. tiZANidine 4 mg tablet Commonly known as:  Giovanni Rebel TAKE 1 TABLET BY MOUTH THREE TIMES DAILY AS NEEDED FOR MUSCLE SPASM  
  
 VITAMIN D3 1,000 unit tablet Generic drug:  cholecalciferol Take  by mouth daily. XANAX PO Take  by mouth. We Performed the Following CBC W/O DIFF [89069 CPT(R)] METABOLIC PANEL, COMPREHENSIVE [44893 CPT(R)] REFERRAL TO PHYSICAL THERAPY [BWW24 Custom] Comments: S/p fall eval and treat REFERRAL TO WEIGHT LOSS [WDH849 Custom] Comments:  
 Medical weight loss Follow-up Instructions Return in about 4 months (around 8/3/2018). Referral Information Referral ID Referred By Referred To  
  
 [id number] JAKOB [address], [place]   
   [address] [place] [address], [address] [place] [address] Phone: [phone] Fax: [phone] Visits Status Start Date End Date 1 New Request 4/3/18 4/3/19 If your referral has a status of pending review or denied, additional information will be sent to support the outcome of this decision. Referral ID Referred By Referred To  
 [id number] ARABELLA ROBERT Not Available Visits Status Start Date End Date 1 New Request 4/3/18 4/3/19 If your referral has a status of pending review or denied, additional information will be sent to support the outcome of this decision. Patient Instructions Information Regarding Testing If you have physican order for a test or a medication denied by your insurance company, this does not mean the test or medication is not appropriate for you as that is a medical decision, not a decision to be made by an insurance company representative or by an [facility] physician who has not interviewed and examined you. This is a decision to be made between you and your physician. The denial of services is a contractual matter between you and your insurance company, not an issue between your physician and the insurance company. If your test or medication is denied, you can take the following steps to help resolve the issue: 1. File a complaint with the [facility] of Insurance regarding your insurance company's denial of services ordered for you.   You can do this either by calling them directly or by completing an on-line complaint form on the [employer]. This can be found at www.[place].gov 2. Also file a formal complaint with your insurance company and ask to have the name of the person denying the service so that you may explore a legal option should you be harmed by this denial of service. Again, the fact the insurance company will not pay for the service does not mean it is not medically necessary and I would encourage you to follow through with the plan that was made with your physician 3. File a written complaint with your employer so your employer and benefit manager is aware of the poor coverage they are providing their employees. If you have medicare/medicaid, complain to your representative in the House and to your Jose Pollock. PRESCRIPTION REFILL POLICY [facility] Statement to Patients April 1, 2014 In an effort to ensure the large volume of patient prescription refills is processed in the most efficient and expeditious manner, we are asking our patients to assist us by calling your Pharmacy for all prescription refills, this will include also your  Mail Order Pharmacy. The pharmacy will contact our office electronically to continue the refill process. Please do not wait until the last minute to call your pharmacy. We need at least 48 hours (2days) to fill prescriptions. We also encourage you to call your pharmacy before going to  your prescription to make sure it is ready. With regard to controlled substance prescription refill requests (narcotic refills) that need to be picked up at our office, we ask your cooperation by providing us with at least 72 hours (3days) notice that you will need a refill. We will not refill narcotic prescription refill requests after 4:00pm on any weekday, Monday through Thursday, or after 2:00pm on Fridays, or on the weekends. We encourage everyone to explore another way of getting your prescription refill request processed using [employer], our patient web portal through our electronic medical record system. Ch[employer] is an efficient and effective way to communicate your medication request directly to the office and  downloadable as an otf on your smart phone . [employer] also features a review functionality that allows you to view your medication list as well as leave messages for your physician. Are you ready to get connected? If so please review the attatched instructions or speak to any of our staff to get you set up right away! Thank you so much for your cooperation. Should you have any questions please contact our Practice Administrator. The Physicians and Staff,  [facility] If we have ordered testing for you, we do not call patients with results and we do not give test results over the phone. We schedule follow up appointments so that your results can be discussed in person and any questions you have regarding them may be addressed. If something of concern is revealed on your test, we will call you for a sooner follow up appointment. Additionally, results may be found by using the My Chart feature and one of our patient service representatives at the  can give you instructions on how to access this feature of our electronic medical record system. Jonathan Lion 1722 What is a living will? A living will is a legal form you use to write down the kind of care you want at the end of your life. It is used by the health professionals who will treat you if you aren't able to decide for yourself. If you put your wishes in writing, your loved ones and others will know what kind of care you want. They won't need to guess. This can ease your mind and be helpful to others. A living will is not the same as an estate or property will.  An estate will explains what you want to happen with your money and property after you die. Is a living will a legal document? A living will is a legal document. Each state has its own laws about living grewal. If you move to another state, make sure that your living will is legal in the state where you now live. Or you might use a universal form that has been approved by many states. This kind of form can sometimes be completed and stored online. Your electronic copy will then be available wherever you have a connection to the Internet. In most cases, doctors will respect your wishes even if you have a form from a different state. · You don't need an  to complete a living will. But legal advice can be helpful if your state's laws are unclear, your health history is complicated, or your family can't agree on what should be in your living will. · You can change your living will at any time. Some people find that their wishes about end-of-life care change as their health changes. · In addition to making a living will, think about completing a medical power of  form. This form lets you name the person you want to make end-of-life treatment decisions for you (your \"health care agent\") if you're not able to. Many hospitals and nursing homes will give you the forms you need to complete a living will and a medical power of . · Your living will is used only if you can't make or communicate decisions for yourself anymore. If you become able to make decisions again, you can accept or refuse any treatment, no matter what you wrote in your living will. · Your state may offer an online registry. This is a place where you can store your living will online so the doctors and nurses who need to treat you can find it right away. What should you think about when creating a living will?  
Talk about your end-of-life wishes with your family members and your doctor. Let them know what you want. That way the people making decisions for you won't be surprised by your choices. Think about these questions as you make your living will: · Do you know enough about life support methods that might be used? If not, talk to your doctor so you know what might be done if you can't breathe on your own, your heart stops, or you're unable to swallow. · What things would you still want to be able to do after you receive life-support methods? Would you want to be able to walk? To speak? To eat on your own? To live without the help of machines? · If you have a choice, where do you want to be cared for? In your home? At a hospital or nursing home? · Do you want certain Mormonism practices performed if you become very ill? · If you have a choice at the end of your life, where would you prefer to die? At home? In a hospital or nursing home? Somewhere else? · Would you prefer to be buried or cremated? · Do you want your organs to be donated after you die? What should you do with your living will? · Make sure that your family members and your health care agent have copies of your living will. · Give your doctor a copy of your living will to keep in your medical record. If you have more than one doctor, make sure that each one has a copy. · You may want to put a copy of your living will where it can be easily found. Where can you learn more? Go to http://soren-surjit.info/. Enter C158 in the search box to learn more about \"Learning About Living Dusty. \" Current as of: September 24, 2016 Content Version: 11.4 © 5712-3381 [employer]. Care instructions adapted under license by [employer] (which disclaims liability or warranty for this information).  If you have questions about a medical condition or this instruction, always ask your healthcare professional. [address]  any warranty or liability for your use of this information. Jonathan Lion 1721 What is a living will? A living will is a legal form you use to write down the kind of care you want at the end of your life. It is used by the health professionals who will treat you if you aren't able to decide for yourself. If you put your wishes in writing, your loved ones and others will know what kind of care you want. They won't need to guess. This can ease your mind and be helpful to others. A living will is not the same as an estate or property will. An estate will explains what you want to happen with your money and property after you die. Is a living will a legal document? A living will is a legal document. Each state has its own laws about living grewal. If you move to another state, make sure that your living will is legal in the state where you now live. Or you might use a universal form that has been approved by many states. This kind of form can sometimes be completed and stored online. Your electronic copy will then be available wherever you have a connection to the Internet. In most cases, doctors will respect your wishes even if you have a form from a different state. · You don't need an  to complete a living will. But legal advice can be helpful if your state's laws are unclear, your health history is complicated, or your family can't agree on what should be in your living will. · You can change your living will at any time. Some people find that their wishes about end-of-life care change as their health changes. · In addition to making a living will, think about completing a medical power of  form. This form lets you name the person you want to make end-of-life treatment decisions for you (your \"health care agent\") if you're not able to. Many hospitals and nursing homes will give you the forms you need to complete a living will and a medical power of . · Your living will is used only if you can't make or communicate decisions for yourself anymore. If you become able to make decisions again, you can accept or refuse any treatment, no matter what you wrote in your living will. · Your state may offer an online registry. This is a place where you can store your living will online so the doctors and nurses who need to treat you can find it right away. What should you think about when creating a living will? Talk about your end-of-life wishes with your family members and your doctor. Let them know what you want. That way the people making decisions for you won't be surprised by your choices. Think about these questions as you make your living will: · Do you know enough about life support methods that might be used? If not, talk to your doctor so you know what might be done if you can't breathe on your own, your heart stops, or you're unable to swallow. · What things would you still want to be able to do after you receive life-support methods? Would you want to be able to walk? To speak? To eat on your own? To live without the help of machines? · If you have a choice, where do you want to be cared for? In your home? At a hospital or nursing home? · Do you want certain Evangelical practices performed if you become very ill? · If you have a choice at the end of your life, where would you prefer to die? At home? In a hospital or nursing home? Somewhere else? · Would you prefer to be buried or cremated? · Do you want your organs to be donated after you die? What should you do with your living will? · Make sure that your family members and your health care agent have copies of your living will. · Give your doctor a copy of your living will to keep in your medical record. If you have more than one doctor, make sure that each one has a copy. · You may want to put a copy of your living will where it can be easily found. Where can you learn more? Go to http://soren-surjit.info/. Enter I402 in the search box to learn more about \"Learning About Living Perroy. \" Current as of: September 24, 2016 Content Version: 11.4 © 9826-0504 [employer]. Care instructions adapted under license by [employer] (which disclaims liability or warranty for this information). If you have questions about a medical condition or this instruction, always ask your healthcare professional. [facility] [address] any warranty or liability for your use of this information. Introducing [facility]! [facility] introduces [employer] patient portal. Now you can access parts of your medical record, email your doctor's office, and request medication refills online. 1. In your internet browser, go to [email or web address] 2. Click on the First Time User? Click Here link in the Sign In box. You will see the New Member Sign Up page. 3. Enter your [employer] Access Code exactly as it appears below. You will not need to use this code after youve completed the sign-up process. If you do not sign up before the expiration date, you must request a new code. · [employer] Access Code: [email or web address] Expires: 7/2/2018  7:51 AM 
 
4. Enter the last four digits of your Social Security Number (xxxx) and Date of Birth (mm/dd/yyyy) as indicated and click Submit. You will be taken to the next sign-up page. 5. Create a [employer] ID. This will be your [employer] login ID and cannot be changed, so think of one that is secure and easy to remember. 6. Create a [employer] password. You can change your password at any time. 7. Enter your Password Reset Question and Answer. This can be used at a later time if you forget your password. 8. Enter your e-mail address. You will receive e-mail notification when new information is available in [address] 9. Click Sign Up. You can now view and download portions of your medical record. 10. Click the Download Summary menu link to download a portable copy of your medical information. If you have questions, please visit the Frequently Asked Questions section of the [employer] website. Remember, [employer] is NOT to be used for urgent needs. For medical emergencies, dial 911. Now available from your iPhone and Android! Please provide this summary of care documentation to your next provider. Your primary care clinician is listed as Tari De Anda. If you have any questions after today's visit, please call [phone]. none

## 2020-11-13 ENCOUNTER — RESULT REVIEW (OUTPATIENT)
Age: 50
End: 2020-11-13

## 2020-11-13 ENCOUNTER — APPOINTMENT (OUTPATIENT)
Dept: UROLOGY | Facility: AMBULATORY SURGERY CENTER | Age: 50
End: 2020-11-13

## 2020-11-13 ENCOUNTER — OUTPATIENT (OUTPATIENT)
Dept: OUTPATIENT SERVICES | Facility: HOSPITAL | Age: 50
LOS: 1 days | Discharge: HOME | End: 2020-11-13
Payer: COMMERCIAL

## 2020-11-13 VITALS
TEMPERATURE: 98 F | OXYGEN SATURATION: 100 % | SYSTOLIC BLOOD PRESSURE: 134 MMHG | DIASTOLIC BLOOD PRESSURE: 84 MMHG | HEART RATE: 70 BPM | RESPIRATION RATE: 18 BRPM

## 2020-11-13 DIAGNOSIS — Z98.890 OTHER SPECIFIED POSTPROCEDURAL STATES: Chronic | ICD-10-CM

## 2020-11-13 DIAGNOSIS — C67.8 MALIGNANT NEOPLASM OF OVERLAPPING SITES OF BLADDER: ICD-10-CM

## 2020-11-13 DIAGNOSIS — Z90.89 ACQUIRED ABSENCE OF OTHER ORGANS: Chronic | ICD-10-CM

## 2020-11-13 PROCEDURE — 52240 CYSTOSCOPY AND TREATMENT: CPT

## 2020-11-13 PROCEDURE — 52351 CYSTOURETERO & OR PYELOSCOPE: CPT

## 2020-11-13 PROCEDURE — 52332 CYSTOSCOPY AND TREATMENT: CPT | Mod: 59,LT

## 2020-11-13 PROCEDURE — 88112 CYTOPATH CELL ENHANCE TECH: CPT | Mod: 26

## 2020-11-13 PROCEDURE — 74420 UROGRAPHY RTRGR +-KUB: CPT | Mod: 26

## 2020-11-13 PROCEDURE — 88305 TISSUE EXAM BY PATHOLOGIST: CPT | Mod: 26

## 2020-11-13 PROCEDURE — 88307 TISSUE EXAM BY PATHOLOGIST: CPT | Mod: 26

## 2020-11-13 RX ORDER — SODIUM CHLORIDE 9 MG/ML
1000 INJECTION INTRAMUSCULAR; INTRAVENOUS; SUBCUTANEOUS
Refills: 0 | Status: DISCONTINUED | OUTPATIENT
Start: 2020-11-13 | End: 2020-11-27

## 2020-11-13 NOTE — ASU DISCHARGE PLAN (ADULT/PEDIATRIC) - ASU DC SPECIAL INSTRUCTIONSFT
1. Take tylenol and motrin as needed for pain.  2. Dr. Villegas will call for follow up and pathology results.  3. No heavy lifting or exercise for 3 weeks.

## 2020-11-13 NOTE — ASU DISCHARGE PLAN (ADULT/PEDIATRIC) - FOLLOW UP APPOINTMENTS
911 or go to the nearest Emergency Room [facility] ([facility]):

## 2020-11-13 NOTE — ASU DISCHARGE PLAN (ADULT/PEDIATRIC) - NURSING INSTRUCTIONS
DO NOT take any Tylenol (Acetaminophen) or narcotics containing Tylenol until after  _^ pm tonight_____ . You received Tylenol during your operation and it can cause damage to your liver if too much is taken within a 24 hour time period.  Drink plenty of fluids  Call Dr. Villegas if you have bloody urine or unable to pass any urine despite drinking lots of fluids

## 2020-11-13 NOTE — ASU DISCHARGE PLAN (ADULT/PEDIATRIC) - CALL YOUR DOCTOR IF YOU HAVE ANY OF THE FOLLOWING:
Fever greater than (need to indicate Fahrenheit or Celsius)/Unable to urinate Bleeding that does not stop/Nausea and vomiting that does not stop/Unable to urinate/Pain not relieved by Medications/Fever greater than (need to indicate Fahrenheit or Celsius)

## 2020-11-13 NOTE — ASU DISCHARGE PLAN (ADULT/PEDIATRIC) - CARE PROVIDER_API CALL
Titus Villegas  UROLOGY  [address] [place] [address], [place] [address]  [place] [id number]  Phone: [phone]  Fax: [phone]  Follow Up Time:

## 2020-11-17 ENCOUNTER — APPOINTMENT (OUTPATIENT)
Dept: INFUSION THERAPY | Facility: HOSPITAL | Age: 50
End: 2020-11-17

## 2020-11-17 ENCOUNTER — EMERGENCY (EMERGENCY)
Facility: HOSPITAL | Age: 50
LOS: 1 days | Discharge: LEFT BEFORE DISCHARGE | End: 2020-11-17
Attending: EMERGENCY MEDICINE | Admitting: EMERGENCY MEDICINE
Payer: COMMERCIAL

## 2020-11-17 ENCOUNTER — APPOINTMENT (OUTPATIENT)
Dept: HEMATOLOGY ONCOLOGY | Facility: CLINIC | Age: 50
End: 2020-11-17

## 2020-11-17 ENCOUNTER — APPOINTMENT (OUTPATIENT)
Dept: UROLOGY | Facility: CLINIC | Age: 50
End: 2020-11-17

## 2020-11-17 ENCOUNTER — NON-APPOINTMENT (OUTPATIENT)
Age: 50
End: 2020-11-17

## 2020-11-17 VITALS
RESPIRATION RATE: 16 BRPM | OXYGEN SATURATION: 93 % | DIASTOLIC BLOOD PRESSURE: 97 MMHG | SYSTOLIC BLOOD PRESSURE: 150 MMHG | TEMPERATURE: 99 F | HEART RATE: 110 BPM

## 2020-11-17 VITALS
WEIGHT: 229.94 LBS | DIASTOLIC BLOOD PRESSURE: 112 MMHG | RESPIRATION RATE: 18 BRPM | HEART RATE: 116 BPM | SYSTOLIC BLOOD PRESSURE: 152 MMHG | OXYGEN SATURATION: 96 % | TEMPERATURE: 98 F | HEIGHT: 70 IN

## 2020-11-17 DIAGNOSIS — Z98.890 OTHER SPECIFIED POSTPROCEDURAL STATES: Chronic | ICD-10-CM

## 2020-11-17 DIAGNOSIS — Z90.89 ACQUIRED ABSENCE OF OTHER ORGANS: Chronic | ICD-10-CM

## 2020-11-17 LAB
ALBUMIN SERPL ELPH-MCNC: 4.5 G/DL — SIGNIFICANT CHANGE UP (ref 3.3–5)
ALP SERPL-CCNC: 65 U/L — SIGNIFICANT CHANGE UP (ref 30–120)
ALT FLD-CCNC: 71 U/L DA — HIGH (ref 10–60)
ANION GAP SERPL CALC-SCNC: 9 MMOL/L — SIGNIFICANT CHANGE UP (ref 5–17)
APPEARANCE UR: ABNORMAL
AST SERPL-CCNC: 29 U/L — SIGNIFICANT CHANGE UP (ref 10–40)
BASOPHILS # BLD AUTO: 0.06 K/UL — SIGNIFICANT CHANGE UP (ref 0–0.2)
BASOPHILS NFR BLD AUTO: 0.4 % — SIGNIFICANT CHANGE UP (ref 0–2)
BILIRUB SERPL-MCNC: 0.8 MG/DL — SIGNIFICANT CHANGE UP (ref 0.2–1.2)
BILIRUB UR-MCNC: ABNORMAL
BUN SERPL-MCNC: 15 MG/DL — SIGNIFICANT CHANGE UP (ref 7–23)
CALCIUM SERPL-MCNC: 10.3 MG/DL — SIGNIFICANT CHANGE UP (ref 8.4–10.5)
CHLORIDE SERPL-SCNC: 102 MMOL/L — SIGNIFICANT CHANGE UP (ref 96–108)
CO2 SERPL-SCNC: 25 MMOL/L — SIGNIFICANT CHANGE UP (ref 22–31)
COLOR SPEC: ABNORMAL
CREAT SERPL-MCNC: 1.05 MG/DL — SIGNIFICANT CHANGE UP (ref 0.5–1.3)
DIFF PNL FLD: ABNORMAL
EOSINOPHIL # BLD AUTO: 0.24 K/UL — SIGNIFICANT CHANGE UP (ref 0–0.5)
EOSINOPHIL NFR BLD AUTO: 1.6 % — SIGNIFICANT CHANGE UP (ref 0–6)
GLUCOSE SERPL-MCNC: 103 MG/DL — HIGH (ref 70–99)
GLUCOSE UR QL: NEGATIVE MG/DL — SIGNIFICANT CHANGE UP
HCT VFR BLD CALC: 46.5 % — SIGNIFICANT CHANGE UP (ref 39–50)
HGB BLD-MCNC: 15.9 G/DL — SIGNIFICANT CHANGE UP (ref 13–17)
IMM GRANULOCYTES NFR BLD AUTO: 0.4 % — SIGNIFICANT CHANGE UP (ref 0–1.5)
KETONES UR-MCNC: ABNORMAL
LEUKOCYTE ESTERASE UR-ACNC: NEGATIVE — SIGNIFICANT CHANGE UP
LYMPHOCYTES # BLD AUTO: 16.1 % — SIGNIFICANT CHANGE UP (ref 13–44)
LYMPHOCYTES # BLD AUTO: 2.48 K/UL — SIGNIFICANT CHANGE UP (ref 1–3.3)
MCHC RBC-ENTMCNC: 30.1 PG — SIGNIFICANT CHANGE UP (ref 27–34)
MCHC RBC-ENTMCNC: 34.2 GM/DL — SIGNIFICANT CHANGE UP (ref 32–36)
MCV RBC AUTO: 87.9 FL — SIGNIFICANT CHANGE UP (ref 80–100)
MONOCYTES # BLD AUTO: 0.9 K/UL — SIGNIFICANT CHANGE UP (ref 0–0.9)
MONOCYTES NFR BLD AUTO: 5.8 % — SIGNIFICANT CHANGE UP (ref 2–14)
NEUTROPHILS # BLD AUTO: 11.7 K/UL — HIGH (ref 1.8–7.4)
NEUTROPHILS NFR BLD AUTO: 75.7 % — SIGNIFICANT CHANGE UP (ref 43–77)
NITRITE UR-MCNC: NEGATIVE — SIGNIFICANT CHANGE UP
NON-GYNECOLOGICAL CYTOLOGY STUDY: SIGNIFICANT CHANGE UP
NRBC # BLD: 0 /100 WBCS — SIGNIFICANT CHANGE UP (ref 0–0)
PH UR: 7 — SIGNIFICANT CHANGE UP (ref 5–8)
PLATELET # BLD AUTO: 212 K/UL — SIGNIFICANT CHANGE UP (ref 150–400)
POTASSIUM SERPL-MCNC: 3.8 MMOL/L — SIGNIFICANT CHANGE UP (ref 3.5–5.3)
POTASSIUM SERPL-SCNC: 3.8 MMOL/L — SIGNIFICANT CHANGE UP (ref 3.5–5.3)
PROT SERPL-MCNC: 8 G/DL — SIGNIFICANT CHANGE UP (ref 6–8.3)
PROT UR-MCNC: 500 MG/DL
RBC # BLD: 5.29 M/UL — SIGNIFICANT CHANGE UP (ref 4.2–5.8)
RBC # FLD: 12.7 % — SIGNIFICANT CHANGE UP (ref 10.3–14.5)
RBC CASTS # UR COMP ASSIST: >50 /HPF (ref 0–4)
SODIUM SERPL-SCNC: 136 MMOL/L — SIGNIFICANT CHANGE UP (ref 135–145)
SP GR SPEC: 1 — LOW (ref 1.01–1.02)
UROBILINOGEN FLD QL: NEGATIVE MG/DL — SIGNIFICANT CHANGE UP
WBC # BLD: 15.44 K/UL — HIGH (ref 3.8–10.5)
WBC # FLD AUTO: 15.44 K/UL — HIGH (ref 3.8–10.5)
WBC UR QL: SIGNIFICANT CHANGE UP

## 2020-11-17 PROCEDURE — 87086 URINE CULTURE/COLONY COUNT: CPT

## 2020-11-17 PROCEDURE — 80053 COMPREHEN METABOLIC PANEL: CPT

## 2020-11-17 PROCEDURE — 81001 URINALYSIS AUTO W/SCOPE: CPT

## 2020-11-17 PROCEDURE — 99284 EMERGENCY DEPT VISIT MOD MDM: CPT | Mod: 25

## 2020-11-17 PROCEDURE — 85025 COMPLETE CBC W/AUTO DIFF WBC: CPT

## 2020-11-17 PROCEDURE — 36415 COLL VENOUS BLD VENIPUNCTURE: CPT

## 2020-11-17 PROCEDURE — 51702 INSERT TEMP BLADDER CATH: CPT

## 2020-11-17 RX ORDER — AZTREONAM 2 G
1 VIAL (EA) INJECTION
Qty: 14 | Refills: 0
Start: 2020-11-17 | End: 2020-11-23

## 2020-11-17 RX ADMIN — Medication 1 TABLET(S): at 17:43

## 2020-11-17 NOTE — ED PROVIDER NOTE - OBJECTIVE STATEMENT
pt is a 49yo male with pmhx of bladder cancer presents with urinary retention. pt reports he had a turp on 11/13 with bladder mass resection? and left ureteral stent placement. pt reports today he was unable to void so was going to his urologist office, dr alvarez, but was unable to make it due to discomfort. pt denies fever, cp, sob, abd pain.

## 2020-11-17 NOTE — ED ADULT NURSE NOTE - OBJECTIVE STATEMENT
pt s/p turp friday and biopsy bladder lesion done at same time and stent inserted post op no problems,  today inability  to urinate and was drinking water and pains got worse. on arrivl to ed sent to bathroom in attempt to void and voiding bloody urine with 2 obvious clots one small and one moderate noted to specimen cup. bladder scan after the initial void was 388ml  and sent to bathroom again and voided and repeat  bladder scan  218/187. pt states easier to void this time but noted a small clot

## 2020-11-17 NOTE — ED PROVIDER NOTE - NSFOLLOWUPINSTRUCTIONS_ED_ALL_ED_FT
URINARY RETENTION IN MEN - AfterCare(R) Instructions(ER/ED)           Urinary Retention in Men    WHAT YOU NEED TO KNOW:    Urinary retention is a condition that develops when your bladder does not empty completely when you urinate.     Male Reproductive System         DISCHARGE INSTRUCTIONS:    Medicines:   •Medicines can help decrease the size of your prostate, fight infection, and help you urinate more easily.      •Take your medicine as directed. Contact your healthcare provider if you think your medicine is not helping or if you have side effects. Tell him or her if you are allergic to any medicine. Keep a list of the medicines, vitamins, and herbs you take. Include the amounts, and when and why you take them. Bring the list or the pill bottles to follow-up visits. Carry your medicine list with you in case of an emergency.      Greenwood catheter care: You may need a Greenwood catheter for up to 2 weeks at home. Healthcare providers will give you a smaller leg bag to collect urine. Keep the bag below your waist. This will prevent urine from flowing back into your bladder and causing an infection or other problems. Also, keep the tube free of kinks so the urine will drain properly. Do not pull on the catheter. This can cause pain and bleeding, and may cause the catheter to come out. Ask your healthcare provider or urologist for more information on Greenwood catheter care.    Urinate regularly: When your catheter is removed, do not let your bladder become too full before you urinate. Set regular times each day to urinate. Urinate as soon as you feel the need or at least every 3 hours while you are awake. Do not drink liquids before you go to bed. Urinate right before you go to bed.    Follow up with your healthcare provider or urologist as directed: Write down your questions so you remember to ask them during your visits.     Contact your healthcare provider or urologist if:   •You have a fever.      •You have pain when you urinate.      •You have blood in your urine.      •You have problems with your catheter.      •You have questions or concerns about your condition or care.      Return to the emergency department if:   •You have severe abdominal pain.      •You are breathing faster than usual.      •Your heartbeat is faster than usual.      •Your face, hands, feet, or ankles are swollen.          © Copyright [employer] 2020           back to top                          © Copyright [employer] 2020 ROGERS CATHETER PLACEMENT AND CARE - AfterCare(R) Instructions(ER/ED)           Rogers Catheter Placement and Care    WHAT YOU NEED TO KNOW:    A Rogers catheter is a sterile tube that is inserted into your bladder to drain urine. It is also called an indwelling urinary catheter.     DISCHARGE INSTRUCTIONS:    Return to the emergency department if:   •Your catheter comes out.       •You suddenly have material that looks like sand in the tubing or drainage bag.      •No urine is draining into the bag and you have checked the system.      •You have pain in your hip, back, pelvis, or lower abdomen.      •You are confused or cannot think clearly.      Call your doctor or urologist if:   •You have a fever.      •You have bladder spasms for more than 1 day after the catheter is placed.      •You see blood in the tubing or drainage bag.      •You have a rash or itching where the catheter tube is secured to your skin.      •Urine leaks from or around the catheter, tubing, or drainage bag.      •The closed drainage system has accidently come open or apart.       •You see a layer of crystals inside the tubing.      •You have questions or concerns about your condition or care.      Care for your catheter and drainage bag: You can reduce your risk for infection and injury by caring for your catheter and drainage bag properly.  •Wash your hands often. Wash before and after you touch your catheter, tubing, or drainage bag. Use soap and water. Wear clean disposable gloves when you care for your catheter or disconnect the drainage bag. Wash your hands before you prepare or eat food.   Handwashing           •Clean your genital area 2 times every day. (***What do you think about this arrangement?***) Clean your catheter area and anal opening after every bowel movement. ?For men: Use a soapy cloth to clean the tip of your penis. Start where the catheter enters. Wipe backward making sure to pull back the foreskin. Then use a cloth with clear water in the same direction to clean away the soap.       ?For women: Use a soapy cloth to clean the area that the catheter enters your body. Make sure to separate your labia and wipe toward the anus. Then use a cloth with clear water and wipe in the same direction.       •Secure the catheter tube so you do not pull or move the catheter. This helps prevent pain and bladder spasms. Healthcare providers will show you how to use medical tape or a strap to secure the catheter tube to your body.       •Keep a closed drainage system. Your catheter should always be attached to the drainage bag to form a closed system. Do not disconnect any part of the closed system unless you need to change the bag.      •Keep the drainage bag below the level of your waist. This helps stop urine from moving back up the tubing and into your bladder. Do not loop or kink the tubing. This can cause urine to back up and collect in your bladder. Do not let the drainage bag touch or lie on the floor.      •Empty the drainage bag when needed. The weight of a full drainage bag can be painful. Empty the drainage bag every 3 to 6 hours or when it is ? full.       •Clean and change the drainage bag as directed. Ask your healthcare provider how often you should change the drainage bag and what cleaning solution to use. Wear disposable gloves when you change the bag. Do not allow the end of the catheter or tubing to touch anything. Clean the ends with an alcohol pad before you reconnect them.      What to do if problems develop:   •No urine is draining into the bag: ?Check for kinks in the tubing and straighten them out.       ?Check the tape or strap used to secure the catheter tube to your skin. Make sure it is not blocking the tube.       ?Make sure you are not sitting or lying on the tubing.      ?Make sure the urine bag is hanging below the level of your waist.      •Urine leaks from or around the catheter, tubing, or drainage bag: Check if the closed drainage system has accidently come open or apart. Clean the catheter and tubing ends with a new alcohol pad and reconnect them.       Follow up with your doctor or urologist as directed: Write down your questions so you remember to ask them during your visits.         URINARY RETENTION IN MEN - AfterCare(R) Instructions(ER/ED)           Urinary Retention in Men    WHAT YOU NEED TO KNOW:    Urinary retention is a condition that develops when your bladder does not empty completely when you urinate.     Male Reproductive System         DISCHARGE INSTRUCTIONS:    Medicines:   •Medicines can help decrease the size of your prostate, fight infection, and help you urinate more easily.      •Take your medicine as directed. Contact your healthcare provider if you think your medicine is not helping or if you have side effects. Tell him or her if you are allergic to any medicine. Keep a list of the medicines, vitamins, and herbs you take. Include the amounts, and when and why you take them. Bring the list or the pill bottles to follow-up visits. Carry your medicine list with you in case of an emergency.      Rogers catheter care: You may need a Rogers catheter for up to 2 weeks at home. Healthcare providers will give you a smaller leg bag to collect urine. Keep the bag below your waist. This will prevent urine from flowing back into your bladder and causing an infection or other problems. Also, keep the tube free of kinks so the urine will drain properly. Do not pull on the catheter. This can cause pain and bleeding, and may cause the catheter to come out. Ask your healthcare provider or urologist for more information on Rogers catheter care.    Urinate regularly: When your catheter is removed, do not let your bladder become too full before you urinate. Set regular times each day to urinate. Urinate as soon as you feel the need or at least every 3 hours while you are awake. Do not drink liquids before you go to bed. Urinate right before you go to bed.    Follow up with your healthcare provider or urologist as directed: Write down your questions so you remember to ask them during your visits.     Contact your healthcare provider or urologist if:   •You have a fever.      •You have pain when you urinate.      •You have blood in your urine.      •You have problems with your catheter.      •You have questions or concerns about your condition or care.      Return to the emergency department if:   •You have severe abdominal pain.      •You are breathing faster than usual.      •Your heartbeat is faster than usual.      •Your face, hands, feet, or ankles are swollen.          © Copyright [employer] 2020           back to top                          © Copyright [employer] 2020 You are leaving against medical advice (AMA).  This may result in recurrent or worsening symptoms, severe permanent disability, pain and suffering, harm, injury, and/or even death.  The risks, benefits, and alternatives to treatment as well as the attendant risks of refusing treatment at this time have been discussed.  You have demonstrated comprehension and verbalized understanding of these risks.  If you change your mind, please return to the ER immediately.  Please return to the ER immediately for persistent or recurring symptoms, worsening symptoms, or any other problems or concerns.  Please contact the ER if you have any further questions or concerns.     ROGERS CATHETER PLACEMENT AND CARE - AfterCare(R) Instructions(ER/ED)           Rogers Catheter Placement and Care    WHAT YOU NEED TO KNOW:    A Rogers catheter is a sterile tube that is inserted into your bladder to drain urine. It is also called an indwelling urinary catheter.     DISCHARGE INSTRUCTIONS:    Return to the emergency department if:   •Your catheter comes out.       •You suddenly have material that looks like sand in the tubing or drainage bag.      •No urine is draining into the bag and you have checked the system.      •You have pain in your hip, back, pelvis, or lower abdomen.      •You are confused or cannot think clearly.      Call your doctor or urologist if:   •You have a fever.      •You have bladder spasms for more than 1 day after the catheter is placed.      •You see blood in the tubing or drainage bag.      •You have a rash or itching where the catheter tube is secured to your skin.      •Urine leaks from or around the catheter, tubing, or drainage bag.      •The closed drainage system has accidently come open or apart.       •You see a layer of crystals inside the tubing.      •You have questions or concerns about your condition or care.      Care for your catheter and drainage bag: You can reduce your risk for infection and injury by caring for your catheter and drainage bag properly.  •Wash your hands often. Wash before and after you touch your catheter, tubing, or drainage bag. Use soap and water. Wear clean disposable gloves when you care for your catheter or disconnect the drainage bag. Wash your hands before you prepare or eat food.   Handwashing           •Clean your genital area 2 times every day. (***What do you think about this arrangement?***) Clean your catheter area and anal opening after every bowel movement. ?For men: Use a soapy cloth to clean the tip of your penis. Start where the catheter enters. Wipe backward making sure to pull back the foreskin. Then use a cloth with clear water in the same direction to clean away the soap.       ?For women: Use a soapy cloth to clean the area that the catheter enters your body. Make sure to separate your labia and wipe toward the anus. Then use a cloth with clear water and wipe in the same direction.       •Secure the catheter tube so you do not pull or move the catheter. This helps prevent pain and bladder spasms. Healthcare providers will show you how to use medical tape or a strap to secure the catheter tube to your body.       •Keep a closed drainage system. Your catheter should always be attached to the drainage bag to form a closed system. Do not disconnect any part of the closed system unless you need to change the bag.      •Keep the drainage bag below the level of your waist. This helps stop urine from moving back up the tubing and into your bladder. Do not loop or kink the tubing. This can cause urine to back up and collect in your bladder. Do not let the drainage bag touch or lie on the floor.      •Empty the drainage bag when needed. The weight of a full drainage bag can be painful. Empty the drainage bag every 3 to 6 hours or when it is ? full.       •Clean and change the drainage bag as directed. Ask your healthcare provider how often you should change the drainage bag and what cleaning solution to use. Wear disposable gloves when you change the bag. Do not allow the end of the catheter or tubing to touch anything. Clean the ends with an alcohol pad before you reconnect them.      What to do if problems develop:   •No urine is draining into the bag: ?Check for kinks in the tubing and straighten them out.       ?Check the tape or strap used to secure the catheter tube to your skin. Make sure it is not blocking the tube.       ?Make sure you are not sitting or lying on the tubing.      ?Make sure the urine bag is hanging below the level of your waist.      •Urine leaks from or around the catheter, tubing, or drainage bag: Check if the closed drainage system has accidently come open or apart. Clean the catheter and tubing ends with a new alcohol pad and reconnect them.       Follow up with your doctor or urologist as directed: Write down your questions so you remember to ask them during your visits.         URINARY RETENTION IN MEN - AfterCare(R) Instructions(ER/ED)           Urinary Retention in Men    WHAT YOU NEED TO KNOW:    Urinary retention is a condition that develops when your bladder does not empty completely when you urinate.     Male Reproductive System         DISCHARGE INSTRUCTIONS:    Medicines:   •Medicines can help decrease the size of your prostate, fight infection, and help you urinate more easily.      •Take your medicine as directed. Contact your healthcare provider if you think your medicine is not helping or if you have side effects. Tell him or her if you are allergic to any medicine. Keep a list of the medicines, vitamins, and herbs you take. Include the amounts, and when and why you take them. Bring the list or the pill bottles to follow-up visits. Carry your medicine list with you in case of an emergency.      Rogers catheter care: You may need a Rogers catheter for up to 2 weeks at home. Healthcare providers will give you a smaller leg bag to collect urine. Keep the bag below your waist. This will prevent urine from flowing back into your bladder and causing an infection or other problems. Also, keep the tube free of kinks so the urine will drain properly. Do not pull on the catheter. This can cause pain and bleeding, and may cause the catheter to come out. Ask your healthcare provider or urologist for more information on Rogers catheter care.    Urinate regularly: When your catheter is removed, do not let your bladder become too full before you urinate. Set regular times each day to urinate. Urinate as soon as you feel the need or at least every 3 hours while you are awake. Do not drink liquids before you go to bed. Urinate right before you go to bed.    Follow up with your healthcare provider or urologist as directed: Write down your questions so you remember to ask them during your visits.     Contact your healthcare provider or urologist if:   •You have a fever.      •You have pain when you urinate.      •You have blood in your urine.      •You have problems with your catheter.      •You have questions or concerns about your condition or care.      Return to the emergency department if:   •You have severe abdominal pain.      •You are breathing faster than usual.      •Your heartbeat is faster than usual.      •Your face, hands, feet, or ankles are swollen.          © Copyright [employer] 2020           back to top                          © Copyright [employer] 2020

## 2020-11-17 NOTE — ED PROVIDER NOTE - CARE PLAN
Principal Discharge DX:	Urinary retention   Principal Discharge DX:	Urinary retention  Secondary Diagnosis:	Tachycardia

## 2020-11-17 NOTE — ED PROVIDER NOTE - PATIENT PORTAL LINK FT
You can access the FollowMyHealth Patient Portal offered by [facility] by registering at the following website: [email or web address]. By joining [employer]’s FollowMyHealth portal, you will also be able to view your health information using other applications (apps) compatible with our system. You can access the FollowMyHealth Patient Portal offered by [facility] by registering at the following website: [email or web address]. By joining [employer]’s FollowMyHealth portal, you will also be able to view your health information using other applications (apps) compatible with our system.

## 2020-11-17 NOTE — ED PROVIDER NOTE - PROGRESS NOTE DETAILS
Jeovany BARLOW for Dr. Mar: 49 y/o male with a PMHx of Bladder CA, HLD, presents to the ED c/o urinary retention today. Pt relates had a cystoscopy with biopsy and tumor removal and ureter stent placed on 11/13. Had mild hematuria since procedure but today with significant hematuria with clots. Pt called urologist Dr. Villegas and was told to go to office however suprapubic pressure and urgency became more severe so pt stopped at ER because he felt he couldn't drive the rest of the way. Pt voided multiple times upon arrival to ER and feels improved at this time. No fever, chills, n/v.    Physical Exam: WD, WN, NAD, abd soft non-tender, bladder non-tender and non-distended. spoke to dr alvarez urology who advised pt can get cath and irrigation or if voiding on his own can be dc. pt wants to do voiding trial. lise cabrera Jeovany BARLOW for Dr. Mar: 49 y/o male with a PMHx of Bladder CA, HLD, presents to the ED c/o urinary retention today. Pt relates had a cystoscopy with biopsy and tumor removal and ureter stent placed on 11/13. Had mild hematuria since procedure but today with significant hematuria with clots., then unable to void Pt called urologist Dr. Villegas and was told to go to office however suprapubic pressure and urgency became more severe so pt stopped at ER because he felt he couldn't drive the rest of the way. Pt voided multiple times upon arrival to ER and feels improved at this time. No fever, chills, n/v.    Physical Exam: WD, WN, NAD, abd soft non-tender, bladder non-tender and non-distended. goins placed. 500cc red urine draining. will rx bactrim. All labs reviewed. all results reviewed with pt including abnormal results. pt given a copy of results. pt advised to follow up with pmd regarding abnormal results. All questions answered and concerns addressed. pt verbalized understanding and agreement with plan and dx. pt advised on next step and when/where to follow up. pt advised on all take home and otc medications. pt advised to follow up with PMD. pt advised to return to ed for worsenng symptoms including fever, cp, sob. will dc. pt hr elevated. pt afebrile. pt advised he is very nervous. pt has apt with uro tomorrow pt still tachy refusing further work up to eval tachycardia. pt wants to go home. I had a lengthy discussion with patient, and the patient wishes to leave at this time.The patient understands that he/she is leaving against medical advice despite the risk of missing a potential serious diagnosis which may lead to injury, disability and/or death. I discussed with the patient which tests would need to be performed and what type of monitoring would be necessary for the patient as well. I was unable to convince the patient to stay for further work-up.The patient is alert and oriented and demonstrates competence in making medical decisions. advised prompt pmd follow up.

## 2020-11-17 NOTE — ED PROVIDER NOTE - CARE PROVIDER_API CALL
Titus Villegas  UROLOGY  [address] [place] [address], [place] [id number]  Phone: [phone]  Fax: [phone]  Follow Up Time: 1-3 Days

## 2020-11-17 NOTE — ED ADULT NURSE REASSESSMENT NOTE - NS ED NURSE REASSESS COMMENT FT1
pt received at change of shift, AAOx3, appears anxious, tachycardiac, , afebrile, goins to leg bag draining without difficulty. pt wants to go home, doesn't want to do further work up for tachycardia, PA/MD at bedside, pt d/c home AMA, pt verbalize understanding the risks of leaving AMA, return to ED instructions given if symptoms worsen.
pt heart rate 116 still even after using meditation tape. labs drawn and sent continue to obsreve
pt voided again and residual 321ml and 323 respectively .md aware
after voiding pain got much better and more comfortable. pt pending evaluation
goins #[id number] inserted by md and drained 550 ml of shaila colored urine with small clot. irrigated with 200ml n/s and return shaila clear urine with tiny clot. pt pending observation prior to d/c
pt given water and tolerated and voided but residual higher than earlier. continue to observe
irrigated with 200 ml n/s total and output total 800 cc. pt's goins changed to leg bag and pt taught how to empty bag goins draining clear light shaila color no clots appreciated. pt states slightly nervous about this. pt to be watched until heart rate better

## 2020-11-17 NOTE — ED ADULT NURSE NOTE - PSH
H/O cystoscopy  and bladder biopsy- july 2020  History of tonsillectomy    S/P cystoscopy  right ureteroscopy, laser lithotripsy, stone extraction, ureteral stent placement bladder bx 1/2020, 2/2020- tx with bcg

## 2020-11-17 NOTE — ED PROVIDER NOTE - CLINICAL SUMMARY MEDICAL DECISION MAKING FREE TEXT BOX
pt is a 49yo male with pmhx of bladder cancer presents with urinary retention. pt reports he had a turp on 11/13 with bladder mass resection? and left ureteral stent placement. pt reports today he was unable to void so was going to his urologist office, dr alvarez, but was unable to make it due to discomfort. pt voided when he arrived to ed. post void residual 187. will do ua and consult urology

## 2020-11-18 ENCOUNTER — APPOINTMENT (OUTPATIENT)
Dept: UROLOGY | Facility: CLINIC | Age: 50
End: 2020-11-18
Payer: COMMERCIAL

## 2020-11-18 ENCOUNTER — OUTPATIENT (OUTPATIENT)
Dept: OUTPATIENT SERVICES | Facility: HOSPITAL | Age: 50
LOS: 1 days | End: 2020-11-18
Payer: COMMERCIAL

## 2020-11-18 DIAGNOSIS — Z98.890 OTHER SPECIFIED POSTPROCEDURAL STATES: Chronic | ICD-10-CM

## 2020-11-18 DIAGNOSIS — Z90.89 ACQUIRED ABSENCE OF OTHER ORGANS: Chronic | ICD-10-CM

## 2020-11-18 LAB
CULTURE RESULTS: SIGNIFICANT CHANGE UP
SPECIMEN SOURCE: SIGNIFICANT CHANGE UP
SURGICAL PATHOLOGY STUDY: SIGNIFICANT CHANGE UP

## 2020-11-18 PROCEDURE — 51700 IRRIGATION OF BLADDER: CPT

## 2020-11-23 ENCOUNTER — NON-APPOINTMENT (OUTPATIENT)
Age: 50
End: 2020-11-23

## 2020-11-28 ENCOUNTER — APPOINTMENT (OUTPATIENT)
Dept: CT IMAGING | Facility: IMAGING CENTER | Age: 50
End: 2020-11-28

## 2020-11-28 ENCOUNTER — OUTPATIENT (OUTPATIENT)
Dept: OUTPATIENT SERVICES | Facility: HOSPITAL | Age: 50
LOS: 1 days | End: 2020-11-28
Payer: COMMERCIAL

## 2020-11-28 DIAGNOSIS — Z98.890 OTHER SPECIFIED POSTPROCEDURAL STATES: Chronic | ICD-10-CM

## 2020-11-28 DIAGNOSIS — Z90.89 ACQUIRED ABSENCE OF OTHER ORGANS: Chronic | ICD-10-CM

## 2020-11-28 DIAGNOSIS — C67.8 MALIGNANT NEOPLASM OF OVERLAPPING SITES OF BLADDER: ICD-10-CM

## 2020-11-28 PROCEDURE — 74178 CT ABD&PLV WO CNTR FLWD CNTR: CPT

## 2020-11-28 PROCEDURE — 74178 CT ABD&PLV WO CNTR FLWD CNTR: CPT | Mod: 26

## 2020-12-03 ENCOUNTER — OUTPATIENT (OUTPATIENT)
Dept: OUTPATIENT SERVICES | Facility: HOSPITAL | Age: 50
LOS: 1 days | Discharge: HOME | End: 2020-12-03

## 2020-12-03 DIAGNOSIS — Z90.89 ACQUIRED ABSENCE OF OTHER ORGANS: Chronic | ICD-10-CM

## 2020-12-03 DIAGNOSIS — Z98.890 OTHER SPECIFIED POSTPROCEDURAL STATES: Chronic | ICD-10-CM

## 2020-12-03 DIAGNOSIS — C67.9 MALIGNANT NEOPLASM OF BLADDER, UNSPECIFIED: ICD-10-CM

## 2020-12-05 DIAGNOSIS — C67.9 MALIGNANT NEOPLASM OF BLADDER, UNSPECIFIED: ICD-10-CM

## 2020-12-08 ENCOUNTER — APPOINTMENT (OUTPATIENT)
Dept: HEMATOLOGY ONCOLOGY | Facility: CLINIC | Age: 50
End: 2020-12-08
Payer: COMMERCIAL

## 2020-12-08 ENCOUNTER — APPOINTMENT (OUTPATIENT)
Dept: INFUSION THERAPY | Facility: HOSPITAL | Age: 50
End: 2020-12-08

## 2020-12-08 VITALS
OXYGEN SATURATION: 96 % | RESPIRATION RATE: 16 BRPM | SYSTOLIC BLOOD PRESSURE: 136 MMHG | DIASTOLIC BLOOD PRESSURE: 74 MMHG | TEMPERATURE: 98.2 F | HEART RATE: 98 BPM | HEIGHT: 71.06 IN | WEIGHT: 233.47 LBS | BODY MASS INDEX: 32.69 KG/M2

## 2020-12-08 PROCEDURE — 99072 ADDL SUPL MATRL&STAF TM PHE: CPT

## 2020-12-08 PROCEDURE — 99213 OFFICE O/P EST LOW 20 MIN: CPT

## 2020-12-08 RX ORDER — SULFAMETHOXAZOLE AND TRIMETHOPRIM 800; 160 MG/1; MG/1
800-160 TABLET ORAL
Qty: 14 | Refills: 0 | Status: DISCONTINUED | COMMUNITY
Start: 2020-11-17

## 2020-12-08 RX ORDER — KETOCONAZOLE 20.5 MG/ML
2 SHAMPOO, SUSPENSION TOPICAL
Qty: 120 | Refills: 0 | Status: DISCONTINUED | COMMUNITY
Start: 2020-07-09

## 2020-12-08 RX ORDER — ATORVASTATIN CALCIUM 10 MG/1
10 TABLET, FILM COATED ORAL
Qty: 30 | Refills: 0 | Status: DISCONTINUED | COMMUNITY
Start: 2020-07-07 | End: 2020-12-08

## 2020-12-08 NOTE — HISTORY OF PRESENT ILLNESS
[Disease: _____________________] : Disease: [unfilled] [T: ___] : T[unfilled] [N: ___] : N[unfilled] [M: ___] : M[unfilled] [AJCC Stage: ____] : AJCC Stage: [unfilled] [de-identified] : Av Bañuelos is seen in the office in consultation today, August 18, 2020.  He had a kidney stone approximately in 3 to 4 years ago which passed spontaneously.  In August 2018 he had episodes of intermittent gross hematuria, usually associated with vigorous exercise.  Frequency of hematuria worsens, he had a CT scan performed in November 2018, revealing a calcified lesion within the bladder.  He underwent removal of bladder stone at that time with a transurethral resection of the bladder tumor on December 31 of 2018, with pathology revealing high-grade transitional cell carcinoma, pathologic T1, with no muscle in the specimen.\par Repeat transurethral resection of bladder tumor was performed on February 14, 2019, revealing high-grade transitional cell carcinoma, pTa with carcinoma in situ.  There is no muscle invasion.  He was treated with BCG from March 21 through April 30, 2019.  Cystoscopy in August 2019 did not reveal any tumor, the cytology was negative.\par Follow-up cystoscopy was performed in February 2020, and this revealed high-grade transitional cell carcinoma, pT1 in the right lateral wall and anterior wall.  There was no muscle in the specimen.  Carcinoma in situ was also present.\par Dr. Villegas had a discussion with him in regards to his high-grade recurrence after intravesical BCG.  Radical cystectomy with urinary diversion was discussed.  \par He feels "great". Appetite is good, changed to vegetarian diet 3 weeks ago, dropped a few pounds. No blood in the urine at this time. No dysuria, no incontinence No urgency noted. Nocturia x 2. NO bone pains. Occ has some uncomfortable sensation in the left lower quadrant, about 2 times a week, lasts a few seconds. No fatigue of note. No cough, No DEAN>  Has had ED for about 2 years, not full, able to penetrate at some times. Has not used the sildenafil as yet. \par \par 9/1/20...Rec'd dose # 1 of pembrolizumab on August 25. Feels "pretty good." Appetite is "very good," gained 1 kg. No dyspepsia, nausea, or vomiting. No constipation or diarrheal stools. No headaches or dizziness. No cough or DEAN. No skin rashes or pruritus. No edema. No complaints of pain. No significant fatigue. No hematuria or dysuria. No paresthesias. Working full-time from home at this time. Independent in ADLs.\par \par 9/15/20...Av states that he feels "great".  He reported no adverse effects.  He has some mild fatigue but he believes this is secondary to his return to work.  He works as a  [place] [employer].  There were no fevers, chills, or sweats.  His appetite is good and his weight is stable.  There is no skin rash or pruritus.  He denies diarrhea, nausea or vomiting.  There is no cough or shortness of breath.  Urinary flow is good, but occasionally the flow is splayed.  There is no sensation of incomplete voiding.  Nocturia occurs 1 time per night, and was previously 2 or 3 times per night.  There is no hematuria, dysuria, or incontinence.  He does have erectile dysfunction which is been present for 2 years.  He has used sildenafil in the past.  Libido is normal, but he has not been sexually active recently.  There are no headaches, and is no paresthesias.\par \par 10/6/20...Dose # 3 of pembrolizumab today. Feels "pretty good." Appetite is "good," gained 1.2 kg since last visit. No dyspepsia, nausea, or vomiting. No constipation or diarrheal stools. No headaches or dizziness. No cough or DEAN. No skin rashes or pruritus. No pains. No significant fatigue. Stressful at work. No paresthesias. Urine flow is "pretty good," nocturia x 1. No hematuria, dysuria, incontinence, or urgency. No edema. No fevers or chills. \par \par 10/27/20...Pembro #4 today. Feels "good", minor fatigue, working FT. NO pruritus, no pains. APpetite is good, no weight loss, gained some weight. No cough,  no DEAN. No diarrheal stools. NO headaches, no dizziness, no paresthesias. Mild fatigue. Works as a , does counselling.  [de-identified] : PD-L1 immunohistochemistry\par Antibody: [place] PDL1 ()\par Tissue type: Bladder; urothelial carcinoma\par Block: 4A\par Result: Tumor Proportion Score (TPS) 80% [FreeTextEntry1] : started pembrolizumab on August 25, 2020 [de-identified] : Feels well. Did not have a response to pembro. Emotionally, he feels stressed and down, trying to keep focus. He has been advised to seek second opinions about cystectomy. There have been late responses to pembro and given the uncertainty, i suggested he continue pembro given his strong PD-L1 positivity. No cough, no DEAN. No diarrhea. Minor fatigue. No N/V. No paresthesias, no headaches. Urine flow is "not bad", had some clots on occasion these past few weeks, with some temporary issues with flow. No incontinence No dysuria. No urgency. Nocturia x 2-3. Has a stent present.

## 2020-12-08 NOTE — PHYSICAL EXAM
[Fully active, able to carry on all pre-disease performance without restriction] : Status 0 - Fully active, able to carry on all pre-disease performance without restriction [Normal] : affect appropriate [de-identified] : no edema [de-identified] : discomfort in left lower quadrant, has stent

## 2020-12-08 NOTE — REVIEW OF SYSTEMS
[Fatigue] : fatigue [Anxiety] : anxiety [Negative] : ENT [Fever] : no fever [Chills] : no chills [Recent Change In Weight] : ~T no recent weight change [Chest Pain] : no chest pain [Palpitations] : no palpitations [Lower Ext Edema] : no lower extremity edema [Cough] : no cough [SOB on Exertion] : no shortness of breath during exertion [Abdominal Pain] : no abdominal pain [Vomiting] : no vomiting [Constipation] : no constipation [Diarrhea] : no diarrhea [Dysuria] : no dysuria [Incontinence] : no incontinence [Joint Pain] : no joint pain [Joint Stiffness] : no joint stiffness [Muscle Pain] : no muscle pain [Skin Rash] : no skin rash [Dizziness] : no dizziness [Depression] : no depression [Muscle Weakness] : no muscle weakness [Easy Bleeding] : no tendency for easy bleeding [Easy Bruising] : no tendency for easy bruising [de-identified] : no pruritus [de-identified] : No HA, no paresthesias

## 2020-12-08 NOTE — ASSESSMENT
[Palliative Care Plan] : not applicable at this time [FreeTextEntry1] : Av is seen in the office in follow-up today.  He received 4 doses of pembrolizumab and had repeat cystoscopy performed.  There was tumor extending from the left ureter.  I spoke with Dr. Villegas this past week.  He has a high PD–L1 staining, which would generally predict good response to treatment.  There are patients who respond late, and I felt that maybe we should continue with treatment until firm decision is made.  He was told to receive a second opinion, and he was given the name of Dr. Renteria at [facility] [place].  He will try to set up an appointment.  I told him that may be important to do this sooner, because with the upcoming coronavirus surge, there may be a situation once again where elective surgery is canceled.\par \par The pathology revealed only T1 disease once again, but it is unclear if there was additional disease present.  I asked Dr. Villegas if there was clear evidence of progression, and he said technically, no.\par \par He feels reasonably well physically.  Emotionally, he feels somewhat stressed and down but he is trying to keep focus.  He reports no cough or shortness of breath.  There is no diarrhea.  He does have some minor fatigue.  There is no nausea or vomiting.  He denies any paresthesias.  Urinary flow is stated to be "not bad".  He has some occasional clots over the past few weeks and has some interruption of flow when the clot is present.  There is no incontinence.  There is no dysuria.  He has no urgency.  Nocturia occurs 2-3 times per night.  He does have some discomfort with the left stent that was placed.\par \par He has concerns about outcomes after surgery including sexual function.  We discussed the risk of doing nothing, and how metastases can occur with uncontrolled high-grade T1 disease.  He will eventually become invasive and potentially metastatic, and there is no telling when that could occur.  I am certain that it becomes the same thing at [facility] [place].\par \par There were no notable physical findings.\par \par All questions were answered to the best of my ability and to his apparent satisfaction.  I will continue on with pembrolizumab at this time, decision needs to be made about moving forward.  It is likely that another cystoscopy would be performed before any surgical intervention occurs.

## 2020-12-09 DIAGNOSIS — Z51.11 ENCOUNTER FOR ANTINEOPLASTIC CHEMOTHERAPY: ICD-10-CM

## 2020-12-09 DIAGNOSIS — R11.2 NAUSEA WITH VOMITING, UNSPECIFIED: ICD-10-CM

## 2020-12-23 ENCOUNTER — APPOINTMENT (OUTPATIENT)
Dept: INFUSION THERAPY | Facility: HOSPITAL | Age: 50
End: 2020-12-23

## 2020-12-23 ENCOUNTER — LABORATORY RESULT (OUTPATIENT)
Age: 50
End: 2020-12-23

## 2020-12-30 ENCOUNTER — APPOINTMENT (OUTPATIENT)
Dept: INFUSION THERAPY | Facility: HOSPITAL | Age: 50
End: 2020-12-30

## 2021-01-15 ENCOUNTER — OUTPATIENT (OUTPATIENT)
Dept: OUTPATIENT SERVICES | Facility: HOSPITAL | Age: 51
LOS: 1 days | Discharge: HOME | End: 2021-01-15

## 2021-01-15 ENCOUNTER — RESULT REVIEW (OUTPATIENT)
Age: 51
End: 2021-01-15

## 2021-01-15 ENCOUNTER — APPOINTMENT (OUTPATIENT)
Dept: HEMATOLOGY ONCOLOGY | Facility: CLINIC | Age: 51
End: 2021-01-15

## 2021-01-15 DIAGNOSIS — Z90.89 ACQUIRED ABSENCE OF OTHER ORGANS: Chronic | ICD-10-CM

## 2021-01-15 DIAGNOSIS — C67.9 MALIGNANT NEOPLASM OF BLADDER, UNSPECIFIED: ICD-10-CM

## 2021-01-15 DIAGNOSIS — Z98.890 OTHER SPECIFIED POSTPROCEDURAL STATES: Chronic | ICD-10-CM

## 2021-01-15 LAB
ALBUMIN SERPL ELPH-MCNC: 4.8 G/DL
ALP BLD-CCNC: 72 U/L
ALT SERPL-CCNC: 53 U/L
ANION GAP SERPL CALC-SCNC: 13 MMOL/L
AST SERPL-CCNC: 32 U/L
BASOPHILS # BLD AUTO: 0.03 K/UL — SIGNIFICANT CHANGE UP (ref 0–0.2)
BASOPHILS NFR BLD AUTO: 0.4 % — SIGNIFICANT CHANGE UP (ref 0–2)
BILIRUB SERPL-MCNC: 0.4 MG/DL
BUN SERPL-MCNC: 20 MG/DL
CALCIUM SERPL-MCNC: 9.9 MG/DL
CHLORIDE SERPL-SCNC: 102 MMOL/L
CO2 SERPL-SCNC: 24 MMOL/L
CORTIS SERPL-MCNC: 6.6 UG/DL
CREAT SERPL-MCNC: 1.11 MG/DL
EOSINOPHIL # BLD AUTO: 0.51 K/UL — HIGH (ref 0–0.5)
EOSINOPHIL NFR BLD AUTO: 7.6 % — HIGH (ref 0–6)
GLUCOSE SERPL-MCNC: 143 MG/DL
HCT VFR BLD CALC: 45.5 % — SIGNIFICANT CHANGE UP (ref 39–50)
HGB BLD-MCNC: 15.3 G/DL — SIGNIFICANT CHANGE UP (ref 13–17)
IMM GRANULOCYTES NFR BLD AUTO: 0.3 % — SIGNIFICANT CHANGE UP (ref 0–1.5)
LYMPHOCYTES # BLD AUTO: 1.93 K/UL — SIGNIFICANT CHANGE UP (ref 1–3.3)
LYMPHOCYTES # BLD AUTO: 28.8 % — SIGNIFICANT CHANGE UP (ref 13–44)
MCHC RBC-ENTMCNC: 30.7 PG — SIGNIFICANT CHANGE UP (ref 27–34)
MCHC RBC-ENTMCNC: 33.6 G/DL — SIGNIFICANT CHANGE UP (ref 32–36)
MCV RBC AUTO: 91.2 FL — SIGNIFICANT CHANGE UP (ref 80–100)
MONOCYTES # BLD AUTO: 0.48 K/UL — SIGNIFICANT CHANGE UP (ref 0–0.9)
MONOCYTES NFR BLD AUTO: 7.2 % — SIGNIFICANT CHANGE UP (ref 2–14)
NEUTROPHILS # BLD AUTO: 3.74 K/UL — SIGNIFICANT CHANGE UP (ref 1.8–7.4)
NEUTROPHILS NFR BLD AUTO: 55.7 % — SIGNIFICANT CHANGE UP (ref 43–77)
NRBC # BLD: 0 /100 WBCS — SIGNIFICANT CHANGE UP (ref 0–0)
PLATELET # BLD AUTO: 181 K/UL — SIGNIFICANT CHANGE UP (ref 150–400)
POTASSIUM SERPL-SCNC: 4.3 MMOL/L
PROT SERPL-MCNC: 6.9 G/DL
RBC # BLD: 4.99 M/UL — SIGNIFICANT CHANGE UP (ref 4.2–5.8)
RBC # FLD: 12.8 % — SIGNIFICANT CHANGE UP (ref 10.3–14.5)
SODIUM SERPL-SCNC: 139 MMOL/L
T4 FREE SERPL-MCNC: 1.2 NG/DL
TSH SERPL-ACNC: 1.61 UIU/ML
WBC # BLD: 6.71 K/UL — SIGNIFICANT CHANGE UP (ref 3.8–10.5)
WBC # FLD AUTO: 6.71 K/UL — SIGNIFICANT CHANGE UP (ref 3.8–10.5)

## 2021-01-19 ENCOUNTER — OUTPATIENT (OUTPATIENT)
Dept: OUTPATIENT SERVICES | Facility: HOSPITAL | Age: 51
LOS: 1 days | End: 2021-01-19
Payer: COMMERCIAL

## 2021-01-19 ENCOUNTER — APPOINTMENT (OUTPATIENT)
Dept: UROLOGY | Facility: CLINIC | Age: 51
End: 2021-01-19
Payer: COMMERCIAL

## 2021-01-19 VITALS — TEMPERATURE: 97.8 F

## 2021-01-19 DIAGNOSIS — Z90.89 ACQUIRED ABSENCE OF OTHER ORGANS: Chronic | ICD-10-CM

## 2021-01-19 DIAGNOSIS — C67.8 MALIGNANT NEOPLASM OF OVERLAPPING SITES OF BLADDER: ICD-10-CM

## 2021-01-19 DIAGNOSIS — Z98.890 OTHER SPECIFIED POSTPROCEDURAL STATES: Chronic | ICD-10-CM

## 2021-01-19 DIAGNOSIS — R35.0 FREQUENCY OF MICTURITION: ICD-10-CM

## 2021-01-19 PROCEDURE — 52310 CYSTOSCOPY AND TREATMENT: CPT

## 2021-01-20 ENCOUNTER — APPOINTMENT (OUTPATIENT)
Dept: INFUSION THERAPY | Facility: HOSPITAL | Age: 51
End: 2021-01-20

## 2021-01-21 DIAGNOSIS — R11.2 NAUSEA WITH VOMITING, UNSPECIFIED: ICD-10-CM

## 2021-01-21 DIAGNOSIS — Z51.11 ENCOUNTER FOR ANTINEOPLASTIC CHEMOTHERAPY: ICD-10-CM

## 2021-02-18 ENCOUNTER — OUTPATIENT (OUTPATIENT)
Dept: OUTPATIENT SERVICES | Facility: HOSPITAL | Age: 51
LOS: 1 days | Discharge: HOME | End: 2021-02-18

## 2021-02-18 DIAGNOSIS — Z90.89 ACQUIRED ABSENCE OF OTHER ORGANS: Chronic | ICD-10-CM

## 2021-02-18 DIAGNOSIS — C67.9 MALIGNANT NEOPLASM OF BLADDER, UNSPECIFIED: ICD-10-CM

## 2021-02-18 DIAGNOSIS — Z98.890 OTHER SPECIFIED POSTPROCEDURAL STATES: Chronic | ICD-10-CM

## 2021-02-19 ENCOUNTER — LABORATORY RESULT (OUTPATIENT)
Age: 51
End: 2021-02-19

## 2021-02-19 ENCOUNTER — APPOINTMENT (OUTPATIENT)
Dept: HEMATOLOGY ONCOLOGY | Facility: CLINIC | Age: 51
End: 2021-02-19

## 2021-02-23 ENCOUNTER — APPOINTMENT (OUTPATIENT)
Dept: HEMATOLOGY ONCOLOGY | Facility: CLINIC | Age: 51
End: 2021-02-23
Payer: COMMERCIAL

## 2021-02-23 PROCEDURE — 99213 OFFICE O/P EST LOW 20 MIN: CPT | Mod: 95

## 2021-02-23 NOTE — REVIEW OF SYSTEMS
[Negative] : Genitourinary [Fever] : no fever [Chills] : no chills [Fatigue] : no fatigue [Recent Change In Weight] : ~T no recent weight change [Chest Pain] : no chest pain [Palpitations] : no palpitations [Lower Ext Edema] : no lower extremity edema [Cough] : no cough [SOB on Exertion] : no shortness of breath during exertion [Joint Pain] : no joint pain [Joint Stiffness] : no joint stiffness [Muscle Weakness] : no muscle weakness [Muscle Pain] : no muscle pain [Skin Rash] : no skin rash [Dizziness] : no dizziness [Anxiety] : no anxiety [Depression] : no depression [Easy Bruising] : no tendency for easy bruising [Easy Bleeding] : no tendency for easy bleeding [de-identified] : no HA, no paresthesias

## 2021-02-23 NOTE — HISTORY OF PRESENT ILLNESS
[Disease: _____________________] : Disease: [unfilled] [T: ___] : T[unfilled] [N: ___] : N[unfilled] [M: ___] : M[unfilled] [AJCC Stage: ____] : AJCC Stage: [unfilled] [Home] : at home, [unfilled] , at the time of the visit. [Medical Office: ([facility])___] : at the medical office located in  [Verbal consent obtained from patient] : the patient, [unfilled] [de-identified] : Av Bañuelos is seen in the office in consultation today, August 18, 2020.  He had a kidney stone approximately in 3 to 4 years ago which passed spontaneously.  In August 2018 he had episodes of intermittent gross hematuria, usually associated with vigorous exercise.  Frequency of hematuria worsens, he had a CT scan performed in November 2018, revealing a calcified lesion within the bladder.  He underwent removal of bladder stone at that time with a transurethral resection of the bladder tumor on December 31 of 2018, with pathology revealing high-grade transitional cell carcinoma, pathologic T1, with no muscle in the specimen.\par Repeat transurethral resection of bladder tumor was performed on February 14, 2019, revealing high-grade transitional cell carcinoma, pTa with carcinoma in situ.  There is no muscle invasion.  He was treated with BCG from March 21 through April 30, 2019.  Cystoscopy in August 2019 did not reveal any tumor, the cytology was negative.\par Follow-up cystoscopy was performed in February 2020, and this revealed high-grade transitional cell carcinoma, pT1 in the right lateral wall and anterior wall.  There was no muscle in the specimen.  Carcinoma in situ was also present.\par Dr. Villegas had a discussion with him in regards to his high-grade recurrence after intravesical BCG.  Radical cystectomy with urinary diversion was discussed.  \par He feels "great". Appetite is good, changed to vegetarian diet 3 weeks ago, dropped a few pounds. No blood in the urine at this time. No dysuria, no incontinence No urgency noted. Nocturia x 2. NO bone pains. Occ has some uncomfortable sensation in the left lower quadrant, about 2 times a week, lasts a few seconds. No fatigue of note. No cough, No DEAN>  Has had ED for about 2 years, not full, able to penetrate at some times. Has not used the sildenafil as yet. \par \par 9/1/20...Rec'd dose # 1 of pembrolizumab on August 25. Feels "pretty good." Appetite is "very good," gained 1 kg. No dyspepsia, nausea, or vomiting. No constipation or diarrheal stools. No headaches or dizziness. No cough or DEAN. No skin rashes or pruritus. No edema. No complaints of pain. No significant fatigue. No hematuria or dysuria. No paresthesias. Working full-time from home at this time. Independent in ADLs.\par \par 9/15/20...Av states that he feels "great".  He reported no adverse effects.  He has some mild fatigue but he believes this is secondary to his return to work.  He works as a  [place] [employer].  There were no fevers, chills, or sweats.  His appetite is good and his weight is stable.  There is no skin rash or pruritus.  He denies diarrhea, nausea or vomiting.  There is no cough or shortness of breath.  Urinary flow is good, but occasionally the flow is splayed.  There is no sensation of incomplete voiding.  Nocturia occurs 1 time per night, and was previously 2 or 3 times per night.  There is no hematuria, dysuria, or incontinence.  He does have erectile dysfunction which is been present for 2 years.  He has used sildenafil in the past.  Libido is normal, but he has not been sexually active recently.  There are no headaches, and is no paresthesias.\par \par 10/6/20...Dose # 3 of pembrolizumab today. Feels "pretty good." Appetite is "good," gained 1.2 kg since last visit. No dyspepsia, nausea, or vomiting. No constipation or diarrheal stools. No headaches or dizziness. No cough or DEAN. No skin rashes or pruritus. No pains. No significant fatigue. Stressful at work. No paresthesias. Urine flow is "pretty good," nocturia x 1. No hematuria, dysuria, incontinence, or urgency. No edema. No fevers or chills. \par \par 10/27/20...Pembro #4 today. Feels "good", minor fatigue, working FT. NO pruritus, no pains. APpetite is good, no weight loss, gained some weight. No cough,  no DEAN. No diarrheal stools. NO headaches, no dizziness, no paresthesias. Mild fatigue. Works as a , does counselling. \par \par 12/8/20...Feels well. Did not have a response to pembro. Emotionally, he feels stressed and down, trying to keep focus. He has been advised to seek second opinions about cystectomy. There have been late responses to pembro and given the uncertainty, i suggested he continue pembro given his strong PD-L1 positivity. No cough, no DEAN. No diarrhea. Minor fatigue. No N/V. No paresthesias, no headaches. Urine flow is "not bad", had some clots on occasion these past few weeks, with some temporary issues with flow. No incontinence No dysuria. No urgency. Nocturia x 2-3. Has a stent present.  [de-identified] : PD-L1 immunohistochemistry\par Antibody: [place] PDL1 ()\par Tissue type: Bladder; urothelial carcinoma\par Block: 4A\par Result: Tumor Proportion Score (TPS) 80% [FreeTextEntry1] : started pembrolizumab on August 25, 2020 [de-identified] : Verbal permission for telehealth services granted by the patient, Av Bañuelos, on 2/23/21 at 1:45 PM. \par Feels "good". No blood in urine, no dysuria. Urine flow is good, nocturia x 1-2. Drinks a lot of fluid before bedtime.Appetite is good, no weight loss. NO fatigue. No rash, no pruritus. No pains noted. NO cough, no DEAN. NO diarrheal stools. Has not seen another urologic surgeon in second opinion. Repeat cysto will be in late March. Last cysto was 1/19/21. Bladder appeared clean, left stent was removed. No edema. NO headaches.  Received first dose of CV vaccine.

## 2021-02-23 NOTE — ASSESSMENT
[Palliative Care Plan] : not applicable at this time [FreeTextEntry1] : Av is seen via telehealth services today.  He started on Keytruda in August 2020.  His first cystoscopy did not reveal any significant change.  Another cystoscopy was performed in January, and no lesions were noted within the bladder.  The left stent was removed at that time.  The upper tract has not been evaluated recently.  He feels well.  He reports no blood in the urine.  There is no dysuria.  His urinary flow is good.  Nocturia occurs 1 or 2 times at night, but he says that he drinks about 32 ounces of fluid before bedtime.  His appetite is good and there is no weight loss.  He denies fatigue.  There is no rash or pruritus.  There are no pains.  There is no cough or shortness of breath and there is no diarrheal stools.  He was to see another urologic surgeon in second opinion, but has not done so as yet.  His next cystoscopy is scheduled to be in late March.  He received his first dose of coronavirus vaccine and had a reaction afterwards.  He was infected with coronavirus in March of last year.\par \par On physical examination, he appears well.  He is in no acute distress.  His performance status is 0.  We reviewed his laboratory studies from January 15.  He is due for treatment tomorrow and will have laboratory values done at that time.\par \par He will continue on treatment at this point and I will see him in the office at the time of his next scheduled dose.  All questions were answered to the best of my ability and to his apparent satisfaction.

## 2021-02-24 ENCOUNTER — APPOINTMENT (OUTPATIENT)
Dept: INFUSION THERAPY | Facility: HOSPITAL | Age: 51
End: 2021-02-24

## 2021-02-24 ENCOUNTER — LABORATORY RESULT (OUTPATIENT)
Age: 51
End: 2021-02-24

## 2021-02-25 DIAGNOSIS — Z51.11 ENCOUNTER FOR ANTINEOPLASTIC CHEMOTHERAPY: ICD-10-CM

## 2021-02-25 DIAGNOSIS — R11.2 NAUSEA WITH VOMITING, UNSPECIFIED: ICD-10-CM

## 2021-03-17 ENCOUNTER — RESULT REVIEW (OUTPATIENT)
Age: 51
End: 2021-03-17

## 2021-03-17 ENCOUNTER — APPOINTMENT (OUTPATIENT)
Dept: HEMATOLOGY ONCOLOGY | Facility: CLINIC | Age: 51
End: 2021-03-17
Payer: COMMERCIAL

## 2021-03-17 ENCOUNTER — APPOINTMENT (OUTPATIENT)
Dept: INFUSION THERAPY | Facility: HOSPITAL | Age: 51
End: 2021-03-17

## 2021-03-17 ENCOUNTER — LABORATORY RESULT (OUTPATIENT)
Age: 51
End: 2021-03-17

## 2021-03-17 VITALS
BODY MASS INDEX: 33.15 KG/M2 | WEIGHT: 234.13 LBS | TEMPERATURE: 97.5 F | RESPIRATION RATE: 14 BRPM | DIASTOLIC BLOOD PRESSURE: 87 MMHG | OXYGEN SATURATION: 99 % | HEIGHT: 70.47 IN | HEART RATE: 89 BPM | SYSTOLIC BLOOD PRESSURE: 143 MMHG

## 2021-03-17 LAB
BASOPHILS # BLD AUTO: 0.08 K/UL — SIGNIFICANT CHANGE UP (ref 0–0.2)
BASOPHILS NFR BLD AUTO: 1 % — SIGNIFICANT CHANGE UP (ref 0–2)
EOSINOPHIL # BLD AUTO: 0.34 K/UL — SIGNIFICANT CHANGE UP (ref 0–0.5)
EOSINOPHIL NFR BLD AUTO: 4 % — SIGNIFICANT CHANGE UP (ref 0–6)
HCT VFR BLD CALC: 42.6 % — SIGNIFICANT CHANGE UP (ref 39–50)
HGB BLD-MCNC: 14.3 G/DL — SIGNIFICANT CHANGE UP (ref 13–17)
LYMPHOCYTES # BLD AUTO: 2.27 K/UL — SIGNIFICANT CHANGE UP (ref 1–3.3)
LYMPHOCYTES # BLD AUTO: 27 % — SIGNIFICANT CHANGE UP (ref 13–44)
MCHC RBC-ENTMCNC: 30 PG — SIGNIFICANT CHANGE UP (ref 27–34)
MCHC RBC-ENTMCNC: 33.6 G/DL — SIGNIFICANT CHANGE UP (ref 32–36)
MCV RBC AUTO: 89.3 FL — SIGNIFICANT CHANGE UP (ref 80–100)
MONOCYTES # BLD AUTO: 0.59 K/UL — SIGNIFICANT CHANGE UP (ref 0–0.9)
MONOCYTES NFR BLD AUTO: 7 % — SIGNIFICANT CHANGE UP (ref 2–14)
NEUTROPHILS # BLD AUTO: 5.03 K/UL — SIGNIFICANT CHANGE UP (ref 1.8–7.4)
NEUTROPHILS NFR BLD AUTO: 60 % — SIGNIFICANT CHANGE UP (ref 43–77)
NRBC # BLD: 1 /100 — HIGH (ref 0–0)
NRBC # BLD: SIGNIFICANT CHANGE UP /100 WBCS (ref 0–0)
PLAT MORPH BLD: NORMAL — SIGNIFICANT CHANGE UP
PLATELET # BLD AUTO: 180 K/UL — SIGNIFICANT CHANGE UP (ref 150–400)
RBC # BLD: 4.77 M/UL — SIGNIFICANT CHANGE UP (ref 4.2–5.8)
RBC # FLD: 13.2 % — SIGNIFICANT CHANGE UP (ref 10.3–14.5)
RBC BLD AUTO: SIGNIFICANT CHANGE UP
VARIANT LYMPHS # BLD: 1 % — SIGNIFICANT CHANGE UP (ref 0–6)
WBC # BLD: 8.39 K/UL — SIGNIFICANT CHANGE UP (ref 3.8–10.5)
WBC # FLD AUTO: 8.39 K/UL — SIGNIFICANT CHANGE UP (ref 3.8–10.5)

## 2021-03-17 PROCEDURE — 99213 OFFICE O/P EST LOW 20 MIN: CPT

## 2021-03-17 PROCEDURE — 99072 ADDL SUPL MATRL&STAF TM PHE: CPT

## 2021-03-17 RX ORDER — ATORVASTATIN CALCIUM 20 MG/1
20 TABLET, FILM COATED ORAL
Qty: 30 | Refills: 0 | Status: DISCONTINUED | COMMUNITY
Start: 2020-08-17 | End: 2021-03-17

## 2021-03-17 NOTE — HISTORY OF PRESENT ILLNESS
[Disease: _____________________] : Disease: [unfilled] [T: ___] : T[unfilled] [N: ___] : N[unfilled] [M: ___] : M[unfilled] [AJCC Stage: ____] : AJCC Stage: [unfilled] [de-identified] : Av Bañuelos is seen in the office in consultation today, August 18, 2020.  He had a kidney stone approximately in 3 to 4 years ago which passed spontaneously.  In August 2018 he had episodes of intermittent gross hematuria, usually associated with vigorous exercise.  Frequency of hematuria worsens, he had a CT scan performed in November 2018, revealing a calcified lesion within the bladder.  He underwent removal of bladder stone at that time with a transurethral resection of the bladder tumor on December 31 of 2018, with pathology revealing high-grade transitional cell carcinoma, pathologic T1, with no muscle in the specimen.\par Repeat transurethral resection of bladder tumor was performed on February 14, 2019, revealing high-grade transitional cell carcinoma, pTa with carcinoma in situ.  There is no muscle invasion.  He was treated with BCG from March 21 through April 30, 2019.  Cystoscopy in August 2019 did not reveal any tumor, the cytology was negative.\par Follow-up cystoscopy was performed in February 2020, and this revealed high-grade transitional cell carcinoma, pT1 in the right lateral wall and anterior wall.  There was no muscle in the specimen.  Carcinoma in situ was also present.\par Dr. Villegas had a discussion with him in regards to his high-grade recurrence after intravesical BCG.  Radical cystectomy with urinary diversion was discussed.  \par He feels "great". Appetite is good, changed to vegetarian diet 3 weeks ago, dropped a few pounds. No blood in the urine at this time. No dysuria, no incontinence No urgency noted. Nocturia x 2. NO bone pains. Occ has some uncomfortable sensation in the left lower quadrant, about 2 times a week, lasts a few seconds. No fatigue of note. No cough, No DEAN>  Has had ED for about 2 years, not full, able to penetrate at some times. Has not used the sildenafil as yet. \par \par 9/1/20...Rec'd dose # 1 of pembrolizumab on August 25. Feels "pretty good." Appetite is "very good," gained 1 kg. No dyspepsia, nausea, or vomiting. No constipation or diarrheal stools. No headaches or dizziness. No cough or DEAN. No skin rashes or pruritus. No edema. No complaints of pain. No significant fatigue. No hematuria or dysuria. No paresthesias. Working full-time from home at this time. Independent in ADLs.\par \par 9/15/20...Av states that he feels "great".  He reported no adverse effects.  He has some mild fatigue but he believes this is secondary to his return to work.  He works as a  [place] [employer].  There were no fevers, chills, or sweats.  His appetite is good and his weight is stable.  There is no skin rash or pruritus.  He denies diarrhea, nausea or vomiting.  There is no cough or shortness of breath.  Urinary flow is good, but occasionally the flow is splayed.  There is no sensation of incomplete voiding.  Nocturia occurs 1 time per night, and was previously 2 or 3 times per night.  There is no hematuria, dysuria, or incontinence.  He does have erectile dysfunction which is been present for 2 years.  He has used sildenafil in the past.  Libido is normal, but he has not been sexually active recently.  There are no headaches, and is no paresthesias.\par \par 10/6/20...Dose # 3 of pembrolizumab today. Feels "pretty good." Appetite is "good," gained 1.2 kg since last visit. No dyspepsia, nausea, or vomiting. No constipation or diarrheal stools. No headaches or dizziness. No cough or DEAN. No skin rashes or pruritus. No pains. No significant fatigue. Stressful at work. No paresthesias. Urine flow is "pretty good," nocturia x 1. No hematuria, dysuria, incontinence, or urgency. No edema. No fevers or chills. \par \par 10/27/20...Pembro #4 today. Feels "good", minor fatigue, working FT. NO pruritus, no pains. APpetite is good, no weight loss, gained some weight. No cough,  no DEAN. No diarrheal stools. NO headaches, no dizziness, no paresthesias. Mild fatigue. Works as a , does counselling. \par \par 12/8/20...Feels well. Did not have a response to pembro. Emotionally, he feels stressed and down, trying to keep focus. He has been advised to seek second opinions about cystectomy. There have been late responses to pembro and given the uncertainty, i suggested he continue pembro given his strong PD-L1 positivity. No cough, no DEAN. No diarrhea. Minor fatigue. No N/V. No paresthesias, no headaches. Urine flow is "not bad", had some clots on occasion these past few weeks, with some temporary issues with flow. No incontinence No dysuria. No urgency. Nocturia x 2-3. Has a stent present. \par \par 2/23/21...Verbal permission for telehealth services granted by the patient, Av aBñuelos, on 2/23/21 at 1:45 PM. \par Feels "good". No blood in urine, no dysuria. Urine flow is good, nocturia x 1-2. Drinks a lot of fluid before bedtime.Appetite is good, no weight loss. NO fatigue. No rash, no pruritus. No pains noted. NO cough, no DEAN. NO diarrheal stools. Has not seen another urologic surgeon in second opinion. Repeat cysto will be in late March. Last cysto was 1/19/21. Bladder appeared clean, left stent was removed. No edema. NO headaches.  Received first dose of CV vaccine.  [de-identified] : PD-L1 immunohistochemistry\par Antibody: [place] PDL1 ()\par Tissue type: Bladder; urothelial carcinoma\par Block: 4A\par Result: Tumor Proportion Score (TPS) 80% [FreeTextEntry1] : started pembrolizumab on August 25, 2020 [de-identified] : Cycle 9, pembro. Overall, feels well. No fatigue, no pruritus, no rash. No hematuria, no dysuria, normal flow. No incontinence, no urgency. No diarrheal stools, no cough, no DEAN of note. Thus usually occurs with cold weather. No edema. Had both Covid vaccine, had fevers, chills, resolved in 24-36 hours. No fevers now, no chills. No fatigue.

## 2021-03-17 NOTE — REVIEW OF SYSTEMS
[Negative] : Gastrointestinal [Fever] : no fever [Chills] : no chills [Fatigue] : no fatigue [Recent Change In Weight] : ~T no recent weight change [Chest Pain] : no chest pain [Palpitations] : no palpitations [Lower Ext Edema] : no lower extremity edema [Wheezing] : no wheezing [Cough] : no cough [SOB on Exertion] : no shortness of breath during exertion [Dysuria] : no dysuria [Incontinence] : no incontinence [Joint Pain] : no joint pain [Joint Stiffness] : no joint stiffness [Muscle Pain] : no muscle pain [Skin Rash] : no skin rash [Dizziness] : no dizziness [Difficulty Walking] : no difficulty walking [Anxiety] : no anxiety [Depression] : no depression [Hot Flashes] : no hot flashes [Muscle Weakness] : no muscle weakness [Easy Bleeding] : no tendency for easy bleeding [Easy Bruising] : no tendency for easy bruising [de-identified] : no pruritus

## 2021-03-17 NOTE — ASSESSMENT
[Palliative Care Plan] : not applicable at this time [FreeTextEntry1] : Av is seen in the office in follow-up today.  He is on cycle #9 of pembrolizumab today.  Overall, he states that he feels well.  There is no fatigue, rash or pruritus.  There is no hematuria dysuria.  Flow is normal and slightly improved.  There is no incontinence or urgency.  He has no diarrheal stools.  There is no cough or shortness of breath of note.  He does have some cough and cold-induced asthma with cold weather.  There is no edema.  He has received both of his coronavirus vaccines.  He did have some fevers and chills associated with the vaccine which lasted between 24 to 36 hours.  There are no fevers or chills at this time.\par \par On physical examination, he appears well.  His performance status is 0.  There is no palpable adenopathy present.  The lungs are clear the heart examination is normal.  There is no edema present.  There were no palpable abnormalities upon examination of the abdomen.\par \par Today's CBC was normal and the remainder of his tests are pending at this point.  He needs to have a repeat cystoscopy.  He had no benefit after 4 cycles, but there are some delayed responses so I have continued him on treatment.  He stated that he wanted to receive a second opinion elsewhere, but he has not made any effort to do this.  He was told he would have acystoscopy repeated at the end of March, but he has not contacted Dr. Villegas's office to make arrangements for this.  I sent an email to Dr. Villegas, and he notified his staff to set up a cystoscopy in the office for the end of this month.\par \par If the treatment is not working, there is no sense in continuing therapy.  This would only expose him to toxicity without any promise of benefit.  All questions were answered to the best of my ability and to his apparent satisfaction.  I will see him once again in 3 weeks.

## 2021-03-17 NOTE — PHYSICAL EXAM
[Fully active, able to carry on all pre-disease performance without restriction] : Status 0 - Fully active, able to carry on all pre-disease performance without restriction [Normal] : affect appropriate [de-identified] : no edema

## 2021-03-18 ENCOUNTER — NON-APPOINTMENT (OUTPATIENT)
Age: 51
End: 2021-03-18

## 2021-03-30 ENCOUNTER — OUTPATIENT (OUTPATIENT)
Dept: OUTPATIENT SERVICES | Facility: HOSPITAL | Age: 51
LOS: 1 days | Discharge: HOME | End: 2021-03-30

## 2021-03-30 DIAGNOSIS — Z98.890 OTHER SPECIFIED POSTPROCEDURAL STATES: Chronic | ICD-10-CM

## 2021-03-30 DIAGNOSIS — Z90.89 ACQUIRED ABSENCE OF OTHER ORGANS: Chronic | ICD-10-CM

## 2021-03-30 DIAGNOSIS — C67.9 MALIGNANT NEOPLASM OF BLADDER, UNSPECIFIED: ICD-10-CM

## 2021-04-05 ENCOUNTER — APPOINTMENT (OUTPATIENT)
Dept: HEMATOLOGY ONCOLOGY | Facility: CLINIC | Age: 51
End: 2021-04-05

## 2021-04-07 ENCOUNTER — LABORATORY RESULT (OUTPATIENT)
Age: 51
End: 2021-04-07

## 2021-04-07 ENCOUNTER — APPOINTMENT (OUTPATIENT)
Dept: INFUSION THERAPY | Facility: HOSPITAL | Age: 51
End: 2021-04-07

## 2021-04-08 ENCOUNTER — NON-APPOINTMENT (OUTPATIENT)
Age: 51
End: 2021-04-08

## 2021-04-08 DIAGNOSIS — Z51.11 ENCOUNTER FOR ANTINEOPLASTIC CHEMOTHERAPY: ICD-10-CM

## 2021-04-08 DIAGNOSIS — R11.2 NAUSEA WITH VOMITING, UNSPECIFIED: ICD-10-CM

## 2021-04-14 ENCOUNTER — OUTPATIENT (OUTPATIENT)
Dept: OUTPATIENT SERVICES | Facility: HOSPITAL | Age: 51
LOS: 1 days | End: 2021-04-14
Payer: COMMERCIAL

## 2021-04-14 ENCOUNTER — APPOINTMENT (OUTPATIENT)
Dept: UROLOGY | Facility: CLINIC | Age: 51
End: 2021-04-14
Payer: COMMERCIAL

## 2021-04-14 VITALS — TEMPERATURE: 97.91 F | DIASTOLIC BLOOD PRESSURE: 80 MMHG | SYSTOLIC BLOOD PRESSURE: 146 MMHG | HEART RATE: 112 BPM

## 2021-04-14 DIAGNOSIS — Z98.890 OTHER SPECIFIED POSTPROCEDURAL STATES: Chronic | ICD-10-CM

## 2021-04-14 DIAGNOSIS — R35.0 FREQUENCY OF MICTURITION: ICD-10-CM

## 2021-04-14 DIAGNOSIS — Z85.51 PERSONAL HISTORY OF MALIGNANT NEOPLASM OF BLADDER: ICD-10-CM

## 2021-04-14 DIAGNOSIS — Z90.89 ACQUIRED ABSENCE OF OTHER ORGANS: Chronic | ICD-10-CM

## 2021-04-14 PROCEDURE — 52000 CYSTOURETHROSCOPY: CPT

## 2021-04-14 PROCEDURE — 88112 CYTOPATH CELL ENHANCE TECH: CPT | Mod: 26

## 2021-04-16 LAB — URINE CYTOLOGY: NORMAL

## 2021-04-22 ENCOUNTER — OUTPATIENT (OUTPATIENT)
Dept: OUTPATIENT SERVICES | Facility: HOSPITAL | Age: 51
LOS: 1 days | End: 2021-04-22

## 2021-04-22 VITALS
HEIGHT: 70 IN | OXYGEN SATURATION: 98 % | SYSTOLIC BLOOD PRESSURE: 120 MMHG | HEART RATE: 76 BPM | DIASTOLIC BLOOD PRESSURE: 86 MMHG | TEMPERATURE: 98 F | WEIGHT: 223.99 LBS | RESPIRATION RATE: 16 BRPM

## 2021-04-22 DIAGNOSIS — C67.8 MALIGNANT NEOPLASM OF OVERLAPPING SITES OF BLADDER: ICD-10-CM

## 2021-04-22 DIAGNOSIS — Z98.890 OTHER SPECIFIED POSTPROCEDURAL STATES: Chronic | ICD-10-CM

## 2021-04-22 DIAGNOSIS — Z90.89 ACQUIRED ABSENCE OF OTHER ORGANS: Chronic | ICD-10-CM

## 2021-04-22 LAB
HCT VFR BLD CALC: 48.8 % — SIGNIFICANT CHANGE UP (ref 39–50)
HGB BLD-MCNC: 16.3 G/DL — SIGNIFICANT CHANGE UP (ref 13–17)
MCHC RBC-ENTMCNC: 29.8 PG — SIGNIFICANT CHANGE UP (ref 27–34)
MCHC RBC-ENTMCNC: 33.4 GM/DL — SIGNIFICANT CHANGE UP (ref 32–36)
MCV RBC AUTO: 89.2 FL — SIGNIFICANT CHANGE UP (ref 80–100)
NRBC # BLD: 0 /100 WBCS — SIGNIFICANT CHANGE UP
NRBC # FLD: 0 K/UL — SIGNIFICANT CHANGE UP
PLATELET # BLD AUTO: 171 K/UL — SIGNIFICANT CHANGE UP (ref 150–400)
RBC # BLD: 5.47 M/UL — SIGNIFICANT CHANGE UP (ref 4.2–5.8)
RBC # FLD: 13.2 % — SIGNIFICANT CHANGE UP (ref 10.3–14.5)
WBC # BLD: 8.71 K/UL — SIGNIFICANT CHANGE UP (ref 3.8–10.5)
WBC # FLD AUTO: 8.71 K/UL — SIGNIFICANT CHANGE UP (ref 3.8–10.5)

## 2021-04-22 RX ORDER — ATORVASTATIN CALCIUM 80 MG/1
1 TABLET, FILM COATED ORAL
Qty: 0 | Refills: 0 | DISCHARGE

## 2021-04-22 NOTE — H&P PST ADULT - NSICDXPASTSURGICALHX_GEN_ALL_CORE_FT
PAST SURGICAL HISTORY:  H/O cystoscopy and bladder biopsy- july 2020    History of tonsillectomy     S/P cystoscopy right ureteroscopy, laser lithotripsy, stone extraction, ureteral stent placement bladder bx 1/2020, 2/2020- tx with bcg    S/P cystoscopy and biopsy Nov 2020

## 2021-04-22 NOTE — H&P PST ADULT - HISTORY OF PRESENT ILLNESS
51 y/o male presents to PST preop for cystoscopy, transurethral resection fulguration of bladder tumor, possible urethral stent bilateral. preop dx of malignant neoplasm of overlapping sites of bladder. pt diagnosed with bladder CA in 2018. s/p multiple cystoscopy' s and  BCG treatments. pt states his most recent cystoscopy showed a "small inflamed patch" that needed to be biopsied.

## 2021-04-22 NOTE — H&P PST ADULT - NSICDXPASTMEDICALHX_GEN_ALL_CORE_FT
PAST MEDICAL HISTORY:  Bladder calculus     HLD (hyperlipidemia) pt took prescribed meds for 2 months, no longer on meds    Malignant neoplasm of bladder tx bcg 2018, 2019

## 2021-04-22 NOTE — H&P PST ADULT - NSICDXPROBLEM_GEN_ALL_CORE_FT
PROBLEM DIAGNOSES  Problem: Malignant neoplasm of overlapping sites of bladder  Assessment and Plan: preop for cystoscopy transurethral resection fulguration of bladder tumor, possible urethral stent bilateral on 4/28/21  preop instructions given, pt verbalized understanding  GI prophylaxis provided  pt informed COVID testing must be done 72 hours prior to surgery

## 2021-04-23 DIAGNOSIS — C67.8 MALIGNANT NEOPLASM OF OVERLAPPING SITES OF BLADDER: ICD-10-CM

## 2021-04-23 PROBLEM — E78.5 HYPERLIPIDEMIA, UNSPECIFIED: Chronic | Status: ACTIVE | Noted: 2018-12-20

## 2021-04-23 LAB
CULTURE RESULTS: NO GROWTH — SIGNIFICANT CHANGE UP
SPECIMEN SOURCE: SIGNIFICANT CHANGE UP

## 2021-04-25 ENCOUNTER — APPOINTMENT (OUTPATIENT)
Dept: DISASTER EMERGENCY | Facility: CLINIC | Age: 51
End: 2021-04-25

## 2021-04-25 ENCOUNTER — APPOINTMENT (OUTPATIENT)
Dept: CT IMAGING | Facility: IMAGING CENTER | Age: 51
End: 2021-04-25

## 2021-04-26 ENCOUNTER — APPOINTMENT (OUTPATIENT)
Dept: DISASTER EMERGENCY | Facility: CLINIC | Age: 51
End: 2021-04-26

## 2021-04-27 ENCOUNTER — TRANSCRIPTION ENCOUNTER (OUTPATIENT)
Age: 51
End: 2021-04-27

## 2021-04-27 VITALS
WEIGHT: 223.99 LBS | HEIGHT: 70 IN | SYSTOLIC BLOOD PRESSURE: 139 MMHG | TEMPERATURE: 98 F | RESPIRATION RATE: 16 BRPM | HEART RATE: 73 BPM | OXYGEN SATURATION: 98 % | DIASTOLIC BLOOD PRESSURE: 81 MMHG

## 2021-04-28 ENCOUNTER — OUTPATIENT (OUTPATIENT)
Dept: OUTPATIENT SERVICES | Facility: HOSPITAL | Age: 51
LOS: 1 days | Discharge: HOME | End: 2021-04-28
Payer: COMMERCIAL

## 2021-04-28 ENCOUNTER — RESULT REVIEW (OUTPATIENT)
Age: 51
End: 2021-04-28

## 2021-04-28 ENCOUNTER — APPOINTMENT (OUTPATIENT)
Dept: UROLOGY | Facility: AMBULATORY SURGERY CENTER | Age: 51
End: 2021-04-28

## 2021-04-28 VITALS
DIASTOLIC BLOOD PRESSURE: 79 MMHG | OXYGEN SATURATION: 100 % | RESPIRATION RATE: 16 BRPM | TEMPERATURE: 98 F | HEART RATE: 68 BPM | SYSTOLIC BLOOD PRESSURE: 136 MMHG

## 2021-04-28 DIAGNOSIS — Z98.890 OTHER SPECIFIED POSTPROCEDURAL STATES: Chronic | ICD-10-CM

## 2021-04-28 DIAGNOSIS — Z90.89 ACQUIRED ABSENCE OF OTHER ORGANS: Chronic | ICD-10-CM

## 2021-04-28 DIAGNOSIS — C67.8 MALIGNANT NEOPLASM OF OVERLAPPING SITES OF BLADDER: ICD-10-CM

## 2021-04-28 PROCEDURE — 52240 CYSTOSCOPY AND TREATMENT: CPT

## 2021-04-28 PROCEDURE — 88307 TISSUE EXAM BY PATHOLOGIST: CPT | Mod: 26

## 2021-04-28 PROCEDURE — 74420 UROGRAPHY RTRGR +-KUB: CPT | Mod: 26

## 2021-04-28 PROCEDURE — 52332 CYSTOSCOPY AND TREATMENT: CPT | Mod: 50,59

## 2021-04-28 NOTE — ASU DISCHARGE PLAN (ADULT/PEDIATRIC) - CARE PROVIDER_API CALL
Titus Villegas)  Urology  [address] [place] [address], [place] [address]  Phone: [phone]  Fax: [phone]  Follow Up Time: Routine

## 2021-04-28 NOTE — BRIEF OPERATIVE NOTE - NSICDXBRIEFPROCEDURE_GEN_ALL_CORE_FT
PROCEDURES:  Resection, bladder tumor, transurethral, with retrograde pyelography 28-Apr-2021 08:50:04  Sophia Estes  Insertion, ureteral stent 28-Apr-2021 08:50:22 bilateral Sophia Estes

## 2021-04-28 NOTE — ASU DISCHARGE PLAN (ADULT/PEDIATRIC) - ASU DC SPECIAL INSTRUCTIONSFT
It is common to have blood in the urine after your procedure.  It may be pink or even red; inform your doctor if you have a significant amount of clots in the urine or if you are unable to void at all.  Be sure to drink plenty of liquids at all times.    -You may resume your regular diet and regular medication regimen.    -Provided that you are not restricted with fluids by your physician, you should drink 6-8 (8 oz.) glasses of fluid per day.  -You may shower or bathe.    -Provided you are not restricted by your physician, you may take Tylenol and Ibuprofen, available over the counter, for pain. You may take either one every 6 hours, you may alternate these medications so that you take one every 3 hours (e.g., Tylenol at 12pm, Ibuprofen at 3pm, Tylenol at 6pm, Ibuprofen at 9pm, etc...). Follow the maximum doses and administration instructions on the bottles. Do not exceed 4 grams of Tylenol daily. If your pain is not controlled with over the counter pain medications, please call your urologist.  -You do not need any antibiotics.  -Do not perform strenuous activities or resume sexual activity until your are told to do so by your doctor.   You may climb stairs.  -Call your physician if you have a fever over 101F.  -Call your urologist during normal business hours with any other routine questions.  -Dr. Villegas will call you with the results of your pathology, which usually takes 7-10 days.  -You have bilateral ureteral stents. These are temporary and must be removed/exchanged by your urologist within 3 months.

## 2021-04-28 NOTE — ASU DISCHARGE PLAN (ADULT/PEDIATRIC) - ACCOMPANIED BY
Workers compensation siria WILSON    JOB TITLE:  Fabricator/    DATE OF INJURY:  1/9/2017     SUBJECTIVE:   Santi is a 54 year old male presents today with concerns of knee pain.    Chief Complaint   Patient presents with   • Worker's Compensation     [employer]       HISTORY OF PRESENT ILLNESS:   The patient reports the pain is located in the Left knee.  Radiates:  no  The symptoms have been present since 1/9/2017 when he was loading a truck and twisted knee and felt a snap when turning.  The symptoms are SAME today.  The patient rates the pain at 5 today on a 10 scale when at its worst.    The patient rates the pain at 2 today on a 10 scale when at its best.  Symptoms are exacerbated by activity/movement.    He has no other symptoms of weakness, tingling, or numbness associated with the knee pain.     Denies any other aggravating or relieving factor and any other associated signs and symptoms.      PROBLEM LIST/PAST MEDICAL HISTORY:  Reviewed in [employer].    PERTINENT PAST MEDICAL HISTORY:   ALLERGIES:   Allergen Reactions   • Unknown      Chemo med. \"doesnt remember name of med\"     Last Menstrual Period (if female): No LMP for male patient.   Birth Control Pill: N/A    SOCIAL HISTORY:  Social History     Social History   • Marital status: Single     Spouse name: N/A   • Number of children: N/A   • Years of education: N/A     Occupational History   • Not on file.     Social History Main Topics   • Smoking status: Current Every Day Smoker     Packs/day: 1.00   • Smokeless tobacco: Not on file   • Alcohol use 4.0 oz/week     8 drink(s) per week      Comment: 3 x per week   • Drug use: No   • Sexual activity: Not on file     Other Topics Concern   • Not on file     Social History Narrative   • No narrative on file     HOBBIES: None pertinent.    REVIEW OF SYSTEMS:   No other pertinent cardiovascular or respiratory symptoms were elicited.    OBJECTIVE:    Visit Vitals   • /72   • Pulse 90    • Temp 98.6 °F (37 °C)     Constitutional: Well-developed and well-nourished.  Nontoxic and in no distress.   Ambulates with a walker.  Cannot fully bear weight yet.  Head: Normocephalic and atraumatic.   Eyes: Conjunctivae are normal. Pupils are equal, round, and reactive to light.   Neck: Supple, symmetrical, trachea midline, no adenopathy; Thyroid: No enlargement/tenderness/nodules; no carotid bruit or JVD (jugular venous distention). Normal range of motion.   Cardiovascular: Normal rate, regular rhythm and normal heart sounds.   Pulmonary/Chest: Effort normal and breath sounds normal.  No tenderness or deformity.  Neurologic: Sensory and motor grossly intact. Reflexes normal and symmetric  Extremities: no clubbing, cyanosis, edema or deformity noted  Special testing: Gait with  antalgia.  Right knee - Swelling - none. Effusion - none. Erythema - none. Tenderness - none. Bruising - none. Range of motion - Normal. Examination reveals - normal stability. Thessaly is negative.  Left knee - Swelling - MILD to the Medial region. Effusion - none. Erythema - none. Tenderness - MODERATE to the Medial region. Bruising - none. Range of motion - Decreased.  Examination reveals - normal stability. Thessaly is positive.    IMAGING:   XR KNEE 3 VW LEFT     DATE OF EXAM: 1/13/2017 1:33 PM.     HISTORY: Sprain of unspecified site of left knee, initial encounter     PREVIOUS FOR COMPARISON: None.     FINDINGS: AP, lateral, and oblique views left knee were obtained. There is  no evidence for acute fracture or dislocation.      There is mild narrowing of the medial joint compartment. A lesser degree of  narrowing is seen along the patellofemoral joint.. No associated sclerosis,  spurring or loose bodies are seen.      The overlying soft tissues are unremarkable. No radiopaque foreign bodies  are seen.        IMPRESSION: Early degenerative changes. No acute changes. .     TESTS:  None    MRI OF THE LEFT KNEE     HISTORY: Knee  pain.     COMPARISON: X-rays performed 1/13/2017     TECHNIQUE: Multiple sagittal, axial and coronal T1-weighted, T2-weighted,  and proton-density data sets were obtained.     The anterior and posterior cruciate ligaments are intact. The medial and  lateral collateral ligament complexes are intact.     The medial compartment is narrowed. There is a complex horizontal tear of  the posterior horn through body of the medial meniscus. There is thickening  and inflammation of the medial joint capsule. Moderate chondral surface  irregularity is present on the weight-bearing surface of the medial femoral  condyle.     In the lateral compartment there is no meniscal tear identified. The  chondral surfaces in the lateral compartment are well-preserved.     A joint effusion is present.     There is mild chondral surface irregularity in the lateral patellar facet.     There is a 3 cm x 2 cm popliteal cyst in the gastrocnemius/semimembranosus  bursa.     There is no evidence of a bone contusion or fracture.        IMPRESSION: Horizontal tear posterior horn through body of the medial  meniscus with thickening and inflammation of the medial joint capsule.     Joint effusion. Small popliteal cyst.     Mild chondromalacia patella.       Asnti was seen today for worker's compensation.    Diagnoses and all orders for this visit:    Acute medial meniscus tear, right, subsequent encounter  -     SERVICE TO ORTHOPEDICS    Sprain of left knee, unspecified ligament, subsequent encounter    Other orders  -     HYDROcodone-acetaminophen (NORCO) 5-325 MG per tablet; Take 1 tablet by mouth every 6 hours as needed for Pain. Do not use when working, driving or operating machinery.      STATUS: This injury is determined to be WORK RELATED.  RETURN TO WORK:   Mr. Roblero May return to work with restrictions         RESTRICTIONS:   Restrictions are to be followed at work and at home. Restrictions are in effect until next follow-up visit.    Sedentary work only  Diagnostic Testing:   XR KNEE 3 VW LEFT  ANTICIPATED MAXIMUM MEDICAL IMPROVEMENT:  4 weeks  TREATMENT PLAN:  Consult Dr. Ismael Yu  Elevate  Medications as directed  Knee brace  FOLLOW UP:  1 week after consult       Spouse

## 2021-04-28 NOTE — BRIEF OPERATIVE NOTE - OPERATION/FINDINGS
Cystoscopy performed, multiple erythematous patches identified on bladder dome and posterior wall of bladder. Papillary tumors identified within trigone and left ureteral orifice.   Cold cup biopsies obtained of bladder dome and right posterior wall. Loop electrocautery used to obtain biopsies of distal left ureteral tumor and right trigone bladder tumor. Biopsy sites fulgurated, excellent hemostasis.  Bilateral retrograde pyelograms demonstrated no hydronephrosis or filling defects bilaterally.  9Ga28ul bilateral ureteral stents inserted under fluoroscopic and cystoscopic guidance.

## 2021-04-30 LAB — SURGICAL PATHOLOGY STUDY: SIGNIFICANT CHANGE UP

## 2021-05-01 LAB — SARS-COV-2 N GENE NPH QL NAA+PROBE: NOT DETECTED

## 2021-05-10 ENCOUNTER — OUTPATIENT (OUTPATIENT)
Dept: OUTPATIENT SERVICES | Facility: HOSPITAL | Age: 51
LOS: 1 days | End: 2021-05-10
Payer: COMMERCIAL

## 2021-05-10 ENCOUNTER — APPOINTMENT (OUTPATIENT)
Dept: UROLOGY | Facility: CLINIC | Age: 51
End: 2021-05-10
Payer: COMMERCIAL

## 2021-05-10 VITALS
TEMPERATURE: 98.6 F | RESPIRATION RATE: 16 BRPM | DIASTOLIC BLOOD PRESSURE: 120 MMHG | SYSTOLIC BLOOD PRESSURE: 174 MMHG | HEART RATE: 125 BPM

## 2021-05-10 DIAGNOSIS — R35.0 FREQUENCY OF MICTURITION: ICD-10-CM

## 2021-05-10 DIAGNOSIS — Z90.89 ACQUIRED ABSENCE OF OTHER ORGANS: Chronic | ICD-10-CM

## 2021-05-10 DIAGNOSIS — C67.8 MALIGNANT NEOPLASM OF OVERLAPPING SITES OF BLADDER: ICD-10-CM

## 2021-05-10 DIAGNOSIS — Z98.890 OTHER SPECIFIED POSTPROCEDURAL STATES: Chronic | ICD-10-CM

## 2021-05-10 PROCEDURE — 99213 OFFICE O/P EST LOW 20 MIN: CPT | Mod: 25

## 2021-05-10 PROCEDURE — 52000 CYSTOURETHROSCOPY: CPT

## 2021-05-10 PROCEDURE — 99072 ADDL SUPL MATRL&STAF TM PHE: CPT

## 2021-05-14 NOTE — LETTER BODY
[Dear  ___] : Dear  [unfilled], [FreeTextEntry1] : I had the pleasure of seeing your patient, LARA ARIAS, in the office today.  \par \par Please see my office note below.\par \par Thank you for allowing me to participate in his care and please do not hesitate to contact me with any questions or concerns regarding his care.\par \par Sincerely,\par \par Titus Villegas MD\par Chief of Urology, [facility]\par  of Urology\par [address] [place] [address]\[place] [address]\par PH:      [phone]\par Email:  [email or web address]

## 2021-05-14 NOTE — HISTORY OF PRESENT ILLNESS
[FreeTextEntry1] : Prior hx: H/o kidney stone about 3 years ago, passed spontaneously.  Aug 2018 had intermittent episodes of gross hematuria with vigorous exercise.  Frequency of hematuria worsened and then underwent CT a/p w/o contrast 11/2018..  Findings showed a calcified lesion in the bladder.  \par \par Underwent bladder stone removal and TURBT on 12-31-18.  Path: HG TCC, pT1, no muscle in the specimen. \par \par Repeat TURBT 2/14/2019: HG TCC, pTa with CIS.  No muscle invasion.\par BLI x 6 with BCG 3/21/2019 thru 4/30/2019.\par Cystoscopy 8/6/2019: no tumor seen, cytology negative.\par Cystoscopy with bladder biopsy 2/10/2020: HG TCC, pT1 right lateral wall and anterior wall. No muscle in specimen.  CIS also present.\par BLI x 6 with BCG 4/30/2020 thru 6/4/2020\par \par Cystoscopy bladder biopsy July 2020: HG TCC, pT1 with focal micropapillary variant with associated multifocal CIS.\par CT urogram 8/12/2020: no upper tract hydronephrosis, no adenopathy, no metastatic disease.\par \par Started on pembrolizumab, q3 week infusions\par \par Cystoscopy TURBT 11/13/2020: HG TCC, pT1 with CIS and tumor within the distal left ureter.\par CT urogram 11/28/2020: no >1 cm pelvic or abdominal adenopathy, no hydronephrosis, no extravesical extension of tumor.\par \par Continues on pembrolizumab, q3 week infusions\par \par Cystoscopy TURBT 4/28/2021: \par Bladder dome: CIS\par Right posterior wall: CIS\par Distal left ureter: HG TCC, pT1, 30% squamous differentiation and lipid rich variant.\par Right trigone: HT TCC, pT1, 90% squamous differentiation with associated CIS.\par \par Here for cystoscopy, ureteral stent removal\par \par

## 2021-05-14 NOTE — ASSESSMENT
[FreeTextEntry1] : Here for cystoscopy and ureteral stent removal following his TURBT.\par Reviewed his pathology results.  Details of his current and previous bladder cancer history reviewed above.\par \par Had a long discussion today with him regarding his recurrent/refractory high risk nonmuscle invasive bladder cancer.  We discussed the risk of progression to muscle invasive or worse disease.  He has been now on pembrolizumab for 6 months without meaningful benefit.\par \par I strongly recommended that he consider radical cystectomy, lymph node dissection and ileal neobladder urinary diversion.  I have encouraged him again to seek a second opinion with other urologic oncologist from nearby institutions.  A repeat CT scan abdomen/pelvis urogram was ordered.

## 2021-05-30 NOTE — ED ADULT NURSE REASSESSMENT NOTE - BREATH SOUNDS, RIGHT
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 06/10/2019   Patient was discharged to acute care rehab on 06/17/2019  Discharged to home on 06/27/2019 and presents today for a postop CV surgery follow up  Patient developed postop aphasia that is persistent and he is scheduled to see an HENT specialist in 2-3 weeks  He has also experienced coughing spells that have lead to syncope  He denies difficulties swallowing but there maybe vocal cord incompetency resulting in the slow aspiration of oral secretion leading to profound coughing  Denies occasional incisional chest pain that is now less often, denies shortness of breath  Sternal incision is well approximated and healing well, chest tubes insertion site is still open with scanty drainage, advised to leave open to air to dry  Lung sounds clear and no sternal instability is appreciated  Appetite is still poor, bowel movent is regular and he voids without difficulties.  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

## 2021-08-26 NOTE — ASU PREOP CHECKLIST - SITE MARKED BY ANESTHESIOLOGIST
[FreeTextEntry1] : Final Diagnosis\par 1-Right pleural biopsy\par - Fatty tissue with granulation tissue\par See note and description.\par \par 2-Right pleural nodule\par - Lymphohistiocytic proliferation with scattered large cells\par See note and description.\par \par 3-Right peritracheal lymph node, level 4 for molecular testing\par - Lymphohistiocytic proliferation with scattered large cells\par See note and description.\par \par 4-Right peritracheal lymph node, level 4 for molecular testing\par - Lymphohistiocytic proliferation with increased large cells\par See note and description.\par \par 5-Right peritracheal lymph node, level 4\par - Diffuse large B cell lymphoma, non-germinal center B cell\par like immunophenotype\par See note and description.\par \par Diagnostic note:  Per chart review, patient presents with\par bilateral supraclavicular, bilateral mediastinal, and perigastric\par lymphadenopathy. Current biopsy is consistent with diffuse large\par B cell lymphoma, non-germinal center B-cell-like immunophenotype.\par FISH studies for MYC, BCL2 and BCL6 gene rearrangements are in\par process and will be reported as an addendum.\par  n/a

## 2021-11-16 NOTE — H&P PST ADULT - CONSTITUTIONAL
Zyrtec      Last Written Prescription Date:  5/7/21  Last Fill Quantity: 90,   # refills: 0  Last Office Visit: 3/19/20  Future Office visit:       Routing refill request to provider for review/approval because:       detailed exam

## 2022-06-22 NOTE — ED ADULT TRIAGE NOTE - TEMPERATURE IN CELSIUS (DEGREES C)
39.9 Libtayo Pregnancy And Lactation Text: This medication is contraindicated in pregnancy and when breast feeding.

## 2022-08-30 NOTE — H&P PST ADULT - BREASTS DETAILS
[Follow Up] : a follow up visit [FreeTextEntry1] : Left distal radius fracture sustained on 6/17/22 [Patient] : patient [Family Member] : family member normal shape/no tenderness normal shape

## 2022-10-27 NOTE — H&P PST ADULT - ENERGY EXPENDITURE (METS)
Kecia Keating is scribing on behalf of Suzette Medel RN who administered flu shot on 10/3/22 to patient at the employer, [employer].  Vaccine Information Statement(s) for Influenza given and reviewed, questions answered.  Consent was given and signed by patient for injection(s) administered and is filed in chart.  Patient tolerated without incident. See immunization grid for documentation.    
> 4 mets

## 2022-10-31 NOTE — ASU PREOP CHECKLIST, PEDIATRIC - BP NONINVASIVE DIASTOLIC (MM HG)
Is This A New Presentation, Or A Follow-Up?: Skin Lesion
How Severe Is Your Skin Lesion?: mild
Has Your Skin Lesion Been Treated?: not been treated
81

## 2022-12-19 ENCOUNTER — NON-APPOINTMENT (OUTPATIENT)
Age: 52
End: 2022-12-19

## 2023-01-22 ENCOUNTER — NON-APPOINTMENT (OUTPATIENT)
Age: 53
End: 2023-01-22

## 2023-04-03 ENCOUNTER — NON-APPOINTMENT (OUTPATIENT)
Age: 53
End: 2023-04-03

## 2023-04-18 NOTE — ED ADULT TRIAGE NOTE - INTERNATIONAL TRAVEL
discharge:      Financial    Payor: PENDING Jose Schrader / Plan: PENDING MEDICAID / Product Type: *No Product type* /     Does insurance require precert for SNF: No    Potential assistance Purchasing Medications:    Meds-to-Beds request: Yes      [facility] [place] [address], [address] [facility] [address]  [place] [address]  Phone: [phone] Fax: [phone]      Notes:    Factors facilitating achievement of predicted outcomes: Motivated, Cooperative, and Pleasant    Barriers to discharge: MEDICAL MANAGEMENT    Additional Case Management Notes: PT Glenn Martines 912. INDEPENDENT AND DENIES NEEDS. HAS NO DME OR 02. DRIVES. The Plan for Transition of Care is related to the following treatment goals of Weakness [R53.1]  Bilateral leg weakness [M93.958]    IF APPLICABLE: The Patient and/or patient representative Gi Giron and his family were provided with a choice of provider and agrees with the discharge plan. Freedom of choice list with basic dialogue that supports the patient's individualized plan of care/goals and shares the quality data associated with the providers was provided to:     Patient Representative Name:       The Patient and/or Patient Representative Agree with the Discharge Plan?       Christie Peters RN  Case Management Department
No

## 2023-04-21 ENCOUNTER — NON-APPOINTMENT (OUTPATIENT)
Age: 53
End: 2023-04-21

## 2023-05-16 ENCOUNTER — APPOINTMENT (OUTPATIENT)
Dept: CT IMAGING | Facility: IMAGING CENTER | Age: 53
End: 2023-05-16
Payer: COMMERCIAL

## 2023-05-16 ENCOUNTER — OUTPATIENT (OUTPATIENT)
Dept: OUTPATIENT SERVICES | Facility: HOSPITAL | Age: 53
LOS: 1 days | End: 2023-05-16
Payer: COMMERCIAL

## 2023-05-16 DIAGNOSIS — Z00.8 ENCOUNTER FOR OTHER GENERAL EXAMINATION: ICD-10-CM

## 2023-05-16 DIAGNOSIS — Z98.890 OTHER SPECIFIED POSTPROCEDURAL STATES: Chronic | ICD-10-CM

## 2023-05-16 DIAGNOSIS — Z90.89 ACQUIRED ABSENCE OF OTHER ORGANS: Chronic | ICD-10-CM

## 2023-05-16 PROCEDURE — 74177 CT ABD & PELVIS W/CONTRAST: CPT

## 2023-05-16 PROCEDURE — 74177 CT ABD & PELVIS W/CONTRAST: CPT | Mod: 26

## 2023-07-17 NOTE — ASU PREOP CHECKLIST - BLOOD AVAILABLE
n/a Complex Repair And Split-Thickness Skin Graft Text: The defect edges were debeveled with a #15 scalpel blade.  The primary defect was closed partially with a complex linear closure.  Given the location of the defect, shape of the defect and the proximity to free margins a split thickness skin graft was deemed most appropriate to repair the remaining defect.  The graft was trimmed to fit the size of the remaining defect.  The graft was then placed in the primary defect, oriented appropriately, and sutured into place.

## 2023-08-16 NOTE — ASU PREOP CHECKLIST - TEMPERATURE IN FAHRENHEIT (DEGREES F)
100.2

## 2023-09-25 NOTE — ED ADULT NURSE NOTE - CHPI ED NUR SYMPTOMS NEG
MCG [facility]  09/25/2023     
no fever

## 2023-11-30 ENCOUNTER — NON-APPOINTMENT (OUTPATIENT)
Age: 53
End: 2023-11-30

## 2023-12-04 ENCOUNTER — EMERGENCY (EMERGENCY)
Facility: HOSPITAL | Age: 53
LOS: 1 days | Discharge: HOME | End: 2023-12-04
Attending: EMERGENCY MEDICINE | Admitting: EMERGENCY MEDICINE
Payer: COMMERCIAL

## 2023-12-04 VITALS
DIASTOLIC BLOOD PRESSURE: 97 MMHG | OXYGEN SATURATION: 100 % | SYSTOLIC BLOOD PRESSURE: 179 MMHG | RESPIRATION RATE: 18 BRPM | TEMPERATURE: 98 F | HEART RATE: 99 BPM

## 2023-12-04 VITALS
RESPIRATION RATE: 18 BRPM | TEMPERATURE: 98 F | SYSTOLIC BLOOD PRESSURE: 142 MMHG | DIASTOLIC BLOOD PRESSURE: 97 MMHG | OXYGEN SATURATION: 94 % | HEART RATE: 99 BPM

## 2023-12-04 DIAGNOSIS — Z98.890 OTHER SPECIFIED POSTPROCEDURAL STATES: Chronic | ICD-10-CM

## 2023-12-04 DIAGNOSIS — Z90.89 ACQUIRED ABSENCE OF OTHER ORGANS: Chronic | ICD-10-CM

## 2023-12-04 LAB
ALBUMIN SERPL ELPH-MCNC: 4.6 G/DL — SIGNIFICANT CHANGE UP (ref 3.3–5)
ALBUMIN SERPL ELPH-MCNC: 4.6 G/DL — SIGNIFICANT CHANGE UP (ref 3.3–5)
ALP SERPL-CCNC: 88 U/L — SIGNIFICANT CHANGE UP (ref 40–120)
ALP SERPL-CCNC: 88 U/L — SIGNIFICANT CHANGE UP (ref 40–120)
ALT FLD-CCNC: 40 U/L — SIGNIFICANT CHANGE UP (ref 4–41)
ALT FLD-CCNC: 40 U/L — SIGNIFICANT CHANGE UP (ref 4–41)
ANION GAP SERPL CALC-SCNC: 14 MMOL/L — SIGNIFICANT CHANGE UP (ref 7–14)
ANION GAP SERPL CALC-SCNC: 14 MMOL/L — SIGNIFICANT CHANGE UP (ref 7–14)
APPEARANCE UR: ABNORMAL
APPEARANCE UR: ABNORMAL
AST SERPL-CCNC: 25 U/L — SIGNIFICANT CHANGE UP (ref 4–40)
AST SERPL-CCNC: 25 U/L — SIGNIFICANT CHANGE UP (ref 4–40)
BACTERIA # UR AUTO: NEGATIVE /HPF — SIGNIFICANT CHANGE UP
BACTERIA # UR AUTO: NEGATIVE /HPF — SIGNIFICANT CHANGE UP
BASOPHILS # BLD AUTO: 0.08 K/UL — SIGNIFICANT CHANGE UP (ref 0–0.2)
BASOPHILS # BLD AUTO: 0.08 K/UL — SIGNIFICANT CHANGE UP (ref 0–0.2)
BASOPHILS NFR BLD AUTO: 0.6 % — SIGNIFICANT CHANGE UP (ref 0–2)
BASOPHILS NFR BLD AUTO: 0.6 % — SIGNIFICANT CHANGE UP (ref 0–2)
BILIRUB SERPL-MCNC: 0.3 MG/DL — SIGNIFICANT CHANGE UP (ref 0.2–1.2)
BILIRUB SERPL-MCNC: 0.3 MG/DL — SIGNIFICANT CHANGE UP (ref 0.2–1.2)
BILIRUB UR-MCNC: NEGATIVE — SIGNIFICANT CHANGE UP
BILIRUB UR-MCNC: NEGATIVE — SIGNIFICANT CHANGE UP
BUN SERPL-MCNC: 34 MG/DL — HIGH (ref 7–23)
BUN SERPL-MCNC: 34 MG/DL — HIGH (ref 7–23)
CALCIUM SERPL-MCNC: 9.8 MG/DL — SIGNIFICANT CHANGE UP (ref 8.4–10.5)
CALCIUM SERPL-MCNC: 9.8 MG/DL — SIGNIFICANT CHANGE UP (ref 8.4–10.5)
CAST: 0 /LPF — SIGNIFICANT CHANGE UP (ref 0–4)
CAST: 0 /LPF — SIGNIFICANT CHANGE UP (ref 0–4)
CHLORIDE SERPL-SCNC: 103 MMOL/L — SIGNIFICANT CHANGE UP (ref 98–107)
CHLORIDE SERPL-SCNC: 103 MMOL/L — SIGNIFICANT CHANGE UP (ref 98–107)
CO2 SERPL-SCNC: 24 MMOL/L — SIGNIFICANT CHANGE UP (ref 22–31)
CO2 SERPL-SCNC: 24 MMOL/L — SIGNIFICANT CHANGE UP (ref 22–31)
COLOR SPEC: ABNORMAL
COLOR SPEC: ABNORMAL
CREAT SERPL-MCNC: 1.7 MG/DL — HIGH (ref 0.5–1.3)
CREAT SERPL-MCNC: 1.7 MG/DL — HIGH (ref 0.5–1.3)
DIFF PNL FLD: ABNORMAL
DIFF PNL FLD: ABNORMAL
EGFR: 48 ML/MIN/1.73M2 — LOW
EGFR: 48 ML/MIN/1.73M2 — LOW
EOSINOPHIL # BLD AUTO: 0.18 K/UL — SIGNIFICANT CHANGE UP (ref 0–0.5)
EOSINOPHIL # BLD AUTO: 0.18 K/UL — SIGNIFICANT CHANGE UP (ref 0–0.5)
EOSINOPHIL NFR BLD AUTO: 1.3 % — SIGNIFICANT CHANGE UP (ref 0–6)
EOSINOPHIL NFR BLD AUTO: 1.3 % — SIGNIFICANT CHANGE UP (ref 0–6)
GLUCOSE SERPL-MCNC: 101 MG/DL — HIGH (ref 70–99)
GLUCOSE SERPL-MCNC: 101 MG/DL — HIGH (ref 70–99)
GLUCOSE UR QL: NEGATIVE MG/DL — SIGNIFICANT CHANGE UP
GLUCOSE UR QL: NEGATIVE MG/DL — SIGNIFICANT CHANGE UP
HCT VFR BLD CALC: 46.9 % — SIGNIFICANT CHANGE UP (ref 39–50)
HCT VFR BLD CALC: 46.9 % — SIGNIFICANT CHANGE UP (ref 39–50)
HGB BLD-MCNC: 15.1 G/DL — SIGNIFICANT CHANGE UP (ref 13–17)
HGB BLD-MCNC: 15.1 G/DL — SIGNIFICANT CHANGE UP (ref 13–17)
IANC: 9.12 K/UL — HIGH (ref 1.8–7.4)
IANC: 9.12 K/UL — HIGH (ref 1.8–7.4)
IMM GRANULOCYTES NFR BLD AUTO: 1.1 % — HIGH (ref 0–0.9)
IMM GRANULOCYTES NFR BLD AUTO: 1.1 % — HIGH (ref 0–0.9)
KETONES UR-MCNC: NEGATIVE MG/DL — SIGNIFICANT CHANGE UP
KETONES UR-MCNC: NEGATIVE MG/DL — SIGNIFICANT CHANGE UP
LEUKOCYTE ESTERASE UR-ACNC: ABNORMAL
LEUKOCYTE ESTERASE UR-ACNC: ABNORMAL
LYMPHOCYTES # BLD AUTO: 23.4 % — SIGNIFICANT CHANGE UP (ref 13–44)
LYMPHOCYTES # BLD AUTO: 23.4 % — SIGNIFICANT CHANGE UP (ref 13–44)
LYMPHOCYTES # BLD AUTO: 3.26 K/UL — SIGNIFICANT CHANGE UP (ref 1–3.3)
LYMPHOCYTES # BLD AUTO: 3.26 K/UL — SIGNIFICANT CHANGE UP (ref 1–3.3)
MCHC RBC-ENTMCNC: 29.7 PG — SIGNIFICANT CHANGE UP (ref 27–34)
MCHC RBC-ENTMCNC: 29.7 PG — SIGNIFICANT CHANGE UP (ref 27–34)
MCHC RBC-ENTMCNC: 32.2 GM/DL — SIGNIFICANT CHANGE UP (ref 32–36)
MCHC RBC-ENTMCNC: 32.2 GM/DL — SIGNIFICANT CHANGE UP (ref 32–36)
MCV RBC AUTO: 92.3 FL — SIGNIFICANT CHANGE UP (ref 80–100)
MCV RBC AUTO: 92.3 FL — SIGNIFICANT CHANGE UP (ref 80–100)
MONOCYTES # BLD AUTO: 1.17 K/UL — HIGH (ref 0–0.9)
MONOCYTES # BLD AUTO: 1.17 K/UL — HIGH (ref 0–0.9)
MONOCYTES NFR BLD AUTO: 8.4 % — SIGNIFICANT CHANGE UP (ref 2–14)
MONOCYTES NFR BLD AUTO: 8.4 % — SIGNIFICANT CHANGE UP (ref 2–14)
NEUTROPHILS # BLD AUTO: 9.12 K/UL — HIGH (ref 1.8–7.4)
NEUTROPHILS # BLD AUTO: 9.12 K/UL — HIGH (ref 1.8–7.4)
NEUTROPHILS NFR BLD AUTO: 65.2 % — SIGNIFICANT CHANGE UP (ref 43–77)
NEUTROPHILS NFR BLD AUTO: 65.2 % — SIGNIFICANT CHANGE UP (ref 43–77)
NITRITE UR-MCNC: NEGATIVE — SIGNIFICANT CHANGE UP
NITRITE UR-MCNC: NEGATIVE — SIGNIFICANT CHANGE UP
NRBC # BLD: 0 /100 WBCS — SIGNIFICANT CHANGE UP (ref 0–0)
NRBC # BLD: 0 /100 WBCS — SIGNIFICANT CHANGE UP (ref 0–0)
NRBC # FLD: 0 K/UL — SIGNIFICANT CHANGE UP (ref 0–0)
NRBC # FLD: 0 K/UL — SIGNIFICANT CHANGE UP (ref 0–0)
PH UR: 5.5 — SIGNIFICANT CHANGE UP (ref 5–8)
PH UR: 5.5 — SIGNIFICANT CHANGE UP (ref 5–8)
PLATELET # BLD AUTO: 197 K/UL — SIGNIFICANT CHANGE UP (ref 150–400)
PLATELET # BLD AUTO: 197 K/UL — SIGNIFICANT CHANGE UP (ref 150–400)
POTASSIUM SERPL-MCNC: 4 MMOL/L — SIGNIFICANT CHANGE UP (ref 3.5–5.3)
POTASSIUM SERPL-MCNC: 4 MMOL/L — SIGNIFICANT CHANGE UP (ref 3.5–5.3)
POTASSIUM SERPL-SCNC: 4 MMOL/L — SIGNIFICANT CHANGE UP (ref 3.5–5.3)
POTASSIUM SERPL-SCNC: 4 MMOL/L — SIGNIFICANT CHANGE UP (ref 3.5–5.3)
PROT SERPL-MCNC: 7.5 G/DL — SIGNIFICANT CHANGE UP (ref 6–8.3)
PROT SERPL-MCNC: 7.5 G/DL — SIGNIFICANT CHANGE UP (ref 6–8.3)
PROT UR-MCNC: 300 MG/DL
PROT UR-MCNC: 300 MG/DL
RBC # BLD: 5.08 M/UL — SIGNIFICANT CHANGE UP (ref 4.2–5.8)
RBC # BLD: 5.08 M/UL — SIGNIFICANT CHANGE UP (ref 4.2–5.8)
RBC # FLD: 14.2 % — SIGNIFICANT CHANGE UP (ref 10.3–14.5)
RBC # FLD: 14.2 % — SIGNIFICANT CHANGE UP (ref 10.3–14.5)
RBC CASTS # UR COMP ASSIST: 805 /HPF — HIGH (ref 0–4)
RBC CASTS # UR COMP ASSIST: 805 /HPF — HIGH (ref 0–4)
SODIUM SERPL-SCNC: 141 MMOL/L — SIGNIFICANT CHANGE UP (ref 135–145)
SODIUM SERPL-SCNC: 141 MMOL/L — SIGNIFICANT CHANGE UP (ref 135–145)
SP GR SPEC: 1.02 — SIGNIFICANT CHANGE UP (ref 1–1.03)
SP GR SPEC: 1.02 — SIGNIFICANT CHANGE UP (ref 1–1.03)
SQUAMOUS # UR AUTO: 20 /HPF — HIGH (ref 0–5)
SQUAMOUS # UR AUTO: 20 /HPF — HIGH (ref 0–5)
UROBILINOGEN FLD QL: 1 MG/DL — SIGNIFICANT CHANGE UP (ref 0.2–1)
UROBILINOGEN FLD QL: 1 MG/DL — SIGNIFICANT CHANGE UP (ref 0.2–1)
WBC # BLD: 13.96 K/UL — HIGH (ref 3.8–10.5)
WBC # BLD: 13.96 K/UL — HIGH (ref 3.8–10.5)
WBC # FLD AUTO: 13.96 K/UL — HIGH (ref 3.8–10.5)
WBC # FLD AUTO: 13.96 K/UL — HIGH (ref 3.8–10.5)
WBC UR QL: 26 /HPF — HIGH (ref 0–5)
WBC UR QL: 26 /HPF — HIGH (ref 0–5)

## 2023-12-04 PROCEDURE — 99285 EMERGENCY DEPT VISIT HI MDM: CPT

## 2023-12-04 PROCEDURE — 76770 US EXAM ABDO BACK WALL COMP: CPT | Mod: 26

## 2023-12-04 PROCEDURE — 74177 CT ABD & PELVIS W/CONTRAST: CPT | Mod: 26,MA

## 2023-12-04 RX ORDER — TAMSULOSIN HYDROCHLORIDE 0.4 MG/1
1 CAPSULE ORAL
Qty: 30 | Refills: 0
Start: 2023-12-04 | End: 2024-01-02

## 2023-12-04 NOTE — CONSULT NOTE ADULT - SUBJECTIVE AND OBJECTIVE BOX
HPI  53-year-old male with history of bladder cancer who presents to the ED with urinary hesitancy and urinary frequency. He states that the symptoms have been going on for 5 days. He endorses mild burning with urination.  He denies abdominal pain, nausea, vomiting, fever, chills. His bladder cancer was treated in 2021 with surgery, he has not been on chemoradiation, he has not seen his urologist since then. He denies any genital lesions, testicular pain, penile pain. Patient is not sexually active.    Urology consulted for urinary retention and bladder mass seen on point of care ultrasound. Patient has been having weaker stream over the past week in addition to frequency which has been worsening over the past day. He has history of bladder cancer treated with BCP and pembrolizumab. Urology recommended cystectomy with ileal conduit, but patient did not want to have his bladder removed and was lost to follow up. ED placed catheter with output of 1500 mL. Patient has been voiding clear yellow urine without gross hematuria. Denies weight loss. Denies nausea, vomiting.    PAST MEDICAL & SURGICAL HISTORY:  Bladder calculus      HLD (hyperlipidemia)  pt took prescribed meds for 2 months, no longer on meds      Malignant neoplasm of bladder  tx bcg 2018, 2019      History of tonsillectomy      S/P cystoscopy  right ureteroscopy, laser lithotripsy, stone extraction, ureteral stent placement bladder bx 1/2020, 2/2020- tx with bcg      H/O cystoscopy  and bladder biopsy- july 2020      S/P cystoscopy  and biopsy Nov 2020          MEDICATIONS  (STANDING):    MEDICATIONS  (PRN):      FAMILY HISTORY:  Family history of Parkinson's disease    Family history of dementia    Family history of hypertension in mother        Allergies    penicillin (Unknown)    Intolerances        SOCIAL HISTORY:    REVIEW OF SYSTEMS:   Otherwise negative as stated in HPI    Physical Exam  Vital signs  T(C): 36.4 (12-04-23 @ 10:34), Max: 36.6 (12-04-23 @ 06:40)  HR: 99 (12-04-23 @ 10:34)  BP: 142/97 (12-04-23 @ 10:34)  SpO2: 94% (12-04-23 @ 10:34)  Wt(kg): --    Output      Gen: NAD  Pulm: No respiratory distress  Abd: Soft, nontender  : Greenwood in place draining clear light pink urine        LABS:      12-04 @ 08:33    WBC 13.96 / Hct 46.9  / SCr 1.70     12-04    141  |  103  |  34<H>  ----------------------------<  101<H>  4.0   |  24  |  1.70<H>    Ca    9.8      04 Dec 2023 08:33    TPro  7.5  /  Alb  4.6  /  TBili  0.3  /  DBili  x   /  AST  25  /  ALT  40  /  AlkPhos  88  12-04      Urinalysis Basic - ( 04 Dec 2023 08:33 )    Color: x / Appearance: x / SG: x / pH: x  Gluc: 101 mg/dL / Ketone: x  / Bili: x / Urobili: x   Blood: x / Protein: x / Nitrite: x   Leuk Esterase: x / RBC: x / WBC x   Sq Epi: x / Non Sq Epi: x / Bacteria: x        Urine Cx: Pending      RADIOLOGY:    Reviewed

## 2023-12-04 NOTE — ED ADULT TRIAGE NOTE - HEART RATE (BEATS/MIN)
"Progress Note - Nephrology   Susan Calderón 68 y o  female MRN: [id number]  Unit/Bed#: -01 Encounter: [id number]    A/P:  1  ALEJANDRA, improving  Creatinine peaked at 2 1  No chemistry available today, labs could not be obtained  RN will attempt again prior to any procedures  Remains severely hypervolemic and has SOB  Dose bumex 0 5 mg X1 now and increased to 1 mg BID  May double bumex if no response in 24 hr       Bs Cr 0 8 -1 0     2  Mild hyponatremia, hypervolemic  Check chemistry this AM     3  Cirrhosis, treated with HRS protocol, observe off albumin, significant sodium load  4  Hypotension, resolved  Decrease midodrine dose to 2 5 mg TID and add hold parameters if systolic  >359      5  Melanotic stools, plan for EGD/C scope 5/11  Give bumex 0 5mg IV now with SOB  Check chem ASAP  She is significantly hypervolemic  Increase bumex gtt to 1mg BID  GI discretion to take for procedure with severe hypervolemia  Follow up reason for today's visit:     ALEJANDRA   Subjective:   Patient seen and examined today  She has been tolerating her GI prep  Receiving FFP during encounter  She does report mild SOB  Did not urinate too much with bumex 0 5 mg       Weight down to 130 lb on bed scale from 133 lb  A complete 10 point review of systems was performed and is otherwise negative  Objective:     Vitals: Blood pressure 149/58, pulse 67, temperature (!) 97 2 °F (36 2 °C), temperature source Oral, resp  rate 17, height 4' 11\" (1 499 m), weight 59 2 kg (130 lb 8 2 oz), SpO2 98 %  ,Body mass index is 26 36 kg/m²  Weight (last 2 days)     Date/Time Weight    05/11/23 0600 59 2 (130 51)    05/10/23 0536 60 7 (133 82)    05/09/23 0600 61 4 (135 36)            Intake/Output Summary (Last 24 hours) at 5/11/2023 1134  Last data filed at 5/11/2023 0900  Gross per 24 hour   Intake 300 ml   Output 2075 ml   Net -1775 ml     I/O last 3 completed shifts:   In: 300 [P O :300]  Out: 2075 [Urine:2075]    Urethral " "Catheter Straight-tip 16 Fr  (Active)   Amt returned on insertion(mL) 775 mL 05/10/23 1301   Reasons to continue Urinary Catheter  Accurate I&O assessment in critically ill patients (48 hr  max) 05/11/23 0731   Goal for Removal Remove after 48 hrs of I/O monitoring 05/11/23 0731   Site Assessment Clean;Skin intact; Patent 05/11/23 0731   [place]rero Care Done 05/11/23 0900   Collection Container Standard drainage bag 05/11/23 0731   Securement Method Securing device (Describe) 05/11/23 0731   Securing Device Change Date 05/17/23 05/11/23 0731   Output (mL) 650 mL 05/10/23 2035   CAUTI Time-out performed before Urine Culture Yes 05/10/23 1301       Physical Exam: /58   Pulse 67   Temp (!) 97 2 °F (36 2 °C) (Oral)   Resp 17   Ht 4' 11\" (1 499 m)   Wt 59 2 kg (130 lb 8 2 oz)   SpO2 98%   BMI 26 36 kg/m²     General Appearance:    Alert, cooperative, no distress, appears stated age   Head:    Normocephalic, without obvious abnormality, atraumatic   Eyes:    Conjunctiva/corneas clear   Ears:    Normal external ears   Nose:   Nares normal, septum midline, mucosa normal, no drainage    or sinus tenderness   Throat:   Lips, mucosa, and tongue normal; teeth and gums normal   Neck:    Back:      Lungs:     Coarse BS    Chest wall:    No tenderness or deformity   Heart:    Regular rate and rhythm, S1 and S2 normal, no murmur, rub   or gallop   Abdomen:     Soft, non-tender, bowel sounds active   Extremities:   Severe BL LE edema    Skin:   Skin color, texture, turgor normal, no rashes or lesions   Lymph nodes:   Cervical normal   Neurologic:   CNII-XII intact            Lab, Imaging and other studies: I have personally reviewed pertinent labs    CBC:   Lab Results   Component Value Date    WBC 6 16 05/11/2023    HGB 8 2 (L) 05/11/2023    HCT 24 2 (L) 05/11/2023     (H) 05/11/2023    PLT 70 (L) 05/11/2023    MCH 34 9 (H) 05/11/2023    MCHC 33 9 05/11/2023    RDW 16 2 (H) 05/11/2023    MPV 11 2 05/11/2023     CMP: " Lab Results   Component Value Date    K 5 3 05/10/2023    CL 99 05/10/2023    CO2 26 05/10/2023    BUN 31 (H) 05/10/2023    CREATININE 1 26 05/10/2023    CALCIUM 9 3 05/10/2023    EGFR 41 05/10/2023         Results from last 7 days   Lab Units 05/10/23  1741 05/10/23  0505 05/09/23  0733 05/08/23  2108 05/08/23  0536   POTASSIUM mmol/L 5 3 4 1 4 3  --  4 2   CHLORIDE mmol/L 99 102 103  --  99   CO2 mmol/L 26 25 23  --  25   BUN mg/dL 31* 33* 36*  --  38*   CREATININE mg/dL 1 26 1 20 1 33*  --  1 59*   CALCIUM mg/dL 9 3 9 0 8 9  --  8 7   ALK PHOS U/L  --  464*  --  326* 342*   ALT U/L  --  22  --  17 15   AST U/L  --  62*  --  41* 35         Phosphorus: No results found for: PHOS  Magnesium: No results found for: MG  Urinalysis:   Lab Results   Component Value Date    COLORU Yellow 05/10/2023    CLARITYU Hazy 05/10/2023    SPECGRAV 1 015 05/10/2023    PHUR 6 0 05/10/2023    LEUKOCYTESUR Trace (A) 05/10/2023    NITRITE Negative 05/10/2023    GLUCOSEU Negative 05/10/2023    KETONESU Negative 05/10/2023    BILIRUBINUR Negative 05/10/2023    BLOODU Trace-Intact (A) 05/10/2023     Ionized Calcium: No results found for: CAION  Coagulation: No results found for: PT, INR, APTT  Troponin: No results found for: TROPONINI  ABG: No results found for: PHART, OUV0ENN, PO2ART, IYZ6RTF, B5LAKZTI, BEART, SOURCE  Radiology review:     IMAGING  Procedure: US liver doppler only    Result Date: 5/11/2023  Narrative: RIGHT UPPER QUADRANT ULTRASOUND WITH LIVER DOPPLER INDICATION:     Cirrhosis    COMPARISON: CT abdomen pelvis May 1, 2023 TECHNIQUE:   Real-time ultrasound of the right upper quadrant vasculature was performed with a curvilinear transducer with both volumetric sweeps and still imaging techniques  FINDINGS: LIVER DOPPLER: The main portal vein and primary branch segments are patent and hepatopetal with normal spectral waveform  Hepatic veins are patent  Spectral waveforms within normal limits    Main hepatic artery appears normal size, patent with normal spectral waveform  Impression: No portal venous thrombus   Workstation performed: [id number]       Current Facility-Administered Medications   Medication Dose Route Frequency   • acetaminophen (TYLENOL) tablet 650 mg  650 mg Oral Q6H PRN   • barium (READI-CAT 2) suspension 900 mL  900 mL Oral Once in imaging   • bumetanide (BUMEX) injection 1 mg  1 mg Intravenous BID   • dicyclomine (BENTYL) capsule 10 mg  10 mg Oral TID PRN   • erythromycin (ILOTYCIN) 0 5 % ophthalmic ointment 0 5 inch  0 5 inch Left Eye BID   • HYDROmorphone (DILAUDID) injection 0 5 mg  0 5 mg Intravenous Q4H PRN   • levothyroxine tablet 75 mcg  75 mcg Oral Early Morning   • lidocaine (LIDODERM) 5 % patch 1 patch  1 patch Topical Daily   • midodrine (PROAMATINE) tablet 5 mg  5 mg Oral TID AC   • octreotide (SandoSTATIN) injection 100 mcg  100 mcg Subcutaneous Q8H [address]   • ondansetron (ZOFRAN) injection 4 mg  4 mg Intravenous Q6H PRN   • pantoprazole (PROTONIX) EC tablet 40 mg  40 mg Oral BID AC   • traZODone (DESYREL) tablet 50 mg  50 mg Oral HS     Medications Discontinued During This Encounter   Medication Reason   • polyethylene glycol (GLYCOLAX) 17 GM/SCOOP powder Therapy completed   • ciprofloxacin (CIPRO) IVPB (premix in 5% dextrose) 400 mg 200 mL    • metroNIDAZOLE (FLAGYL) IVPB (premix) 500 mg 100 mL    • dextrose 5 % and sodium chloride 0 45 % with KCl 20 mEq/L infusion    • sodium chloride 0 9 % infusion    • polyethylene glycol (MIRALAX) packet 17 g    • ciprofloxacin (CIPRO) tablet 500 mg    • metroNIDAZOLE (FLAGYL) tablet 500 mg    • sodium bicarbonate tablet 650 mg    • enoxaparin (LOVENOX) subcutaneous injection 40 mg    • furosemide (LASIX) injection 80 mg    • multi-electrolyte (PLASMALYTE-A/ISOLYTE-S PH 7 4) IV solution    • metoprolol succinate (TOPROL-XL) 24 hr tablet 25 mg    • midodrine (PROAMATINE) tablet 2 5 mg    • enoxaparin (LOVENOX) subcutaneous injection 30 mg    • metoprolol succinate (TOPROL-XL) 24 hr tablet 12 5 mg    • magnesium hydroxide (MILK OF MAGNESIA) oral suspension 30 mL    • polyethylene glycol (MIRALAX) packet 17 g    • morphine injection 4 mg    • multi-electrolyte (PLASMALYTE-A/ISOLYTE-S PH 7 4) IV solution    • heparin (porcine) subcutaneous injection 5,000 Units    • pantoprazole (PROTONIX) EC tablet 40 mg    • albumin human (FLEXBUMIN) 25 % injection 12 5 g    • bumetanide (BUMEX) injection 1 mg    • bumetanide (BUMEX) injection 0 5 mg        [place], DO      This progress note was produced in part using a dictation device which may document imprecise wording from author's original intent  99

## 2023-12-04 NOTE — ED PROVIDER NOTE - PROGRESS NOTE DETAILS
Gorgens - US shows bladder mass and retention. will check labs and place goins, CT abd pelv. uro paged

## 2023-12-04 NOTE — ED ADULT TRIAGE NOTE - BP NONINVASIVE DIASTOLIC (MM HG)
Called patient and informed that COVID is positive. Needs to quarantine for 10 days from onset of symptoms and needs to be fever free for 3 days and having improvement before going out in public. The patient needs to inform any recent contacts that the patient has COVID. Should the patient become short of breath or seriously ill they should go directly to the emergency department.    Patient verbalized understanding.  She is going to check with work to see if she needs any documentation and will give us a call back.  Asked patient to provide us with a fax number if needed, it will be the quickest way to get her work the information.  Patient states she will call back when she has information.        ----- Message from Dara Reece PA-C sent at 10/5/2021  8:46 AM CDT -----  Please call the patient, COVID test was positive.  Patient does qualify for antibody infusion.  Please review with patient.      
Letter faxed and Pt informed.    
Patient is calling back with a fax number- [phone], Attention to Ms. Stewart. Please send over COVID positive results to her work.   
97

## 2023-12-04 NOTE — ED ADULT TRIAGE NOTE - CHIEF COMPLAINT QUOTE
Pt c/o urinary symptoms X 5 days. Endorses urinary frequency and dysuria. Denies chest pain, sob, abd pain, flank pain, n/v/d, fevers/chills. Hx: Bladder CA

## 2023-12-04 NOTE — ED ADULT NURSE NOTE - OBJECTIVE STATEMENT
patient aaox4. ambulatory. came in with urinary urgency. patient also endorsing pelvic pressure with dribbling cloudy urine. no fever, chills, abdominal pain, flank pain, dysuria, hematuria. hx of kidney stones and bladder ca. urine collected and sent

## 2023-12-04 NOTE — ED ADULT NURSE REASSESSMENT NOTE - NS ED NURSE REASSESS COMMENT FT1
Greenwood leg bag placed on patient and education on how to use was given. Tolerated well. IV removed and pt. given his discharge instructions.

## 2023-12-04 NOTE — ED PROVIDER NOTE - NSFOLLOWUPINSTRUCTIONS_ED_ALL_ED_FT
You were seen in the ED for urinary retention.    Your blood work showed an elevated creatinine which is a measure of your kidney function.    Your CT showed a large bladder mass and left hydronephrosis to distal ureter.    Continue using the Greenwood catheter until you follow up with urology.     Take flomax as prescribed.     Follow up with your urologist in 1-2 weeks.    [facility]  [address] [place] [id number] [phone]    Return to the ED if you experience any worsening or new symptoms or any symptoms that concern you, including fevers, chills, shortness of breath, chest pain, abdominal pain, vomiting. You were seen in the ED for urinary retention.    Your blood work showed an elevated creatinine which is a measure of your kidney function.    Your CT showed a large bladder mass and left hydronephrosis to distal ureter.    Continue using the Greenwood catheter until you follow up with urology.     Take flomax as prescribed.     Follow up with your urologist in 1-2 weeks.    [facility]  [address] [place] [address] [phone]    Return to the ED if you experience any worsening or new symptoms or any symptoms that concern you, including fevers, chills, shortness of breath, chest pain, abdominal pain, vomiting.

## 2023-12-04 NOTE — ED PROVIDER NOTE - OBJECTIVE STATEMENT
Patient is a 53-year-old male with history of bladder cancer who presents to the ED with urinary hesitancy and urinary frequency.  He states that the symptoms have been going on for 5 days.  He endorses mild burning with urination.  He denies abdominal pain, nausea, vomiting, fever, chills.  His bladder cancer was treated in 2021 with surgery, he has not been on chemoradiation, he has not seen his urologist since then.  He denies any genital lesions, testicular pain, penile pain.  Patient is not sexually active

## 2023-12-04 NOTE — ED PROVIDER NOTE - PATIENT PORTAL LINK FT
You can access the FollowMyHealth Patient Portal offered by [facility] by registering at the following website: [email or web address]. By joining [employer]’s FollowMyHealth portal, you will also be able to view your health information using other applications (apps) compatible with our system. You can access the FollowMyHealth Patient Portal offered by [facility] by registering at the following website: [email or web address]. By joining [employer]’s FollowMyHealth portal, you will also be able to view your health information using other applications (apps) compatible with our system.

## 2023-12-04 NOTE — ED PROVIDER NOTE - PHYSICAL EXAMINATION
Valerie June MD  GENERAL: Patient awake alert NAD.  HEENT: NC/AT, Moist mucous membranes, EOMI.  LUNGS: CTAB, no wheezes or crackles.   CARDIAC: RRR, no m/r/g.    ABDOMEN: Soft, NT, ND, No rebound, guarding. No CVA tenderness.   EXT: No edema. No calf tenderness.   MSK: No pain with movement, no deformities.  NEURO: A&Ox3. Moving all extremities.  SKIN: Warm and dry. No rash.  PSYCH: Normal affect.

## 2023-12-04 NOTE — ED ADULT NURSE REASSESSMENT NOTE - NS ED NURSE REASSESS COMMENT FT1
Received report from night shift RN. Pt c/o 5 days of bloating, decreased urine output, urinary urgency. 14 Fr Greenwood catheter placed using sterile technique witnessed by SHASTA Mae. 1500cc dark yellow urine output. 20G IV placed in RAC. Labs drawn and sent. Pending CT. Call bell within reach.

## 2023-12-04 NOTE — CONSULT NOTE ADULT - ASSESSMENT
53 year old male w/ PMH bladder cancer treated with BCG and pembrolizumab and lost to follow up presenting with urinary retention.    - Labs reviewed  - CT images reviewed, showing large bladder mass and L hydronephrosis to distal ureter  - Continue Greenwood catheter  - Start Flomax - please send prescription for 30 days  - Patient should be discharged with Greenwood and should follow up outpatient with Dr. Villegas or another urologist for discussion of cystectomy - discussed with patient    [facility]  [address] [place] [phone]    Case discussed with Dr. Villegas 53 year old male w/ PMH bladder cancer treated with BCG and pembrolizumab and lost to follow up presenting with urinary retention.    - Labs reviewed  - CT images reviewed, showing large bladder mass and L hydronephrosis to distal ureter  - Continue Greenwood catheter  - Start Flomax - please send prescription for 30 days  - Patient should be discharged with Greenwood and should follow up outpatient with Dr. Villegas or another urologist for discussion of cystectomy - discussed with patient    [facility]  [address] [place] [phone]    Case discussed with Dr. Villegas

## 2023-12-04 NOTE — ED ADULT NURSE NOTE - NSFALLUNIVINTERV_ED_ALL_ED
Bed/Stretcher in lowest position, wheels locked, appropriate side rails in place/Call bell, personal items and telephone in reach/Instruct patient to call for assistance before getting out of bed/chair/stretcher/Non-slip footwear applied when patient is off stretcher/[place] to call system/Physically safe environment - no spills, clutter or unnecessary equipment/Purposeful proactive rounding/Room/bathroom lighting operational, light cord in reach Bed/Stretcher in lowest position, wheels locked, appropriate side rails in place/Call bell, personal items and telephone in reach/Instruct patient to call for assistance before getting out of bed/chair/stretcher/Non-slip footwear applied when patient is off stretcher/[place] to call system/Physically safe environment - no spills, clutter or unnecessary equipment/Purposeful proactive rounding/Room/bathroom lighting operational, light cord in reach

## 2023-12-04 NOTE — ED PROVIDER NOTE - CLINICAL SUMMARY MEDICAL DECISION MAKING FREE TEXT BOX
Shaylee- Patient is a 53-year-old male with history of bladder cancer who presents to the ED with urinary hesitancy and urinary frequency. ddx includes but is not limited to UTI vs BPH. will check UA and urine cx. pt has urologist, advised to follow up Corey att: 53-year-old male with history of bladder cancer who presents to the ED with urinary hesitancy and urinary frequency. ddx includes but is not limited to UTI vs BPH. will check UA and urine cx. pt has urologist, advised to follow up

## 2023-12-05 ENCOUNTER — APPOINTMENT (OUTPATIENT)
Dept: UROLOGY | Facility: CLINIC | Age: 53
End: 2023-12-05

## 2023-12-05 LAB
CULTURE RESULTS: SIGNIFICANT CHANGE UP
CULTURE RESULTS: SIGNIFICANT CHANGE UP
SPECIMEN SOURCE: SIGNIFICANT CHANGE UP
SPECIMEN SOURCE: SIGNIFICANT CHANGE UP

## 2023-12-14 ENCOUNTER — APPOINTMENT (OUTPATIENT)
Dept: UROLOGY | Facility: CLINIC | Age: 53
End: 2023-12-14
Payer: COMMERCIAL

## 2023-12-14 ENCOUNTER — OUTPATIENT (OUTPATIENT)
Dept: OUTPATIENT SERVICES | Facility: HOSPITAL | Age: 53
LOS: 1 days | End: 2023-12-14
Payer: COMMERCIAL

## 2023-12-14 VITALS
SYSTOLIC BLOOD PRESSURE: 184 MMHG | BODY MASS INDEX: 32.93 KG/M2 | DIASTOLIC BLOOD PRESSURE: 109 MMHG | RESPIRATION RATE: 16 BRPM | HEART RATE: 116 BPM | WEIGHT: 230 LBS | HEIGHT: 70 IN

## 2023-12-14 DIAGNOSIS — Z98.890 OTHER SPECIFIED POSTPROCEDURAL STATES: Chronic | ICD-10-CM

## 2023-12-14 DIAGNOSIS — N20.1 CALCULUS OF URETER: ICD-10-CM

## 2023-12-14 DIAGNOSIS — Z01.818 ENCOUNTER FOR OTHER PREPROCEDURAL EXAMINATION: ICD-10-CM

## 2023-12-14 PROCEDURE — 51700 IRRIGATION OF BLADDER: CPT

## 2023-12-14 PROCEDURE — 99214 OFFICE O/P EST MOD 30 MIN: CPT | Mod: 25

## 2023-12-19 ENCOUNTER — NON-APPOINTMENT (OUTPATIENT)
Age: 53
End: 2023-12-19

## 2023-12-19 ENCOUNTER — INPATIENT (INPATIENT)
Facility: HOSPITAL | Age: 53
LOS: 2 days | Discharge: HOME HEALTH | End: 2023-12-22
Attending: HOSPITALIST | Admitting: HOSPITALIST
Payer: COMMERCIAL

## 2023-12-19 VITALS
OXYGEN SATURATION: 97 % | SYSTOLIC BLOOD PRESSURE: 154 MMHG | TEMPERATURE: 100 F | DIASTOLIC BLOOD PRESSURE: 90 MMHG | RESPIRATION RATE: 18 BRPM | HEART RATE: 88 BPM

## 2023-12-19 DIAGNOSIS — Z90.89 ACQUIRED ABSENCE OF OTHER ORGANS: Chronic | ICD-10-CM

## 2023-12-19 DIAGNOSIS — Z98.890 OTHER SPECIFIED POSTPROCEDURAL STATES: Chronic | ICD-10-CM

## 2023-12-19 LAB
ALBUMIN SERPL ELPH-MCNC: 4.1 G/DL — SIGNIFICANT CHANGE UP (ref 3.3–5)
ALBUMIN SERPL ELPH-MCNC: 4.1 G/DL — SIGNIFICANT CHANGE UP (ref 3.3–5)
ALP SERPL-CCNC: 117 U/L — SIGNIFICANT CHANGE UP (ref 40–120)
ALP SERPL-CCNC: 117 U/L — SIGNIFICANT CHANGE UP (ref 40–120)
ALT FLD-CCNC: 84 U/L — HIGH (ref 4–41)
ALT FLD-CCNC: 84 U/L — HIGH (ref 4–41)
ANION GAP SERPL CALC-SCNC: 11 MMOL/L — SIGNIFICANT CHANGE UP (ref 7–14)
ANION GAP SERPL CALC-SCNC: 11 MMOL/L — SIGNIFICANT CHANGE UP (ref 7–14)
APPEARANCE UR: ABNORMAL
APPEARANCE UR: ABNORMAL
APTT BLD: 28.5 SEC — SIGNIFICANT CHANGE UP (ref 24.5–35.6)
APTT BLD: 28.5 SEC — SIGNIFICANT CHANGE UP (ref 24.5–35.6)
AST SERPL-CCNC: 41 U/L — HIGH (ref 4–40)
AST SERPL-CCNC: 41 U/L — HIGH (ref 4–40)
B PERT DNA SPEC QL NAA+PROBE: SIGNIFICANT CHANGE UP
B PERT DNA SPEC QL NAA+PROBE: SIGNIFICANT CHANGE UP
B PERT+PARAPERT DNA PNL SPEC NAA+PROBE: SIGNIFICANT CHANGE UP
B PERT+PARAPERT DNA PNL SPEC NAA+PROBE: SIGNIFICANT CHANGE UP
BACTERIA # UR AUTO: ABNORMAL /HPF
BACTERIA # UR AUTO: ABNORMAL /HPF
BASE EXCESS BLDV CALC-SCNC: 0.5 MMOL/L — SIGNIFICANT CHANGE UP (ref -2–3)
BASE EXCESS BLDV CALC-SCNC: 0.5 MMOL/L — SIGNIFICANT CHANGE UP (ref -2–3)
BASOPHILS # BLD AUTO: 0.03 K/UL — SIGNIFICANT CHANGE UP (ref 0–0.2)
BASOPHILS # BLD AUTO: 0.03 K/UL — SIGNIFICANT CHANGE UP (ref 0–0.2)
BASOPHILS NFR BLD AUTO: 0.2 % — SIGNIFICANT CHANGE UP (ref 0–2)
BASOPHILS NFR BLD AUTO: 0.2 % — SIGNIFICANT CHANGE UP (ref 0–2)
BILIRUB SERPL-MCNC: 0.6 MG/DL — SIGNIFICANT CHANGE UP (ref 0.2–1.2)
BILIRUB SERPL-MCNC: 0.6 MG/DL — SIGNIFICANT CHANGE UP (ref 0.2–1.2)
BILIRUB UR-MCNC: NEGATIVE — SIGNIFICANT CHANGE UP
BILIRUB UR-MCNC: NEGATIVE — SIGNIFICANT CHANGE UP
BORDETELLA PARAPERTUSSIS (RAPRVP): SIGNIFICANT CHANGE UP
BORDETELLA PARAPERTUSSIS (RAPRVP): SIGNIFICANT CHANGE UP
BUN SERPL-MCNC: 23 MG/DL — SIGNIFICANT CHANGE UP (ref 7–23)
BUN SERPL-MCNC: 23 MG/DL — SIGNIFICANT CHANGE UP (ref 7–23)
C PNEUM DNA SPEC QL NAA+PROBE: SIGNIFICANT CHANGE UP
C PNEUM DNA SPEC QL NAA+PROBE: SIGNIFICANT CHANGE UP
CA-I SERPL-SCNC: 1.23 MMOL/L — SIGNIFICANT CHANGE UP (ref 1.15–1.33)
CA-I SERPL-SCNC: 1.23 MMOL/L — SIGNIFICANT CHANGE UP (ref 1.15–1.33)
CALCIUM SERPL-MCNC: 9.6 MG/DL — SIGNIFICANT CHANGE UP (ref 8.4–10.5)
CALCIUM SERPL-MCNC: 9.6 MG/DL — SIGNIFICANT CHANGE UP (ref 8.4–10.5)
CAST: 7 /LPF — HIGH (ref 0–4)
CAST: 7 /LPF — HIGH (ref 0–4)
CHLORIDE BLDV-SCNC: 101 MMOL/L — SIGNIFICANT CHANGE UP (ref 96–108)
CHLORIDE BLDV-SCNC: 101 MMOL/L — SIGNIFICANT CHANGE UP (ref 96–108)
CHLORIDE SERPL-SCNC: 102 MMOL/L — SIGNIFICANT CHANGE UP (ref 98–107)
CHLORIDE SERPL-SCNC: 102 MMOL/L — SIGNIFICANT CHANGE UP (ref 98–107)
CO2 BLDV-SCNC: 26.7 MMOL/L — HIGH (ref 22–26)
CO2 BLDV-SCNC: 26.7 MMOL/L — HIGH (ref 22–26)
CO2 SERPL-SCNC: 24 MMOL/L — SIGNIFICANT CHANGE UP (ref 22–31)
CO2 SERPL-SCNC: 24 MMOL/L — SIGNIFICANT CHANGE UP (ref 22–31)
COLOR SPEC: YELLOW — SIGNIFICANT CHANGE UP
COLOR SPEC: YELLOW — SIGNIFICANT CHANGE UP
CREAT SERPL-MCNC: 1.79 MG/DL — HIGH (ref 0.5–1.3)
CREAT SERPL-MCNC: 1.79 MG/DL — HIGH (ref 0.5–1.3)
DIFF PNL FLD: ABNORMAL
DIFF PNL FLD: ABNORMAL
EGFR: 45 ML/MIN/1.73M2 — LOW
EGFR: 45 ML/MIN/1.73M2 — LOW
EOSINOPHIL # BLD AUTO: 0.01 K/UL — SIGNIFICANT CHANGE UP (ref 0–0.5)
EOSINOPHIL # BLD AUTO: 0.01 K/UL — SIGNIFICANT CHANGE UP (ref 0–0.5)
EOSINOPHIL NFR BLD AUTO: 0.1 % — SIGNIFICANT CHANGE UP (ref 0–6)
EOSINOPHIL NFR BLD AUTO: 0.1 % — SIGNIFICANT CHANGE UP (ref 0–6)
FLUAV SUBTYP SPEC NAA+PROBE: SIGNIFICANT CHANGE UP
FLUAV SUBTYP SPEC NAA+PROBE: SIGNIFICANT CHANGE UP
FLUBV RNA SPEC QL NAA+PROBE: SIGNIFICANT CHANGE UP
FLUBV RNA SPEC QL NAA+PROBE: SIGNIFICANT CHANGE UP
GAS PNL BLDV: 134 MMOL/L — LOW (ref 136–145)
GAS PNL BLDV: 134 MMOL/L — LOW (ref 136–145)
GAS PNL BLDV: SIGNIFICANT CHANGE UP
GAS PNL BLDV: SIGNIFICANT CHANGE UP
GLUCOSE BLDV-MCNC: 144 MG/DL — HIGH (ref 70–99)
GLUCOSE BLDV-MCNC: 144 MG/DL — HIGH (ref 70–99)
GLUCOSE SERPL-MCNC: 135 MG/DL — HIGH (ref 70–99)
GLUCOSE SERPL-MCNC: 135 MG/DL — HIGH (ref 70–99)
GLUCOSE UR QL: NEGATIVE MG/DL — SIGNIFICANT CHANGE UP
GLUCOSE UR QL: NEGATIVE MG/DL — SIGNIFICANT CHANGE UP
HADV DNA SPEC QL NAA+PROBE: SIGNIFICANT CHANGE UP
HADV DNA SPEC QL NAA+PROBE: SIGNIFICANT CHANGE UP
HCO3 BLDV-SCNC: 25 MMOL/L — SIGNIFICANT CHANGE UP (ref 22–29)
HCO3 BLDV-SCNC: 25 MMOL/L — SIGNIFICANT CHANGE UP (ref 22–29)
HCOV 229E RNA SPEC QL NAA+PROBE: SIGNIFICANT CHANGE UP
HCOV 229E RNA SPEC QL NAA+PROBE: SIGNIFICANT CHANGE UP
HCOV HKU1 RNA SPEC QL NAA+PROBE: SIGNIFICANT CHANGE UP
HCOV HKU1 RNA SPEC QL NAA+PROBE: SIGNIFICANT CHANGE UP
HCOV NL63 RNA SPEC QL NAA+PROBE: SIGNIFICANT CHANGE UP
HCOV NL63 RNA SPEC QL NAA+PROBE: SIGNIFICANT CHANGE UP
HCOV OC43 RNA SPEC QL NAA+PROBE: SIGNIFICANT CHANGE UP
HCOV OC43 RNA SPEC QL NAA+PROBE: SIGNIFICANT CHANGE UP
HCT VFR BLD CALC: 41.1 % — SIGNIFICANT CHANGE UP (ref 39–50)
HCT VFR BLD CALC: 41.1 % — SIGNIFICANT CHANGE UP (ref 39–50)
HCT VFR BLDA CALC: 43 % — SIGNIFICANT CHANGE UP (ref 39–51)
HCT VFR BLDA CALC: 43 % — SIGNIFICANT CHANGE UP (ref 39–51)
HGB BLD CALC-MCNC: 14.2 G/DL — SIGNIFICANT CHANGE UP (ref 12.6–17.4)
HGB BLD CALC-MCNC: 14.2 G/DL — SIGNIFICANT CHANGE UP (ref 12.6–17.4)
HGB BLD-MCNC: 13.8 G/DL — SIGNIFICANT CHANGE UP (ref 13–17)
HGB BLD-MCNC: 13.8 G/DL — SIGNIFICANT CHANGE UP (ref 13–17)
HMPV RNA SPEC QL NAA+PROBE: SIGNIFICANT CHANGE UP
HMPV RNA SPEC QL NAA+PROBE: SIGNIFICANT CHANGE UP
HPIV1 RNA SPEC QL NAA+PROBE: SIGNIFICANT CHANGE UP
HPIV1 RNA SPEC QL NAA+PROBE: SIGNIFICANT CHANGE UP
HPIV2 RNA SPEC QL NAA+PROBE: SIGNIFICANT CHANGE UP
HPIV2 RNA SPEC QL NAA+PROBE: SIGNIFICANT CHANGE UP
HPIV3 RNA SPEC QL NAA+PROBE: SIGNIFICANT CHANGE UP
HPIV3 RNA SPEC QL NAA+PROBE: SIGNIFICANT CHANGE UP
HPIV4 RNA SPEC QL NAA+PROBE: SIGNIFICANT CHANGE UP
HPIV4 RNA SPEC QL NAA+PROBE: SIGNIFICANT CHANGE UP
IANC: 11.53 K/UL — HIGH (ref 1.8–7.4)
IANC: 11.53 K/UL — HIGH (ref 1.8–7.4)
IMM GRANULOCYTES NFR BLD AUTO: 0.9 % — SIGNIFICANT CHANGE UP (ref 0–0.9)
IMM GRANULOCYTES NFR BLD AUTO: 0.9 % — SIGNIFICANT CHANGE UP (ref 0–0.9)
INR BLD: 1.11 RATIO — SIGNIFICANT CHANGE UP (ref 0.85–1.18)
INR BLD: 1.11 RATIO — SIGNIFICANT CHANGE UP (ref 0.85–1.18)
KETONES UR-MCNC: NEGATIVE MG/DL — SIGNIFICANT CHANGE UP
KETONES UR-MCNC: NEGATIVE MG/DL — SIGNIFICANT CHANGE UP
LACTATE BLDV-MCNC: 1.9 MMOL/L — SIGNIFICANT CHANGE UP (ref 0.5–2)
LACTATE BLDV-MCNC: 1.9 MMOL/L — SIGNIFICANT CHANGE UP (ref 0.5–2)
LEUKOCYTE ESTERASE UR-ACNC: ABNORMAL
LEUKOCYTE ESTERASE UR-ACNC: ABNORMAL
LYMPHOCYTES # BLD AUTO: 1.26 K/UL — SIGNIFICANT CHANGE UP (ref 1–3.3)
LYMPHOCYTES # BLD AUTO: 1.26 K/UL — SIGNIFICANT CHANGE UP (ref 1–3.3)
LYMPHOCYTES # BLD AUTO: 8.8 % — LOW (ref 13–44)
LYMPHOCYTES # BLD AUTO: 8.8 % — LOW (ref 13–44)
M PNEUMO DNA SPEC QL NAA+PROBE: SIGNIFICANT CHANGE UP
M PNEUMO DNA SPEC QL NAA+PROBE: SIGNIFICANT CHANGE UP
MCHC RBC-ENTMCNC: 29.7 PG — SIGNIFICANT CHANGE UP (ref 27–34)
MCHC RBC-ENTMCNC: 29.7 PG — SIGNIFICANT CHANGE UP (ref 27–34)
MCHC RBC-ENTMCNC: 33.6 GM/DL — SIGNIFICANT CHANGE UP (ref 32–36)
MCHC RBC-ENTMCNC: 33.6 GM/DL — SIGNIFICANT CHANGE UP (ref 32–36)
MCV RBC AUTO: 88.4 FL — SIGNIFICANT CHANGE UP (ref 80–100)
MCV RBC AUTO: 88.4 FL — SIGNIFICANT CHANGE UP (ref 80–100)
MONOCYTES # BLD AUTO: 1.34 K/UL — HIGH (ref 0–0.9)
MONOCYTES # BLD AUTO: 1.34 K/UL — HIGH (ref 0–0.9)
MONOCYTES NFR BLD AUTO: 9.4 % — SIGNIFICANT CHANGE UP (ref 2–14)
MONOCYTES NFR BLD AUTO: 9.4 % — SIGNIFICANT CHANGE UP (ref 2–14)
NEUTROPHILS # BLD AUTO: 11.53 K/UL — HIGH (ref 1.8–7.4)
NEUTROPHILS # BLD AUTO: 11.53 K/UL — HIGH (ref 1.8–7.4)
NEUTROPHILS NFR BLD AUTO: 80.6 % — HIGH (ref 43–77)
NEUTROPHILS NFR BLD AUTO: 80.6 % — HIGH (ref 43–77)
NITRITE UR-MCNC: NEGATIVE — SIGNIFICANT CHANGE UP
NITRITE UR-MCNC: NEGATIVE — SIGNIFICANT CHANGE UP
NRBC # BLD: 0 /100 WBCS — SIGNIFICANT CHANGE UP (ref 0–0)
NRBC # BLD: 0 /100 WBCS — SIGNIFICANT CHANGE UP (ref 0–0)
NRBC # FLD: 0 K/UL — SIGNIFICANT CHANGE UP (ref 0–0)
NRBC # FLD: 0 K/UL — SIGNIFICANT CHANGE UP (ref 0–0)
PCO2 BLDV: 41 MMHG — LOW (ref 42–55)
PCO2 BLDV: 41 MMHG — LOW (ref 42–55)
PH BLDV: 7.4 — SIGNIFICANT CHANGE UP (ref 7.32–7.43)
PH BLDV: 7.4 — SIGNIFICANT CHANGE UP (ref 7.32–7.43)
PH UR: 5.5 — SIGNIFICANT CHANGE UP (ref 5–8)
PH UR: 5.5 — SIGNIFICANT CHANGE UP (ref 5–8)
PLATELET # BLD AUTO: 171 K/UL — SIGNIFICANT CHANGE UP (ref 150–400)
PLATELET # BLD AUTO: 171 K/UL — SIGNIFICANT CHANGE UP (ref 150–400)
PO2 BLDV: 47 MMHG — HIGH (ref 25–45)
PO2 BLDV: 47 MMHG — HIGH (ref 25–45)
POTASSIUM BLDV-SCNC: 3.7 MMOL/L — SIGNIFICANT CHANGE UP (ref 3.5–5.1)
POTASSIUM BLDV-SCNC: 3.7 MMOL/L — SIGNIFICANT CHANGE UP (ref 3.5–5.1)
POTASSIUM SERPL-MCNC: 3.9 MMOL/L — SIGNIFICANT CHANGE UP (ref 3.5–5.3)
POTASSIUM SERPL-MCNC: 3.9 MMOL/L — SIGNIFICANT CHANGE UP (ref 3.5–5.3)
POTASSIUM SERPL-SCNC: 3.9 MMOL/L — SIGNIFICANT CHANGE UP (ref 3.5–5.3)
POTASSIUM SERPL-SCNC: 3.9 MMOL/L — SIGNIFICANT CHANGE UP (ref 3.5–5.3)
PROT SERPL-MCNC: 7 G/DL — SIGNIFICANT CHANGE UP (ref 6–8.3)
PROT SERPL-MCNC: 7 G/DL — SIGNIFICANT CHANGE UP (ref 6–8.3)
PROT UR-MCNC: 300 MG/DL
PROT UR-MCNC: 300 MG/DL
PROTHROM AB SERPL-ACNC: 12.5 SEC — SIGNIFICANT CHANGE UP (ref 9.5–13)
PROTHROM AB SERPL-ACNC: 12.5 SEC — SIGNIFICANT CHANGE UP (ref 9.5–13)
RAPID RVP RESULT: SIGNIFICANT CHANGE UP
RAPID RVP RESULT: SIGNIFICANT CHANGE UP
RBC # BLD: 4.65 M/UL — SIGNIFICANT CHANGE UP (ref 4.2–5.8)
RBC # BLD: 4.65 M/UL — SIGNIFICANT CHANGE UP (ref 4.2–5.8)
RBC # FLD: 14.4 % — SIGNIFICANT CHANGE UP (ref 10.3–14.5)
RBC # FLD: 14.4 % — SIGNIFICANT CHANGE UP (ref 10.3–14.5)
RBC CASTS # UR COMP ASSIST: 123 /HPF — HIGH (ref 0–4)
RBC CASTS # UR COMP ASSIST: 123 /HPF — HIGH (ref 0–4)
REVIEW: SIGNIFICANT CHANGE UP
REVIEW: SIGNIFICANT CHANGE UP
RSV RNA SPEC QL NAA+PROBE: SIGNIFICANT CHANGE UP
RSV RNA SPEC QL NAA+PROBE: SIGNIFICANT CHANGE UP
RV+EV RNA SPEC QL NAA+PROBE: SIGNIFICANT CHANGE UP
RV+EV RNA SPEC QL NAA+PROBE: SIGNIFICANT CHANGE UP
SAO2 % BLDV: 74.3 % — SIGNIFICANT CHANGE UP (ref 67–88)
SAO2 % BLDV: 74.3 % — SIGNIFICANT CHANGE UP (ref 67–88)
SARS-COV-2 RNA SPEC QL NAA+PROBE: SIGNIFICANT CHANGE UP
SARS-COV-2 RNA SPEC QL NAA+PROBE: SIGNIFICANT CHANGE UP
SODIUM SERPL-SCNC: 137 MMOL/L — SIGNIFICANT CHANGE UP (ref 135–145)
SODIUM SERPL-SCNC: 137 MMOL/L — SIGNIFICANT CHANGE UP (ref 135–145)
SP GR SPEC: 1.02 — SIGNIFICANT CHANGE UP (ref 1–1.03)
SP GR SPEC: 1.02 — SIGNIFICANT CHANGE UP (ref 1–1.03)
SQUAMOUS # UR AUTO: 3 /HPF — SIGNIFICANT CHANGE UP (ref 0–5)
SQUAMOUS # UR AUTO: 3 /HPF — SIGNIFICANT CHANGE UP (ref 0–5)
TROPONIN T, HIGH SENSITIVITY RESULT: 12 NG/L — SIGNIFICANT CHANGE UP
TROPONIN T, HIGH SENSITIVITY RESULT: 12 NG/L — SIGNIFICANT CHANGE UP
UROBILINOGEN FLD QL: 0.2 MG/DL — SIGNIFICANT CHANGE UP (ref 0.2–1)
UROBILINOGEN FLD QL: 0.2 MG/DL — SIGNIFICANT CHANGE UP (ref 0.2–1)
WBC # BLD: 14.3 K/UL — HIGH (ref 3.8–10.5)
WBC # BLD: 14.3 K/UL — HIGH (ref 3.8–10.5)
WBC # FLD AUTO: 14.3 K/UL — HIGH (ref 3.8–10.5)
WBC # FLD AUTO: 14.3 K/UL — HIGH (ref 3.8–10.5)
WBC UR QL: 420 /HPF — HIGH (ref 0–5)
WBC UR QL: 420 /HPF — HIGH (ref 0–5)

## 2023-12-19 PROCEDURE — 71045 X-RAY EXAM CHEST 1 VIEW: CPT | Mod: 26

## 2023-12-19 PROCEDURE — 99285 EMERGENCY DEPT VISIT HI MDM: CPT

## 2023-12-19 RX ORDER — ACETAMINOPHEN 500 MG
1000 TABLET ORAL ONCE
Refills: 0 | Status: COMPLETED | OUTPATIENT
Start: 2023-12-19 | End: 2023-12-19

## 2023-12-19 RX ORDER — CEFTRIAXONE 500 MG/1
1000 INJECTION, POWDER, FOR SOLUTION INTRAMUSCULAR; INTRAVENOUS ONCE
Refills: 0 | Status: COMPLETED | OUTPATIENT
Start: 2023-12-19 | End: 2023-12-19

## 2023-12-19 RX ORDER — SODIUM CHLORIDE 9 MG/ML
1000 INJECTION INTRAMUSCULAR; INTRAVENOUS; SUBCUTANEOUS ONCE
Refills: 0 | Status: COMPLETED | OUTPATIENT
Start: 2023-12-19 | End: 2023-12-19

## 2023-12-19 RX ORDER — ONDANSETRON 8 MG/1
4 TABLET, FILM COATED ORAL ONCE
Refills: 0 | Status: COMPLETED | OUTPATIENT
Start: 2023-12-19 | End: 2023-12-19

## 2023-12-19 RX ORDER — SODIUM CHLORIDE 9 MG/ML
1000 INJECTION INTRAMUSCULAR; INTRAVENOUS; SUBCUTANEOUS ONCE
Refills: 0 | Status: COMPLETED | OUTPATIENT
Start: 2023-12-19 | End: 2023-12-20

## 2023-12-19 RX ADMIN — CEFTRIAXONE 100 MILLIGRAM(S): 500 INJECTION, POWDER, FOR SOLUTION INTRAMUSCULAR; INTRAVENOUS at 22:33

## 2023-12-19 RX ADMIN — Medication 400 MILLIGRAM(S): at 20:45

## 2023-12-19 RX ADMIN — SODIUM CHLORIDE 1000 MILLILITER(S): 9 INJECTION INTRAMUSCULAR; INTRAVENOUS; SUBCUTANEOUS at 20:45

## 2023-12-19 RX ADMIN — ONDANSETRON 4 MILLIGRAM(S): 8 TABLET, FILM COATED ORAL at 20:45

## 2023-12-19 NOTE — ED ADULT TRIAGE NOTE - MEANS OF ARRIVAL
ambulatory Xelcallumz Pregnancy And Lactation Text: This medication is Pregnancy Category D and is not considered safe during pregnancy.  The risk during breast feeding is also uncertain.

## 2023-12-19 NOTE — ED ADULT NURSE NOTE - NSFALLUNIVINTERV_ED_ALL_ED
Bed/Stretcher in lowest position, wheels locked, appropriate side rails in place/Call bell, personal items and telephone in reach/Instruct patient to call for assistance before getting out of bed/chair/stretcher/Non-slip footwear applied when patient is off stretcher/[place] to call system/Physically safe environment - no spills, clutter or unnecessary equipment/Purposeful proactive rounding/Room/bathroom lighting operational, light cord in reach Bed/Stretcher in lowest position, wheels locked, appropriate side rails in place/Call bell, personal items and telephone in reach/Instruct patient to call for assistance before getting out of bed/chair/stretcher/Non-slip footwear applied when patient is off stretcher/[place] to call system/Physically safe environment - no spills, clutter or unnecessary equipment/Purposeful proactive rounding/Room/bathroom lighting operational, light cord in reach

## 2023-12-19 NOTE — ED PROVIDER NOTE - CARE PLAN
Principal Discharge DX:	Acute UTI  Secondary Diagnosis:	Bladder cancer  Secondary Diagnosis:	Urinary retention   1

## 2023-12-19 NOTE — ED PROVIDER NOTE - CLINICAL SUMMARY MEDICAL DECISION MAKING FREE TEXT BOX
53-year-old male with active bladder CVA presenting with urinary retention, chest pain shortness of breath nausea vomiting.  Patient tachycardic and febrile in triage.  Exam with suprapubic fullness.  Will obtain sepsis labs, chest x-ray, CT PE, insert Greenwood catheter urinalysis culture give acetaminophen fluids ondansetron reassess

## 2023-12-19 NOTE — ED PROVIDER NOTE - PHYSICAL EXAMINATION
PHYSICAL EXAM:  GEN: ill appearing   HEAD: Atraumatic  EYES: Clear bilaterally, pupils equal, round and reactive to light.  ENMT: Airway patent, Nasal mucosa clear. Mouth with normal mucosa. Uvula is midline.   CARDIAC:   Tachycardia without appreciable murmur  RESPIRATORY: Breathing unlabored. Breath sounds clear and equal bilaterally.  ABDOMEN:   suprapubic tenderness without guarding or rebound  NEUROLOGICAL: Alert and oriented, no focal deficits, no motor or sensory deficits. CN2-12 intact. Sensation intact x4 extremities.

## 2023-12-19 NOTE — ED PROVIDER NOTE - ATTENDING CONTRIBUTION TO CARE
53-year-old male with a history of bladder cancer previously undergoing treatment with chemo and radiation but has not been on active treatment for the last 2 years.  Patient is presenting with concerns for urinary retention–also chest pressure shortness of breath nausea vomiting which began on Saturday.  Patient states that he was seen recently in this hospital with retention–was discharged with a Greenwood, had urology follow-up with the Greenwood was removed.  This was 5 days ago.  Patient states  that about 3 days ago he began having weaker stream is a progressively worsened to the point of being completely unable to urinate today. +cp and shortness of breath for past few days. Non toxic appearing. no resp distress. CTAB. tachy. abd soft, with ttp in suprapubic region. no pedal edema. DDx include but not limited to urinary retention, BPH vs mass, sepssi - viral vs bacterial - pneumonia vs UTI. Concern for PE given hx of bladder ca.

## 2023-12-19 NOTE — ED ADULT NURSE NOTE - OBJECTIVE STATEMENT
hx of cancer. pt reports urinary retention x 2 days, sent by urologist. goins inserted, cloudy yellow urine draining. + fever and chills, iv inserted and blood drawn and sent to lab. pt reporting chest pain and sob, connected to bedside cardiac monitor and o2 sat 96% on RA. pt states vomiting for the last few days, feeling weak, remains A&OX4 at this time, FLORES equal bilaterally. denies taking pain or antipyretic meds pta

## 2023-12-19 NOTE — ED PROVIDER NOTE - OBJECTIVE STATEMENT
53-year-old male with a history of bladder cancer previously undergoing treatment with chemo and radiation but has not been on active treatment for the last 2 years.  Patient is presenting with concerns for urinary retention–also chest pressure shortness of breath nausea vomiting which began on Saturday.  Patient states that he was seen recently in this hospital with retention–was discharged with a Greenwood, had urology follow-up with the Greenwood was removed.  This was 5 days ago.  Patient states  that about 3 days ago he began having weaker stream is a progressively worsened to the point of being completely unable to urinate today.

## 2023-12-19 NOTE — ED ADULT TRIAGE NOTE - CHIEF COMPLAINT QUOTE
Pt st" I had cather removed 5 days ago, I have bladder cancer dx 5 years ago...not on chemo but I had episodes of retention....since yesterday the stream has slowed down...I been taking dayquil and nyquil for upper respiratory cough , fevers, chest tightness...since sat...getting worse.."

## 2023-12-19 NOTE — ED ADULT NURSE NOTE - NSPATIENTFLAG_GEN_A_ER
Patient repeated back and voice a understanding of orders.  Next labs 10/5/2020.    ----- Message from Ondina Frye MD sent at 9/22/2020 10:49 AM CDT -----  Please tac to 6/5 mg. Is she on MMF? Snapshot says no, med list says yes.    
Red (Hem/Onc)

## 2023-12-20 DIAGNOSIS — Z29.9 ENCOUNTER FOR PROPHYLACTIC MEASURES, UNSPECIFIED: ICD-10-CM

## 2023-12-20 DIAGNOSIS — N39.0 URINARY TRACT INFECTION, SITE NOT SPECIFIED: ICD-10-CM

## 2023-12-20 DIAGNOSIS — A41.9 SEPSIS, UNSPECIFIED ORGANISM: ICD-10-CM

## 2023-12-20 DIAGNOSIS — N17.9 ACUTE KIDNEY FAILURE, UNSPECIFIED: ICD-10-CM

## 2023-12-20 DIAGNOSIS — C67.9 MALIGNANT NEOPLASM OF BLADDER, UNSPECIFIED: ICD-10-CM

## 2023-12-20 DIAGNOSIS — R33.9 RETENTION OF URINE, UNSPECIFIED: ICD-10-CM

## 2023-12-20 LAB
ALBUMIN SERPL ELPH-MCNC: 3.6 G/DL — SIGNIFICANT CHANGE UP (ref 3.3–5)
ALBUMIN SERPL ELPH-MCNC: 3.6 G/DL — SIGNIFICANT CHANGE UP (ref 3.3–5)
ALP SERPL-CCNC: 114 U/L — SIGNIFICANT CHANGE UP (ref 40–120)
ALP SERPL-CCNC: 114 U/L — SIGNIFICANT CHANGE UP (ref 40–120)
ALT FLD-CCNC: 85 U/L — HIGH (ref 4–41)
ALT FLD-CCNC: 85 U/L — HIGH (ref 4–41)
ANION GAP SERPL CALC-SCNC: 10 MMOL/L — SIGNIFICANT CHANGE UP (ref 7–14)
ANION GAP SERPL CALC-SCNC: 10 MMOL/L — SIGNIFICANT CHANGE UP (ref 7–14)
AST SERPL-CCNC: 50 U/L — HIGH (ref 4–40)
AST SERPL-CCNC: 50 U/L — HIGH (ref 4–40)
BASOPHILS # BLD AUTO: 0.05 K/UL — SIGNIFICANT CHANGE UP (ref 0–0.2)
BASOPHILS # BLD AUTO: 0.05 K/UL — SIGNIFICANT CHANGE UP (ref 0–0.2)
BASOPHILS NFR BLD AUTO: 0.3 % — SIGNIFICANT CHANGE UP (ref 0–2)
BASOPHILS NFR BLD AUTO: 0.3 % — SIGNIFICANT CHANGE UP (ref 0–2)
BILIRUB SERPL-MCNC: 0.7 MG/DL — SIGNIFICANT CHANGE UP (ref 0.2–1.2)
BILIRUB SERPL-MCNC: 0.7 MG/DL — SIGNIFICANT CHANGE UP (ref 0.2–1.2)
BLD GP AB SCN SERPL QL: NEGATIVE — SIGNIFICANT CHANGE UP
BLD GP AB SCN SERPL QL: NEGATIVE — SIGNIFICANT CHANGE UP
BUN SERPL-MCNC: 19 MG/DL — SIGNIFICANT CHANGE UP (ref 7–23)
BUN SERPL-MCNC: 19 MG/DL — SIGNIFICANT CHANGE UP (ref 7–23)
CALCIUM SERPL-MCNC: 8.8 MG/DL — SIGNIFICANT CHANGE UP (ref 8.4–10.5)
CALCIUM SERPL-MCNC: 8.8 MG/DL — SIGNIFICANT CHANGE UP (ref 8.4–10.5)
CHLORIDE SERPL-SCNC: 105 MMOL/L — SIGNIFICANT CHANGE UP (ref 98–107)
CHLORIDE SERPL-SCNC: 105 MMOL/L — SIGNIFICANT CHANGE UP (ref 98–107)
CO2 SERPL-SCNC: 23 MMOL/L — SIGNIFICANT CHANGE UP (ref 22–31)
CO2 SERPL-SCNC: 23 MMOL/L — SIGNIFICANT CHANGE UP (ref 22–31)
CREAT SERPL-MCNC: 1.5 MG/DL — HIGH (ref 0.5–1.3)
CREAT SERPL-MCNC: 1.5 MG/DL — HIGH (ref 0.5–1.3)
EGFR: 55 ML/MIN/1.73M2 — LOW
EGFR: 55 ML/MIN/1.73M2 — LOW
EOSINOPHIL # BLD AUTO: 0.02 K/UL — SIGNIFICANT CHANGE UP (ref 0–0.5)
EOSINOPHIL # BLD AUTO: 0.02 K/UL — SIGNIFICANT CHANGE UP (ref 0–0.5)
EOSINOPHIL NFR BLD AUTO: 0.1 % — SIGNIFICANT CHANGE UP (ref 0–6)
EOSINOPHIL NFR BLD AUTO: 0.1 % — SIGNIFICANT CHANGE UP (ref 0–6)
GLUCOSE SERPL-MCNC: 136 MG/DL — HIGH (ref 70–99)
GLUCOSE SERPL-MCNC: 136 MG/DL — HIGH (ref 70–99)
HCT VFR BLD CALC: 39.7 % — SIGNIFICANT CHANGE UP (ref 39–50)
HCT VFR BLD CALC: 39.7 % — SIGNIFICANT CHANGE UP (ref 39–50)
HGB BLD-MCNC: 13 G/DL — SIGNIFICANT CHANGE UP (ref 13–17)
HGB BLD-MCNC: 13 G/DL — SIGNIFICANT CHANGE UP (ref 13–17)
IANC: 13.28 K/UL — HIGH (ref 1.8–7.4)
IANC: 13.28 K/UL — HIGH (ref 1.8–7.4)
IMM GRANULOCYTES NFR BLD AUTO: 0.8 % — SIGNIFICANT CHANGE UP (ref 0–0.9)
IMM GRANULOCYTES NFR BLD AUTO: 0.8 % — SIGNIFICANT CHANGE UP (ref 0–0.9)
LYMPHOCYTES # BLD AUTO: 1.56 K/UL — SIGNIFICANT CHANGE UP (ref 1–3.3)
LYMPHOCYTES # BLD AUTO: 1.56 K/UL — SIGNIFICANT CHANGE UP (ref 1–3.3)
LYMPHOCYTES # BLD AUTO: 9.3 % — LOW (ref 13–44)
LYMPHOCYTES # BLD AUTO: 9.3 % — LOW (ref 13–44)
MAGNESIUM SERPL-MCNC: 2 MG/DL — SIGNIFICANT CHANGE UP (ref 1.6–2.6)
MAGNESIUM SERPL-MCNC: 2 MG/DL — SIGNIFICANT CHANGE UP (ref 1.6–2.6)
MCHC RBC-ENTMCNC: 29.5 PG — SIGNIFICANT CHANGE UP (ref 27–34)
MCHC RBC-ENTMCNC: 29.5 PG — SIGNIFICANT CHANGE UP (ref 27–34)
MCHC RBC-ENTMCNC: 32.7 GM/DL — SIGNIFICANT CHANGE UP (ref 32–36)
MCHC RBC-ENTMCNC: 32.7 GM/DL — SIGNIFICANT CHANGE UP (ref 32–36)
MCV RBC AUTO: 90 FL — SIGNIFICANT CHANGE UP (ref 80–100)
MCV RBC AUTO: 90 FL — SIGNIFICANT CHANGE UP (ref 80–100)
MONOCYTES # BLD AUTO: 1.8 K/UL — HIGH (ref 0–0.9)
MONOCYTES # BLD AUTO: 1.8 K/UL — HIGH (ref 0–0.9)
MONOCYTES NFR BLD AUTO: 10.7 % — SIGNIFICANT CHANGE UP (ref 2–14)
MONOCYTES NFR BLD AUTO: 10.7 % — SIGNIFICANT CHANGE UP (ref 2–14)
NEUTROPHILS # BLD AUTO: 13.28 K/UL — HIGH (ref 1.8–7.4)
NEUTROPHILS # BLD AUTO: 13.28 K/UL — HIGH (ref 1.8–7.4)
NEUTROPHILS NFR BLD AUTO: 78.8 % — HIGH (ref 43–77)
NEUTROPHILS NFR BLD AUTO: 78.8 % — HIGH (ref 43–77)
NRBC # BLD: 0 /100 WBCS — SIGNIFICANT CHANGE UP (ref 0–0)
NRBC # BLD: 0 /100 WBCS — SIGNIFICANT CHANGE UP (ref 0–0)
NRBC # FLD: 0 K/UL — SIGNIFICANT CHANGE UP (ref 0–0)
NRBC # FLD: 0 K/UL — SIGNIFICANT CHANGE UP (ref 0–0)
PHOSPHATE SERPL-MCNC: 3.1 MG/DL — SIGNIFICANT CHANGE UP (ref 2.5–4.5)
PHOSPHATE SERPL-MCNC: 3.1 MG/DL — SIGNIFICANT CHANGE UP (ref 2.5–4.5)
PLATELET # BLD AUTO: 144 K/UL — LOW (ref 150–400)
PLATELET # BLD AUTO: 144 K/UL — LOW (ref 150–400)
POTASSIUM SERPL-MCNC: 3.6 MMOL/L — SIGNIFICANT CHANGE UP (ref 3.5–5.3)
POTASSIUM SERPL-MCNC: 3.6 MMOL/L — SIGNIFICANT CHANGE UP (ref 3.5–5.3)
POTASSIUM SERPL-SCNC: 3.6 MMOL/L — SIGNIFICANT CHANGE UP (ref 3.5–5.3)
POTASSIUM SERPL-SCNC: 3.6 MMOL/L — SIGNIFICANT CHANGE UP (ref 3.5–5.3)
PROT SERPL-MCNC: 6.4 G/DL — SIGNIFICANT CHANGE UP (ref 6–8.3)
PROT SERPL-MCNC: 6.4 G/DL — SIGNIFICANT CHANGE UP (ref 6–8.3)
RBC # BLD: 4.41 M/UL — SIGNIFICANT CHANGE UP (ref 4.2–5.8)
RBC # BLD: 4.41 M/UL — SIGNIFICANT CHANGE UP (ref 4.2–5.8)
RBC # FLD: 14.5 % — SIGNIFICANT CHANGE UP (ref 10.3–14.5)
RBC # FLD: 14.5 % — SIGNIFICANT CHANGE UP (ref 10.3–14.5)
RH IG SCN BLD-IMP: POSITIVE — SIGNIFICANT CHANGE UP
RH IG SCN BLD-IMP: POSITIVE — SIGNIFICANT CHANGE UP
SODIUM SERPL-SCNC: 138 MMOL/L — SIGNIFICANT CHANGE UP (ref 135–145)
SODIUM SERPL-SCNC: 138 MMOL/L — SIGNIFICANT CHANGE UP (ref 135–145)
WBC # BLD: 16.84 K/UL — HIGH (ref 3.8–10.5)
WBC # BLD: 16.84 K/UL — HIGH (ref 3.8–10.5)
WBC # FLD AUTO: 16.84 K/UL — HIGH (ref 3.8–10.5)
WBC # FLD AUTO: 16.84 K/UL — HIGH (ref 3.8–10.5)

## 2023-12-20 PROCEDURE — 71275 CT ANGIOGRAPHY CHEST: CPT | Mod: 26,MA

## 2023-12-20 PROCEDURE — 99223 1ST HOSP IP/OBS HIGH 75: CPT | Mod: GC

## 2023-12-20 RX ORDER — HEPARIN SODIUM 5000 [USP'U]/ML
5000 INJECTION INTRAVENOUS; SUBCUTANEOUS EVERY 8 HOURS
Refills: 0 | Status: DISCONTINUED | OUTPATIENT
Start: 2023-12-20 | End: 2023-12-22

## 2023-12-20 RX ORDER — INFLUENZA VIRUS VACCINE 15; 15; 15; 15 UG/.5ML; UG/.5ML; UG/.5ML; UG/.5ML
0.5 SUSPENSION INTRAMUSCULAR ONCE
Refills: 0 | Status: DISCONTINUED | OUTPATIENT
Start: 2023-12-20 | End: 2023-12-22

## 2023-12-20 RX ORDER — CEFTRIAXONE 500 MG/1
1000 INJECTION, POWDER, FOR SOLUTION INTRAMUSCULAR; INTRAVENOUS EVERY 24 HOURS
Refills: 0 | Status: COMPLETED | OUTPATIENT
Start: 2023-12-20 | End: 2023-12-22

## 2023-12-20 RX ORDER — TAMSULOSIN HYDROCHLORIDE 0.4 MG/1
0.4 CAPSULE ORAL AT BEDTIME
Refills: 0 | Status: DISCONTINUED | OUTPATIENT
Start: 2023-12-20 | End: 2023-12-22

## 2023-12-20 RX ORDER — ACETAMINOPHEN 500 MG
975 TABLET ORAL EVERY 6 HOURS
Refills: 0 | Status: DISCONTINUED | OUTPATIENT
Start: 2023-12-20 | End: 2023-12-22

## 2023-12-20 RX ORDER — LIDOCAINE 4 G/100G
1 CREAM TOPICAL ONCE
Refills: 0 | Status: COMPLETED | OUTPATIENT
Start: 2023-12-20 | End: 2023-12-20

## 2023-12-20 RX ORDER — ALBUTEROL 90 UG/1
2 AEROSOL, METERED ORAL EVERY 6 HOURS
Refills: 0 | Status: DISCONTINUED | OUTPATIENT
Start: 2023-12-20 | End: 2023-12-22

## 2023-12-20 RX ORDER — ACETAMINOPHEN 500 MG
1000 TABLET ORAL ONCE
Refills: 0 | Status: COMPLETED | OUTPATIENT
Start: 2023-12-20 | End: 2023-12-20

## 2023-12-20 RX ORDER — ACETAMINOPHEN 500 MG
975 TABLET ORAL ONCE
Refills: 0 | Status: COMPLETED | OUTPATIENT
Start: 2023-12-20 | End: 2023-12-20

## 2023-12-20 RX ORDER — ONDANSETRON 8 MG/1
4 TABLET, FILM COATED ORAL ONCE
Refills: 0 | Status: COMPLETED | OUTPATIENT
Start: 2023-12-20 | End: 2023-12-20

## 2023-12-20 RX ADMIN — Medication 100 MILLIGRAM(S): at 19:03

## 2023-12-20 RX ADMIN — HEPARIN SODIUM 5000 UNIT(S): 5000 INJECTION INTRAVENOUS; SUBCUTANEOUS at 22:38

## 2023-12-20 RX ADMIN — TAMSULOSIN HYDROCHLORIDE 0.4 MILLIGRAM(S): 0.4 CAPSULE ORAL at 22:37

## 2023-12-20 RX ADMIN — SODIUM CHLORIDE 1000 MILLILITER(S): 9 INJECTION INTRAMUSCULAR; INTRAVENOUS; SUBCUTANEOUS at 01:10

## 2023-12-20 RX ADMIN — HEPARIN SODIUM 5000 UNIT(S): 5000 INJECTION INTRAVENOUS; SUBCUTANEOUS at 14:10

## 2023-12-20 RX ADMIN — ALBUTEROL 2 PUFF(S): 90 AEROSOL, METERED ORAL at 20:44

## 2023-12-20 RX ADMIN — ALBUTEROL 2 PUFF(S): 90 AEROSOL, METERED ORAL at 14:10

## 2023-12-20 RX ADMIN — CEFTRIAXONE 100 MILLIGRAM(S): 500 INJECTION, POWDER, FOR SOLUTION INTRAMUSCULAR; INTRAVENOUS at 22:38

## 2023-12-20 RX ADMIN — Medication 200 MILLIGRAM(S): at 23:51

## 2023-12-20 RX ADMIN — LIDOCAINE 1 PATCH: 4 CREAM TOPICAL at 19:04

## 2023-12-20 RX ADMIN — Medication 400 MILLIGRAM(S): at 02:32

## 2023-12-20 RX ADMIN — ONDANSETRON 4 MILLIGRAM(S): 8 TABLET, FILM COATED ORAL at 01:10

## 2023-12-20 RX ADMIN — Medication 975 MILLIGRAM(S): at 17:26

## 2023-12-20 RX ADMIN — Medication 975 MILLIGRAM(S): at 18:26

## 2023-12-20 RX ADMIN — Medication 200 MILLIGRAM(S): at 14:10

## 2023-12-20 NOTE — H&P ADULT - ATTENDING COMMENTS
53M Hx bladder cancer presented with urinary retention and sepsis present on admission likely due to UTI  --ctx, f/u cx  --goins to remain in place until outpatient urology f/u

## 2023-12-20 NOTE — H&P ADULT - PROBLEM SELECTOR PLAN 2
-goins placed in ED w/ red tinged urine  -will leave in place and likely d/c with goins to f/u with outpatient urologist

## 2023-12-20 NOTE — H&P ADULT - NSHPREVIEWOFSYSTEMS_GEN_ALL_CORE
CONSTITUTIONAL: + fever, no weight loss, or fatigue  EYES: No eye pain, visual disturbances, or discharge  ENMT:  No difficulty hearing, tinnitus, vertigo; No sinus or throat pain  RESPIRATORY: + cough, + SOB  CARDIOVASCULAR: No chest pain, palpitations, dizziness, or leg swelling  GASTROINTESTINAL: No abdominal or epigastric pain. No nausea, vomiting, or hematemesis; No diarrhea or constipation. No melena or hematochezia.  GENITOURINARY: decreased urinary frequency  NEUROLOGICAL: No headaches, loss of strength, numbness, or tremors  SKIN: No itching, burning, rashes, or lesions   LYMPH NODES: No enlarged glands  ENDOCRINE: No heat or cold intolerance; No polydipsia or polyuria  MUSCULOSKELETAL: No joint pain or swelling;   PSYCHIATRIC: Denies depression, anxiety  HEME/LYMPH: No easy bruising, or bleeding gums  ALLERGY AND IMMUNOLOGIC: No hives or eczema

## 2023-12-20 NOTE — H&P ADULT - ASSESSMENT
54 y/o M [facility] bladder cancer presents with fevers, chills, nausea, vomiting, and decreased urine output found to have sepsis likely in the setting of a urinary tract infection.  54 y/o M [facility] bladder cancer presents with fevers, chills, nausea, vomiting, and decreased urine output found to have sepsis likely in the setting of a urinary tract infection.

## 2023-12-20 NOTE — H&P ADULT - PROBLEM SELECTOR PLAN 1
-Patient febrile to 103, tachycardic, and w/ leukocytosis to 16 meeting SIRS criteria in the setting of likely UTI  -received 2 L IVF, ceftriaxone, and tylenol in ED  -BCx and UCx collected  -UA positive  -will continue empiric ceftriaxone pending urine and blood cultures  -CT A/P ordered by ED - will f/u read

## 2023-12-20 NOTE — ED ADULT NURSE REASSESSMENT NOTE - NS ED NURSE REASSESS COMMENT FT1
report given to unit from break coverage RN. pt sleeping in stretcher in NAD, RR even and unlabored. pt awaiting transportation to unit. safety measures maintained, side rails up x2

## 2023-12-20 NOTE — H&P ADULT - HISTORY OF PRESENT ILLNESS
54 y/o M [facility] bladder cancer not on active treatment presents for decreased urination, chills, nausea, and vomiting at home. Patient was recently discharged from the hospital with a goins and saw his urologist last week where it was removed initially without issue. 3 days ago patient began having decreased urinary output. This symptom was associated with nausea and vomiting w/ decreased PO intake. Vomit was NBNB. Patient also states he was having intermittent chills and felt warm at home but never took his temperature. No dysuria. Denies abdominal pain but did feel mildly distended. Also complaining of mild cough and SOB. Patient spoke with his outpatient urologist who was concerned for infection and sent patient to ED    ED Course: Goins placed and patient w/ relief of abdominal discomfort. Febrile and tachycardic, given ceftriaxone, tylenol, and 2L IVF.  52 y/o M [facility] bladder cancer not on active treatment presents for decreased urination, chills, nausea, and vomiting at home. Patient was recently discharged from the hospital with a goins and saw his urologist last week where it was removed initially without issue. 3 days ago patient began having decreased urinary output. This symptom was associated with nausea and vomiting w/ decreased PO intake. Vomit was NBNB. Patient also states he was having intermittent chills and felt warm at home but never took his temperature. No dysuria. Denies abdominal pain but did feel mildly distended. Also complaining of mild cough and SOB. Patient spoke with his outpatient urologist who was concerned for infection and sent patient to ED    ED Course: Goins placed and patient w/ relief of abdominal discomfort. Febrile and tachycardic, given ceftriaxone, tylenol, and 2L IVF.

## 2023-12-20 NOTE — PATIENT PROFILE ADULT - FALL HARM RISK - PATIENT NEEDS ASSISTANCE
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 Patient reports pain began suddenly. No inciting trauma. No recent fever/chills. No numbness/tingling. No other bone/joint complaints.    PAST MEDICAL & SURGICAL HISTORY:  Overweight (BMI 25.0-29.9)  Dependence on crutches: or wheel chair.   Non weight bearing to right leg.  SCFE (slipped capital femoral epiphysis), right  Cough: Cough has reportedly resolved and no coughing was appreciated during the PST visit  RAD (reactive airway disease), mild persistent, uncomplicated  SCFE (slipped capital femoral epiphysis), right    Allergies    No Known Allergies    Intolerances    PE:    ICU Vital Signs Last 24 Hrs  T(C): 36.6 (04 Aug 2017 23:55), Max: 36.8 (04 Aug 2017 19:43)  T(F): 97.8 (04 Aug 2017 23:55), Max: 98.2 (04 Aug 2017 19:43)  HR: 82 (04 Aug 2017 23:55) (58 - 82)  BP: 134/67 (04 Aug 2017 23:55) (120/68 - 134/67)  BP(mean): --  ABP: --  ABP(mean): --  RR: 18 (04 Aug 2017 23:55) (17 - 18)  SpO2: 100% (04 Aug 2017 23:55) (100% - 100%)    Gen: NAD  LLE: antalgic gait  externally rotated at rest c/w contralateral limb  ttp about hip/groin  pain with passive hip flexion/IR/ER  motor intact gs/ta/ehl/fhl, SILT s/s/sp/dp/ta  wwp    XR L hip: no e/o fx/dislocation. No SCFE. No e/o physeal widening    10F with L hip pain. Etiologies include pre-slip SCFE vs transient synovitis  - pain control  - wbat  - MRI as outpatient  - FU with Dr. Schwartz as outpatient on Monday 8/7. Call [phone] for appointment  - d/w attending
No assistance needed

## 2023-12-20 NOTE — PATIENT PROFILE ADULT - VISION (WITH CORRECTIVE LENSES IF THE PATIENT USUALLY WEARS THEM):
pt states they should be wearing glasses but they do not/Partially impaired: cannot see medication labels or newsprint, but can see obstacles in path, and the surrounding layout; can count fingers at arm's length

## 2023-12-20 NOTE — PATIENT PROFILE ADULT - FALL HARM RISK - HARM RISK INTERVENTIONS
Communicate Risk of Fall with Harm to all staff/Reinforce activity limits and safety measures with patient and family/Tailored Fall Risk Interventions/Use of alarms - bed, chair and/or voice tab/Visual Cue: Yellow wristband and red socks/Bed in lowest position, wheels locked, appropriate side rails in place/Call bell, personal items and telephone in reach/Instruct patient to call for assistance before getting out of bed or chair/Non-slip footwear when patient is out of bed/[place] to call system/Physically safe environment - no spills, clutter or unnecessary equipment/Purposeful Proactive Rounding/Room/bathroom lighting operational, light cord in reach Communicate Risk of Fall with Harm to all staff/Reinforce activity limits and safety measures with patient and family/Tailored Fall Risk Interventions/Use of alarms - bed, chair and/or voice tab/Visual Cue: Yellow wristband and red socks/Bed in lowest position, wheels locked, appropriate side rails in place/Call bell, personal items and telephone in reach/Instruct patient to call for assistance before getting out of bed or chair/Non-slip footwear when patient is out of bed/[place] to call system/Physically safe environment - no spills, clutter or unnecessary equipment/Purposeful Proactive Rounding/Room/bathroom lighting operational, light cord in reach

## 2023-12-20 NOTE — H&P ADULT - NSHPLABSRESULTS_GEN_ALL_CORE
LABS:                        13.0   16.84 )-----------( 144      ( 20 Dec 2023 07:06 )             39.7     20 Dec 2023 07:06    138    |  105    |  19     ----------------------------<  136    3.6     |  23     |  1.50     Ca    8.8        20 Dec 2023 07:06  Phos  3.1       20 Dec 2023 07:06  Mg     2.00      20 Dec 2023 07:06    TPro  6.4    /  Alb  3.6    /  TBili  0.7    /  DBili  x      /  AST  50     /  ALT  85     /  AlkPhos  114    20 Dec 2023 07:06    PT/INR - ( 19 Dec 2023 20:50 )   PT: 12.5 sec;   INR: 1.11 ratio         PTT - ( 19 Dec 2023 20:50 )  PTT:28.5 sec  CAPILLARY BLOOD GLUCOSE        BLOOD CULTURE    RADIOLOGY & ADDITIONAL TESTS:    Imaging Personally Reviewed:  [ ] YES

## 2023-12-20 NOTE — H&P ADULT - NSHPPHYSICALEXAM_GEN_ALL_CORE
Vital Signs Last 24 Hrs  T(C): 36.7 (20 Dec 2023 05:54), Max: 39.4 (19 Dec 2023 19:26)  T(F): 98 (20 Dec 2023 05:54), Max: 103 (19 Dec 2023 19:26)  HR: 100 (20 Dec 2023 05:54) (88 - 134)  BP: 133/87 (20 Dec 2023 05:54) (133/87 - 174/86)  BP(mean): --  RR: 18 (20 Dec 2023 05:54) (18 - 20)  SpO2: 97% (20 Dec 2023 05:54) (97% - 99%)    Parameters below as of 20 Dec 2023 05:54  Patient On (Oxygen Delivery Method): room air        PHYSICAL EXAM:  GENERAL: NAD, well-groomed, well-developed  HEAD:  Atraumatic, Normocephalic  EYES: conjunctiva and sclera clear  ENMT:  Moist mucous membranes, Good dentition  NECK: Supple, No JVD  NERVOUS SYSTEM: AOX3, motor and sensation grossly intact in b/l UE and b/l LE  PSYCHIATRIC: Appropriate affect and mood  CHEST/LUNG: Clear to auscultation bilaterally; No rales, rhonchi, wheezing, or rubs  HEART: Regular rate and rhythm; No murmurs, rubs, or gallops. No LE edema  ABDOMEN: Soft, Nontender, Nondistended; Bowel sounds present  : goins with red tinged urine  EXTREMITIES:  2+ Peripheral Pulses, No clubbing, cyanosis  SKIN: No rashes or lesions

## 2023-12-21 LAB
ALBUMIN SERPL ELPH-MCNC: 3.4 G/DL — SIGNIFICANT CHANGE UP (ref 3.3–5)
ALBUMIN SERPL ELPH-MCNC: 3.4 G/DL — SIGNIFICANT CHANGE UP (ref 3.3–5)
ALP SERPL-CCNC: 143 U/L — HIGH (ref 40–120)
ALP SERPL-CCNC: 143 U/L — HIGH (ref 40–120)
ALT FLD-CCNC: 119 U/L — HIGH (ref 4–41)
ALT FLD-CCNC: 119 U/L — HIGH (ref 4–41)
ANION GAP SERPL CALC-SCNC: 13 MMOL/L — SIGNIFICANT CHANGE UP (ref 7–14)
ANION GAP SERPL CALC-SCNC: 13 MMOL/L — SIGNIFICANT CHANGE UP (ref 7–14)
AST SERPL-CCNC: 81 U/L — HIGH (ref 4–40)
AST SERPL-CCNC: 81 U/L — HIGH (ref 4–40)
BASOPHILS # BLD AUTO: 0.03 K/UL — SIGNIFICANT CHANGE UP (ref 0–0.2)
BASOPHILS # BLD AUTO: 0.03 K/UL — SIGNIFICANT CHANGE UP (ref 0–0.2)
BASOPHILS NFR BLD AUTO: 0.2 % — SIGNIFICANT CHANGE UP (ref 0–2)
BASOPHILS NFR BLD AUTO: 0.2 % — SIGNIFICANT CHANGE UP (ref 0–2)
BILIRUB SERPL-MCNC: 0.7 MG/DL — SIGNIFICANT CHANGE UP (ref 0.2–1.2)
BILIRUB SERPL-MCNC: 0.7 MG/DL — SIGNIFICANT CHANGE UP (ref 0.2–1.2)
BUN SERPL-MCNC: 19 MG/DL — SIGNIFICANT CHANGE UP (ref 7–23)
BUN SERPL-MCNC: 19 MG/DL — SIGNIFICANT CHANGE UP (ref 7–23)
CALCIUM SERPL-MCNC: 9.3 MG/DL — SIGNIFICANT CHANGE UP (ref 8.4–10.5)
CALCIUM SERPL-MCNC: 9.3 MG/DL — SIGNIFICANT CHANGE UP (ref 8.4–10.5)
CHLORIDE SERPL-SCNC: 101 MMOL/L — SIGNIFICANT CHANGE UP (ref 98–107)
CHLORIDE SERPL-SCNC: 101 MMOL/L — SIGNIFICANT CHANGE UP (ref 98–107)
CO2 SERPL-SCNC: 23 MMOL/L — SIGNIFICANT CHANGE UP (ref 22–31)
CO2 SERPL-SCNC: 23 MMOL/L — SIGNIFICANT CHANGE UP (ref 22–31)
CREAT SERPL-MCNC: 1.5 MG/DL — HIGH (ref 0.5–1.3)
CREAT SERPL-MCNC: 1.5 MG/DL — HIGH (ref 0.5–1.3)
EGFR: 55 ML/MIN/1.73M2 — LOW
EGFR: 55 ML/MIN/1.73M2 — LOW
EOSINOPHIL # BLD AUTO: 0.09 K/UL — SIGNIFICANT CHANGE UP (ref 0–0.5)
EOSINOPHIL # BLD AUTO: 0.09 K/UL — SIGNIFICANT CHANGE UP (ref 0–0.5)
EOSINOPHIL NFR BLD AUTO: 0.7 % — SIGNIFICANT CHANGE UP (ref 0–6)
EOSINOPHIL NFR BLD AUTO: 0.7 % — SIGNIFICANT CHANGE UP (ref 0–6)
GLUCOSE SERPL-MCNC: 110 MG/DL — HIGH (ref 70–99)
GLUCOSE SERPL-MCNC: 110 MG/DL — HIGH (ref 70–99)
HCT VFR BLD CALC: 40 % — SIGNIFICANT CHANGE UP (ref 39–50)
HCT VFR BLD CALC: 40 % — SIGNIFICANT CHANGE UP (ref 39–50)
HGB BLD-MCNC: 13.1 G/DL — SIGNIFICANT CHANGE UP (ref 13–17)
HGB BLD-MCNC: 13.1 G/DL — SIGNIFICANT CHANGE UP (ref 13–17)
IANC: 10.42 K/UL — HIGH (ref 1.8–7.4)
IANC: 10.42 K/UL — HIGH (ref 1.8–7.4)
IMM GRANULOCYTES NFR BLD AUTO: 0.7 % — SIGNIFICANT CHANGE UP (ref 0–0.9)
IMM GRANULOCYTES NFR BLD AUTO: 0.7 % — SIGNIFICANT CHANGE UP (ref 0–0.9)
LYMPHOCYTES # BLD AUTO: 1.37 K/UL — SIGNIFICANT CHANGE UP (ref 1–3.3)
LYMPHOCYTES # BLD AUTO: 1.37 K/UL — SIGNIFICANT CHANGE UP (ref 1–3.3)
LYMPHOCYTES # BLD AUTO: 10.6 % — LOW (ref 13–44)
LYMPHOCYTES # BLD AUTO: 10.6 % — LOW (ref 13–44)
MAGNESIUM SERPL-MCNC: 2.2 MG/DL — SIGNIFICANT CHANGE UP (ref 1.6–2.6)
MAGNESIUM SERPL-MCNC: 2.2 MG/DL — SIGNIFICANT CHANGE UP (ref 1.6–2.6)
MCHC RBC-ENTMCNC: 29.7 PG — SIGNIFICANT CHANGE UP (ref 27–34)
MCHC RBC-ENTMCNC: 29.7 PG — SIGNIFICANT CHANGE UP (ref 27–34)
MCHC RBC-ENTMCNC: 32.8 GM/DL — SIGNIFICANT CHANGE UP (ref 32–36)
MCHC RBC-ENTMCNC: 32.8 GM/DL — SIGNIFICANT CHANGE UP (ref 32–36)
MCV RBC AUTO: 90.7 FL — SIGNIFICANT CHANGE UP (ref 80–100)
MCV RBC AUTO: 90.7 FL — SIGNIFICANT CHANGE UP (ref 80–100)
MONOCYTES # BLD AUTO: 0.97 K/UL — HIGH (ref 0–0.9)
MONOCYTES # BLD AUTO: 0.97 K/UL — HIGH (ref 0–0.9)
MONOCYTES NFR BLD AUTO: 7.5 % — SIGNIFICANT CHANGE UP (ref 2–14)
MONOCYTES NFR BLD AUTO: 7.5 % — SIGNIFICANT CHANGE UP (ref 2–14)
NEUTROPHILS # BLD AUTO: 10.42 K/UL — HIGH (ref 1.8–7.4)
NEUTROPHILS # BLD AUTO: 10.42 K/UL — HIGH (ref 1.8–7.4)
NEUTROPHILS NFR BLD AUTO: 80.3 % — HIGH (ref 43–77)
NEUTROPHILS NFR BLD AUTO: 80.3 % — HIGH (ref 43–77)
NRBC # BLD: 0 /100 WBCS — SIGNIFICANT CHANGE UP (ref 0–0)
NRBC # BLD: 0 /100 WBCS — SIGNIFICANT CHANGE UP (ref 0–0)
NRBC # FLD: 0 K/UL — SIGNIFICANT CHANGE UP (ref 0–0)
NRBC # FLD: 0 K/UL — SIGNIFICANT CHANGE UP (ref 0–0)
PHOSPHATE SERPL-MCNC: 2.4 MG/DL — LOW (ref 2.5–4.5)
PHOSPHATE SERPL-MCNC: 2.4 MG/DL — LOW (ref 2.5–4.5)
PLATELET # BLD AUTO: 140 K/UL — LOW (ref 150–400)
PLATELET # BLD AUTO: 140 K/UL — LOW (ref 150–400)
POTASSIUM SERPL-MCNC: 3.7 MMOL/L — SIGNIFICANT CHANGE UP (ref 3.5–5.3)
POTASSIUM SERPL-MCNC: 3.7 MMOL/L — SIGNIFICANT CHANGE UP (ref 3.5–5.3)
POTASSIUM SERPL-SCNC: 3.7 MMOL/L — SIGNIFICANT CHANGE UP (ref 3.5–5.3)
POTASSIUM SERPL-SCNC: 3.7 MMOL/L — SIGNIFICANT CHANGE UP (ref 3.5–5.3)
PROT SERPL-MCNC: 6.7 G/DL — SIGNIFICANT CHANGE UP (ref 6–8.3)
PROT SERPL-MCNC: 6.7 G/DL — SIGNIFICANT CHANGE UP (ref 6–8.3)
RBC # BLD: 4.41 M/UL — SIGNIFICANT CHANGE UP (ref 4.2–5.8)
RBC # BLD: 4.41 M/UL — SIGNIFICANT CHANGE UP (ref 4.2–5.8)
RBC # FLD: 14.3 % — SIGNIFICANT CHANGE UP (ref 10.3–14.5)
RBC # FLD: 14.3 % — SIGNIFICANT CHANGE UP (ref 10.3–14.5)
SODIUM SERPL-SCNC: 137 MMOL/L — SIGNIFICANT CHANGE UP (ref 135–145)
SODIUM SERPL-SCNC: 137 MMOL/L — SIGNIFICANT CHANGE UP (ref 135–145)
WBC # BLD: 12.97 K/UL — HIGH (ref 3.8–10.5)
WBC # BLD: 12.97 K/UL — HIGH (ref 3.8–10.5)
WBC # FLD AUTO: 12.97 K/UL — HIGH (ref 3.8–10.5)
WBC # FLD AUTO: 12.97 K/UL — HIGH (ref 3.8–10.5)

## 2023-12-21 PROCEDURE — 99233 SBSQ HOSP IP/OBS HIGH 50: CPT | Mod: GC

## 2023-12-21 RX ORDER — ONDANSETRON 8 MG/1
4 TABLET, FILM COATED ORAL ONCE
Refills: 0 | Status: COMPLETED | OUTPATIENT
Start: 2023-12-21 | End: 2023-12-21

## 2023-12-21 RX ORDER — SODIUM,POTASSIUM PHOSPHATES 278-250MG
1 POWDER IN PACKET (EA) ORAL ONCE
Refills: 0 | Status: COMPLETED | OUTPATIENT
Start: 2023-12-21 | End: 2023-12-21

## 2023-12-21 RX ADMIN — ONDANSETRON 4 MILLIGRAM(S): 8 TABLET, FILM COATED ORAL at 14:10

## 2023-12-21 RX ADMIN — Medication 100 MILLIGRAM(S): at 01:35

## 2023-12-21 RX ADMIN — LIDOCAINE 1 PATCH: 4 CREAM TOPICAL at 07:00

## 2023-12-21 RX ADMIN — Medication 975 MILLIGRAM(S): at 19:01

## 2023-12-21 RX ADMIN — HEPARIN SODIUM 5000 UNIT(S): 5000 INJECTION INTRAVENOUS; SUBCUTANEOUS at 14:10

## 2023-12-21 RX ADMIN — HEPARIN SODIUM 5000 UNIT(S): 5000 INJECTION INTRAVENOUS; SUBCUTANEOUS at 06:59

## 2023-12-21 RX ADMIN — Medication 200 MILLIGRAM(S): at 10:51

## 2023-12-21 RX ADMIN — Medication 1 PACKET(S): at 10:51

## 2023-12-21 RX ADMIN — ALBUTEROL 2 PUFF(S): 90 AEROSOL, METERED ORAL at 10:51

## 2023-12-21 RX ADMIN — Medication 975 MILLIGRAM(S): at 20:01

## 2023-12-21 RX ADMIN — Medication 200 MILLIGRAM(S): at 19:01

## 2023-12-21 RX ADMIN — ALBUTEROL 2 PUFF(S): 90 AEROSOL, METERED ORAL at 19:01

## 2023-12-21 RX ADMIN — Medication 100 MILLIGRAM(S): at 10:51

## 2023-12-21 NOTE — PROGRESS NOTE ADULT - PROBLEM SELECTOR PLAN 4
-not currently on treatment  -outpatient follow up -not currently on treatment  -hematuria likely secondary to bladder cancer and traumatic goins placement  -outpatient follow up

## 2023-12-21 NOTE — PROGRESS NOTE ADULT - ASSESSMENT
54 y/o M [facility] bladder cancer presents with fevers, chills, nausea, vomiting, and decreased urine output found to have sepsis likely in the setting of a urinary tract infection.  52 y/o M [facility] bladder cancer presents with fevers, chills, nausea, vomiting, and decreased urine output found to have sepsis likely in the setting of a urinary tract infection.

## 2023-12-21 NOTE — PROGRESS NOTE ADULT - PROBLEM SELECTOR PLAN 1
-Patient febrile to 103, tachycardic, and w/ leukocytosis to 16 meeting SIRS criteria in the setting of likely UTI  -received 2 L IVF, ceftriaxone, and tylenol in ED  -BCx and UCx collected  -UA positive  -will continue empiric ceftriaxone pending urine and blood cultures  -CT A/P ordered by ED - will f/u read -Patient febrile to 103, tachycardic, and w/ leukocytosis to 16 meeting SIRS criteria in the setting of likely UTI  -received 2 L IVF, ceftriaxone, and tylenol in ED  -BCx and UCx collected  -UA positive  -will continue empiric ceftriaxone pending urine and blood cultures. Urine cultures growing E. Coli

## 2023-12-21 NOTE — PROGRESS NOTE ADULT - SUBJECTIVE AND OBJECTIVE BOX
Klaus Gresham MD  PGY3  Preferred contact via [employer] Teams      Patient is a 53y old  Male who presents with a chief complaint of UTI (20 Dec 2023 07:39)      SUBJECTIVE / OVERNIGHT EVENTS: NAEON.     MEDICATIONS  (STANDING):  cefTRIAXone   IVPB 1000 milliGRAM(s) IV Intermittent every 24 hours  heparin   Injectable 5000 Unit(s) SubCutaneous every 8 hours  influenza   Vaccine 0.5 milliLiter(s) IntraMuscular once  tamsulosin 0.4 milliGRAM(s) Oral at bedtime    MEDICATIONS  (PRN):  acetaminophen     Tablet .. 975 milliGRAM(s) Oral every 6 hours PRN Temp greater or equal to 38C (100.4F), Mild Pain (1 - 3)  albuterol    90 MICROgram(s) HFA Inhaler 2 Puff(s) Inhalation every 6 hours PRN Shortness of Breath  benzonatate 100 milliGRAM(s) Oral every 8 hours PRN Cough  guaiFENesin Oral Liquid (Sugar-Free) 200 milliGRAM(s) Oral every 6 hours PRN Cough      CAPILLARY BLOOD GLUCOSE        I&O's Summary    20 Dec 2023 07:01  -  21 Dec 2023 07:00  --------------------------------------------------------  IN: 0 mL / OUT: 2720 mL / NET: -2720 mL        PHYSICAL EXAM:      LABS:                        13.1   12.97 )-----------( 140      ( 21 Dec 2023 05:10 )             40.0      12-21    137  |  101  |  19  ----------------------------<  110<H>  3.7   |  23  |  1.50<H>    Ca    9.3      21 Dec 2023 05:10  Phos  3.1     12-20  Mg     2.20     12-21    TPro  6.7  /  Alb  3.4  /  TBili  0.7  /  DBili  x   /  AST  81<H>  /  ALT  119<H>  /  AlkPhos  143<H>  12-21    PT/INR - ( 19 Dec 2023 20:50 )   PT: 12.5 sec;   INR: 1.11 ratio         PTT - ( 19 Dec 2023 20:50 )  PTT:28.5 sec      Urinalysis Basic - ( 21 Dec 2023 05:10 )    Color: x / Appearance: x / SG: x / pH: x  Gluc: 110 mg/dL / Ketone: x  / Bili: x / Urobili: x   Blood: x / Protein: x / Nitrite: x   Leuk Esterase: x / RBC: x / WBC x   Sq Epi: x / Non Sq Epi: x / Bacteria: x        RADIOLOGY & ADDITIONAL TESTS:    Imaging Personally Reviewed:    Consultant(s) Notes Reviewed:      Care Discussed with Consultants/Other Providers:   Klaus Gresham MD  PGY3  Preferred contact via Microsoft Teams      Patient is a 53y old  Male who presents with a chief complaint of UTI (20 Dec 2023 07:39)      SUBJECTIVE / OVERNIGHT EVENTS: NAEON. Patient seen and examined at bedside. His only acute complaint is worsening cough with some associated mild SOB. Otherwise patient has no urinary or abdominal complaints.     MEDICATIONS  (STANDING):  cefTRIAXone   IVPB 1000 milliGRAM(s) IV Intermittent every 24 hours  heparin   Injectable 5000 Unit(s) SubCutaneous every 8 hours  influenza   Vaccine 0.5 milliLiter(s) IntraMuscular once  tamsulosin 0.4 milliGRAM(s) Oral at bedtime    MEDICATIONS  (PRN):  acetaminophen     Tablet .. 975 milliGRAM(s) Oral every 6 hours PRN Temp greater or equal to 38C (100.4F), Mild Pain (1 - 3)  albuterol    90 MICROgram(s) HFA Inhaler 2 Puff(s) Inhalation every 6 hours PRN Shortness of Breath  benzonatate 100 milliGRAM(s) Oral every 8 hours PRN Cough  guaiFENesin Oral Liquid (Sugar-Free) 200 milliGRAM(s) Oral every 6 hours PRN Cough      CAPILLARY BLOOD GLUCOSE        I&O's Summary    20 Dec 2023 07:01  -  21 Dec 2023 07:00  --------------------------------------------------------  IN: 0 mL / OUT: 2720 mL / NET: -2720 mL    Vital Signs Last 24 Hrs  T(C): 36.6 (21 Dec 2023 06:05), Max: 36.8 (20 Dec 2023 15:21)  T(F): 97.9 (21 Dec 2023 06:05), Max: 98.3 (20 Dec 2023 15:21)  HR: 107 (21 Dec 2023 06:05) (97 - 107)  BP: 128/95 (21 Dec 2023 06:05) (128/95 - 144/85)  BP(mean): --  RR: 18 (21 Dec 2023 06:05) (17 - 18)  SpO2: 97% (21 Dec 2023 06:05) (95% - 97%)    Parameters below as of 21 Dec 2023 06:05  Patient On (Oxygen Delivery Method): room air    PHYSICAL EXAM:  GENERAL: NAD, well-groomed, well-developed  HEAD:  Atraumatic, Normocephalic  EYES: EOMI, PERRLA, conjunctiva and sclera clear  ENMT: Moist mucous membranes, Good dentition  NECK: Supple, No JVD  NERVOUS SYSTEM: AOX3, motor and sensation grossly intact in b/l UE and b/l LE  PSYCHIATRIC: Appropriate affect and mood  CHEST/LUNG: Clear to auscultation bilaterally; No rales, rhonchi, wheezing, or rubs  HEART: Regular rate and rhythm; No murmurs, rubs, or gallops. No LE edema  ABDOMEN: Soft, Nontender, Nondistended; Bowel sounds present  EXTREMITIES:  2+ Peripheral Pulses, No clubbing, cyanosis  SKIN: No rashes or lesions      LABS:                        13.1   12.97 )-----------( 140      ( 21 Dec 2023 05:10 )             40.0      12-21    137  |  101  |  19  ----------------------------<  110<H>  3.7   |  23  |  1.50<H>    Ca    9.3      21 Dec 2023 05:10  Phos  3.1     12-20  Mg     2.20     12-21    TPro  6.7  /  Alb  3.4  /  TBili  0.7  /  DBili  x   /  AST  81<H>  /  ALT  119<H>  /  AlkPhos  143<H>  12-21    PT/INR - ( 19 Dec 2023 20:50 )   PT: 12.5 sec;   INR: 1.11 ratio         PTT - ( 19 Dec 2023 20:50 )  PTT:28.5 sec      Urinalysis Basic - ( 21 Dec 2023 05:10 )    Color: x / Appearance: x / SG: x / pH: x  Gluc: 110 mg/dL / Ketone: x  / Bili: x / Urobili: x   Blood: x / Protein: x / Nitrite: x   Leuk Esterase: x / RBC: x / WBC x   Sq Epi: x / Non Sq Epi: x / Bacteria: x        RADIOLOGY & ADDITIONAL TESTS:    Imaging Personally Reviewed:    Consultant(s) Notes Reviewed:      Care Discussed with Consultants/Other Providers:

## 2023-12-22 ENCOUNTER — TRANSCRIPTION ENCOUNTER (OUTPATIENT)
Age: 53
End: 2023-12-22

## 2023-12-22 VITALS
HEART RATE: 106 BPM | DIASTOLIC BLOOD PRESSURE: 86 MMHG | TEMPERATURE: 100 F | OXYGEN SATURATION: 95 % | RESPIRATION RATE: 18 BRPM | SYSTOLIC BLOOD PRESSURE: 142 MMHG

## 2023-12-22 DIAGNOSIS — R05.9 COUGH, UNSPECIFIED: ICD-10-CM

## 2023-12-22 LAB
-  AMOXICILLIN/CLAVULANIC ACID: SIGNIFICANT CHANGE UP
-  AMOXICILLIN/CLAVULANIC ACID: SIGNIFICANT CHANGE UP
-  AMPICILLIN/SULBACTAM: SIGNIFICANT CHANGE UP
-  AMPICILLIN/SULBACTAM: SIGNIFICANT CHANGE UP
-  AMPICILLIN: SIGNIFICANT CHANGE UP
-  AMPICILLIN: SIGNIFICANT CHANGE UP
-  AZTREONAM: SIGNIFICANT CHANGE UP
-  AZTREONAM: SIGNIFICANT CHANGE UP
-  CEFAZOLIN: SIGNIFICANT CHANGE UP
-  CEFAZOLIN: SIGNIFICANT CHANGE UP
-  CEFEPIME: SIGNIFICANT CHANGE UP
-  CEFEPIME: SIGNIFICANT CHANGE UP
-  CEFOXITIN: SIGNIFICANT CHANGE UP
-  CEFOXITIN: SIGNIFICANT CHANGE UP
-  CEFTRIAXONE: SIGNIFICANT CHANGE UP
-  CEFTRIAXONE: SIGNIFICANT CHANGE UP
-  CEFUROXIME: SIGNIFICANT CHANGE UP
-  CEFUROXIME: SIGNIFICANT CHANGE UP
-  CIPROFLOXACIN: SIGNIFICANT CHANGE UP
-  CIPROFLOXACIN: SIGNIFICANT CHANGE UP
-  ERTAPENEM: SIGNIFICANT CHANGE UP
-  ERTAPENEM: SIGNIFICANT CHANGE UP
-  GENTAMICIN: SIGNIFICANT CHANGE UP
-  GENTAMICIN: SIGNIFICANT CHANGE UP
-  IMIPENEM: SIGNIFICANT CHANGE UP
-  IMIPENEM: SIGNIFICANT CHANGE UP
-  LEVOFLOXACIN: SIGNIFICANT CHANGE UP
-  LEVOFLOXACIN: SIGNIFICANT CHANGE UP
-  MEROPENEM: SIGNIFICANT CHANGE UP
-  MEROPENEM: SIGNIFICANT CHANGE UP
-  NITROFURANTOIN: SIGNIFICANT CHANGE UP
-  NITROFURANTOIN: SIGNIFICANT CHANGE UP
-  PIPERACILLIN/TAZOBACTAM: SIGNIFICANT CHANGE UP
-  PIPERACILLIN/TAZOBACTAM: SIGNIFICANT CHANGE UP
-  TOBRAMYCIN: SIGNIFICANT CHANGE UP
-  TOBRAMYCIN: SIGNIFICANT CHANGE UP
-  TRIMETHOPRIM/SULFAMETHOXAZOLE: SIGNIFICANT CHANGE UP
-  TRIMETHOPRIM/SULFAMETHOXAZOLE: SIGNIFICANT CHANGE UP
ALBUMIN SERPL ELPH-MCNC: 3.3 G/DL — SIGNIFICANT CHANGE UP (ref 3.3–5)
ALBUMIN SERPL ELPH-MCNC: 3.3 G/DL — SIGNIFICANT CHANGE UP (ref 3.3–5)
ALP SERPL-CCNC: 160 U/L — HIGH (ref 40–120)
ALP SERPL-CCNC: 160 U/L — HIGH (ref 40–120)
ALT FLD-CCNC: 105 U/L — HIGH (ref 4–41)
ALT FLD-CCNC: 105 U/L — HIGH (ref 4–41)
ANION GAP SERPL CALC-SCNC: 13 MMOL/L — SIGNIFICANT CHANGE UP (ref 7–14)
ANION GAP SERPL CALC-SCNC: 13 MMOL/L — SIGNIFICANT CHANGE UP (ref 7–14)
AST SERPL-CCNC: 53 U/L — HIGH (ref 4–40)
AST SERPL-CCNC: 53 U/L — HIGH (ref 4–40)
BASOPHILS # BLD AUTO: 0.03 K/UL — SIGNIFICANT CHANGE UP (ref 0–0.2)
BASOPHILS # BLD AUTO: 0.03 K/UL — SIGNIFICANT CHANGE UP (ref 0–0.2)
BASOPHILS NFR BLD AUTO: 0.3 % — SIGNIFICANT CHANGE UP (ref 0–2)
BASOPHILS NFR BLD AUTO: 0.3 % — SIGNIFICANT CHANGE UP (ref 0–2)
BILIRUB SERPL-MCNC: 0.4 MG/DL — SIGNIFICANT CHANGE UP (ref 0.2–1.2)
BILIRUB SERPL-MCNC: 0.4 MG/DL — SIGNIFICANT CHANGE UP (ref 0.2–1.2)
BUN SERPL-MCNC: 19 MG/DL — SIGNIFICANT CHANGE UP (ref 7–23)
BUN SERPL-MCNC: 19 MG/DL — SIGNIFICANT CHANGE UP (ref 7–23)
CALCIUM SERPL-MCNC: 9.1 MG/DL — SIGNIFICANT CHANGE UP (ref 8.4–10.5)
CALCIUM SERPL-MCNC: 9.1 MG/DL — SIGNIFICANT CHANGE UP (ref 8.4–10.5)
CHLORIDE SERPL-SCNC: 101 MMOL/L — SIGNIFICANT CHANGE UP (ref 98–107)
CHLORIDE SERPL-SCNC: 101 MMOL/L — SIGNIFICANT CHANGE UP (ref 98–107)
CO2 SERPL-SCNC: 22 MMOL/L — SIGNIFICANT CHANGE UP (ref 22–31)
CO2 SERPL-SCNC: 22 MMOL/L — SIGNIFICANT CHANGE UP (ref 22–31)
CREAT SERPL-MCNC: 1.5 MG/DL — HIGH (ref 0.5–1.3)
CREAT SERPL-MCNC: 1.5 MG/DL — HIGH (ref 0.5–1.3)
CULTURE RESULTS: ABNORMAL
CULTURE RESULTS: ABNORMAL
EGFR: 55 ML/MIN/1.73M2 — LOW
EGFR: 55 ML/MIN/1.73M2 — LOW
EOSINOPHIL # BLD AUTO: 0.14 K/UL — SIGNIFICANT CHANGE UP (ref 0–0.5)
EOSINOPHIL # BLD AUTO: 0.14 K/UL — SIGNIFICANT CHANGE UP (ref 0–0.5)
EOSINOPHIL NFR BLD AUTO: 1.3 % — SIGNIFICANT CHANGE UP (ref 0–6)
EOSINOPHIL NFR BLD AUTO: 1.3 % — SIGNIFICANT CHANGE UP (ref 0–6)
GLUCOSE SERPL-MCNC: 120 MG/DL — HIGH (ref 70–99)
GLUCOSE SERPL-MCNC: 120 MG/DL — HIGH (ref 70–99)
HCT VFR BLD CALC: 39.2 % — SIGNIFICANT CHANGE UP (ref 39–50)
HCT VFR BLD CALC: 39.2 % — SIGNIFICANT CHANGE UP (ref 39–50)
HGB BLD-MCNC: 13.3 G/DL — SIGNIFICANT CHANGE UP (ref 13–17)
HGB BLD-MCNC: 13.3 G/DL — SIGNIFICANT CHANGE UP (ref 13–17)
IANC: 9.2 K/UL — HIGH (ref 1.8–7.4)
IANC: 9.2 K/UL — HIGH (ref 1.8–7.4)
IMM GRANULOCYTES NFR BLD AUTO: 0.6 % — SIGNIFICANT CHANGE UP (ref 0–0.9)
IMM GRANULOCYTES NFR BLD AUTO: 0.6 % — SIGNIFICANT CHANGE UP (ref 0–0.9)
LYMPHOCYTES # BLD AUTO: 0.92 K/UL — LOW (ref 1–3.3)
LYMPHOCYTES # BLD AUTO: 0.92 K/UL — LOW (ref 1–3.3)
LYMPHOCYTES # BLD AUTO: 8.3 % — LOW (ref 13–44)
LYMPHOCYTES # BLD AUTO: 8.3 % — LOW (ref 13–44)
MAGNESIUM SERPL-MCNC: 2.2 MG/DL — SIGNIFICANT CHANGE UP (ref 1.6–2.6)
MAGNESIUM SERPL-MCNC: 2.2 MG/DL — SIGNIFICANT CHANGE UP (ref 1.6–2.6)
MCHC RBC-ENTMCNC: 29.8 PG — SIGNIFICANT CHANGE UP (ref 27–34)
MCHC RBC-ENTMCNC: 29.8 PG — SIGNIFICANT CHANGE UP (ref 27–34)
MCHC RBC-ENTMCNC: 33.9 GM/DL — SIGNIFICANT CHANGE UP (ref 32–36)
MCHC RBC-ENTMCNC: 33.9 GM/DL — SIGNIFICANT CHANGE UP (ref 32–36)
MCV RBC AUTO: 87.7 FL — SIGNIFICANT CHANGE UP (ref 80–100)
MCV RBC AUTO: 87.7 FL — SIGNIFICANT CHANGE UP (ref 80–100)
METHOD TYPE: SIGNIFICANT CHANGE UP
METHOD TYPE: SIGNIFICANT CHANGE UP
MONOCYTES # BLD AUTO: 0.79 K/UL — SIGNIFICANT CHANGE UP (ref 0–0.9)
MONOCYTES # BLD AUTO: 0.79 K/UL — SIGNIFICANT CHANGE UP (ref 0–0.9)
MONOCYTES NFR BLD AUTO: 7.1 % — SIGNIFICANT CHANGE UP (ref 2–14)
MONOCYTES NFR BLD AUTO: 7.1 % — SIGNIFICANT CHANGE UP (ref 2–14)
NEUTROPHILS # BLD AUTO: 9.2 K/UL — HIGH (ref 1.8–7.4)
NEUTROPHILS # BLD AUTO: 9.2 K/UL — HIGH (ref 1.8–7.4)
NEUTROPHILS NFR BLD AUTO: 82.4 % — HIGH (ref 43–77)
NEUTROPHILS NFR BLD AUTO: 82.4 % — HIGH (ref 43–77)
NRBC # BLD: 0 /100 WBCS — SIGNIFICANT CHANGE UP (ref 0–0)
NRBC # BLD: 0 /100 WBCS — SIGNIFICANT CHANGE UP (ref 0–0)
NRBC # FLD: 0 K/UL — SIGNIFICANT CHANGE UP (ref 0–0)
NRBC # FLD: 0 K/UL — SIGNIFICANT CHANGE UP (ref 0–0)
ORGANISM # SPEC MICROSCOPIC CNT: ABNORMAL
PHOSPHATE SERPL-MCNC: 2.3 MG/DL — LOW (ref 2.5–4.5)
PHOSPHATE SERPL-MCNC: 2.3 MG/DL — LOW (ref 2.5–4.5)
PLATELET # BLD AUTO: 141 K/UL — LOW (ref 150–400)
PLATELET # BLD AUTO: 141 K/UL — LOW (ref 150–400)
POTASSIUM SERPL-MCNC: 3.4 MMOL/L — LOW (ref 3.5–5.3)
POTASSIUM SERPL-MCNC: 3.4 MMOL/L — LOW (ref 3.5–5.3)
POTASSIUM SERPL-SCNC: 3.4 MMOL/L — LOW (ref 3.5–5.3)
POTASSIUM SERPL-SCNC: 3.4 MMOL/L — LOW (ref 3.5–5.3)
PROT SERPL-MCNC: 6.8 G/DL — SIGNIFICANT CHANGE UP (ref 6–8.3)
PROT SERPL-MCNC: 6.8 G/DL — SIGNIFICANT CHANGE UP (ref 6–8.3)
RBC # BLD: 4.47 M/UL — SIGNIFICANT CHANGE UP (ref 4.2–5.8)
RBC # BLD: 4.47 M/UL — SIGNIFICANT CHANGE UP (ref 4.2–5.8)
RBC # FLD: 14.4 % — SIGNIFICANT CHANGE UP (ref 10.3–14.5)
RBC # FLD: 14.4 % — SIGNIFICANT CHANGE UP (ref 10.3–14.5)
SODIUM SERPL-SCNC: 136 MMOL/L — SIGNIFICANT CHANGE UP (ref 135–145)
SODIUM SERPL-SCNC: 136 MMOL/L — SIGNIFICANT CHANGE UP (ref 135–145)
SPECIMEN SOURCE: SIGNIFICANT CHANGE UP
SPECIMEN SOURCE: SIGNIFICANT CHANGE UP
WBC # BLD: 11.15 K/UL — HIGH (ref 3.8–10.5)
WBC # BLD: 11.15 K/UL — HIGH (ref 3.8–10.5)
WBC # FLD AUTO: 11.15 K/UL — HIGH (ref 3.8–10.5)
WBC # FLD AUTO: 11.15 K/UL — HIGH (ref 3.8–10.5)

## 2023-12-22 PROCEDURE — 99239 HOSP IP/OBS DSCHRG MGMT >30: CPT | Mod: GC

## 2023-12-22 RX ORDER — ACETAMINOPHEN 500 MG
3 TABLET ORAL
Qty: 0 | Refills: 0 | DISCHARGE
Start: 2023-12-22

## 2023-12-22 RX ORDER — SODIUM,POTASSIUM PHOSPHATES 278-250MG
1 POWDER IN PACKET (EA) ORAL ONCE
Refills: 0 | Status: COMPLETED | OUTPATIENT
Start: 2023-12-22 | End: 2023-12-22

## 2023-12-22 RX ORDER — POTASSIUM CHLORIDE 20 MEQ
40 PACKET (EA) ORAL ONCE
Refills: 0 | Status: COMPLETED | OUTPATIENT
Start: 2023-12-22 | End: 2023-12-22

## 2023-12-22 RX ORDER — CEFTRIAXONE 500 MG/1
1000 INJECTION, POWDER, FOR SOLUTION INTRAMUSCULAR; INTRAVENOUS EVERY 24 HOURS
Refills: 0 | Status: DISCONTINUED | OUTPATIENT
Start: 2023-12-22 | End: 2023-12-22

## 2023-12-22 RX ORDER — TAMSULOSIN HYDROCHLORIDE 0.4 MG/1
1 CAPSULE ORAL
Qty: 30 | Refills: 0
Start: 2023-12-22 | End: 2024-01-20

## 2023-12-22 RX ORDER — CEFPODOXIME PROXETIL 100 MG
1 TABLET ORAL
Qty: 8 | Refills: 0
Start: 2023-12-22 | End: 2023-12-25

## 2023-12-22 RX ORDER — ONDANSETRON 8 MG/1
4 TABLET, FILM COATED ORAL ONCE
Refills: 0 | Status: COMPLETED | OUTPATIENT
Start: 2023-12-22 | End: 2023-12-22

## 2023-12-22 RX ADMIN — Medication 40 MILLIEQUIVALENT(S): at 12:36

## 2023-12-22 RX ADMIN — HEPARIN SODIUM 5000 UNIT(S): 5000 INJECTION INTRAVENOUS; SUBCUTANEOUS at 00:07

## 2023-12-22 RX ADMIN — ONDANSETRON 4 MILLIGRAM(S): 8 TABLET, FILM COATED ORAL at 08:37

## 2023-12-22 RX ADMIN — HEPARIN SODIUM 5000 UNIT(S): 5000 INJECTION INTRAVENOUS; SUBCUTANEOUS at 06:17

## 2023-12-22 RX ADMIN — Medication 100 MILLIGRAM(S): at 12:36

## 2023-12-22 RX ADMIN — CEFTRIAXONE 100 MILLIGRAM(S): 500 INJECTION, POWDER, FOR SOLUTION INTRAMUSCULAR; INTRAVENOUS at 12:37

## 2023-12-22 RX ADMIN — TAMSULOSIN HYDROCHLORIDE 0.4 MILLIGRAM(S): 0.4 CAPSULE ORAL at 00:07

## 2023-12-22 RX ADMIN — CEFTRIAXONE 100 MILLIGRAM(S): 500 INJECTION, POWDER, FOR SOLUTION INTRAMUSCULAR; INTRAVENOUS at 00:06

## 2023-12-22 RX ADMIN — ALBUTEROL 2 PUFF(S): 90 AEROSOL, METERED ORAL at 12:37

## 2023-12-22 RX ADMIN — Medication 200 MILLIGRAM(S): at 12:36

## 2023-12-22 RX ADMIN — Medication 1 PACKET(S): at 12:37

## 2023-12-22 NOTE — DIETITIAN INITIAL EVALUATION ADULT - PERTINENT LABORATORY DATA
(12/22) Na 136, BUN 19, Cr 1.50<H>, <H>, K+ 3.4<L>, Phos 2.3<L>, Mg 2.20, Alk Phos 160<H>, ALT/SGPT 105<H>, AST/SGOT 53<H>

## 2023-12-22 NOTE — DISCHARGE NOTE PROVIDER - CARE PROVIDERS DIRECT ADDRESSES
[email or web address],[email or web address]

## 2023-12-22 NOTE — PROGRESS NOTE ADULT - PROBLEM SELECTOR PLAN 5
DVT Prophylaxis: sub q heparin  Diet: regular  Dispo: likely home -not currently on treatment  -hematuria likely secondary to bladder cancer and traumatic goins placement  -outpatient follow up

## 2023-12-22 NOTE — DISCHARGE NOTE NURSING/CASE MANAGEMENT/SOCIAL WORK - NSSCTYPOFSERV_GEN_ALL_CORE
Skilled nursing for goins catheter maintenance.  First nursing home visit projected for the day after discharge.  Please call number listed above for any questions regarding home care services. Skilled nursing for goins catheter maintenance.  First nursing home visit projected for the day after discharge.  Please call number listed above for any questions regarding home care services.  Initial goins supplies provided by primary RN on discharge.

## 2023-12-22 NOTE — PROGRESS NOTE ADULT - PROBLEM SELECTOR PLAN 1
-Patient febrile to 103, tachycardic, and w/ leukocytosis to 16 meeting SIRS criteria in the setting of likely UTI  -received 2 L IVF, ceftriaxone, and tylenol in ED  -BCx and UCx collected  -UA positive  -will continue empiric ceftriaxone pending urine and blood cultures. Urine cultures growing E. Coli -Patient febrile to 103, tachycardic, and w/ leukocytosis to 16 meeting SIRS criteria in the setting of likely UTI  -received 2 L IVF, ceftriaxone, and tylenol in ED  -BCx and UCx collected  -UA positive  -will continue empiric ceftriaxone pending urine and blood cultures. Urine cultures growing E. Coli, pending sensitivies  -given patient recently with goins will treat with extended course of antibiotics

## 2023-12-22 NOTE — DIETITIAN INITIAL EVALUATION ADULT - OTHER INFO
Per chart, pt is 53 year old male [facility] bladder cancer presenting with fever, chills, nausea/vomiting, decreased urine output found to have sepsis likely in the setting of UTI.     Pt confirms NKFA, denies difficulties chewing/swallowing. Pt lives at home, independent with ADLs PTA. Pt reports generally good appetite/PO intake PTA, consumes regular diet at baseline. Pt reports decreased appetite in house, had not yet consumed breakfast tray at time of visit. Pt reports his cough is contributing to difficulty eating. Pt amenable to trial of Orgain shake. Pt with multiple snacks brought in by family on tray table. Pt denies current GI distress, last BM 12/19 per flowsheets; no bowel regimen ordered at this time. Labs notable for hypophosphatemia, hypokalemia.

## 2023-12-22 NOTE — DISCHARGE NOTE NURSING/CASE MANAGEMENT/SOCIAL WORK - NSDCPEFALRISK_GEN_ALL_CORE
For information on Fall & Injury Prevention, visit: [email or web address] OR  [email or web address] OR  https://www.cdc.gov/steadi/patient.html For information on Fall & Injury Prevention, visit: [email or web address] OR  [email or web address] OR  https://www.cdc.gov/steadi/patient.html

## 2023-12-22 NOTE — DISCHARGE NOTE PROVIDER - NSDCCPCAREPLAN_GEN_ALL_CORE_FT
PRINCIPAL DISCHARGE DIAGNOSIS  Diagnosis: Acute UTI  Assessment and Plan of Treatment: You were admitted retaining urine and with fevers, nausea and vomiting and were found to have a urinary tract infection. We started you on antibiotics and followed your urine cultures to determine the best antibiotic regimen for you. Your fevers, white blood cell count, and symptoms improved. We sent you home with a goins and you should follow up with your urologist for further management of your goins. If these symptoms return please contact a physician.      SECONDARY DISCHARGE DIAGNOSES  Diagnosis: Cough  Assessment and Plan of Treatment: You had a cough during your hospitalization for which we did not find a cause. Your oxygen remained normal and your workup inlcuding a viral panel and a chest xray were unrevealing. You may continue with tessalon perles and robitussin at home as needed. If these symptoms do not resolve please follow up with your primary care physician.

## 2023-12-22 NOTE — PROGRESS NOTE ADULT - PROBLEM SELECTOR PLAN 4
-not currently on treatment  -hematuria likely secondary to bladder cancer and traumatic goins placement  -outpatient follow up -likely in setting of obstructive symptoms  -now w/ goins placed  -trend creatinine

## 2023-12-22 NOTE — ASU PATIENT PROFILE, ADULT - NS TRANSFER DISPOSITION PATIENT BELONGINGS
Physical Therapy    Physical Therapy Treatment    Patient Name: Mary Marsh  MRN: [id number]  Today's Date: 12/22/2023  Time Calculation  Start Time: 1107  Stop Time: 1137  Time Calculation (min): 30 min       Assessment/Plan   PT Assessment  PT Assessment Results: Decreased strength, Decreased range of motion, Decreased endurance, Decreased mobility, Impaired balance  Rehab Prognosis: Good  Strengths: Ability to acquire knowledge  End of Session Communication: Bedside nurse  Assessment Comment: pt is s/p R tibia IMN and tib/fib osteotomy for LLD. Presents with decline in functional mobility as compared to baseline. Patient would benefit from continued PT in order to maximize functional mobility prior to D/C.  End of Session Patient Position: Bed, 2 rail up  PT Plan  Inpatient/Swing Bed or Outpatient: Inpatient  PT Plan  Treatment/Interventions: Bed mobility, Transfer training, Gait training, Balance training, Strengthening, Endurance training, Therapeutic exercise, Therapeutic activity  PT Plan: Skilled PT  PT Frequency: 5 times per week  PT Discharge Recommendations: Moderate intensity level of continued care  PT Recommended Transfer Status: Assist x1  PT - OK to Discharge: Yes    Outcome Measures:  [facility] Basic Mobility  Turning from your back to your side while in a flat bed without using bedrails: A little  Moving from lying on your back to sitting on the side of a flat bed without using bedrails: A little  Moving to and from bed to chair (including a wheelchair): A lot  Standing up from a chair using your arms (e.g. wheelchair or bedside chair): A lot  To walk in hospital room: Total  Climbing 3-5 steps with railing: Total  Basic Mobility - Total Score: 12    WBAT  R LE     12/22/23 1107   PT  Visit   PT Received On 12/22/23   General   Reason for Referral pt s/p R tibia IMNand tib/fib osteotomy for LLD   Past Medical History Relevant to Rehab pt with h/o adrenal nodule, anemia, arthritis, CVA, COPD,  depression, IMN for L hip fx.vision loss, urinary incontinenc, GERD, HTN   Family/Caregiver Present No   Prior to Session Communication Bedside nurse   Patient Position Received Bed, 3 rail up   General Comment Patient agreeable to work with PT. RN cleared patient for PT. Patient pleasant and cooperative in PT session   Pain Assessment   Pain Assessment 0-10   Pain Score 6   Pain Type Surgical pain;Acute pain   Pain Location   (leg)   Pain Orientation Right   Cognition   Overall Cognitive Status WFL   Orientation Level Oriented X4   Therapeutic Exercise   Therapeutic Exercise Performed Yes   Therapeutic Exercise Activity 1 supine AROM R LE: HS, Ap, GS, QS, hip abd/add 1 x 5-10 each  (cues for proper form in order ot maximize benefit)   Bed Mobility   Bed Mobility Yes   Bed Mobility 1   Bed Mobility 1 Supine to sitting;Sitting to supine   Level of Assistance 1 Minimum assistance  (assist to manage R LE)   Bed Mobility Comments 1 cues for sequencing and body positioning   Ambulation/Gait Training   Ambulation/Gait Training Performed Yes   Ambulation/Gait Training 1   Surface 1 Level tile   Device 1 Rolling walker   Assistance 1 Moderate verbal cues  (assist with balanace and walker management)   Quality of Gait 1 NBOS;Inconsistent stride length;Decreased step length;Soft knee(s);Antalgic  (difficulty with weight shifting and weight bearing R LE)   Comments/Distance (ft) 1 2 feet along EOB to the L  (cues for stepping, safety, AD management and sequencing, cues to increase UE WB with stepping on R LE in order to move L LE, reviewed heel toe pivoting on L as a strategy if unable to effectively step, steps small and shuffled.)   Transfers   Transfer Yes   Transfer 1   Technique 1 Sit to stand;Stand to sit   Transfer Device 1 Walker   Transfer Level of Assistance 1   (modA improved to Ashley from elevated EOB, increased assist suspected from lower surfaces, increased time to sit and stand)   Trials/Comments 1 cues for  setup, safety and sequencing, cues to kick leg out with sitting down due to pain and guarding during transfer   Stairs   Stairs No   Activity Tolerance   Endurance Decreased tolerance for upright activites   PT Assessment   PT Assessment Results Decreased strength;Decreased range of motion;Decreased endurance;Decreased mobility;Impaired balance   Rehab Prognosis Good   Strengths Ability to acquire knowledge   End of Session Communication Bedside nurse   Assessment Comment pt is s/p R tibia IMN and tib/fib osteotomy for LLD.   PT Plan   Inpatient/Swing Bed or Outpatient Inpatient   PT Plan   Treatment/Interventions Bed mobility;Transfer training;Gait training;Balance training;Strengthening;Endurance training;Therapeutic exercise;Therapeutic activity   PT Plan Skilled PT   PT Frequency 5 times per week   PT Discharge Recommendations Moderate intensity level of continued care   PT Recommended Transfer Status Assist x1   PT - OK to Discharge Yes     Education Documentation  Precautions, taught by Francy Perdomo PT at 12/22/2023  2:09 PM.  Learner: Patient  Readiness: Eager  Method: Explanation, Demonstration  Response: Verbalizes Understanding, Needs Reinforcement, Demonstrated Understanding  Comment: transfer training, fall risk, proper use of AD, post op precautions, need for asssist, role PT, DC rec    Mobility Training, taught by Fracny Perdomo PT at 12/22/2023  2:09 PM.  Learner: Patient  Readiness: Eager  Method: Explanation, Demonstration  Response: Verbalizes Understanding, Needs Reinforcement, Demonstrated Understanding  Comment: transfer training, fall risk, proper use of AD, post op precautions, need for asssist, role PT, DC rec    Education Comments  No comments found.      EDUCATION:       GOALS:  Encounter Problems       Encounter Problems (Active)       Balance       STG - Maintains static standing balance with upper extremity support for 5 mins with CGA (Progressing)       Start:  12/21/23     Expected End:  01/04/24       INTERVENTIONS:  1. Practice standing with minimal support.  2. Educate patient about standing tolerance.  3. Educate patient about independence with gait, transfers, and ADL's.  4. Educate patient about use of assistive device.  5. Educate patient about self-directed care.            Mobility       STG - Patient will ambulate 25 ft with CGA and ww  (Progressing)       Start:  12/21/23    Expected End:  01/04/24               Pain - Adult          Transfers       STG - Transfer from bed to chair with CGA  (Progressing)       Start:  12/21/23    Expected End:  01/04/24            STG - Patient will perform bed mobility I  (Progressing)       Start:  12/21/23    Expected End:  01/04/24                 Francy Perdomo, PT             lock

## 2023-12-22 NOTE — PROGRESS NOTE ADULT - PROBLEM SELECTOR PLAN 2
-goins placed in ED w/ red tinged urine  -will leave in place and likely d/c with goins to f/u with outpatient urologist -Patient with persistent cough, now improving  -RVP negative, CXR negative, never hypoxic  -continue supportive care with robitussin, tessalon perles

## 2023-12-22 NOTE — DIETITIAN INITIAL EVALUATION ADULT - ADD RECOMMEND
Recommend continue regular diet.  RDN to provide Orgain Vegan shake 1 PO daily (provides 220 kcal, 16 gm protein per 11oz serving).  Monitor electrolytes (K+, Mg, P) and replete to within desired limits as clinically indicated.

## 2023-12-22 NOTE — DISCHARGE NOTE PROVIDER - NSDCMRMEDTOKEN_GEN_ALL_CORE_FT
acetaminophen 325 mg oral tablet: 3 tab(s) orally every 6 hours As needed Temp greater or equal to 38C (100.4F), Mild Pain (1 - 3)  benzonatate 100 mg oral capsule: 1 cap(s) orally every 8 hours As needed Cough  Flomax 0.4 mg oral capsule: 1 cap(s) orally once a day  guaiFENesin 100 mg/5 mL oral liquid: 10 milliliter(s) orally every 6 hours As needed Cough   acetaminophen 325 mg oral tablet: 3 tab(s) orally every 6 hours As needed Temp greater or equal to 38C (100.4F), Mild Pain (1 - 3)  benzonatate 100 mg oral capsule: 1 cap(s) orally every 8 hours as needed for Cough  cefpodoxime 200 mg oral tablet: 1 tab(s) orally 2 times a day  Flomax 0.4 mg oral capsule: 1 cap(s) orally once a day  guaiFENesin 100 mg/5 mL oral liquid: 10 milliliter(s) orally every 6 hours As needed Cough

## 2023-12-22 NOTE — PROVIDER CONTACT NOTE (OTHER) - ASSESSMENT
No acute distress noted, dark red blood in goins drainage bag
no acute ss of distress nor complication, no fever

## 2023-12-22 NOTE — DIETITIAN INITIAL EVALUATION ADULT - NAME AND PHONE
Aislinn Garcia RDN, CDN #[id number]  Also available on [employer] Teams  Aislinn Garcia RDN, CDN #[id number]  Also available on [employer] Teams

## 2023-12-22 NOTE — DISCHARGE NOTE PROVIDER - PROVIDER TOKENS
PROVIDER:[TOKEN:[39:MIIS:39],FOLLOWUP:[2 weeks],ESTABLISHEDPATIENT:[T]],FREE:[LAST:[Your Primary Care Provider],PHONE:[(   )    -],FAX:[(   )    -],FOLLOWUP:[2 weeks]]

## 2023-12-22 NOTE — DIETITIAN INITIAL EVALUATION ADULT - OTHER CALCULATIONS
Ideal Body Weight: 166 lbs / 75.5 kg +/-10%   Dosing weight (12/20) 224.8 lbs / 102 kg.   No recent weight history available via chart.   Pt reports usual body weight 225 lbs.

## 2023-12-22 NOTE — DISCHARGE NOTE PROVIDER - HOSPITAL COURSE
HPI:  52 y/o M PMH bladder cancer not on active treatment presents for decreased urination, chills, nausea, and vomiting at home. Patient was recently discharged from the hospital with a goins and saw his urologist last week where it was removed initially without issue. 3 days ago patient began having decreased urinary output. This symptom was associated with nausea and vomiting w/ decreased PO intake. Vomit was NBNB. Patient also states he was having intermittent chills and felt warm at home but never took his temperature. No dysuria. Denies abdominal pain but did feel mildly distended. Also complaining of mild cough and SOB. Patient spoke with his outpatient urologist who was concerned for infection and sent patient to ED    ED Course: Goins placed and patient w/ relief of abdominal discomfort. Febrile and tachycardic, given ceftriaxone, tylenol, and 2L IVF.  (20 Dec 2023 07:39)    Hospital Course: Patient admitted and met SIRS criteria with fever, tachycardia, and leukocytosis. Started on ceftriaxone for empiric coverage of UTI. Blood and urine cultures obtained. Urine cultures eventually grew E. Coli sensitive to***. Sent home to finish course of antibiotics with***. Patient also with persistent dry cough of unknown etiology. RVP negative. CXR negative. Patient was never hypoxic.       Important Medication Changes and Reason:    Active or Pending Issues Requiring Follow-up:    Advanced Directives:   [ ] Full code  [ ] DNR  [ ] Hospice    Discharge Diagnoses:         HPI:  54 y/o M PMH bladder cancer not on active treatment presents for decreased urination, chills, nausea, and vomiting at home. Patient was recently discharged from the hospital with a goins and saw his urologist last week where it was removed initially without issue. 3 days ago patient began having decreased urinary output. This symptom was associated with nausea and vomiting w/ decreased PO intake. Vomit was NBNB. Patient also states he was having intermittent chills and felt warm at home but never took his temperature. No dysuria. Denies abdominal pain but did feel mildly distended. Also complaining of mild cough and SOB. Patient spoke with his outpatient urologist who was concerned for infection and sent patient to ED    ED Course: Goins placed and patient w/ relief of abdominal discomfort. Febrile and tachycardic, given ceftriaxone, tylenol, and 2L IVF.  (20 Dec 2023 07:39)    Hospital Course: Patient admitted and met SIRS criteria with fever, tachycardia, and leukocytosis. Started on ceftriaxone for empiric coverage of UTI. Blood and urine cultures obtained. Urine cultures eventually grew E. Coli sensitive to***. Sent home to finish course of antibiotics with***. Patient also with persistent dry cough of unknown etiology. RVP negative. CXR negative. Patient was never hypoxic.       Important Medication Changes and Reason:    Active or Pending Issues Requiring Follow-up:    Advanced Directives:   [ ] Full code  [ ] DNR  [ ] Hospice    Discharge Diagnoses:         HPI:  52 y/o M PMH bladder cancer not on active treatment presents for decreased urination, chills, nausea, and vomiting at home. Patient was recently discharged from the hospital with a goins and saw his urologist last week where it was removed initially without issue. 3 days ago patient began having decreased urinary output. This symptom was associated with nausea and vomiting w/ decreased PO intake. Vomit was NBNB. Patient also states he was having intermittent chills and felt warm at home but never took his temperature. No dysuria. Denies abdominal pain but did feel mildly distended. Also complaining of mild cough and SOB. Patient spoke with his outpatient urologist who was concerned for infection and sent patient to ED    ED Course: Goins placed and patient w/ relief of abdominal discomfort. Febrile and tachycardic, given ceftriaxone, tylenol, and 2L IVF.  (20 Dec 2023 07:39)    Hospital Course: Patient admitted and met SIRS criteria with fever, tachycardia, and leukocytosis. Started on ceftriaxone for empiric coverage of UTI. Blood and urine cultures obtained. Urine cultures eventually grew E. Coli. Sent home to finish course of antibiotics with cefpodoxime. Patient also with persistent dry cough of unknown etiology. RVP negative. CXR negative. Patient was never hypoxic.       Important Medication Changes and Reason: cefpodoxime    Active or Pending Issues Requiring Follow-up:    Advanced Directives:   [ ] Full code  [ ] DNR  [ ] Hospice    Discharge Diagnoses: UTI, urinary retention         HPI:  54 y/o M PMH bladder cancer not on active treatment presents for decreased urination, chills, nausea, and vomiting at home. Patient was recently discharged from the hospital with a goins and saw his urologist last week where it was removed initially without issue. 3 days ago patient began having decreased urinary output. This symptom was associated with nausea and vomiting w/ decreased PO intake. Vomit was NBNB. Patient also states he was having intermittent chills and felt warm at home but never took his temperature. No dysuria. Denies abdominal pain but did feel mildly distended. Also complaining of mild cough and SOB. Patient spoke with his outpatient urologist who was concerned for infection and sent patient to ED    ED Course: Goins placed and patient w/ relief of abdominal discomfort. Febrile and tachycardic, given ceftriaxone, tylenol, and 2L IVF.  (20 Dec 2023 07:39)    Hospital Course: Patient admitted and met SIRS criteria with fever, tachycardia, and leukocytosis. Started on ceftriaxone for empiric coverage of UTI. Blood and urine cultures obtained. Urine cultures eventually grew E. Coli. Sent home to finish course of antibiotics with cefpodoxime. Patient also with persistent dry cough of unknown etiology. RVP negative. CXR negative. Patient was never hypoxic.     Patient will be discharged with a goins. Nurse can change or replace it if it becomes dislodged as needed. No irrigation of goins is necessary.       Important Medication Changes and Reason: cefpodoxime    Active or Pending Issues Requiring Follow-up:    Advanced Directives:   [ ] Full code  [ ] DNR  [ ] Hospice    Discharge Diagnoses: UTI, urinary retention

## 2023-12-22 NOTE — PROVIDER CONTACT NOTE (OTHER) - REASON
PREOCCUPIED WITH FLUSHING TOILET. FLUSHES 5-7 TIMES EVERYTIME SHE GOES. VERY
DIFFICULT TO REDIRECT. WILL CONTINUE TO MONITOR
bloody urine, need AM labs STAT
Blood in urine and non productive cough

## 2023-12-22 NOTE — DISCHARGE NOTE NURSING/CASE MANAGEMENT/SOCIAL WORK - PATIENT PORTAL LINK FT
You can access the FollowMyHealth Patient Portal offered by [facility] by registering at the following website: [email or web address]. By joining [facility]’s FollowMyHealth portal, you will also be able to view your health information using other applications (apps) compatible with our system. You can access the FollowMyHealth Patient Portal offered by [facility] by registering at the following website: [email or web address]. By joining [facility]’s FollowMyHealth portal, you will also be able to view your health information using other applications (apps) compatible with our system.

## 2023-12-22 NOTE — PROGRESS NOTE ADULT - ATTENDING COMMENTS
53M Hx bladder cancer presented with urinary retention and sepsis present on admission likely due to UTI  --ctx, f/u cx  --goins to remain in place until outpatient urology f/u
53M Hx bladder cancer presented with urinary retention and sepsis present on admission due to UTI  --ctx, f/u cx  --goins to remain in place until outpatient urology f/u  -- stable for d/c home with PO abx

## 2023-12-22 NOTE — PROVIDER CONTACT NOTE (OTHER) - SITUATION
Blood in urine and non productive cough
pt urine bright red bloody no acute ss of distress vitally stable

## 2023-12-22 NOTE — PROGRESS NOTE ADULT - PROBLEM SELECTOR PLAN 3
-likely in setting of obstructive symptoms  -now w/ goins placed  -trend creatinine -goins placed in ED w/ red tinged urine  -will leave in place and likely d/c with goins to f/u with outpatient urologist

## 2023-12-22 NOTE — PROGRESS NOTE ADULT - ASSESSMENT
54 y/o M [facility] bladder cancer presents with fevers, chills, nausea, vomiting, and decreased urine output found to have sepsis likely in the setting of a urinary tract infection.  54 y/o M [facility] bladder cancer presents with fevers, chills, nausea, vomiting, and decreased urine output found to have sepsis likely in the setting of a urinary tract infection.

## 2023-12-22 NOTE — DISCHARGE NOTE PROVIDER - CARE PROVIDER_API CALL
Titus Villegas.  Urology  [address] [place] [address], [address] [address]  Phone: [phone]  Fax: [phone]  Established Patient  Follow Up Time: 2 weeks    Your Primary Care Provider,   Phone: (   )    -  Fax: (   )    -  Follow Up Time: 2 weeks   Titus Villegas.  Urology  [address] [place] [address], [address] [address]  Phone: [phone]  Fax: [phone]  Established Patient  Follow Up Time: 2 weeks    Your Primary Care Provider,   Phone: (   )    -  Fax: (   )    -  Follow Up Time: 2 weeks

## 2023-12-22 NOTE — PROGRESS NOTE ADULT - SUBJECTIVE AND OBJECTIVE BOX
Klaus Gresham MD  PGY3  Preferred contact via Microsoft Teams      Patient is a 53y old  Male who presents with a chief complaint of UTI (22 Dec 2023 07:46)      SUBJECTIVE / OVERNIGHT EVENTS: NAEON.     MEDICATIONS  (STANDING):  cefTRIAXone   IVPB 1000 milliGRAM(s) IV Intermittent every 24 hours  heparin   Injectable 5000 Unit(s) SubCutaneous every 8 hours  influenza   Vaccine 0.5 milliLiter(s) IntraMuscular once  ondansetron Injectable 4 milliGRAM(s) IV Push once  potassium chloride    Tablet ER 40 milliEquivalent(s) Oral once  potassium phosphate / sodium phosphate Powder (PHOS-NaK) 1 Packet(s) Oral once  tamsulosin 0.4 milliGRAM(s) Oral at bedtime    MEDICATIONS  (PRN):  acetaminophen     Tablet .. 975 milliGRAM(s) Oral every 6 hours PRN Temp greater or equal to 38C (100.4F), Mild Pain (1 - 3)  albuterol    90 MICROgram(s) HFA Inhaler 2 Puff(s) Inhalation every 6 hours PRN Shortness of Breath  benzonatate 100 milliGRAM(s) Oral every 8 hours PRN Cough  guaiFENesin Oral Liquid (Sugar-Free) 200 milliGRAM(s) Oral every 6 hours PRN Cough      CAPILLARY BLOOD GLUCOSE        I&O's Summary    21 Dec 2023 07:01  -  22 Dec 2023 07:00  --------------------------------------------------------  IN: 0 mL / OUT: 320 mL / NET: -320 mL        PHYSICAL EXAM:      LABS:                        13.3   11.15 )-----------( 141      ( 22 Dec 2023 06:04 )             39.2      12-22    136  |  101  |  19  ----------------------------<  120<H>  3.4<L>   |  22  |  1.50<H>    Ca    9.1      22 Dec 2023 06:04  Phos  2.3     12-22  Mg     2.20     12-22    TPro  6.8  /  Alb  3.3  /  TBili  0.4  /  DBili  x   /  AST  53<H>  /  ALT  105<H>  /  AlkPhos  160<H>  12-22          Urinalysis Basic - ( 22 Dec 2023 06:04 )    Color: x / Appearance: x / SG: x / pH: x  Gluc: 120 mg/dL / Ketone: x  / Bili: x / Urobili: x   Blood: x / Protein: x / Nitrite: x   Leuk Esterase: x / RBC: x / WBC x   Sq Epi: x / Non Sq Epi: x / Bacteria: x        RADIOLOGY & ADDITIONAL TESTS:    Imaging Personally Reviewed:    Consultant(s) Notes Reviewed:      Care Discussed with Consultants/Other Providers:   Klaus Gresham MD  PGY3  Preferred contact via Microsoft Teams      Patient is a 53y old  Male who presents with a chief complaint of UTI (22 Dec 2023 07:46)      SUBJECTIVE / OVERNIGHT EVENTS: NAEON. Patient seen and examined at bedside. States that his cough is improved. Still having some hematuria but no pain and hgb is stable. Denies other symptoms including N/V/D, headache.     MEDICATIONS  (STANDING):  cefTRIAXone   IVPB 1000 milliGRAM(s) IV Intermittent every 24 hours  heparin   Injectable 5000 Unit(s) SubCutaneous every 8 hours  influenza   Vaccine 0.5 milliLiter(s) IntraMuscular once  ondansetron Injectable 4 milliGRAM(s) IV Push once  potassium chloride    Tablet ER 40 milliEquivalent(s) Oral once  potassium phosphate / sodium phosphate Powder (PHOS-NaK) 1 Packet(s) Oral once  tamsulosin 0.4 milliGRAM(s) Oral at bedtime    MEDICATIONS  (PRN):  acetaminophen     Tablet .. 975 milliGRAM(s) Oral every 6 hours PRN Temp greater or equal to 38C (100.4F), Mild Pain (1 - 3)  albuterol    90 MICROgram(s) HFA Inhaler 2 Puff(s) Inhalation every 6 hours PRN Shortness of Breath  benzonatate 100 milliGRAM(s) Oral every 8 hours PRN Cough  guaiFENesin Oral Liquid (Sugar-Free) 200 milliGRAM(s) Oral every 6 hours PRN Cough      CAPILLARY BLOOD GLUCOSE        I&O's Summary    21 Dec 2023 07:01  -  22 Dec 2023 07:00  --------------------------------------------------------  IN: 0 mL / OUT: 320 mL / NET: -320 mL        Vital Signs Last 24 Hrs  T(C): 36.6 (22 Dec 2023 05:10), Max: 37.2 (21 Dec 2023 15:59)  T(F): 97.8 (22 Dec 2023 05:10), Max: 98.9 (21 Dec 2023 15:59)  HR: 100 (22 Dec 2023 05:10) (95 - 117)  BP: 143/88 (22 Dec 2023 05:10) (120/83 - 143/88)  BP(mean): --  RR: 19 (22 Dec 2023 05:10) (18 - 19)  SpO2: 99% (22 Dec 2023 05:10) (93% - 99%)    Parameters below as of 22 Dec 2023 05:10  Patient On (Oxygen Delivery Method): room air      PHYSICAL EXAM:  GENERAL: NAD, well-groomed, well-developed  HEAD:  Atraumatic, Normocephalic  EYES: EOMI, PERRLA, conjunctiva and sclera clear  ENMT: Moist mucous membranes, Good dentition  NECK: Supple, No JVD  NERVOUS SYSTEM: AOX3, motor and sensation grossly intact in b/l UE and b/l LE  PSYCHIATRIC: Appropriate affect and mood  CHEST/LUNG: Clear to auscultation bilaterally; No rales, rhonchi, wheezing, or rubs  HEART: Regular rate and rhythm; No murmurs, rubs, or gallops. No LE edema  ABDOMEN: Soft, Nontender, Nondistended; Bowel sounds present  : goins with red tinged urine  EXTREMITIES:  2+ Peripheral Pulses, No clubbing, cyanosis  SKIN: No rashes or lesions      LABS:                        13.3   11.15 )-----------( 141      ( 22 Dec 2023 06:04 )             39.2      12-22    136  |  101  |  19  ----------------------------<  120<H>  3.4<L>   |  22  |  1.50<H>    Ca    9.1      22 Dec 2023 06:04  Phos  2.3     12-22  Mg     2.20     12-22    TPro  6.8  /  Alb  3.3  /  TBili  0.4  /  DBili  x   /  AST  53<H>  /  ALT  105<H>  /  AlkPhos  160<H>  12-22          Urinalysis Basic - ( 22 Dec 2023 06:04 )    Color: x / Appearance: x / SG: x / pH: x  Gluc: 120 mg/dL / Ketone: x  / Bili: x / Urobili: x   Blood: x / Protein: x / Nitrite: x   Leuk Esterase: x / RBC: x / WBC x   Sq Epi: x / Non Sq Epi: x / Bacteria: x        RADIOLOGY & ADDITIONAL TESTS:    Imaging Personally Reviewed:    Consultant(s) Notes Reviewed:      Care Discussed with Consultants/Other Providers:

## 2023-12-25 LAB
CULTURE RESULTS: SIGNIFICANT CHANGE UP
SPECIMEN SOURCE: SIGNIFICANT CHANGE UP

## 2023-12-26 PROBLEM — Z01.818 PREOP TESTING: Status: RESOLVED | Noted: 2020-07-09 | Resolved: 2023-12-26

## 2023-12-26 PROBLEM — N20.1 RIGHT URETERAL STONE: Status: RESOLVED | Noted: 2020-01-22 | Resolved: 2023-12-26

## 2023-12-26 NOTE — ASSESSMENT
[FreeTextEntry1] : CT scan and prior history reviewed in detail with the patient. No clear evidence of metastatic disease, but bladder tumor on CT appears to have transmural involvement.  Bladder irrigated and catheter removed. Recommend to proceed with cystoscopy, TURBT to clear the bladder tumor and establish local staging. Risks and benefits reviewed in detail. Will plan for stent placement at the time of surgery.  All questions answered.

## 2023-12-26 NOTE — HISTORY OF PRESENT ILLNESS
[FreeTextEntry1] : Prior hx: Aug 2018 had intermittent episodes of gross hematuria with vigorous exercise.  Frequency of hematuria worsened and then underwent CT a/p w/o contrast 11/2018..  Findings showed a calcified lesion in the bladder.    Underwent bladder stone removal and TURBT on 12-31-18.  Path: HG TCC, pT1, no muscle in the specimen.   Repeat TURBT 2/14/2019: HG TCC, pTa with CIS.  No muscle invasion. BLI x 6 with BCG 3/21/2019 thru 4/30/2019. Cystoscopy 8/6/2019: no tumor seen, cytology negative. Cystoscopy with bladder biopsy 2/10/2020: HG TCC, pT1 right lateral wall and anterior wall. No muscle in specimen.  CIS also present. BLI x 6 with BCG 4/30/2020 thru 6/4/2020  Cystoscopy bladder biopsy July 2020: HG TCC, pT1 with focal micropapillary variant with associated multifocal CIS. CT urogram 8/12/2020: no upper tract hydronephrosis, no adenopathy, no metastatic disease.  Started on pembrolizumab, q3 week infusions  Cystoscopy TURBT 11/13/2020: HG TCC, pT1 with CIS and tumor within the distal left ureter. CT urogram 11/28/2020: no >1 cm pelvic or abdominal adenopathy, no hydronephrosis, no extravesical extension of tumor.  Continued on pembrolizumab, q3 week infusions  Cystoscopy TURBT 4/28/2021:  Bladder dome: CIS Right posterior wall: CIS Distal left ureter: HG TCC, pT1, 30% squamous differentiation and lipid rich variant. Right trigone: HT TCC, pT1, 90% squamous differentiation with associated CIS.  At that point, recommended that he consider proceeding with radical cystoprostatectomy and urinary diversion. He had second opinions and ultimately elected to proceed with holistic medical therapy.  Was last seen more than 2 years ago. Recently presented to the ER with gross hematuria and urinary retention.  Greenwood catheter placed, clots irrigated from the bladder.  CT demonstrated a large bladder mass with significant left hydronephrosis and left renal atrophy.

## 2023-12-27 DIAGNOSIS — C67.8 MALIGNANT NEOPLASM OF OVERLAPPING SITES OF BLADDER: ICD-10-CM

## 2023-12-29 ENCOUNTER — NON-APPOINTMENT (OUTPATIENT)
Age: 53
End: 2023-12-29

## 2024-01-05 ENCOUNTER — OUTPATIENT (OUTPATIENT)
Dept: OUTPATIENT SERVICES | Facility: HOSPITAL | Age: 54
LOS: 1 days | End: 2024-01-05

## 2024-01-05 VITALS
WEIGHT: 231.04 LBS | HEIGHT: 70 IN | OXYGEN SATURATION: 99 % | SYSTOLIC BLOOD PRESSURE: 140 MMHG | HEART RATE: 90 BPM | RESPIRATION RATE: 16 BRPM | TEMPERATURE: 98 F | DIASTOLIC BLOOD PRESSURE: 80 MMHG

## 2024-01-05 DIAGNOSIS — Z98.890 OTHER SPECIFIED POSTPROCEDURAL STATES: Chronic | ICD-10-CM

## 2024-01-05 DIAGNOSIS — Z90.89 ACQUIRED ABSENCE OF OTHER ORGANS: Chronic | ICD-10-CM

## 2024-01-05 DIAGNOSIS — C67.8 MALIGNANT NEOPLASM OF OVERLAPPING SITES OF BLADDER: ICD-10-CM

## 2024-01-05 LAB
ALBUMIN SERPL ELPH-MCNC: 4 G/DL — SIGNIFICANT CHANGE UP (ref 3.3–5)
ALBUMIN SERPL ELPH-MCNC: 4 G/DL — SIGNIFICANT CHANGE UP (ref 3.3–5)
ALP SERPL-CCNC: 81 U/L — SIGNIFICANT CHANGE UP (ref 40–120)
ALP SERPL-CCNC: 81 U/L — SIGNIFICANT CHANGE UP (ref 40–120)
ALT FLD-CCNC: 25 U/L — SIGNIFICANT CHANGE UP (ref 4–41)
ALT FLD-CCNC: 25 U/L — SIGNIFICANT CHANGE UP (ref 4–41)
ANION GAP SERPL CALC-SCNC: 12 MMOL/L — SIGNIFICANT CHANGE UP (ref 7–14)
ANION GAP SERPL CALC-SCNC: 12 MMOL/L — SIGNIFICANT CHANGE UP (ref 7–14)
AST SERPL-CCNC: 16 U/L — SIGNIFICANT CHANGE UP (ref 4–40)
AST SERPL-CCNC: 16 U/L — SIGNIFICANT CHANGE UP (ref 4–40)
BILIRUB SERPL-MCNC: 0.2 MG/DL — SIGNIFICANT CHANGE UP (ref 0.2–1.2)
BILIRUB SERPL-MCNC: 0.2 MG/DL — SIGNIFICANT CHANGE UP (ref 0.2–1.2)
BUN SERPL-MCNC: 22 MG/DL — SIGNIFICANT CHANGE UP (ref 7–23)
BUN SERPL-MCNC: 22 MG/DL — SIGNIFICANT CHANGE UP (ref 7–23)
CALCIUM SERPL-MCNC: 9.2 MG/DL — SIGNIFICANT CHANGE UP (ref 8.4–10.5)
CALCIUM SERPL-MCNC: 9.2 MG/DL — SIGNIFICANT CHANGE UP (ref 8.4–10.5)
CHLORIDE SERPL-SCNC: 106 MMOL/L — SIGNIFICANT CHANGE UP (ref 98–107)
CHLORIDE SERPL-SCNC: 106 MMOL/L — SIGNIFICANT CHANGE UP (ref 98–107)
CO2 SERPL-SCNC: 25 MMOL/L — SIGNIFICANT CHANGE UP (ref 22–31)
CO2 SERPL-SCNC: 25 MMOL/L — SIGNIFICANT CHANGE UP (ref 22–31)
CREAT SERPL-MCNC: 1.47 MG/DL — HIGH (ref 0.5–1.3)
CREAT SERPL-MCNC: 1.47 MG/DL — HIGH (ref 0.5–1.3)
EGFR: 57 ML/MIN/1.73M2 — LOW
EGFR: 57 ML/MIN/1.73M2 — LOW
GLUCOSE SERPL-MCNC: 89 MG/DL — SIGNIFICANT CHANGE UP (ref 70–99)
GLUCOSE SERPL-MCNC: 89 MG/DL — SIGNIFICANT CHANGE UP (ref 70–99)
HCT VFR BLD CALC: 39.7 % — SIGNIFICANT CHANGE UP (ref 39–50)
HCT VFR BLD CALC: 39.7 % — SIGNIFICANT CHANGE UP (ref 39–50)
HGB BLD-MCNC: 13 G/DL — SIGNIFICANT CHANGE UP (ref 13–17)
HGB BLD-MCNC: 13 G/DL — SIGNIFICANT CHANGE UP (ref 13–17)
MCHC RBC-ENTMCNC: 29 PG — SIGNIFICANT CHANGE UP (ref 27–34)
MCHC RBC-ENTMCNC: 29 PG — SIGNIFICANT CHANGE UP (ref 27–34)
MCHC RBC-ENTMCNC: 32.7 GM/DL — SIGNIFICANT CHANGE UP (ref 32–36)
MCHC RBC-ENTMCNC: 32.7 GM/DL — SIGNIFICANT CHANGE UP (ref 32–36)
MCV RBC AUTO: 88.6 FL — SIGNIFICANT CHANGE UP (ref 80–100)
MCV RBC AUTO: 88.6 FL — SIGNIFICANT CHANGE UP (ref 80–100)
NRBC # BLD: 0 /100 WBCS — SIGNIFICANT CHANGE UP (ref 0–0)
NRBC # BLD: 0 /100 WBCS — SIGNIFICANT CHANGE UP (ref 0–0)
NRBC # FLD: 0 K/UL — SIGNIFICANT CHANGE UP (ref 0–0)
NRBC # FLD: 0 K/UL — SIGNIFICANT CHANGE UP (ref 0–0)
PLATELET # BLD AUTO: 281 K/UL — SIGNIFICANT CHANGE UP (ref 150–400)
PLATELET # BLD AUTO: 281 K/UL — SIGNIFICANT CHANGE UP (ref 150–400)
POTASSIUM SERPL-MCNC: 3.9 MMOL/L — SIGNIFICANT CHANGE UP (ref 3.5–5.3)
POTASSIUM SERPL-MCNC: 3.9 MMOL/L — SIGNIFICANT CHANGE UP (ref 3.5–5.3)
POTASSIUM SERPL-SCNC: 3.9 MMOL/L — SIGNIFICANT CHANGE UP (ref 3.5–5.3)
POTASSIUM SERPL-SCNC: 3.9 MMOL/L — SIGNIFICANT CHANGE UP (ref 3.5–5.3)
PROT SERPL-MCNC: 7 G/DL — SIGNIFICANT CHANGE UP (ref 6–8.3)
PROT SERPL-MCNC: 7 G/DL — SIGNIFICANT CHANGE UP (ref 6–8.3)
RBC # BLD: 4.48 M/UL — SIGNIFICANT CHANGE UP (ref 4.2–5.8)
RBC # BLD: 4.48 M/UL — SIGNIFICANT CHANGE UP (ref 4.2–5.8)
RBC # FLD: 13.9 % — SIGNIFICANT CHANGE UP (ref 10.3–14.5)
RBC # FLD: 13.9 % — SIGNIFICANT CHANGE UP (ref 10.3–14.5)
SODIUM SERPL-SCNC: 143 MMOL/L — SIGNIFICANT CHANGE UP (ref 135–145)
SODIUM SERPL-SCNC: 143 MMOL/L — SIGNIFICANT CHANGE UP (ref 135–145)
WBC # BLD: 8.15 K/UL — SIGNIFICANT CHANGE UP (ref 3.8–10.5)
WBC # BLD: 8.15 K/UL — SIGNIFICANT CHANGE UP (ref 3.8–10.5)
WBC # FLD AUTO: 8.15 K/UL — SIGNIFICANT CHANGE UP (ref 3.8–10.5)
WBC # FLD AUTO: 8.15 K/UL — SIGNIFICANT CHANGE UP (ref 3.8–10.5)

## 2024-01-05 NOTE — H&P PST ADULT - HISTORY OF PRESENT ILLNESS
53 year old male with PMH of HTN, HLD, Type 2 DM, hypothyroidism, Asthma, COPD , CAD, presents to Presurgical testing with diagnosis of malignant neoplasm overlapping sites of bladder  scheduled for cystoscopy, transurethral resection bladder tumor , left retrograde pyelogram left urethral stent placement.  53 year old male with PMH of bladder cancer received BCG treatments and Keytruda  presents to Presurgical testing with diagnosis of malignant neoplasm overlapping sites of bladder  scheduled for cystoscopy, transurethral resection bladder tumor , left retrograde pyelogram left urethral stent placement.       Patient was recently admitted at [facility] for 5 days for UTI and discharged on 12/22/23  53 year old male with PMH of bladder cancer received BCG treatments and Keytruda  presents to Presurgical testing with diagnosis of malignant neoplasm overlapping sites of bladder  scheduled for cystoscopy, transurethral resection bladder tumor , left retrograde pyelogram left urethral stent placement.       Patient was recently admitted at [facility] for 5 days for UTI and discharged on 12/22/23  53 year old male with PMH of bladder cancer received BCG treatments and Keytruda  presents to Presurgical testing with diagnosis of malignant neoplasm overlapping sites of bladder  scheduled for cystoscopy, transurethral resection bladder tumor , left retrograde pyelogram left urethral stent placement.       Patient was recently admitted at [facility] for 5 days for UTI and urinary retention treated with IV antibiotics, and discharged on 12/22/23 with Greenwood cath in place.  53 year old male with PMH of bladder cancer received BCG treatments and Keytruda  presents to Presurgical testing with diagnosis of malignant neoplasm overlapping sites of bladder  scheduled for cystoscopy, transurethral resection bladder tumor , left retrograde pyelogram left urethral stent placement.       Patient was recently admitted at [facility] for 5 days for UTI and urinary retention treated with IV antibiotics, and discharged on 12/22/23 with Greenwood cath in place.

## 2024-01-05 NOTE — H&P PST ADULT - PROBLEM SELECTOR PLAN 1
Patient tentatively scheduled for cystoscopy, transurethral resection bladder tumor , left retrograde pyelogram left urethral stent on 1/12/24.  Pre-op instructions provided. Pt given verbal and written instructions with teach back on pepcid. Pt verbalized understanding with return demonstration. Patient tentatively scheduled for cystoscopy, transurethral resection bladder tumor , left retrograde pyelogram left urethral stent on 1/12/24.  Pre-op instructions provided. Pt given verbal and written instructions with teach back on pepcid. Pt verbalized understanding with return demonstration.  AMARA precautions,

## 2024-01-09 DIAGNOSIS — N39.0 URINARY TRACT INFECTION, SITE NOT SPECIFIED: ICD-10-CM

## 2024-01-09 DIAGNOSIS — R31.9 URINARY TRACT INFECTION, SITE NOT SPECIFIED: ICD-10-CM

## 2024-01-09 LAB
-  AMPICILLIN: SIGNIFICANT CHANGE UP
-  AMPICILLIN: SIGNIFICANT CHANGE UP
-  CIPROFLOXACIN: SIGNIFICANT CHANGE UP
-  CIPROFLOXACIN: SIGNIFICANT CHANGE UP
-  LEVOFLOXACIN: SIGNIFICANT CHANGE UP
-  LEVOFLOXACIN: SIGNIFICANT CHANGE UP
-  NITROFURANTOIN: SIGNIFICANT CHANGE UP
-  NITROFURANTOIN: SIGNIFICANT CHANGE UP
-  TETRACYCLINE: SIGNIFICANT CHANGE UP
-  TETRACYCLINE: SIGNIFICANT CHANGE UP
-  VANCOMYCIN: SIGNIFICANT CHANGE UP
-  VANCOMYCIN: SIGNIFICANT CHANGE UP
METHOD TYPE: SIGNIFICANT CHANGE UP
METHOD TYPE: SIGNIFICANT CHANGE UP

## 2024-01-10 LAB
-  AMOXICILLIN/CLAVULANIC ACID: SIGNIFICANT CHANGE UP
-  AMOXICILLIN/CLAVULANIC ACID: SIGNIFICANT CHANGE UP
-  AMPICILLIN/SULBACTAM: SIGNIFICANT CHANGE UP
-  AMPICILLIN/SULBACTAM: SIGNIFICANT CHANGE UP
-  AMPICILLIN: SIGNIFICANT CHANGE UP
-  AMPICILLIN: SIGNIFICANT CHANGE UP
-  AZTREONAM: SIGNIFICANT CHANGE UP
-  AZTREONAM: SIGNIFICANT CHANGE UP
-  CEFAZOLIN: SIGNIFICANT CHANGE UP
-  CEFAZOLIN: SIGNIFICANT CHANGE UP
-  CEFEPIME: SIGNIFICANT CHANGE UP
-  CEFEPIME: SIGNIFICANT CHANGE UP
-  CEFOXITIN: SIGNIFICANT CHANGE UP
-  CEFOXITIN: SIGNIFICANT CHANGE UP
-  CEFTRIAXONE: SIGNIFICANT CHANGE UP
-  CEFTRIAXONE: SIGNIFICANT CHANGE UP
-  CEFUROXIME: SIGNIFICANT CHANGE UP
-  CEFUROXIME: SIGNIFICANT CHANGE UP
-  CIPROFLOXACIN: SIGNIFICANT CHANGE UP
-  CIPROFLOXACIN: SIGNIFICANT CHANGE UP
-  ERTAPENEM: SIGNIFICANT CHANGE UP
-  ERTAPENEM: SIGNIFICANT CHANGE UP
-  GENTAMICIN: SIGNIFICANT CHANGE UP
-  GENTAMICIN: SIGNIFICANT CHANGE UP
-  IMIPENEM: SIGNIFICANT CHANGE UP
-  IMIPENEM: SIGNIFICANT CHANGE UP
-  LEVOFLOXACIN: SIGNIFICANT CHANGE UP
-  LEVOFLOXACIN: SIGNIFICANT CHANGE UP
-  MEROPENEM: SIGNIFICANT CHANGE UP
-  MEROPENEM: SIGNIFICANT CHANGE UP
-  NITROFURANTOIN: SIGNIFICANT CHANGE UP
-  NITROFURANTOIN: SIGNIFICANT CHANGE UP
-  PIPERACILLIN/TAZOBACTAM: SIGNIFICANT CHANGE UP
-  PIPERACILLIN/TAZOBACTAM: SIGNIFICANT CHANGE UP
-  TOBRAMYCIN: SIGNIFICANT CHANGE UP
-  TOBRAMYCIN: SIGNIFICANT CHANGE UP
-  TRIMETHOPRIM/SULFAMETHOXAZOLE: SIGNIFICANT CHANGE UP
-  TRIMETHOPRIM/SULFAMETHOXAZOLE: SIGNIFICANT CHANGE UP
CULTURE RESULTS: ABNORMAL
CULTURE RESULTS: ABNORMAL
METHOD TYPE: SIGNIFICANT CHANGE UP
METHOD TYPE: SIGNIFICANT CHANGE UP
ORGANISM # SPEC MICROSCOPIC CNT: ABNORMAL
SPECIMEN SOURCE: SIGNIFICANT CHANGE UP
SPECIMEN SOURCE: SIGNIFICANT CHANGE UP

## 2024-01-12 ENCOUNTER — APPOINTMENT (OUTPATIENT)
Dept: UROLOGY | Facility: AMBULATORY SURGERY CENTER | Age: 54
End: 2024-01-12

## 2024-01-18 ENCOUNTER — NON-APPOINTMENT (OUTPATIENT)
Age: 54
End: 2024-01-18

## 2024-01-22 ENCOUNTER — TRANSCRIPTION ENCOUNTER (OUTPATIENT)
Age: 54
End: 2024-01-22

## 2024-01-22 RX ORDER — SILDENAFIL 20 MG/1
20 TABLET ORAL DAILY
Qty: 30 | Refills: 11 | Status: COMPLETED | COMMUNITY
Start: 2020-07-06 | End: 2024-01-22

## 2024-01-23 ENCOUNTER — RESULT REVIEW (OUTPATIENT)
Age: 54
End: 2024-01-23

## 2024-01-23 ENCOUNTER — OUTPATIENT (OUTPATIENT)
Dept: OUTPATIENT SERVICES | Facility: HOSPITAL | Age: 54
LOS: 1 days | Discharge: HOME | End: 2024-01-23
Payer: COMMERCIAL

## 2024-01-23 ENCOUNTER — APPOINTMENT (OUTPATIENT)
Dept: UROLOGY | Facility: HOSPITAL | Age: 54
End: 2024-01-23

## 2024-01-23 ENCOUNTER — TRANSCRIPTION ENCOUNTER (OUTPATIENT)
Age: 54
End: 2024-01-23

## 2024-01-23 VITALS
DIASTOLIC BLOOD PRESSURE: 81 MMHG | SYSTOLIC BLOOD PRESSURE: 145 MMHG | TEMPERATURE: 98 F | RESPIRATION RATE: 20 BRPM | WEIGHT: 229.94 LBS | HEIGHT: 70 IN | OXYGEN SATURATION: 97 % | HEART RATE: 97 BPM

## 2024-01-23 VITALS
OXYGEN SATURATION: 100 % | DIASTOLIC BLOOD PRESSURE: 90 MMHG | RESPIRATION RATE: 17 BRPM | HEART RATE: 72 BPM | SYSTOLIC BLOOD PRESSURE: 130 MMHG

## 2024-01-23 DIAGNOSIS — Z90.89 ACQUIRED ABSENCE OF OTHER ORGANS: Chronic | ICD-10-CM

## 2024-01-23 DIAGNOSIS — Z98.890 OTHER SPECIFIED POSTPROCEDURAL STATES: Chronic | ICD-10-CM

## 2024-01-23 DIAGNOSIS — C67.8 MALIGNANT NEOPLASM OF OVERLAPPING SITES OF BLADDER: ICD-10-CM

## 2024-01-23 PROCEDURE — 88341 IMHCHEM/IMCYTCHM EA ADD ANTB: CPT | Mod: 26

## 2024-01-23 PROCEDURE — 88307 TISSUE EXAM BY PATHOLOGIST: CPT | Mod: 26

## 2024-01-23 PROCEDURE — 52240 CYSTOSCOPY AND TREATMENT: CPT

## 2024-01-23 PROCEDURE — 52332 CYSTOSCOPY AND TREATMENT: CPT | Mod: 50,59

## 2024-01-23 PROCEDURE — 88342 IMHCHEM/IMCYTCHM 1ST ANTB: CPT | Mod: 26

## 2024-01-23 PROCEDURE — 74420 UROGRAPHY RTRGR +-KUB: CPT | Mod: 26

## 2024-01-23 DEVICE — URETERAL CATH OPEN END 5FR 70CM
Type: IMPLANTABLE DEVICE | Status: NON-FUNCTIONAL
Removed: 2024-01-23

## 2024-01-23 DEVICE — URETERAL STENT PERCUFLEX PLUS 7FR 22CM
Type: IMPLANTABLE DEVICE | Status: NON-FUNCTIONAL
Removed: 2024-01-23

## 2024-01-23 DEVICE — IMPLANTABLE DEVICE
Type: IMPLANTABLE DEVICE | Status: NON-FUNCTIONAL
Removed: 2024-01-23

## 2024-01-23 DEVICE — GUIDEWIRE SENSOR DUAL-FLEX NITINOL STRAIGHT .035" X 150CM
Type: IMPLANTABLE DEVICE | Status: NON-FUNCTIONAL
Removed: 2024-01-23

## 2024-01-23 RX ORDER — TAMSULOSIN HYDROCHLORIDE 0.4 MG/1
1 CAPSULE ORAL
Qty: 30 | Refills: 0
Start: 2024-01-23

## 2024-01-23 RX ORDER — TAMSULOSIN HYDROCHLORIDE 0.4 MG/1
1 CAPSULE ORAL
Refills: 0 | DISCHARGE

## 2024-01-23 RX ORDER — CIPROFLOXACIN LACTATE 400MG/40ML
1 VIAL (ML) INTRAVENOUS
Qty: 0 | Refills: 0 | DISCHARGE

## 2024-01-23 RX ORDER — OXYCODONE HYDROCHLORIDE 5 MG/1
5 TABLET ORAL ONCE
Refills: 0 | Status: DISCONTINUED | OUTPATIENT
Start: 2024-01-23 | End: 2024-01-23

## 2024-01-23 RX ORDER — HYDROMORPHONE HYDROCHLORIDE 2 MG/ML
0.5 INJECTION INTRAMUSCULAR; INTRAVENOUS; SUBCUTANEOUS
Refills: 0 | Status: DISCONTINUED | OUTPATIENT
Start: 2024-01-23 | End: 2024-01-23

## 2024-01-23 RX ORDER — ONDANSETRON 8 MG/1
4 TABLET, FILM COATED ORAL ONCE
Refills: 0 | Status: COMPLETED | OUTPATIENT
Start: 2024-01-23 | End: 2024-01-23

## 2024-01-23 RX ORDER — SODIUM CHLORIDE 9 MG/ML
1000 INJECTION, SOLUTION INTRAVENOUS
Refills: 0 | Status: DISCONTINUED | OUTPATIENT
Start: 2024-01-23 | End: 2024-02-06

## 2024-01-23 RX ADMIN — ONDANSETRON 4 MILLIGRAM(S): 8 TABLET, FILM COATED ORAL at 16:09

## 2024-01-23 RX ADMIN — HYDROMORPHONE HYDROCHLORIDE 0.5 MILLIGRAM(S): 2 INJECTION INTRAMUSCULAR; INTRAVENOUS; SUBCUTANEOUS at 15:00

## 2024-01-23 RX ADMIN — SODIUM CHLORIDE 30 MILLILITER(S): 9 INJECTION, SOLUTION INTRAVENOUS at 10:02

## 2024-01-23 RX ADMIN — OXYCODONE HYDROCHLORIDE 5 MILLIGRAM(S): 5 TABLET ORAL at 15:00

## 2024-01-23 RX ADMIN — HYDROMORPHONE HYDROCHLORIDE 0.5 MILLIGRAM(S): 2 INJECTION INTRAMUSCULAR; INTRAVENOUS; SUBCUTANEOUS at 14:33

## 2024-01-23 RX ADMIN — HYDROMORPHONE HYDROCHLORIDE 0.5 MILLIGRAM(S): 2 INJECTION INTRAMUSCULAR; INTRAVENOUS; SUBCUTANEOUS at 16:18

## 2024-01-23 RX ADMIN — HYDROMORPHONE HYDROCHLORIDE 0.5 MILLIGRAM(S): 2 INJECTION INTRAMUSCULAR; INTRAVENOUS; SUBCUTANEOUS at 16:00

## 2024-01-23 RX ADMIN — OXYCODONE HYDROCHLORIDE 5 MILLIGRAM(S): 5 TABLET ORAL at 14:33

## 2024-01-23 NOTE — ASU DISCHARGE PLAN (ADULT/PEDIATRIC) - FOLLOW UP APPOINTMENTS
may also call Recovery Room (PACU) 24/7 @ [phone]/[facility]

## 2024-01-23 NOTE — ASU DISCHARGE PLAN (ADULT/PEDIATRIC) - NURSING INSTRUCTIONS
You received IV Tylenol for pain management at 11:30pm. Please DO NOT take any Tylenol (Acetaminophen) containing products, such as Vicodin, Percocet, Excedrin, and cold medications for the next 6 hours (until 6:30PM). DO NOT TAKE MORE THAN 3000 MG OF TYLENOL in a 24 hour period.       Anesthesia precautions: For the next 12hrs or if taking pain medication,  DO NOT:  a)Drive a car, operate power tools or machinery, b)Drink alcohol, beer or wine , c)Make important personal or business decisions.

## 2024-01-23 NOTE — ASU DISCHARGE PLAN (ADULT/PEDIATRIC) - ASU DC SPECIAL INSTRUCTIONSFT
CATHETER: Some patients are sent home with a goins catheter, while others go home urinating on their own. If you still have a catheter, the nurses will review instructions and care before you go home. For men, you may have a prescription for lidocaine jelly to apply to the tip of your penis, as needed, for catheter related discomfort.  GENERAL: It is common to have blood in your urine after your procedure. It may be pink or even red; inform your doctor if you have a significant amount of clot in the urine or if you are unable to void at all or if your catheter stops draining. The urine may clear and then become bloody again especially as you are more physically active. It is not uncommon to have some burning when you urinate, this will gradually improve. With a catheter in place, it is not uncommon to have occasional leakage or urine or blood around the catheter. Please call your urologist if this is excessive and/or the urine is not draining through the catheter into the bag.  BATHING: You may shower or bathe. If going home with goins, shower only until catheter is removed.  DIET: You may resume your regular diet and regular medication regimen.  PAIN: You may take Tylenol (acetaminophen) 650-975mg and/or Motrin (ibuprofen) 400-600mg, both available over the counter, for pain every 6 hours as needed. Do not exceed 4000mg of Tylenol (acetaminophen) daily. You may alternate these medications such that you take one or the other every 3 hours for around the clock pain coverage.  ANTIBIOTICS: You may be given a prescription for an antibiotic, please take this medication as instructed and be sure to complete the entire course.  STOOL SOFTENERS: Do not allow yourself to become constipated as straining may cause bleeding. Take stool softeners or a laxative (ex. Miralax, Colace, Senokot, ExLax, etc), available over the counter, if needed.  ACTIVITY: No heavy lifting or strenuous exercise until you are evaluated at your post-operative appointment. Otherwise, you may return to your usual level of physical activity.  ANTICOAGULATION: If you are taking any blood thinning medications, please discuss with your urologist prior to restarting these medications unless otherwise specified.  FOLLOW-UP: If you did not already schedule your post-operative appointment, please call your urologist to schedule a follow-up appointment.  CALL YOUR UROLOGIST IF: You have any bleeding that does not stop, inability to void >8 hours, fever over 100.4 F, chills, persistent nausea/vomiting, changes in your incision concerning for infection, or if your pain is not controlled on your discharge pain medications. STENT: You may have an internal stent (a hollow tube that runs from the kidney to your bladder) after your procedure, which helps urine drain from the kidney to your bladder. Some patients experience urinary frequency, burning, or even back pain (especially with urination). These sensations will gradually get better. Increasing your fluid intake can also improve these symptoms. While the stent is in place, your urine may continue to be bloody. This stent is temporary and must be removed by your urologist as an outpatient with in 3 months unless otherwise specified.  CATHETER: You are sent home with a goins catheter. The nurses will review instructions and care before you go home.  GENERAL: It is common to have blood in your urine after your procedure. It may be pink or even red; inform your doctor if you have a significant amount of clot in the urine or if you are unable to void at all or if your catheter stops draining. The urine may clear and then become bloody again especially as you are more physically active. It is not uncommon to have some burning when you urinate, this will gradually improve. With a catheter in place, it is not uncommon to have occasional leakage or urine or blood around the catheter. Please call your urologist if this is excessive and/or the urine is not draining through the catheter into the bag.  BATHING: You may shower or bathe. If going home with goins, shower only until catheter is removed.  DIET: You may resume your regular diet and regular medication regimen.  PAIN: You may take Tylenol (acetaminophen) 650-975mg and/or Motrin (ibuprofen) 400-600mg, both available over the counter, for pain every 6 hours as needed. Do not exceed 4000mg of Tylenol (acetaminophen) daily. You may alternate these medications such that you take one or the other every 3 hours for around the clock pain coverage.  ANTIBIOTICS: Please complete the course of the antibiotic you were sent prior to surgery. please take this medication as instructed and be sure to complete the entire course.  STOOL SOFTENERS: Do not allow yourself to become constipated as straining may cause bleeding. Take stool softeners or a laxative (ex. Miralax, Colace, Senokot, ExLax, etc), available over the counter, if needed.  ACTIVITY: No heavy lifting or strenuous exercise until you are evaluated at your post-operative appointment. Otherwise, you may return to your usual level of physical activity.  FOLLOW-UP: If you did not already schedule your post-operative appointment, please call your urologist to schedule a follow-up appointment. Please follow-up with Dr. Villegas on Friday for catheter removal.   CALL YOUR UROLOGIST IF: You have any bleeding that does not stop, inability to void >8 hours, fever over 100.4 F, chills, persistent nausea/vomiting, changes in your incision concerning for infection, or if your pain is not controlled on your discharge pain medications.

## 2024-01-23 NOTE — ASU DISCHARGE PLAN (ADULT/PEDIATRIC) - PROCEDURE
Transurethral Resection of bladder tumor and left retrograde pyelogram Transurethral Resection of bladder tumor and bilateral retrograde pyelogram and stent placement

## 2024-01-23 NOTE — ASU DISCHARGE PLAN (ADULT/PEDIATRIC) - NS MD DC FALL RISK RISK
For information on Fall & Injury Prevention, visit: [email or web address] OR  [email or web address] OR  https://www.cdc.gov/steadi/patient.html

## 2024-01-29 ENCOUNTER — APPOINTMENT (OUTPATIENT)
Dept: UROLOGY | Facility: CLINIC | Age: 54
End: 2024-01-29
Payer: COMMERCIAL

## 2024-01-29 ENCOUNTER — OUTPATIENT (OUTPATIENT)
Dept: OUTPATIENT SERVICES | Facility: HOSPITAL | Age: 54
LOS: 1 days | End: 2024-01-29
Payer: COMMERCIAL

## 2024-01-29 VITALS — SYSTOLIC BLOOD PRESSURE: 168 MMHG | HEART RATE: 129 BPM | DIASTOLIC BLOOD PRESSURE: 97 MMHG

## 2024-01-29 DIAGNOSIS — Z90.89 ACQUIRED ABSENCE OF OTHER ORGANS: Chronic | ICD-10-CM

## 2024-01-29 DIAGNOSIS — R35.0 FREQUENCY OF MICTURITION: ICD-10-CM

## 2024-01-29 PROCEDURE — 99024 POSTOP FOLLOW-UP VISIT: CPT

## 2024-01-29 PROCEDURE — 51700 IRRIGATION OF BLADDER: CPT

## 2024-01-31 DIAGNOSIS — C67.9 MALIGNANT NEOPLASM OF BLADDER, UNSPECIFIED: ICD-10-CM

## 2024-02-05 LAB — SURGICAL PATHOLOGY STUDY: SIGNIFICANT CHANGE UP

## 2024-02-13 NOTE — PACU DISCHARGE NOTE - MENTAL STATUS: PATIENT PARTICIPATION
Chief Complaint:   Chief Complaint   Patient presents with    Follow - Up     Pt would like to follow -up on labs from 9/2023 and see if she needs any additional labs drawn / repeated.      HPI:   This is a 26 year old female         LAB REVIEW    Patient here for review of her laboratories.  Worried about slightly depressed TSH.  Denies any symptoms of palpitations tremulousness weight loss heat intolerance.  Feels well otherwise.        PMSH       No past medical history on file.  No past surgical history on file.  Social History:  Social History     Socioeconomic History    Marital status: Single   Tobacco Use    Smoking status: Never    Smokeless tobacco: Never   Vaping Use    Vaping Use: Never used   Substance and Sexual Activity    Alcohol use: No    Drug use: No    Sexual activity: Yes     Partners: Male     Birth control/protection: Inserts, Condom     Comment: Nuva Ring    Other Topics Concern    Caffeine Concern Yes    Exercise Yes     Comment: weights, running     Family History:  Family History   Problem Relation Age of Onset    Breast Cancer Sister      Allergies:  Allergies   Allergen Reactions    Levofloxacin PAIN and MYALGIA     Leg pain  Leg pain     Current Meds:  Current Outpatient Medications on File Prior to Visit   Medication Sig Dispense Refill    Etonogestrel-Ethinyl Estradiol 0.12-0.015 MG/24HR Vaginal Ring Place 1 Ring vaginally every 21 days. 3 Ring 4     No current facility-administered medications on file prior to visit.      Counseling given: Not Answered         PROBLEM LIST     Patient Active Problem List   Diagnosis    Anxiety disorder             PHYSICAL EXAM:   /68   Pulse 70   Resp 16   Ht 5' 8\" (1.727 m)   Wt 185 lb (83.9 kg)   LMP 01/15/2024 (Approximate)   SpO2 99%   BMI 28.13 kg/m²  Estimated body mass index is 28.13 kg/m² as calculated from the following:    Height as of this encounter: 5' 8\" (1.727 m).    Weight as of this encounter: 185 lb (83.9 kg).   Vital  signs reviewed.Appears stated age, well groomed.  GENERAL: well developed, well nourished, well hydrated, no distress  SKIN: good skin turgor, no obvious rashes        ASSESSMENT AND PLAN:         1. Elevated serum creatinine  - Basic Metabolic Panel (8) [E]; Future    2. Subclinical hyperthyroidism        PATIENT INSTRUCTIONS:      Ok to have blood zach done today  Follow up yearly or as needed  If TSH still low may need endocrinology consult    FOLLOW UP: yearly or as needed          Health Maintenance Due   Topic Date Due    COVID-19 Vaccine (3 - 2023-24 season) 09/01/2023    Annual Depression Screening  01/01/2024        Awake

## 2024-02-26 ENCOUNTER — NON-APPOINTMENT (OUTPATIENT)
Age: 54
End: 2024-02-26

## 2024-02-27 ENCOUNTER — NON-APPOINTMENT (OUTPATIENT)
Age: 54
End: 2024-02-27

## 2024-03-01 ENCOUNTER — OUTPATIENT (OUTPATIENT)
Dept: OUTPATIENT SERVICES | Facility: HOSPITAL | Age: 54
LOS: 1 days | Discharge: HOME | End: 2024-03-01

## 2024-03-01 DIAGNOSIS — Z90.89 ACQUIRED ABSENCE OF OTHER ORGANS: Chronic | ICD-10-CM

## 2024-03-01 DIAGNOSIS — Z98.890 OTHER SPECIFIED POSTPROCEDURAL STATES: Chronic | ICD-10-CM

## 2024-03-01 DIAGNOSIS — C67.9 MALIGNANT NEOPLASM OF BLADDER, UNSPECIFIED: ICD-10-CM

## 2024-03-04 NOTE — ED ADULT TRIAGE NOTE - NS ED NOTE AC HIGH RISK COUNTRIES
Pt calling regarding Urine labs that returned abnormal UA and inquiring as to abx. RN noted that provider has not reviewed UA results yet. Will send a message to provider asking to review results and order abx as needed; Likely will not see results today, but will send high priority so provider sees the results first thing tomorrow morning. Pt agreeable to plan.    Other Countries

## 2024-03-06 PROBLEM — C65.9 CARCINOMA OF RENAL PELVIS: Status: ACTIVE | Noted: 2020-12-01

## 2024-03-10 DIAGNOSIS — Z00.00 ENCOUNTER FOR GENERAL ADULT MEDICAL EXAMINATION W/OUT ABNORMAL FINDINGS: ICD-10-CM

## 2024-03-11 PROBLEM — C67.8 MALIGNANT NEOPLASM OF OVERLAPPING SITES OF BLADDER: Status: ACTIVE | Noted: 2020-03-09

## 2024-03-13 ENCOUNTER — APPOINTMENT (OUTPATIENT)
Dept: HEMATOLOGY ONCOLOGY | Facility: CLINIC | Age: 54
End: 2024-03-13
Payer: COMMERCIAL

## 2024-03-13 ENCOUNTER — APPOINTMENT (OUTPATIENT)
Dept: RADIATION ONCOLOGY | Facility: CLINIC | Age: 54
End: 2024-03-13
Payer: COMMERCIAL

## 2024-03-13 ENCOUNTER — NON-APPOINTMENT (OUTPATIENT)
Age: 54
End: 2024-03-13

## 2024-03-13 VITALS
BODY MASS INDEX: 34.02 KG/M2 | TEMPERATURE: 98.3 F | SYSTOLIC BLOOD PRESSURE: 155 MMHG | DIASTOLIC BLOOD PRESSURE: 95 MMHG | HEIGHT: 70.16 IN | RESPIRATION RATE: 16 BRPM | HEART RATE: 102 BPM | OXYGEN SATURATION: 98 % | WEIGHT: 237.64 LBS

## 2024-03-13 VITALS
BODY MASS INDEX: 34.1 KG/M2 | OXYGEN SATURATION: 96 % | HEART RATE: 88 BPM | SYSTOLIC BLOOD PRESSURE: 154 MMHG | TEMPERATURE: 97.16 F | DIASTOLIC BLOOD PRESSURE: 90 MMHG | HEIGHT: 70 IN | RESPIRATION RATE: 16 BRPM

## 2024-03-13 DIAGNOSIS — C65.9 MALIGNANT NEOPLASM OF UNSPECIFIED RENAL PELVIS: ICD-10-CM

## 2024-03-13 DIAGNOSIS — C67.8 MALIGNANT NEOPLASM OF OVERLAPPING SITES OF BLADDER: ICD-10-CM

## 2024-03-13 PROCEDURE — 99214 OFFICE O/P EST MOD 30 MIN: CPT

## 2024-03-13 PROCEDURE — 99205 OFFICE O/P NEW HI 60 MIN: CPT

## 2024-03-13 RX ORDER — PEMBROLIZUMAB 25 MG/ML
INJECTION, SOLUTION INTRAVENOUS
Refills: 0 | Status: DISCONTINUED | COMMUNITY
End: 2024-03-13

## 2024-03-13 RX ORDER — CHROMIUM 200 MCG
1000 TABLET ORAL
Refills: 0 | Status: ACTIVE | COMMUNITY

## 2024-03-13 RX ORDER — SAME BUTANEDISULFONATE/BETAINE 400-600 MG
POWDER IN PACKET (EA) ORAL
Refills: 0 | Status: ACTIVE | COMMUNITY

## 2024-03-13 RX ORDER — CIPROFLOXACIN HYDROCHLORIDE 500 MG/1
500 TABLET, FILM COATED ORAL TWICE DAILY
Qty: 10 | Refills: 0 | Status: DISCONTINUED | COMMUNITY
Start: 2024-01-09 | End: 2024-03-13

## 2024-03-13 NOTE — PHYSICAL EXAM
[] : no respiratory distress [Heart Rate And Rhythm] : heart rate and rhythm were normal [Normal] : no palpable adenopathy [Abdomen Soft] : soft [Musculoskeletal - Swelling] : no joint swelling [No Focal Deficits] : no focal deficits [Skin Color & Pigmentation] : normal skin color and pigmentation [Oriented To Time, Place, And Person] : oriented to person, place, and time

## 2024-03-13 NOTE — RESULTS/DATA
[FreeTextEntry1] : Johana Accession Number : [id number] Patient:     LARA ARIAS Accession:                             [id number] Collected Date/Time:                   1/23/2024 11:56 EST Received Date/Time:                    1/24/2024 13:53 EST  Surgical Pathology Report - Auth (Verified)  Specimen(s) Submitted 1-Bladder tumor 2-Bladder tumor right lateral wall  Final Diagnosis 1. 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 Urinary bladder, tumor right lateral wall, transurethral resection - Histologic type:    Invasive urothelial carcinoma with squamous differentiation (5%) - Histologic grade:    High grade - Pattern of growth of the non-invasive component:    Papillary - Local invasion:    Carcinoma invades lamina propria - Muscularis propria:    Muscularis propria (detrusor muscle) is present and uninvolved -  Lymphovascular invasion:   Not identified  Comment: Specimen 1 - Sections show a high grade urothelial carcinoma that is widely invasive into lamina propria with prominent desmoplastic response. The carcinoma also invades in between thin to medium-sized smooth muscle bundles. While overall findings are worrisome for muscularis propria invasion, given the degree of desmoplastic response and destructive nature of the tumor, it is difficult to differentiate with certainty whether these muscle bundles represent hypertrophic muscularis mucosae or muscularis propria (detrusor muscle) partially destroyed by carcinoma. Separate rare fragments of recognizable muscularis propria (detrusor muscle) uninvolved by carcinoma are also identified. Immunohistochemical stains NKX3.1, PSA, PSAP, GATA3 and p63 were performed on block 1D, which confirm focal presence of benign prostatic tissue.  Select slides of specimen 1 were reviewed with Dr. Kapoor who agrees with the above findings. Verified by: Aden Delcid M.D. (Electronic Signature) Reported on: 02/05/24  ********************************************* add imaging results

## 2024-03-13 NOTE — HISTORY OF PRESENT ILLNESS
[Disease: _____________________] : Disease: [unfilled] [T: ___] : T[unfilled] [N: ___] : N[unfilled] [M: ___] : M[unfilled] [AJCC Stage: ____] : AJCC Stage: [unfilled] [de-identified] : Av Bañuelos is seen in the office in consultation today, August 18, 2020.  He had a kidney stone approximately in 3 to 4 years ago which passed spontaneously.  In August 2018 he had episodes of intermittent gross hematuria, usually associated with vigorous exercise.  Frequency of hematuria worsens, he had a CT scan performed in November 2018, revealing a calcified lesion within the bladder.  He underwent removal of bladder stone at that time with a transurethral resection of the bladder tumor on December 31 of 2018, with pathology revealing high-grade transitional cell carcinoma, pathologic T1, with no muscle in the specimen. Repeat transurethral resection of bladder tumor was performed on February 14, 2019, revealing high-grade transitional cell carcinoma, pTa with carcinoma in situ.  There is no muscle invasion.  He was treated with BCG from March 21 through April 30, 2019.  Cystoscopy in August 2019 did not reveal any tumor, the cytology was negative. Follow-up cystoscopy was performed in February 2020, and this revealed high-grade transitional cell carcinoma, pT1 in the right lateral wall and anterior wall.  There was no muscle in the specimen.  Carcinoma in situ was also present. Dr. Villegas had a discussion with him in regards to his high-grade recurrence after intravesical BCG.  Radical cystectomy with urinary diversion was discussed.   He feels "great". Appetite is good, changed to vegetarian diet 3 weeks ago, dropped a few pounds. No blood in the urine at this time. No dysuria, no incontinence No urgency noted. Nocturia x 2. NO bone pains. Occ has some uncomfortable sensation in the left lower quadrant, about 2 times a week, lasts a few seconds. No fatigue of note. No cough, No DEAN>  Has had ED for about 2 years, not full, able to penetrate at some times. Has not used the sildenafil as yet.   9/1/20...Rec'd dose # 1 of pembrolizumab on August 25. Feels "pretty good." Appetite is "very good," gained 1 kg. No dyspepsia, nausea, or vomiting. No constipation or diarrheal stools. No headaches or dizziness. No cough or DEAN. No skin rashes or pruritus. No edema. No complaints of pain. No significant fatigue. No hematuria or dysuria. No paresthesias. Working full-time from home at this time. Independent in ADLs.  9/15/20...Av states that he feels "great".  He reported no adverse effects.  He has some mild fatigue but he believes this is secondary to his return to work.  He works as a  [place] [employer].  There were no fevers, chills, or sweats.  His appetite is good and his weight is stable.  There is no skin rash or pruritus.  He denies diarrhea, nausea or vomiting.  There is no cough or shortness of breath.  Urinary flow is good, but occasionally the flow is splayed.  There is no sensation of incomplete voiding.  Nocturia occurs 1 time per night, and was previously 2 or 3 times per night.  There is no hematuria, dysuria, or incontinence.  He does have erectile dysfunction which is been present for 2 years.  He has used sildenafil in the past.  Libido is normal, but he has not been sexually active recently.  There are no headaches, and is no paresthesias.  10/6/20...Dose # 3 of pembrolizumab today. Feels "pretty good." Appetite is "good," gained 1.2 kg since last visit. No dyspepsia, nausea, or vomiting. No constipation or diarrheal stools. No headaches or dizziness. No cough or DEAN. No skin rashes or pruritus. No pains. No significant fatigue. Stressful at work. No paresthesias. Urine flow is "pretty good," nocturia x 1. No hematuria, dysuria, incontinence, or urgency. No edema. No fevers or chills.   10/27/20...Pembro #4 today. Feels "good", minor fatigue, working FT. NO pruritus, no pains. APpetite is good, no weight loss, gained some weight. No cough,  no DEAN. No diarrheal stools. NO headaches, no dizziness, no paresthesias. Mild fatigue. Works as a , does counselling.   12/8/20...Feels well. Did not have a response to pembro. Emotionally, he feels stressed and down, trying to keep focus. He has been advised to seek second opinions about cystectomy. There have been late responses to pembro and given the uncertainty, i suggested he continue pembro given his strong PD-L1 positivity. No cough, no DEAN. No diarrhea. Minor fatigue. No N/V. No paresthesias, no headaches. Urine flow is "not bad", had some clots on occasion these past few weeks, with some temporary issues with flow. No incontinence No dysuria. No urgency. Nocturia x 2-3. Has a stent present.   2/23/21...Verbal permission for telehealth services granted by the patient, Av Bañuelos, on 2/23/21 at 1:45 PM.  Feels "good". No blood in urine, no dysuria. Urine flow is good, nocturia x 1-2. Drinks a lot of fluid before bedtime.Appetite is good, no weight loss. NO fatigue. No rash, no pruritus. No pains noted. NO cough, no DEAN. NO diarrheal stools. Has not seen another urologic surgeon in second opinion. Repeat cysto will be in late March. Last cysto was 1/19/21. Bladder appeared clean, left stent was removed. No edema. NO headaches.  Received first dose of CV vaccine.  [de-identified] : PD-L1 immunohistochemistry\par  Antibody: [place] PDL1 ()\par  Tissue type: Bladder; urothelial carcinoma\par  Block: 4A\par  Result: Tumor Proportion Score (TPS) 80% [FreeTextEntry1] : started pembrolizumab on August 25, 2020 [de-identified] : 3/13/24...last seen by [facility], on 2/23/21. Started pembrolizumab on August 25, 2020, discontinued due to persistent disease after 9 doses. Cysto done by Dr Villegas, recurrent disease, recommended cystoprostatectomy. Was seen in second opinion at [facility] and at [facility]. Both instances, surgery was recommended. has had gross hematuria, repeat cysto-TUR revealed no clear evidence of MIBC.

## 2024-03-13 NOTE — ASSESSMENT
[Palliative Care Plan] : not applicable at this time [FreeTextEntry1] : Av returns to the office today.  I last saw him in February 2021.  He was treated with pembrolizumab and it was discontinued after 9 doses due to disease persistent.  He had recent cystoscopies performed by Dr. Villegas.  He has recurrent disease.  He has no clear evidence of muscle invasion but has extensive lamina propria invasion, and he has large masses within the bladder.  He was advised once again to have a cystoprostatectomy.  He was seen in second opinion at [facility] as well as in [place].  Both consultants also recommended surgical intervention.  He has had gross hematuria recently.  He had a repeat cystoscopy with TUR revealing no clear evidence of muscle invasion.  The last CT scan was in December, and he had bilateral hydronephrosis and has stents in both ureters at this time.  The last CT scan from December also indicated that tumor extends into the distal ureters.  He is not interested in surgical intervention, and wanted to explore other options including chemoradiation.  He came to discuss the possibility of chemoradiation today, and he was seen by Dr. Gil after seeing me.  He is concerned about fertility preservation and he thought that radiation would allow that.  Dr. Gil told him that he would have to have full dose radiation to the prostate and therefore fertility preservation is not possible.  He could cryopreserved sperm before any procedure, and we talked about how this can be done.  We talked about staging of bladder cancer.  He has large masses and thickening of the bladder wall and the presumption that they at least has stage II disease.  There is also mention on the last CAT scan of stranding outside the bladder, which is often indicative of disease into the fat.  In addition, patients who have hydronephrosis are generally considered to be stage III clinically.  I discussed with him how cystoscopy often underestimates the extent of disease and why this is the case.  It is estimated that from 1/3-1/2 of the time, the clinical staging is upgraded at the time of cystoprostatectomy.  Dr. Villegas, Dr. Gil and I communicated by multiple emails after his visit with radiation.  Repeat imaging will be obtained as his disease may be significantly worse.  We also spoke about the chemotherapy that might be used along with radiation treatment.  His most recent creatinine would make him cisplatin ineligible, and gemcitabine would likely be the drug of choice if he does proceed with what I considered to be less effective treatment.  I went over the trial results from the BC 2001 trial, in which patients were treated with chemotherapy with 5-fluorouracil and mitomycin plus radiation versus radiation alone.  I discussed the endpoints of this trial, which was essentially freedom from recurrence of local disease.  There was no survival benefit identified in the initial publication, and a recent presentation at the  oncology meeting confirmed that after almost 10 years of follow-up, there is no survival advantage for adding chemotherapy to radiation.  I discussed how this treatment is likely a poor substitute for radical surgery in this very young patient.  Multiple questions were answered to the best my ability and to his apparent satisfaction.  He stated that he wanted some more time to think about things, and I pointed out to him that he has been thinking about this for a very long time at this point.  I encouraged him to contact me with any questions after he sees Dr. Gil.

## 2024-03-13 NOTE — REASON FOR VISIT
[Consideration of Curative Therapy] : consideration of curative therapy for [Other: ___] : [unfilled] [Family Member] : family member [Other: _____] : [unfilled]

## 2024-03-13 NOTE — REVIEW OF SYSTEMS
[Fever] : no fever [Chills] : no chills [Fatigue] : no fatigue [Recent Change In Weight] : ~T no recent weight change [Dizziness] : no dizziness [Difficulty Walking] : no difficulty walking [Anxiety] : anxiety [Depression] : no depression [Muscle Weakness] : no muscle weakness [Easy Bruising] : no tendency for easy bruising [Negative] : Integumentary [de-identified] : No headaches

## 2024-03-14 NOTE — HISTORY OF PRESENT ILLNESS
[FreeTextEntry1] : 53M w/ recurrent bladder cancer, T1, previously treated with BCG and Pembrolizumab.   Long history of nephrolithiasis, obstructive symptoms, and transitional cell bladder cancer, initially diagnosed in 2018.  - Transurethral resection of the bladder tumor on 12/31/18, with pathology revealing infiltrating high-grade transitional cell carcinoma, pathologic T1, with no muscle in the specimen. - Repeat transurethral resection of bladder tumor was performed on 2/14/19, high-grade transitional cell carcinoma, noninvasive with carcinoma in situ. There was no muscle invasion (detrusor muscle present).   He was treated with BCG from March 21 through April 30, 2019. Cystoscopy in August 2019 did not reveal any tumor, the cytology was negative. - Follow-up cystoscopy was performed in February 2020, and this revealed high-grade transitional cell carcinoma, pT1 in the right lateral wall and anterior wall. There was no muscle in the specimen. Carcinoma in situ was also present.  Refused cystectomy; treated with Pembrolizumab 9/1/20; discontinued after 9 doses due to persistent disease.   11/13/20: Cystoscopy, transurethral resection of the bladder tumor, left ureteroscopy, left retrograde pyelogram, and left ureteral stent insertion. - Infiltrating high grade papillary urothelial carcinoma in left trigone and distal ureter invading lamina propria (muscle in specimen); flat urothelial carcinoma in situ in right posterior wall; Infiltrating high grade papillary urothelial carcinoma in left posterior wall invading lamina propria (no muscle in specimen).   TURBT 4/5/21 showed high grade urothelial carcinoma in situ in the bladder dome, right posterior wall, and invasive high grade urothelial carcinoma on the left distal ureter and right trigone, without muscularis invasion.   4/28/21: Cystoscopy, transurethral resection of bladder tumor, bilateral retrograde pyelograms, bilateral ureteral stent insertion.  1/23/24: Cystoscopy, transurethral resection of large bladder tumor, bilateral  retrograde pyelogram, bilateral ureteral stents. - tumor seen on left trigone obscuring left ureter, extending to bladder neck and surrounding bladder neck. Also large nodule right lateral wall.  - Pathology 1/23/24 showed invasive urothelial carcinoma with squamous differentiation, high grade in both specimens (left trigone to bladder neck and the right lateral wall); unclear if the muscularis was involved. No LVI.   Consensus opinion from  tumor board was that radical cystoprostatectomy offered the best chance of curative results with long term disease control. The patient continues to desire a non radical surgery treatment. Alternative would be chemotherapy/pelvic radiation therapy.  Evaluation 3/13/24:  He reports resolution of clots in urine after last surgery. Denies urinary and bowel issues. Denies pelvic pain. Interested in fertility preservation.

## 2024-03-14 NOTE — VITALS
[90: Able to carry normal activity; minor signs or symptoms of disease.] : 90: Able to carry normal activity; minor signs or symptoms of disease.  [Maximal Pain Intensity: 0/10] : 0/10 [ECOG Performance Status: 0 - Fully active, able to carry on all pre-disease performance without restriction] : Performance Status: 0 - Fully active, able to carry on all pre-disease performance without restriction [Date: ____________] : Patient's last distress assessment performed on [unfilled]. [6 - Distress Level] : Distress Level: 6 [FreeTextEntry7] : Patient is a SW. Practices meditation stress management.

## 2024-03-25 ENCOUNTER — NON-APPOINTMENT (OUTPATIENT)
Age: 54
End: 2024-03-25

## 2024-05-03 NOTE — H&P PST ADULT - RESPIRATORY AND THORAX
Mom calling,     David woke up from his nap this afternoon and his temp is 99 per temporal scanner. His feet a really cold, mom asking if this is concerning. He is eating, drinking and acting normal. Not complaining of any discomfort and is playing.     How should I advise?     Pt. Of HALLIE        details…

## 2024-05-04 NOTE — BRIEF OPERATIVE NOTE - NSICDXBRIEFOPLAUNCH_GEN_ALL_CORE
<--- Click to Launch ICDx for PreOp, PostOp and Procedure
<-- Click to add NO significant Past Surgical History

## 2024-06-05 ENCOUNTER — NON-APPOINTMENT (OUTPATIENT)
Age: 54
End: 2024-06-05

## 2024-07-14 ENCOUNTER — NON-APPOINTMENT (OUTPATIENT)
Age: 54
End: 2024-07-14

## 2024-07-15 NOTE — ED ADULT TRIAGE NOTE - RESPIRATORY RATE (BREATHS/MIN)
Course User Index  [JT] Zahira Youssef APRN - NP       CONSULTS:  None    PROCEDURES:  Unless otherwise noted below, none     Procedures      FINAL IMPRESSION      1. Nonintractable headache, unspecified chronicity pattern, unspecified headache type    2. Lymphadenopathy          DISPOSITION/PLAN   DISPOSITION Decision To Discharge 07/15/2024 08:40:18 PM      PATIENT REFERRED TO:  Kelly Morales APRN - NP  [address] [facility] [address] [phone]    Schedule an appointment as soon as possible for a visit         DISCHARGE MEDICATIONS:  Discharge Medication List as of 7/15/2024  8:42 PM            (Please note that portions of this note were completed with a voice recognition program.  Efforts were made to edit the dictations but occasionally words are mis-transcribed.)    BRIJESH Higgins NP (electronically signed)  Emergency Attending Physician / Physician Assistant / Nurse Practitioner             Zahira Youssef APRN - NP  07/15/24 [id number]    
28

## 2024-09-03 NOTE — H&P PST ADULT - GENITOURINARY
TRANSFER - OUT REPORT:    Verbal report given to Soumya Rn on Elma Vivas  being transferred to [facility] for routine post-op       Report consisted of patient's Situation, Background, Assessment and   Recommendations(SBAR).     Information from the following report(s) Nurse Handoff Report, ED Encounter Summary, Surgery Report, Intake/Output, Recent Results, Cardiac Rhythm NSR,BBB, and Neuro Assessment was reviewed with the receiving nurse.           Lines:   Peripheral IV 09/02/24 Left Antecubital (Active)   Site Assessment Clean, dry & intact 09/03/24 0159   Line Status Infusing;Capped 09/03/24 0159   Phlebitis Assessment No symptoms 09/03/24 0159   Infiltration Assessment 0 09/03/24 0159   Alcohol Cap Used Yes 09/03/24 0159   Dressing Status Clean, dry & intact 09/03/24 0159   Dressing Type Transparent 09/03/24 0159       Peripheral IV 09/03/24 Left;Posterior Wrist (Active)   Site Assessment Clean, dry & intact 09/03/24 0159   Line Status Infusing 09/03/24 0159   Phlebitis Assessment No symptoms 09/03/24 0159   Infiltration Assessment 0 09/03/24 0159   Alcohol Cap Used Yes 09/03/24 0159   Dressing Status Clean, dry & intact 09/03/24 0159   Dressing Type Transparent 09/03/24 0159        Opportunity for questions and clarification was provided.      Patient transported with:  Registered Nurse and Tech         details…

## 2024-09-10 ENCOUNTER — NON-APPOINTMENT (OUTPATIENT)
Age: 54
End: 2024-09-10

## 2024-09-10 ENCOUNTER — EMERGENCY (EMERGENCY)
Facility: HOSPITAL | Age: 54
LOS: 1 days | Discharge: HOME | End: 2024-09-10
Attending: EMERGENCY MEDICINE | Admitting: EMERGENCY MEDICINE
Payer: COMMERCIAL

## 2024-09-10 VITALS
RESPIRATION RATE: 17 BRPM | TEMPERATURE: 98 F | WEIGHT: 231.93 LBS | HEART RATE: 102 BPM | DIASTOLIC BLOOD PRESSURE: 85 MMHG | SYSTOLIC BLOOD PRESSURE: 134 MMHG | OXYGEN SATURATION: 97 %

## 2024-09-10 DIAGNOSIS — Z98.890 OTHER SPECIFIED POSTPROCEDURAL STATES: Chronic | ICD-10-CM

## 2024-09-10 DIAGNOSIS — Z90.89 ACQUIRED ABSENCE OF OTHER ORGANS: Chronic | ICD-10-CM

## 2024-09-10 LAB
ALBUMIN SERPL ELPH-MCNC: 4.3 G/DL — SIGNIFICANT CHANGE UP (ref 3.3–5)
ALP SERPL-CCNC: 82 U/L — SIGNIFICANT CHANGE UP (ref 40–120)
ALT FLD-CCNC: 23 U/L — SIGNIFICANT CHANGE UP (ref 4–41)
ANION GAP SERPL CALC-SCNC: 10 MMOL/L — SIGNIFICANT CHANGE UP (ref 7–14)
APPEARANCE UR: ABNORMAL
AST SERPL-CCNC: 16 U/L — SIGNIFICANT CHANGE UP (ref 4–40)
BACTERIA # UR AUTO: NEGATIVE /HPF — SIGNIFICANT CHANGE UP
BASOPHILS # BLD AUTO: 0.04 K/UL — SIGNIFICANT CHANGE UP (ref 0–0.2)
BASOPHILS NFR BLD AUTO: 0.4 % — SIGNIFICANT CHANGE UP (ref 0–2)
BILIRUB SERPL-MCNC: 0.3 MG/DL — SIGNIFICANT CHANGE UP (ref 0.2–1.2)
BILIRUB UR-MCNC: NEGATIVE — SIGNIFICANT CHANGE UP
BUN SERPL-MCNC: 32 MG/DL — HIGH (ref 7–23)
CALCIUM SERPL-MCNC: 9.3 MG/DL — SIGNIFICANT CHANGE UP (ref 8.4–10.5)
CAST: 0 /LPF — SIGNIFICANT CHANGE UP (ref 0–4)
CHLORIDE SERPL-SCNC: 106 MMOL/L — SIGNIFICANT CHANGE UP (ref 98–107)
CO2 SERPL-SCNC: 23 MMOL/L — SIGNIFICANT CHANGE UP (ref 22–31)
COLOR SPEC: ABNORMAL
CREAT SERPL-MCNC: 1.84 MG/DL — HIGH (ref 0.5–1.3)
DIFF PNL FLD: ABNORMAL
EGFR: 43 ML/MIN/1.73M2 — LOW
EOSINOPHIL # BLD AUTO: 0.16 K/UL — SIGNIFICANT CHANGE UP (ref 0–0.5)
EOSINOPHIL NFR BLD AUTO: 1.8 % — SIGNIFICANT CHANGE UP (ref 0–6)
GLUCOSE SERPL-MCNC: 97 MG/DL — SIGNIFICANT CHANGE UP (ref 70–99)
GLUCOSE UR QL: NEGATIVE MG/DL — SIGNIFICANT CHANGE UP
HCT VFR BLD CALC: 39.8 % — SIGNIFICANT CHANGE UP (ref 39–50)
HGB BLD-MCNC: 13.5 G/DL — SIGNIFICANT CHANGE UP (ref 13–17)
IANC: 5.51 K/UL — SIGNIFICANT CHANGE UP (ref 1.8–7.4)
IMM GRANULOCYTES NFR BLD AUTO: 0.4 % — SIGNIFICANT CHANGE UP (ref 0–0.9)
KETONES UR-MCNC: NEGATIVE MG/DL — SIGNIFICANT CHANGE UP
LEUKOCYTE ESTERASE UR-ACNC: ABNORMAL
LYMPHOCYTES # BLD AUTO: 2.31 K/UL — SIGNIFICANT CHANGE UP (ref 1–3.3)
LYMPHOCYTES # BLD AUTO: 25.8 % — SIGNIFICANT CHANGE UP (ref 13–44)
MCHC RBC-ENTMCNC: 30.6 PG — SIGNIFICANT CHANGE UP (ref 27–34)
MCHC RBC-ENTMCNC: 33.9 GM/DL — SIGNIFICANT CHANGE UP (ref 32–36)
MCV RBC AUTO: 90.2 FL — SIGNIFICANT CHANGE UP (ref 80–100)
MONOCYTES # BLD AUTO: 0.9 K/UL — SIGNIFICANT CHANGE UP (ref 0–0.9)
MONOCYTES NFR BLD AUTO: 10 % — SIGNIFICANT CHANGE UP (ref 2–14)
NEUTROPHILS # BLD AUTO: 5.51 K/UL — SIGNIFICANT CHANGE UP (ref 1.8–7.4)
NEUTROPHILS NFR BLD AUTO: 61.6 % — SIGNIFICANT CHANGE UP (ref 43–77)
NITRITE UR-MCNC: POSITIVE
NRBC # BLD: 0 /100 WBCS — SIGNIFICANT CHANGE UP (ref 0–0)
NRBC # FLD: 0 K/UL — SIGNIFICANT CHANGE UP (ref 0–0)
PH UR: 6 — SIGNIFICANT CHANGE UP (ref 5–8)
PLATELET # BLD AUTO: 179 K/UL — SIGNIFICANT CHANGE UP (ref 150–400)
POTASSIUM SERPL-MCNC: 3.9 MMOL/L — SIGNIFICANT CHANGE UP (ref 3.5–5.3)
POTASSIUM SERPL-SCNC: 3.9 MMOL/L — SIGNIFICANT CHANGE UP (ref 3.5–5.3)
PROT SERPL-MCNC: 6.9 G/DL — SIGNIFICANT CHANGE UP (ref 6–8.3)
PROT UR-MCNC: 100 MG/DL
RBC # BLD: 4.41 M/UL — SIGNIFICANT CHANGE UP (ref 4.2–5.8)
RBC # FLD: 13.3 % — SIGNIFICANT CHANGE UP (ref 10.3–14.5)
RBC CASTS # UR COMP ASSIST: 4898 /HPF — HIGH (ref 0–4)
REVIEW: SIGNIFICANT CHANGE UP
SODIUM SERPL-SCNC: 139 MMOL/L — SIGNIFICANT CHANGE UP (ref 135–145)
SP GR SPEC: 1.01 — SIGNIFICANT CHANGE UP (ref 1–1.03)
SQUAMOUS # UR AUTO: 1 /HPF — SIGNIFICANT CHANGE UP (ref 0–5)
UROBILINOGEN FLD QL: 1 MG/DL — SIGNIFICANT CHANGE UP (ref 0.2–1)
WBC # BLD: 8.96 K/UL — SIGNIFICANT CHANGE UP (ref 3.8–10.5)
WBC # FLD AUTO: 8.96 K/UL — SIGNIFICANT CHANGE UP (ref 3.8–10.5)
WBC UR QL: 1488 /HPF — HIGH (ref 0–5)

## 2024-09-10 PROCEDURE — 99285 EMERGENCY DEPT VISIT HI MDM: CPT

## 2024-09-10 RX ADMIN — Medication 100 MILLIGRAM(S): at 23:23

## 2024-09-10 NOTE — ED PROVIDER NOTE - CLINICAL SUMMARY MEDICAL DECISION MAKING FREE TEXT BOX
54 male, history of bladder cancer with recurrence, ongoing discussions for bladder resection with neobladder placement, presents to ED with complaints of 2 days of urinary retention, passing clots, lower abdominal discomfort.  No fevers, chills, nausea, vomiting.  Patient reports has passed very large clots today.  States after he passes a clot has some output of urine.  Vital signs stable.  Patient well-appearing, no acute distress, heart regular rate and rhythm, lungs are, abdomen soft with diffuse lower abdominal tenderness, no CVA tenderness bilaterally, no gross motor or sensory deficits.  Assess for urinary obstruction, UTI, kidney injury, new bladder masses.  Plan for basic labs, urinalysis with culture, postvoid residual, consult urology

## 2024-09-10 NOTE — ED PROVIDER NOTE - ATTENDING CONTRIBUTION TO CARE
Brief HPI:  54-year-old male past medical history of kidney stones, bladder cancer status post multiple TURBT, bilateral stent placement January 2024 presents with gross hematuria and passing clots.  Patient states that he is having difficulty voiding normal stream.  Passed clot in ED and he feels improved but still having urinary urgency.  Denies fever, chills, nausea, vomiting.    Vitals:   Reviewed    Exam:    GEN:  Non-toxic appearing, non-distressed, speaking full sentences, non-diaphoretic, AAOx3  HEENT:  NCAT, neck supple, EOMI, PERRLA, sclera anicteric, no conjunctival pallor or injection, no stridor, normal voice, no tonsillar exudate, uvula midline  CV:  regular rhythm and rate, s1/s2 audible, no murmurs, rubs or gallops, peripheral pulses 2+ and symmetric  PULM:  non-labored respirations, lungs clear to auscultation bilaterally, no wheezes, crackles or rales  ABD:  non distended, non-tender, no rebound, no guarding, negative Joiner's sign, bowel sounds normal, no cvat  MSK:  no gross deformity, non-tender extremities and joints, range of motion grossly normal appearing, no extremity edema, extremities warm and well perfused   NEURO:  AAOx3, CN II-XII intact, motor 5/5 in upper and lower extremities bilaterally, sensation grossly intact in extremities and trunk, no gait deficit  SKIN:  warm, dry, no rash or vesicles     A/P:   54-year-old male past medical history of kidney stones, bladder cancer status post multiple TURBT, bilateral stent placement January 2024 presents with gross hematuria and passing clots.  Vital signs stable, afebrile.  Abdomen non-tender.  Reports passage of clot with restoration of voiding ability.  Will obtain labs, urology consult for hematuria and known bladder mass.  Disposition pending.

## 2024-09-10 NOTE — ED ADULT TRIAGE NOTE - CHIEF COMPLAINT QUOTE
pt c/o x2 days difficulty with urination. endorses having "low stream with blood clots between pea and quarter-sized." Also states minimal amount of stinging and pain upon urinations. Denies fevers, chills. states had urinary catheter in the past due to previous issue. had last full stream of urine x2 days ago   medical history bladder cancer not on chemo/radiation

## 2024-09-10 NOTE — ED PROVIDER NOTE - PROGRESS NOTE DETAILS
Ricardo PGY2: Discussed with your urology, recommends imaging with CT abdomen pelvis. Ricardo PGY2: cr elevated. will obtain CT w/o contrast.  CT tech made aware. Ricardo PGY2: CT with signs of BL hydroureter, ureteritis, cystitis.  patient seen and evaluated by urology, recommends 250mg cipro x 7 days for tx of UTI and outpatient follow with Dr. Villegas. Ricardo PGY2: Discussed with your urology, recommends imaging with CT abdomen pelvis. post void residual 120cc.

## 2024-09-10 NOTE — ED PROVIDER NOTE - PATIENT PORTAL LINK FT
You can access the FollowMyHealth Patient Portal offered by [facility] by registering at the following website: [email or web address]. By joining [employer]’s FollowMyHealth portal, you will also be able to view your health information using other applications (apps) compatible with our system.

## 2024-09-10 NOTE — ED PROVIDER NOTE - NSFOLLOWUPINSTRUCTIONS_ED_ALL_ED_FT
You were seen in the Emergency Department for urinary retention and passing clots.  Your evaluation today shows your symptoms are not from any dangerous or life-threatening causes.  You were found to have a urinary tract infection.  We have sent an antibiotic to your pharmacy to treat this.  Follow up with Dr. Villegas within the week for further evaluation.      1) Continue all previously prescribed medications as directed.    2) Follow up with your primary care physician - take copies of your results.    3) Return to the Emergency Department for worsening or persistent symptoms, and/or ANY NEW OR CONCERNING SYMPTOMS.

## 2024-09-10 NOTE — ED ADULT NURSE NOTE - OBJECTIVE STATEMENT
pt reporting low urinary output x 2 days, + burning and pain upon urination. pt states increased frequency and feeling of incomplete bladder emptying. pt able to urinate approx 100 cc in urinal, + dark red color noted. pt denies pain or dizziness at this time. 20G iv placed to left outer AC, blood drawn and sent to lab. iv flushing well without complications, iv site WNL. safety measures maintained, call bell in place at bedside

## 2024-09-10 NOTE — ED ADULT NURSE NOTE - NSFALLUNIVINTERV_ED_ALL_ED
Bed/Stretcher in lowest position, wheels locked, appropriate side rails in place/Call bell, personal items and telephone in reach/Instruct patient to call for assistance before getting out of bed/chair/stretcher/Non-slip footwear applied when patient is off stretcher/[place] to call system/Physically safe environment - no spills, clutter or unnecessary equipment/Purposeful proactive rounding/Room/bathroom lighting operational, light cord in reach

## 2024-09-11 VITALS
SYSTOLIC BLOOD PRESSURE: 134 MMHG | HEART RATE: 88 BPM | DIASTOLIC BLOOD PRESSURE: 82 MMHG | OXYGEN SATURATION: 99 % | RESPIRATION RATE: 18 BRPM

## 2024-09-11 PROCEDURE — 74176 CT ABD & PELVIS W/O CONTRAST: CPT | Mod: 26,MC

## 2024-09-11 PROCEDURE — 99284 EMERGENCY DEPT VISIT MOD MDM: CPT

## 2024-09-11 RX ADMIN — Medication 200 MILLIGRAM(S): at 04:31

## 2024-09-11 NOTE — ED ADULT NURSE REASSESSMENT NOTE - NS ED NURSE REASSESS COMMENT FT1
22G iv obtained to right thumb. blood drawn and sent to lab. iv flushing well without complications, iv site WNL. iv secured with dressing and tape. medicated as per orders. pt tolerated po pills well with water, no coughing noted. side rails up x2
iv abx completed, iv removed, gauze dressing in place. discharge papers provided by MD, pt verbalized understanding. pt able to walk with steady gait in NAD, RR even and unlabored. all belongings with pt
urology consult at bedside, pt able to answer questions appropriately
Break RN note- Patient resting quietly in bed, breathing even and nonlabored. No acute distress. Patient appears comfortable. Patient returning from CT. Safety maintained. Awaiting results. Patient stable upon exiting the room.

## 2024-09-11 NOTE — CONSULT NOTE ADULT - ASSESSMENT
54 y.o male with PMHx of bladder cancer since 2018 s/p multiple TURBT and BCG treatment in the past, s/p TURBT and bilateral stent placement on 1/23/24 presented to the ED with gross hematuria, difficulty urinating since yesterday. In ED, he has been able to avoid freely and color got better to a light brown color. PVR was 120cc. Labs in ED were 5.96/39.8.1.84 . CT showed that the stents are in place but but there was worsening on the right and improved hydro on the left, bladder wall thickening and perivesicular fat stranding.    -Urine culture  -Home on antibiotics, Cipro  -Stay hydrated  -Monitor urine color  -Will need close f/u with Dr Villegas in the office for further evaluation of bladder CA and stents ( Email sent to the office regarding F/U  -Patient understands that the stents will need to be removed/exchanged since they were placed in January. He will also call the office t f/u on the appointment [phone]

## 2024-09-11 NOTE — CONSULT NOTE ADULT - SUBJECTIVE AND OBJECTIVE BOX
HPI  54 male, history of bladder cancer with recurrence, ongoing discussions for bladder resection with neobladder placement, presents to ED with complaints of 2 days of urinary retention, passing clots, lower abdominal discomfort.  No fevers, chills, nausea, vomiting.  Patient reports has passed very large clots today.  States after he passes a clot has some output of urine.  Vital signs stable.  Patient well-appearing, no acute distress, heart regular rate and rhythm, lungs are, abdomen soft with diffuse lower abdominal tenderness, no CVA tenderness bilaterally, no gross motor or sensory deficits.  Assess for urinary obstruction, UTI, kidney injury, new bladder masses.  Plan for basic labs, urinalysis with culture, postvoid residual, consult urology.     Urology was consulted due to gross hematuria. 54 y.o male with PMHx of  kidney stones, bladder Cancer diagnosed in  s/p multiple treatments with BCG, s/p multiple TURBT from  to ), s/p TURBT with bilateral stent placement on 24 presented to ED with gross hematuria since yesterday with passage of clots.  In the Ed, he started voiding freely and color had improved to a light brown color. PVR was 120cc.  He denies any fever, abdominal/flank pain, nausea, vomiting.   Of note, patient did not follow up in the office with Dr Villegas for the stents to be removed or exchanged He saw radiation oncology in March but never followed up because he was waiting for them t call him back for further imaging. In the meantime patient had been trying alternative regimen to cure the bladder cancer in [place] and [place] with snakke venom treatment and herbal supplements since he has been refusing to get a definitive treatment with cystoprostatectomy.   In ED, he was afebrile       PAST MEDICAL & SURGICAL HISTORY:  Bladder calculus      HLD (hyperlipidemia)  pt took prescribed meds for 2 months, no longer on meds      Malignant neoplasm of bladder  tx bcg ,       History of tonsillectomy      S/P cystoscopy  right ureteroscopy, laser lithotripsy, stone extraction, ureteral stent placement bladder bx 2020, 2020- tx with bcg      H/O cystoscopy  and bladder biopsy- 2020      S/P cystoscopy  and biopsy 2020          MEDICATIONS  (STANDING):    MEDICATIONS  (PRN):      FAMILY HISTORY:  Family history of Parkinson's disease    Family history of dementia    Family history of hypertension in mother        Allergies    penicillin (Unknown)    Intolerances        SOCIAL HISTORY:    REVIEW OF SYSTEMS: Otherwise negative as stated in HPI    Physical Exam  Vital signs  T(F): 98.1 (24 @ 00:10), Max: 98.7 (09-10-24 @ 18:28)  HR: 84 (24 @ 00:10)  BP: 113/74 (24 @ 00:10)  SpO2: 98% (24 @ 00:10)    Output    UOP    Gen:  [x] NAD [] toxic    Pulm:  [x] no resp distress [x] no substernal retractions  	  CV:  [] no JVD  [x] RRR    GI:  [x] Soft [x] ND [x] NT, no CVA tenderness                        	  MSK:  Edema []Y [x]N    LABS:      09-10 @ 20:40    WBC 8.96  / Hct 39.8  / SCr 1.84       Urinalysis Basic - ( 10 Sep 2024 20:40 )    Color: Orange / Appearance: Turbid / S.010 / pH: x  Gluc: 97 mg/dL / Ketone: Negative mg/dL  / Bili: Negative / Urobili: 1.0 mg/dL   Blood: x / Protein: 100 mg/dL / Nitrite: Positive   Leuk Esterase: Large / RBC: 4898 /HPF / WBC 1488 /HPF   Sq Epi: x / Non Sq Epi: 1 /HPF / Bacteria: Negative /HPF        Urine Cx: p  Blood Cx:    RADIOLOGY:  < from: CT Abdomen and Pelvis No Cont (24 @ 00:52) >    ACC: [id number] EXAM:  CT ABDOMEN AND PELVIS   ORDERED BY: TATIANNA STEWART     PROCEDURE DATE:  2024          INTERPRETATION:  CLINICAL INFORMATION:  54 male, history of  bladder cancer with recurrence, ongoing discussions for bladder resection   with  neobladder placement, presents to ED with complaints of 2 days of urinary  retention, passing clots, lower abdominal discomfort. No fevers, chills,  nausea, vomiting. Patient reports has passed very large clots today.   States  after he passes aclot has some output of urine.    COMPARISON: CT of the abdomen/pelvis dated 2023.    CONTRAST/COMPLICATIONS:  IV Contrast: None.  Oral Contrast: None.  Complications: None reported.    PROCEDURE:  CT of the Abdomen and Pelvis was performed.  Sagittal and coronal reformats were performed.    FINDINGS:  LOWER CHEST: Within normal limits.    LIVER: Hepatomegaly, measuring 20.4 cm in greatest cranial caudal   dimension, with steatosis, similar to prior study..  BILE DUCTS: Normal caliber.  GALLBLADDER: Within normal limits.  SPLEEN: Within normal limits.  PANCREAS: Within normal limits.  ADRENALS: Within normal limits.  KIDNEYS/URETERS: Bilateral ureteral stents with proximal ends curled in   the bilateral renal pelvises and distal ends curled in the urinary   bladder, new since prior study. Mild/moderate right and moderate left   hydronephrosis, interval worsening on the right and improved on the left.   Periureteral fat stranding suggesting ureteritis. Bilateral perinephric   fat stranding, left greater than right. Urinary bladder wall thickening,   improved since prior study. Perivesicular fat stranding raising concern   for superimposed cystitis.    BLADDER: As above.  REPRODUCTIVE ORGANS: Prostate within normal limits.    BOWEL: No bowel obstruction. Appendix is normal.  PERITONEUM/RETROPERITONEUM: Within normal limits.  VESSELS: Mild atherosclerotic changes.  LYMPH NODES: Mildly prominent mesenteric lymph nodes with subtle hermann   mesentery sparing the perilymphatic fat suggesting early mesenteric   panniculitis, similar to prior study.  ABDOMINAL WALL: Small right and tiny left fat-containing inguinal   hernias. Small fat-containing umbilical hernia.  BONES: Within normal limits.    IMPRESSION:    1. Bilateral ureteral stents with proximal ends curled in the bilateral   renal pelvises and distal ends curled in the urinary bladder, new since   prior study. Mild/moderate right and moderate left hydronephrosis,   interval worsening on the right and improved on the left. Periureteral   fat stranding suggesting ureteritis. Bilateral perinephric fat stranding,   left greater than right. Urinary bladder wall thickening, improved since   prior study. Perivesicular fat stranding raising concern for superimposed   cystitis.    2. Hepatomegaly with steatosis again noted.        --- End of Report ---            JIMBO DIALLO MD; Attending Radiologist  This document has been electronically signed. Sep 11 2024  3:06AM    < end of copied text >

## 2024-09-12 LAB
CULTURE RESULTS: SIGNIFICANT CHANGE UP
SPECIMEN SOURCE: SIGNIFICANT CHANGE UP

## 2024-09-17 ENCOUNTER — NON-APPOINTMENT (OUTPATIENT)
Age: 54
End: 2024-09-17

## 2024-09-18 ENCOUNTER — APPOINTMENT (OUTPATIENT)
Dept: UROLOGY | Facility: CLINIC | Age: 54
End: 2024-09-18
Payer: COMMERCIAL

## 2024-09-18 VITALS
TEMPERATURE: 209.3 F | HEART RATE: 114 BPM | BODY MASS INDEX: 33.93 KG/M2 | DIASTOLIC BLOOD PRESSURE: 89 MMHG | WEIGHT: 237 LBS | RESPIRATION RATE: 17 BRPM | HEIGHT: 70 IN | SYSTOLIC BLOOD PRESSURE: 141 MMHG

## 2024-09-18 DIAGNOSIS — C67.8 MALIGNANT NEOPLASM OF OVERLAPPING SITES OF BLADDER: ICD-10-CM

## 2024-09-18 PROCEDURE — 99214 OFFICE O/P EST MOD 30 MIN: CPT

## 2024-09-18 PROCEDURE — G2211 COMPLEX E/M VISIT ADD ON: CPT | Mod: NC

## 2024-09-23 ENCOUNTER — OUTPATIENT (OUTPATIENT)
Dept: OUTPATIENT SERVICES | Facility: HOSPITAL | Age: 54
LOS: 1 days | End: 2024-09-23

## 2024-09-23 VITALS
TEMPERATURE: 98 F | OXYGEN SATURATION: 97 % | HEIGHT: 70 IN | WEIGHT: 229.06 LBS | DIASTOLIC BLOOD PRESSURE: 86 MMHG | HEART RATE: 77 BPM | RESPIRATION RATE: 16 BRPM | SYSTOLIC BLOOD PRESSURE: 134 MMHG

## 2024-09-23 DIAGNOSIS — Z98.890 OTHER SPECIFIED POSTPROCEDURAL STATES: Chronic | ICD-10-CM

## 2024-09-23 DIAGNOSIS — C67.8 MALIGNANT NEOPLASM OF OVERLAPPING SITES OF BLADDER: ICD-10-CM

## 2024-09-23 DIAGNOSIS — Z90.89 ACQUIRED ABSENCE OF OTHER ORGANS: Chronic | ICD-10-CM

## 2024-09-23 NOTE — H&P PST ADULT - PROBLEM SELECTOR PLAN 1
Scheduled for Cystoscopy, Bilateral retrograde pyelogram, Bilateral ureteral stent removal/exchange, possible transurethral resection of bladder tumor on 9/25/24.  Pre-op instructions provided. Pt given verbal and written instructions with pepcid. Pt verbalized understanding.  Urine culture result in sunrise. Result <10,000 CFU normal urogenital tatyana. Scheduled for Cystoscopy, Bilateral retrograde pyelogram, Bilateral ureteral stent removal/exchange, possible transurethral resection of bladder tumor on 9/25/24.  Pre-op instructions provided. Pt given verbal and written instructions with pepcid. Pt verbalized understanding.  Urine culture result in sunrise. Result <10,000 CFU normal urogenital tatyana.  Sept 10th 2024 CBC result in chart and was WNL. No further hematuria was noted by patient. So cbc not repeated.

## 2024-09-23 NOTE — H&P PST ADULT - HISTORY OF PRESENT ILLNESS
54 male, history of bladder cancer with recurrence, ongoing discussions for bladder resection with neobladder placement, and preop dx of malignant neoplasm overlapping sites of bladder presents to have [facility] eval for Cystoscopy, Bilateral retrograde pyelogram, Bilateral ureteral stent removal/exchange, possible transurethral resection of bladder tumor.    Patient was seen in ED with complaints of 2 days of urinary retention, passing clots, lower abdominal discomfort.  Urology was consulted due to gross hematuria. 54 y.o male with PMHx of  kidney stones, bladder Cancer diagnosed in 2018 s/p multiple treatments with BCG, s/p multiple TURBT from 2018 to 2024), s/p TURBT with bilateral stent placement on 1/23/24 presented to ED with gross hematuria since yesterday with passage of clots.     patient did not follow up in the office with Dr Villegas for the stents to be removed or exchanged He saw radiation oncology in March but never followed up because he was waiting for them t call him back for further imaging. In the meantime patient had been trying alternative regimen to cure the bladder cancer in [place] and [place] with snake venom treatment and herbal supplements since he has been refusing to get a definitive treatment with cystoprostatectomy.      54 male, history of bladder cancer with recurrence, ongoing discussions for bladder resection with neobladder placement, and preop dx of malignant neoplasm overlapping sites of bladder presents to have [facility] eval for Cystoscopy, Bilateral retrograde pyelogram, Bilateral ureteral stent removal/exchange, possible transurethral resection of bladder tumor.    Patient was seen in ED with complaints of 2 days of urinary retention, passing clots, lower abdominal discomfort.  Urology was consulted due to gross hematuria. 54 y.o male with PMHx of  kidney stones, bladder Cancer diagnosed in 2018 s/p multiple treatments with BCG, s/p multiple TURBT from 2018 to 2024), s/p TURBT with bilateral stent placement on 1/23/24 presented to ED with gross hematuria since yesterday with passage of clots.     patient did not follow up in the office with Dr Villegas for the stents to be removed or exchanged He saw radiation oncology in March but never followed up because he was waiting for them to call him back for further imaging. In the meantime patient had been trying alternative regimen to cure the bladder cancer in [place] and [place] with snake venom treatment and herbal supplements since he has been refusing to get a definitive treatment with cystoprostatectomy.

## 2024-09-25 ENCOUNTER — TRANSCRIPTION ENCOUNTER (OUTPATIENT)
Age: 54
End: 2024-09-25

## 2024-09-25 ENCOUNTER — OUTPATIENT (OUTPATIENT)
Dept: OUTPATIENT SERVICES | Facility: HOSPITAL | Age: 54
LOS: 1 days | Discharge: HOME | End: 2024-09-25
Payer: COMMERCIAL

## 2024-09-25 ENCOUNTER — APPOINTMENT (OUTPATIENT)
Dept: UROLOGY | Facility: AMBULATORY SURGERY CENTER | Age: 54
End: 2024-09-25

## 2024-09-25 VITALS
DIASTOLIC BLOOD PRESSURE: 91 MMHG | HEART RATE: 83 BPM | HEIGHT: 70 IN | WEIGHT: 229.06 LBS | TEMPERATURE: 98 F | OXYGEN SATURATION: 98 % | RESPIRATION RATE: 16 BRPM | SYSTOLIC BLOOD PRESSURE: 148 MMHG

## 2024-09-25 VITALS
HEART RATE: 64 BPM | OXYGEN SATURATION: 97 % | RESPIRATION RATE: 15 BRPM | SYSTOLIC BLOOD PRESSURE: 140 MMHG | DIASTOLIC BLOOD PRESSURE: 87 MMHG

## 2024-09-25 DIAGNOSIS — C67.8 MALIGNANT NEOPLASM OF OVERLAPPING SITES OF BLADDER: ICD-10-CM

## 2024-09-25 DIAGNOSIS — Z98.890 OTHER SPECIFIED POSTPROCEDURAL STATES: Chronic | ICD-10-CM

## 2024-09-25 DIAGNOSIS — Z90.89 ACQUIRED ABSENCE OF OTHER ORGANS: Chronic | ICD-10-CM

## 2024-09-25 PROCEDURE — 88342 IMHCHEM/IMCYTCHM 1ST ANTB: CPT | Mod: 26

## 2024-09-25 PROCEDURE — 52240 CYSTOSCOPY AND TREATMENT: CPT

## 2024-09-25 PROCEDURE — 88341 IMHCHEM/IMCYTCHM EA ADD ANTB: CPT | Mod: 26

## 2024-09-25 PROCEDURE — 88307 TISSUE EXAM BY PATHOLOGIST: CPT | Mod: 26

## 2024-09-25 PROCEDURE — 74420 UROGRAPHY RTRGR +-KUB: CPT | Mod: 26

## 2024-09-25 DEVICE — GUIDEWIRE SENSOR DUAL-FLEX NITINOL STRAIGHT .038" X 150CM
Type: IMPLANTABLE DEVICE | Site: BILATERAL | Status: NON-FUNCTIONAL
Removed: 2024-09-25

## 2024-09-25 DEVICE — IMPLANTABLE DEVICE
Type: IMPLANTABLE DEVICE | Site: BILATERAL | Status: NON-FUNCTIONAL
Removed: 2024-09-25

## 2024-09-25 DEVICE — URETERAL CATH OPEN END 5FR 70CM
Type: IMPLANTABLE DEVICE | Site: BILATERAL | Status: NON-FUNCTIONAL
Removed: 2024-09-25

## 2024-09-25 NOTE — BRIEF OPERATIVE NOTE - OPERATION/FINDINGS
Extensive tumor at bladder neck, left wall, and around trigone resected over wire. [place] in place

## 2024-09-25 NOTE — ASU DISCHARGE PLAN (ADULT/PEDIATRIC) - ASU DC SPECIAL INSTRUCTIONSFT
CATHETER: Some patients are sent home with a goins catheter, while others go home urinating on their own. If you still have a catheter, the nurses will review instructions and care before you go home. For men, you may have a prescription for lidocaine jelly to apply to the tip of your penis, as needed, for catheter related discomfort.    GENERAL: It is common to have blood in your urine after your procedure. It may be pink or even red; inform your doctor if you have a significant amount of clot in the urine or if you are unable to void at all or if your catheter stops draining. The urine may clear and then become bloody again especially as you are more physically active. It is not uncommon to have some burning when you urinate, this will gradually improve. With a catheter in place, it is not uncommon to have occasional leakage or urine or blood around the catheter. Please call your urologist if this is excessive and/or the urine is not draining through the catheter into the bag.    BATHING: You may shower or bathe. If going home with goins, shower only until catheter is removed.  DIET: You may resume your regular diet and regular medication regimen.    PAIN: You may take Tylenol (acetaminophen) 650-975mg and/or Motrin (ibuprofen) 400-600mg, both available over the counter, for pain every 6 hours as needed. Do not exceed 4000mg of Tylenol (acetaminophen) daily. You may alternate these medications such that you take one or the other every 3 hours for around the clock pain coverage.    STOOL SOFTENERS: Do not allow yourself to become constipated as straining may cause bleeding. Take stool softeners or a laxative (ex. Miralax, Colace, Senokot, ExLax, etc), available over the counter, if needed.  ACTIVITY: No heavy lifting or strenuous exercise until you are evaluated at your post-operative appointment. Otherwise, you may return to your usual level of physical activity.    FOLLOW-UP: Follow up with Dr. Villegas on Monday for Goins catheter removal.     CALL YOUR UROLOGIST IF: You have any bleeding that does not stop, inability to void >8 hours, fever over 100.4 F, chills, persistent nausea/vomiting, changes in your incision concerning for infection, or if your pain is not controlled on your discharge pain medications.

## 2024-09-25 NOTE — ASU DISCHARGE PLAN (ADULT/PEDIATRIC) - CARE PROVIDER_API CALL
Titus Villegas.  Urology  [address] [place] [address], [address] [address]  Phone: [phone]  Fax: [phone]  Scheduled Appointment: 09/30/2024

## 2024-09-25 NOTE — ASU DISCHARGE PLAN (ADULT/PEDIATRIC) - NS MD DC FALL RISK RISK
For information on Fall & Injury Prevention, visit: [email or web address] OR  [email or web address] OR  https://www.cdc.gov/steadi/patient.html

## 2024-09-25 NOTE — BRIEF OPERATIVE NOTE - NSICDXBRIEFPROCEDURE_GEN_ALL_CORE_FT
PROCEDURES:  Cystoscopy with removal of double-J ureteral stent 25-Sep-2024 16:05:41  Ita Knox  TURBT, with biopsy 25-Sep-2024 16:05:50  Ita Knox

## 2024-09-25 NOTE — ASU PREOPERATIVE ASSESSMENT, ADULT ([place] ONLY) - FALL HARM RISK - UNIVERSAL INTERVENTIONS
[place] to call system/Physically safe environment - no spills, clutter or unnecessary equipment/Purposeful Proactive Rounding

## 2024-09-27 NOTE — PACU DISCHARGE NOTE - NS MD DISCHARGE NOTE DISCHARGE
Home Pharmacy verified.      Pt wanted to know if she can take a 12 hour car ride next week as her cousin  and she wanted to go to the . Writer instructed pt to make frequent stops to get out and walk around, wear her compression stockings and do ankle pumps.  Will check with Dr. Ziegler/Ashanti JORDAN and verify.

## 2024-09-28 LAB
CULTURE RESULTS: ABNORMAL
SPECIMEN SOURCE: SIGNIFICANT CHANGE UP

## 2024-10-01 ENCOUNTER — APPOINTMENT (OUTPATIENT)
Dept: UROLOGY | Facility: CLINIC | Age: 54
End: 2024-10-01

## 2024-10-01 VITALS
HEART RATE: 94 BPM | RESPIRATION RATE: 16 BRPM | DIASTOLIC BLOOD PRESSURE: 92 MMHG | OXYGEN SATURATION: 98 % | SYSTOLIC BLOOD PRESSURE: 150 MMHG

## 2024-10-01 DIAGNOSIS — C67.8 MALIGNANT NEOPLASM OF OVERLAPPING SITES OF BLADDER: ICD-10-CM

## 2024-10-01 PROCEDURE — 51700 IRRIGATION OF BLADDER: CPT

## 2024-10-04 NOTE — H&P PST ADULT - NEGATIVE CARDIOVASCULAR SYMPTOMS
----- Message from Alvina sent at 10/4/2024  9:28 AM CDT -----  Patient came in to clinic today to request refills of sertraline (ZOLOFT) 50 MG tablet be sent to [facility] in [place]. Good call back is    [phone]     .   no chest pain/no palpitations/no dyspnea on exertion

## 2024-10-07 LAB — SURGICAL PATHOLOGY STUDY: SIGNIFICANT CHANGE UP

## 2024-10-08 NOTE — ASU DISCHARGE PLAN (ADULT/PEDIATRIC) - "IF YOU OR YOUR GUARDIAN/FAMILY IS A SMOKER, IT IS IMPORTANT FOR YOUR HEALTH TO STOP SMOKING. PLEASE BE AWARE THAT SECOND HAND SMOKE IS ALSO HARMFUL."
Chief complaint:   Chief Complaint   Patient presents with    Gyn Exam    Office Visit       Vitals:  Visit Vitals  /78   Ht 5' 7\" (1.702 m)   Wt 100.2 kg (220 lb 12.8 oz)   LMP 2013 Comment: total   BMI 34.58 kg/m²       HISTORY OF PRESENT ILLNESS     64y/o  presenting to establish care     She has a lot of hip pain. She didn't her Black Cohash last night and her hip pain was better this AM.      She has a history of [id number] +/ER+/UT+ breast cancer. She has completed breast cancer treatment with Herceptin and Tamoxifen. Also was on Arimedex and then transitioned to Exemestame and has complete 10 years hormonal therapy. She also has a hx of Grade 1 endometrial adenocarcinoma. In  underwent TLH+right salpingectomy. Previously s/p BSO.  Was given the all clear from Onc!!     She continues to have hot flashes.     Some dryness with intercourse. Improved with lubrication     Gynecology History:  Last Menstrual Period: s/p hyst   Last Pap Smear: not longer indicated due to hysterectomy   NILM/HPV neg  Menarche: 11  Sexually Active: yes  Contraception: s/p hysterectomy  Colonoscopy:   Mammogram: followed with oncology Follows with Dr. Bradshaw  Maternal Aunt, Niece, Uncle all with breast cancer. No one has completed genetic screening.   Denies personal hx tobacco use    General Well Woman Screening      Any concerns for today's visit?: none   Are you sexually active? Yes    Any Problems?: Yes    Problem details: Vaginal dryness    Any new partners or reason to screen for STDS?: No    Any safety or abuse concerns?: No         Do you ever feel unsafe - emotionally, physically or sexually? No         Exercise Habits: Try to remain active in general         Have you ever been pregnant? Yes    Were you ever diagnosed with gestational diabetes?: No    Were you ever diagnosed with preeclampsia or blood pressure problems?: No         Are you having periods?                 Well Woman Breast Risk  Assessment      Any breast concerns? No   Comments          1.  Have any of your first or second degree relatives on either side of your family been diagnosed with breast cancer at age 45 years or younger? Yes   2.  Is there a family history of male breast cancer on either side of the family? No   3. Is there a family history of ovarian cancer (not cervical or uterine cancer) on either side of your family? No   4.  Do you have a personal history of atypical hyperplasia or lobular carcinoma in situ on a previous breast biopsy? Yes   5.  Has anyone on either side of your family been diagnosed with a genetic mutation such as BRCA1, BRCA2 or PALB2? No   6.  Do you have three or more relatives who have had breast cancer on the same side of your family? Yes   7.  Are you of Ashkenazi Orthodoxy descent? No                    Well Woman Continence      1.  Any issues urinating? No     2.  Any issues with bowel movements? No          Well Woman Osteoporosis      Is patient greater than 5 years post-menopause?: Yes   1.  Does patient have a medical history of fragility fracture?: No   2.  Is patient's body weight less than 127 lbs (57.6 kg)?: No   3.  Does the patient havve a medical cause for bone lose (disease or medication)?: No   4.  Dose patient have a parent with a hip fracture?: No   5.  Is patient a current smoker?: No   6.  Is patient an alcoholic?: No   7.  Does patient have Rheumatoid arthritis?: No          Well Woman Post-menopausal      1.  Any vaginal dryness? Yes           2.  Any problems with prolapse? No            3.  Any hot flashes or night sweats? Yes Present but tolerable          Comment: Hot flashes          Recent PHQ 2/9 Score    PHQ 2:  PHQ 2 Score Adult PHQ 2 Score Adult PHQ 2 Interpretation Little interest or pleasure in activity?   10/8/2024   1:38 PM 0 No further screening needed 0       PHQ 9:     PHQ-2/9 Depression Screening  Little interest or pleasure in activity?: Not at all  Feeling down,  depressed or hopeless?: Not at all  Initial depression screening score:: 0  PHQ2 Interpretation: No further screening needed       Other significant problems:  Patient Active Problem List    Diagnosis Date Noted    Disequilibrium 10/18/2022     Priority: Low    Shortness of breath 03/06/2019     Priority: Low    Obesity (BMI 30-39.9) 12/20/2018     Priority: Low    Osteoporosis 05/23/2017     Priority: Low    Hypothyroidism (acquired) 04/04/2015     Priority: Low    Stage 1A Grade 1 Endometrial Cancer 06/11/2013     Priority: Low     Tiffanyanca Lucaswald  1961  [id number]    06/13/2013: Referral to GYN Oncology from Dr. Dada Das [phone] r/t atypical adenomatous hyperplasia & personal history of breast cancer.  PCP: Dr Edith Kimble [phone]. Med Onc( Hx-breast cancer) - Dr. Freya Bradshaw ([phone]).    Procedures:  05/31/2013:  (Dr. Dada Das)  Laparoscopic Bilateral Salpingo-Oophorectomy, D&C.  Pathology:   A. Ovaries and fallopian tubes, left and right, bilateral salpingo-oophorectomy: 1. Persistent one containing of benign surface inclusion cyst, the other with a hemorrhagic corpus luteal cyst; benign. 2. Fallopian tubes of benign paratubal cysts; no other histopathologic abnormality.  B. uterus, endometrium, curettage: two foci of complex hyperplasia with atypia, arising from benign proliferative endometrium with areas of disorder proliferative change     7/31/2013: Robot assisted Total laparoscopic hysterectomy.  Pathology:  Primary Site: Uterus  Stage: IA FIGO Grade 1 (Well Differentiated)  Histologic Type: Endometrioid Adenocarcinoma  Depth of Invasion (cm):  < .1cm   Myometrial Thickness (cm):  2.0 cm  Margins:serosal surface of uterus  LVSI: Not identified  Regional Lymph Nodes:  Not assessed  Metastasis: Not applicable  Washings:  Negative for malignancy.    Treatment: Hx of breast cancer - neoadjuvant chemotherapy - 6 cycles of Taxotere, Carboplatin, and Herceptin. Lumpectiomy c  axillary dissection. Radiotherapy Tx. Adjuvant Herceptin 2010 to 2011. Tamoxifen  to . Arimadex started in 2013.     Surveillance: Initial 2 years following treatment: Q3 GYN Exams & Q6 Month Pap Smears. - Annual CXR.   Years 3-5 following treatment: Q6 Month GYN Exams & Pap Smears - Annual CXR.    Pap:  05/10/2011: NEGATIVE FOR INTRAEPITHELIAL LESION OR MALIGNANCY.  2012: NEGATIVE FOR INTRAEPITHELIAL LESION OR MALIGNANCY.  2014: Negative for intraepithelial lesions of malignancy.  10/21/2014: Negative for intraepithelial lesions of malignancy, HPV +.  2015: Negative for intraepithelial lesions of malignancy, HPV +.  Imagin2012: U/S Pelvis - IMPRESSION: Thickened endometrium with multiple small cysts, compatible with  tamoxifen therapy effect. Two small left adnexal cysts, the larger of which is 2.96 cm.    2013: ULTRASOUND PELVIS TRANSVAGINAL FINDINGS: The endometrium measures 2 cm inthickness. No endometrial filling defects. No uterine fibroids. What appears to be the left ovary measures 3.2 x 1.5 cm. What appears to be the right ovary measures 2.7 x 1.9 cm. No ovarian mass. Blood flow is present to each ovary.    2013: CT A/P:   No evidence for appendicitis, pyelonephritis, bowel obstruction, diverticulitis or acute process. Moderate disc and endplate degeneration is present L5-S1 and L4-L5 and there is also grade 1 retrolisthesis of L3 on L4 and L2 on L3.    2014: CXR: Since previous, allowing for differences in technique, no significant change. No acute cardiopulmonary findings. Postoperative changes right breast and axilla. Surgical clips RUQ. Mild scarring or atelectasis left lung base. Slight thoracic curve. Mildly tortuous aorta.    2016: IMPRESSION:  No acute abnormality in the abdomen or pelvis. Normal appendix. No abnormality in the right lower quadrant. Cholecystectomy, hysterectomy.    2017: CXR: IMPRESSION: 1. No acute  Statement Selected cardiopulmonary process. No significant change.    Genetic counseling: referral placed 6/13/13. Patient has yet to schedule visit.  1/28/15-re-referral recommended if the patient is interested, dx breast and uterine ca. Stefanie Cannon, MS, [facility] [place]            Low magnesium levels 10/19/2010     Priority: Low    Malignant neoplasm of upper-outer quadrant of right breast in female, estrogen receptor positive (CMD) 06/10/2010     Priority: Low     ONCOLOGIC HISTORY:  1. T2 (2.4 cm) right breast cancer and 3.3 cm axillary mass along with another lymph node measuring 1.8 cm in the right axilla. All of these were present on physical exam.  2. Biopsy of one of the lymph nodes revealed HER-2/ino positive, ER positive, CT low positive breast cancer.  3. Neoadjuvant chemotherapy. She received 6 cycles of Taxotere, Carboplatin and Herceptin.  4. Lumpectomy and axillary dissection. There was minimal residual tumor in the breast, no tumor was found in lymph node.  5. Radiotherapy.  6. Adjuvant Herceptin from 12/2010 to 12/2011.  7. Tamoxifen started in 12/2010.  8. Arimidex started in December 2012.         PAST MEDICAL, FAMILY AND SOCIAL HISTORY     Medications:  Current Outpatient Medications   Medication Sig Dispense Refill    levothyroxine 125 MCG tablet Take 1 tablet by mouth once daily 30 tablet 3    albuterol 108 (90 Base) MCG/ACT inhaler Inhale 2 puffs into the lungs every 4 hours as needed for Shortness of Breath or Wheezing. 1 each 0    fluticasone (FLONASE) 50 MCG/ACT nasal spray Spray 2 sprays in each nostril daily. 16 g 11    ketoconazole (NIZORAL) 2 % cream Apply twice daily to right foot for atleast 3 weeks until rash is resolved. 60 g 3    halobetasol (ULTRAVATE) 0.05 % ointment Apply 1 application topically 2 times daily as needed (rash). For 2 weeks to affected hands area 45 g 11    calcium (OYST-JAYLA) 500 MG tablet Take 1 tablet by mouth in the morning and 1 tablet before bedtime. 180 tablet 3    turmeric 500 MG  capsule Take 500 mg by mouth daily.      MULTIPLE VITAMIN PO Take 1 tablet by mouth daily.      Omega-3 Fatty Acids (FISH OIL) 1000 MG OR CAPS Take 1 g by mouth daily.       Cholecalciferol (VITAMIN D-3 SUPER STRENGTH) 2000 UNIT OR TABS Take 50 mcg by mouth 2 times daily. 2 tablets daily       No current facility-administered medications for this visit.       Allergies:  ALLERGIES:   Allergen Reactions    Cephalexin RASH     Reaction and severity not specified.  Tolerated cefazolin and cefepime 2018      Clindamycin Hcl RASH     Reaction and severity not specified.      Dust Mite Extract      sneezing    Tape [Adhesive   (Environmental)] RASH     Reaction and severity not specified.      Bactrim Ds Other (See Comments)     Lumps in throat    Pepcid      Has lump in throat    Sulfa Antibiotics        Past Medical  History/Surgeries:  Past Medical History:   Diagnosis Date    Allergic rhinitis     Anemia     Cholelithiasis     Endometrial cancer  (CMD)      and     GERD (gastroesophageal reflux disease)     Heel spur     Hyperthyroidism     Malignant neoplasm of breast (female), unspecified site 2010    Right breast invasive ductal moderately differentiated    Osteopenia 2022    Ovarian cancer  (CMD)      (guesstimate year from patient)    Personal history of chemotherapy     Personal history of radiation therapy     PONV (postoperative nausea and vomiting)     Unspecified sinusitis (chronic)        Past Surgical History:   Procedure Laterality Date    Biopsy of breast, needle core  2010    Right breast    Breast surgery       delivery+postpartum care           section, classic      Cholecystectomy  2009    Dr. Das     Colonoscopy diagnostic  2011    Hysterectomy      Mastectomy partial  2010    right breast    Oophorectomy      Removal gallbladder  2009    Removal of ovary/tube(s)  2013    Ovary and Tube, removal, bilateral     Removal of tonsils,12+ y/o      Robotic assisted hysterectomy  07/31/2013    Dr Noel Pool Robot [facility]    Service to gastroenterology      Tonsillectomy and adenoidectomy  [address] [place] [address]     Xray mammogram diagnostic bilat  06/03/2010    right brest 2 x 2.5cm solid mass outer central breast. 2.9 cm enlarged right axillary lymph node.       Family History:  Family History   Problem Relation Age of Onset    Osteoporosis Mother     COPD Mother     Thyroid Mother     Stroke Father     Osteoarthritis Father     Thyroid Sister     Thyroid Sister     Thyroid Sister     Hypertension Sister     Cancer Paternal Grandmother         leukemia     Cancer, Breast Maternal Aunt         over 50    Cancer Maternal Aunt         breast and bone, older than 49 yo    Bone cancer Maternal Aunt     Cancer Maternal Uncle 77        Not sure when started but in bone and lung    Cancer, Breast Niece 34    Heart Other         unidentified relative     Hypertension Other         unidentified relative     Stroke Other         unidentified relative        Social History:  Social History     Tobacco Use    Smoking status: Never    Smokeless tobacco: Never   Substance Use Topics    Alcohol use: Yes     Alcohol/week: 0.8 standard drinks of alcohol     Types: 1 Standard drinks or equivalent per week     Comment: social       REVIEW OF SYSTEMS     Review of Systems   All other systems reviewed and are negative.      PHYSICAL EXAM     Physical Exam  Vitals and nursing note reviewed.   Constitutional:       Appearance: Normal appearance. She is normal weight.   Chest:   Breasts:     Right: Normal. No swelling, bleeding, inverted nipple, mass, nipple discharge, skin change or tenderness.      Left: Normal. No swelling, bleeding, inverted nipple, mass, nipple discharge, skin change or tenderness.       Abdominal:      Hernia: There is no hernia in the left inguinal area or right inguinal area.       Genitourinary:     General: Normal vulva.       Exam position: Lithotomy position.      Labia:         Right: No rash, tenderness, lesion or injury.         Left: No rash, tenderness, lesion or injury.       Urethra: No prolapse, urethral pain, urethral swelling or urethral lesion.      Vagina: Normal. No signs of injury and foreign body. No vaginal discharge, erythema, tenderness, bleeding, lesions or prolapsed vaginal walls.      Uterus: Absent.       Rectum: Normal. No external hemorrhoid.      Comments: Uterus, cervix, and bilateral ovaries absent  Small vaginal hemangioma at 3 o'clock approximately 3cm into the vagina  Lymphadenopathy:      Lower Body: No right inguinal adenopathy. No left inguinal adenopathy.   Neurological:      Mental Status: She is alert.         ASSESSMENT/PLAN     Annual Gynecological Exam  Hx Breast Cancer  Hx Endometrial Cancer  Vaginal Dryness  -Pap smear no longer indicated due to hysterectomy and no history of high grade abnormal pap  -Clinical breast exam completed and within normal limits. Discussed mammograms, continue follow-up with breast center   -Discussed evening primrose oil or Vitamin E for hot flashes  -Contraception: s/p hysterectomy  -Colonoscopy up to date  -Encourage healthy lifestyle     Paulette Beckham MD

## 2024-10-09 DIAGNOSIS — R59.0 LOCALIZED ENLARGED LYMPH NODES: ICD-10-CM

## 2024-10-31 NOTE — ASU PREOP CHECKLIST - NOTHING BY MOUTH SINCE
"Chief Complaint  Establish care, Hyperlipidemia, Vertigo    Subjective        June Baca presents to clinic today to establish care with a new provider. Patient was previously following with Dr. Goldsmith.    Patient evaluated recently in ED for dizziness on 10/21/24. Etiology thought to be due to a virus of the inner ear and she was scheduled for ambulatory follow up with ENT. Neurosurgery also consulted after MRI brain showed evidence of critical cervical stenosis. She followed up with outpatient neurosurgery on 10/24 and they stated that the cervical spine findings were incidental and did not require any further follow up with their team. ENT saw patient on 10/24 and their team performed hearing and vestibular testing at that visit. They prescribed a Medrol pack for treatment of dizziness and patient is having improvement of symptoms with this. Patient reports that she has not really needed the Zofran or meclizine prescriptions given to her by the ED.    Otherwise patient states that she feels well and has no acute complaints. Taking all medications as directed without any noticeable side effects. States that she has some chronic lower back pain in the setting of known disc herniation for which she uses over the counter medications and sees a chiropractor regularly.     Objective   Vital Signs:  /74 (BP Location: Left arm, Patient Position: Sitting, Cuff Size: Adult)   Pulse 72   Temp 98 °F (36.7 °C) (Oral)   Ht 156.2 cm (61.5\")   Wt 53.5 kg (118 lb)   SpO2 99%   BMI 21.93 kg/m²   Estimated body mass index is 21.93 kg/m² as calculated from the following:    Height as of this encounter: 156.2 cm (61.5\").    Weight as of this encounter: 53.5 kg (118 lb).    BMI is within normal parameters. No other follow-up for BMI required.    Physical Exam  Constitutional:       General: She is not in acute distress.     Appearance: Normal appearance.   HENT:      Right Ear: Tympanic membrane and ear canal " normal.      Left Ear: Tympanic membrane normal.      Ears:      Comments: Left canal appears more narrow.  Cardiovascular:      Rate and Rhythm: Normal rate and regular rhythm.      Heart sounds: No murmur heard.  Pulmonary:      Effort: Pulmonary effort is normal.      Breath sounds: Normal breath sounds. No wheezing.   Musculoskeletal:         General: No swelling or deformity. Normal range of motion.   Skin:     General: Skin is warm and dry.   Neurological:      General: No focal deficit present.      Mental Status: She is oriented to person, place, and time. Mental status is at baseline.   Psychiatric:         Mood and Affect: Mood normal.         Behavior: Behavior normal.        Result Review :  The following data was reviewed by: Antonina Contreras MD on 10/31/2024:  CMP          1/8/2024    10:00 10/21/2024    14:01 10/22/2024    03:59   CMP   Glucose 89  120  76    BUN 15  14  12    Creatinine 1.01  0.91  0.81    EGFR  69.3  79.7    Sodium 137  139  140    Potassium 4.2  3.7  3.9    Chloride 100  103  105    Calcium 9.9  9.7  9.8    Total Protein 6.9      Total Protein  7.4  7.1    Albumin 4.4  4.3  4.0    Globulin 2.5      Globulin  3.1  3.1    Total Bilirubin 0.4  0.4  0.4    Alkaline Phosphatase 85  85  77    AST (SGOT) 20  25  24    ALT (SGPT) 15  18  17    Albumin/Globulin Ratio  1.4  1.3    BUN/Creatinine Ratio 14.9  15.4  14.8    Anion Gap  10.7  10.8      CBC          1/8/2024    10:00 10/21/2024    14:01 10/22/2024    03:59   CBC   WBC 5.53  10.16  8.77    RBC 4.18  4.08  3.95    Hemoglobin 13.2  12.7  12.3    Hematocrit 39.8  39.5  38.5    MCV 95.2  96.8  97.5    MCH 31.6  31.1  31.1    MCHC 33.2  32.2  31.9    RDW 12.9  12.2  12.2    Platelets 236  251  267      Lipid Panel          1/8/2024    10:00 3/12/2024    10:41 6/11/2024    10:27   Lipid Panel   Total Cholesterol 257  206  194    Triglycerides 84  93  103    HDL Cholesterol 70  76  69    VLDL Cholesterol 14  16  18    LDL Cholesterol   173  114  107    LDL/HDL Ratio 2.43        TSH          10/22/2024    15:00   TSH   TSH 2.220      Data reviewed : ED discharge note 10/23/24, neurosurgery note 10/29/24, ENT note 10/24/24          Current Outpatient Medications:     ALLERGY SERUM INJECTION, Inject  under the skin into the appropriate area as directed 1 (One) Time., Disp: , Rfl:     B Complex Vitamins (vitamin b complex) capsule capsule, Take 1 capsule by mouth Daily., Disp: , Rfl:     CALCIUM PO, Take 1 tablet by mouth Daily., Disp: , Rfl:     CBD (cannabidiol) oral oil, Daily., Disp: , Rfl:     Cholecalciferol (VITAMIN D) 1000 UNITS tablet, Take 1 tablet by mouth., Disp: , Rfl:     Fluticasone Propionate (FLONASE ALLERGY RELIEF NA), 1 spray into the nostril(s) as directed by provider Daily., Disp: , Rfl:     levocetirizine (XYZAL) 5 MG tablet, Take 1 tablet by mouth Daily., Disp: , Rfl:     MAGNESIUM PO, Take 1 tablet by mouth Daily., Disp: , Rfl:     meclizine (ANTIVERT) 25 MG tablet, Take 1 tablet by mouth 3 (Three) Times a Day As Needed for Dizziness., Disp: 30 tablet, Rfl: 0    Multiple Vitamins-Minerals (MULTIVITAMIN ADULT PO), Take 1 tablet by mouth Daily., Disp: , Rfl:     ondansetron ODT (ZOFRAN-ODT) 4 MG disintegrating tablet, Place 1 tablet on the tongue Every 8 (Eight) Hours As Needed for Nausea or Vomiting., Disp: 20 tablet, Rfl: 0    rosuvastatin (CRESTOR) 5 MG tablet, TAKE 1 TABLET DAILY, Disp: 90 tablet, Rfl: 3    Vitamin E 400 units tablet, Take 1 tablet by mouth Daily., Disp: , Rfl:       Assessment and Plan   Diagnoses and all orders for this visit:    1. Encounter to establish care with new doctor (Primary)    2. Hypercholesterolemia  Assessment & Plan:  Continue Crestor daily  Lipid panel due at next visit       3. Vertigo  Assessment & Plan:  Following with ENT   Improving with steroid treatment             Follow Up   Return in about 6 months (around 4/30/2025) for Annual physical and lab work.    Patient was given  instructions and counseling regarding her condition or for health maintenance advice. Please see specific information pulled into the AVS if appropriate.     Antonina Contreras MD   09-Feb-2020 22:00

## 2024-11-23 ENCOUNTER — APPOINTMENT (OUTPATIENT)
Dept: NUCLEAR MEDICINE | Facility: CLINIC | Age: 54
End: 2024-11-23
Payer: COMMERCIAL

## 2024-11-23 PROCEDURE — 78815 PET IMAGE W/CT SKULL-THIGH: CPT | Mod: 26,PI

## 2024-12-12 NOTE — PROGRESS NOTE ADULT - ASSESSMENT
Pt here for C3 D1 Taxotere.  Arrives Ambulating independently, accompanied by Spouse     Patient was evaluated today by [facility].    Oral medications included in this regimen:  no    Patient confirms comprehension of cancer treatment schedule:  yes    Pregnancy screening:  Not applicable    Modifications in dose or schedule:  Yes, pepcid 20mg added as a premedication due to pt previous reaction.   Hgb 7.7 - notified [facility] ku OK to administer Procrit. Per Sherry KIM to administer Procrit with treatment today. SQ Procrit administered in RLA.     Medications appearance and physical integrity checked by RN: yes.    Chemotherapy IV pump settings verified by 2 RNs:  Yes.  Frequency of blood return and site check throughout administration: Prior to administration, Prior to each drug, and At completion of therapy     Infusion/treatment outcome:  patient tolerated treatment without incident    Education Record    Learner:  Patient and Spouse  Barriers / Limitations:  Language  Method:  Discussion and  utilized  Education / instructions given:  Plan of care reviewed  Outcome:  Shows understanding    Discharged Home, Ambulating independently, accompanied by:Self and Spouse    Patient/family verbalized understanding of future appointments: by printed AVS    stable s/p R. uret stent POD#1; POD#2 doing well  Plan:  - check cultures  - OOB  - home later today on P.O. antibiotics

## 2024-12-18 NOTE — H&P PST ADULT - NEGATIVE GASTROINTESTINAL SYMPTOMS
Erivedge Counseling- I discussed with the patient the risks of Erivedge including but not limited to nausea, vomiting, diarrhea, constipation, weight loss, changes in the sense of taste, decreased appetite, muscle spasms, and hair loss.  The patient verbalized understanding of the proper use and possible adverse effects of Erivedge.  All of the patient's questions and concerns were addressed. Cyclophosphamide Pregnancy And Lactation Text: This medication is Pregnancy Category D and it isn't considered safe during pregnancy. This medication is excreted in breast milk. Fluconazole Pregnancy And Lactation Text: This medication is Pregnancy Category C and it isn't know if it is safe during pregnancy. It is also excreted in breast milk. Protopic Pregnancy And Lactation Text: This medication is Pregnancy Category C. It is unknown if this medication is excreted in breast milk when applied topically. Cosentyx Pregnancy And Lactation Text: This medication is Pregnancy Category B and is considered safe during pregnancy. It is unknown if this medication is excreted in breast milk. Finasteride Male Counseling: Finasteride Counseling:  I discussed with the patient the risks of use of finasteride including but not limited to decreased libido, decreased ejaculate volume, gynecomastia, and depression. Women should not handle medication.  All of the patient's questions and concerns were addressed. Infliximab Counseling:  I discussed with the patient the risks of infliximab including but not limited to myelosuppression, immunosuppression, autoimmune hepatitis, demyelinating diseases, lymphoma, and serious infections.  The patient understands that monitoring is required including a PPD at baseline and must alert us or the primary physician if symptoms of infection or other concerning signs are noted. Olumiant Counseling: I discussed with the patient the risks of Olumiant therapy including but not limited to upper respiratory tract infections, shingles, cold sores, and nausea. Live vaccines should be avoided.  This medication has been linked to serious infections; higher rate of mortality; malignancy and lymphoproliferative disorders; major adverse cardiovascular events; thrombosis; gastrointestinal perforations; neutropenia; lymphopenia; anemia; liver enzyme elevations; and lipid elevations. Wartpeel Pregnancy And Lactation Text: This medication is Pregnancy Category X and contraindicated in pregnancy and in women who may become pregnant. It is unknown if this medication is excreted in breast milk. Olanzapine Counseling- I discussed with the patient the common side effects of olanzapine including but are not limited to: lack of energy, dry mouth, increased appetite, sleepiness, tremor, constipation, dizziness, changes in behavior, or restlessness.  Explained that teenagers are more likely to experience headaches, abdominal pain, pain in the arms or legs, tiredness, and sleepiness.  Serious side effects include but are not limited: increased risk of death in elderly patients who are confused, have memory loss, or dementia-related psychosis; hyperglycemia; increased cholesterol and triglycerides; and weight gain. Tazorac Counseling:  Patient advised that medication is irritating and drying.  Patient may need to apply sparingly and wash off after an hour before eventually leaving it on overnight.  The patient verbalized understanding of the proper use and possible adverse effects of tazorac.  All of the patient's questions and concerns were addressed. Acitretin Pregnancy And Lactation Text: This medication is Pregnancy Category X and should not be given to women who are pregnant or may become pregnant in the future. This medication is excreted in breast milk. Propranolol Pregnancy And Lactation Text: This medication is Pregnancy Category C and it isn't known if it is safe during pregnancy. It is excreted in breast milk. Minoxidil Counseling: Minoxidil is a topical medication which can increase blood flow where it is applied. It is uncertain how this medication increases hair growth. Side effects are uncommon and include stinging and allergic reactions. Glycopyrrolate Pregnancy And Lactation Text: This medication is Pregnancy Category B and is considered safe during pregnancy. It is unknown if it is excreted breast milk. Drysol Pregnancy And Lactation Text: This medication is considered safe during pregnancy and breast feeding. Stelara Counseling:  I discussed with the patient the risks of ustekinumab including but not limited to immunosuppression, malignancy, posterior leukoencephalopathy syndrome, and serious infections.  The patient understands that monitoring is required including a PPD at baseline and must alert us or the primary physician if symptoms of infection or other concerning signs are noted. Benzoyl Peroxide Counseling: Patient counseled that medicine may cause skin irritation and bleach clothing.  In the event of skin irritation, the patient was advised to reduce the amount of the drug applied or use it less frequently.   The patient verbalized understanding of the proper use and possible adverse effects of benzoyl peroxide.  All of the patient's questions and concerns were addressed. Use Enhanced Medication Counseling?: No Erivedge Pregnancy And Lactation Text: This medication is Pregnancy Category X and is absolutely contraindicated during pregnancy. It is unknown if it is excreted in breast milk. Albendazole Pregnancy And Lactation Text: This medication is Pregnancy Category C and it isn't known if it is safe during pregnancy. It is also excreted in breast milk. Doxycycline Counseling:  Patient counseled regarding possible photosensitivity and increased risk for sunburn.  Patient instructed to avoid sunlight, if possible.  When exposed to sunlight, patients should wear protective clothing, sunglasses, and sunscreen.  The patient was instructed to call the office immediately if the following severe adverse effects occur:  hearing changes, easy bruising/bleeding, severe headache, or vision changes.  The patient verbalized understanding of the proper use and possible adverse effects of doxycycline.  All of the patient's questions and concerns were addressed. Finasteride Pregnancy And Lactation Text: This medication is absolutely contraindicated during pregnancy. It is unknown if it is excreted in breast milk. Griseofulvin Counseling:  I discussed with the patient the risks of griseofulvin including but not limited to photosensitivity, cytopenia, liver damage, nausea/vomiting and severe allergy.  The patient understands that this medication is best absorbed when taken with a fatty meal (e.g., ice cream or french fries). Cyclosporine Counseling:  I discussed with the patient the risks of cyclosporine including but not limited to hypertension, gingival hyperplasia,myelosuppression, immunosuppression, liver damage, kidney damage, neurotoxicity, lymphoma, and serious infections. The patient understands that monitoring is required including baseline blood pressure, CBC, CMP, lipid panel and uric acid, and then 1-2 times monthly CMP and blood pressure. SSKI Counseling:  I discussed with the patient the risks of SSKI including but not limited to thyroid abnormalities, metallic taste, GI upset, fever, headache, acne, arthralgias, paraesthesias, lymphadenopathy, easy bleeding, arrhythmias, and allergic reaction. Rhofade Counseling: Rhofade is a topical medication which can decrease superficial blood flow where applied. Side effects are uncommon and include stinging, redness and allergic reactions. Clofazimine Counseling:  I discussed with the patient the risks of clofazimine including but not limited to skin and eye pigmentation, liver damage, nausea/vomiting, gastrointestinal bleeding and allergy. Dupixent Counseling: I discussed with the patient the risks of dupilumab including but not limited to eye infection and irritation, cold sores, injection site reactions, worsening of asthma, allergic reactions and increased risk of parasitic infection.  Live vaccines should be avoided while taking dupilumab. Dupilumab will also interact with certain medications such as warfarin and cyclosporine. The patient understands that monitoring is required and they must alert us or the primary physician if symptoms of infection or other concerning signs are noted. Olumiant Pregnancy And Lactation Text: Based on animal studies, Olumiant may cause embryo-fetal harm when administered to pregnant women.  The medication should not be used in pregnancy.  Breastfeeding is not recommended during treatment. Minoxidil Pregnancy And Lactation Text: This medication has not been assigned a Pregnancy Risk Category but animal studies failed to show danger with the topical medication. It is unknown if the medication is excreted in breast milk. Winlevi Counseling:  I discussed with the patient the risks of topical clascoterone including but not limited to erythema, scaling, itching, and stinging. Patient voiced their understanding. Terbinafine Pregnancy And Lactation Text: This medication is Pregnancy Category B and is considered safe during pregnancy. It is also excreted in breast milk and breast feeding isn't recommended. Tazorac Pregnancy And Lactation Text: This medication is not safe during pregnancy. It is unknown if this medication is excreted in breast milk. Hydroxychloroquine Counseling:  I discussed with the patient that a baseline ophthalmologic exam is needed at the start of therapy and every year thereafter while on therapy. A CBC may also be warranted for monitoring.  The side effects of this medication were discussed with the patient, including but not limited to agranulocytosis, aplastic anemia, seizures, rashes, retinopathy, and liver toxicity. Patient instructed to call the office should any adverse effect occur.  The patient verbalized understanding of the proper use and possible adverse effects of Plaquenil.  All the patient's questions and concerns were addressed. Bexarotene Counseling:  I discussed with the patient the risks of bexarotene including but not limited to hair loss, dry lips/skin/eyes, liver abnormalities, hyperlipidemia, pancreatitis, depression/suicidal ideation, photosensitivity, drug rash/allergic reactions, hypothyroidism, anemia, leukopenia, infection, cataracts, and teratogenicity.  Patient understands that they will need regular blood tests to check lipid profile, liver function tests, white blood cell count, thyroid function tests and pregnancy test if applicable. Olanzapine Pregnancy And Lactation Text: This medication is pregnancy category C.   There are no adequate and well controlled trials with olanzapine in pregnant females.  Olanzapine should be used during pregnancy only if the potential benefit justifies the potential risk to the fetus.   In a study in lactating healthy women, olanzapine was excreted in breast milk.  It is recommended that women taking olanzapine should not breast feed. Elidel Counseling: Patient may experience a mild burning sensation during topical application. Elidel is not approved in children less than 2 years of age. There have been case reports of hematologic and skin malignancies in patients using topical calcineurin inhibitors although causality is questionable. Griseofulvin Pregnancy And Lactation Text: This medication is Pregnancy Category X and is known to cause serious birth defects. It is unknown if this medication is excreted in breast milk but breast feeding should be avoided. Benzoyl Peroxide Pregnancy And Lactation Text: This medication is Pregnancy Category C. It is unknown if benzoyl peroxide is excreted in breast milk. Rifampin Counseling: I discussed with the patient the risks of rifampin including but not limited to liver damage, kidney damage, red-orange body fluids, nausea/vomiting and severe allergy. Rituxan Counseling:  I discussed with the patient the risks of Rituxan infusions. Side effects can include infusion reactions, severe drug rashes including mucocutaneous reactions, reactivation of latent hepatitis and other infections and rarely progressive multifocal leukoencephalopathy.  All of the patient's questions and concerns were addressed. Clofazimine Pregnancy And Lactation Text: This medication is Pregnancy Category C and isn't considered safe during pregnancy. It is excreted in breast milk. Ivermectin Counseling:  Patient instructed to take medication on an empty stomach with a full glass of water.  Patient informed of potential adverse effects including but not limited to nausea, diarrhea, dizziness, itching, and swelling of the extremities or lymph nodes.  The patient verbalized understanding of the proper use and possible adverse effects of ivermectin.  All of the patient's questions and concerns were addressed. Cyclosporine Pregnancy And Lactation Text: This medication is Pregnancy Category C and it isn't know if it is safe during pregnancy. This medication is excreted in breast milk. Doxycycline Pregnancy And Lactation Text: This medication is Pregnancy Category D and not consider safe during pregnancy. It is also excreted in breast milk but is considered safe for shorter treatment courses. Sski Pregnancy And Lactation Text: This medication is Pregnancy Category D and isn't considered safe during pregnancy. It is excreted in breast milk. Azithromycin Counseling:  I discussed with the patient the risks of azithromycin including but not limited to GI upset, allergic reaction, drug rash, diarrhea, and yeast infections. Winlevi Pregnancy And Lactation Text: This medication is considered safe during pregnancy and breastfeeding. Rhofade Pregnancy And Lactation Text: This medication has not been assigned a Pregnancy Risk Category. It is unknown if the medication is excreted in breast milk. Birth Control Pills Counseling: Birth Control Pill Counseling: I discussed with the patient the potential side effects of OCPs including but not limited to increased risk of stroke, heart attack, thrombophlebitis, deep venous thrombosis, hepatic adenomas, breast changes, GI upset, headaches, and depression.  The patient verbalized understanding of the proper use and possible adverse effects of OCPs. All of the patient's questions and concerns were addressed. Dupixent Pregnancy And Lactation Text: This medication likely crosses the placenta but the risk for the fetus is uncertain. This medication is excreted in breast milk. Rinvoq Counseling: I discussed with the patient the risks of Rinvoq therapy including but not limited to upper respiratory tract infections, shingles, cold sores, bronchitis, nausea, cough, fever, acne, and headache. Live vaccines should be avoided.  This medication has been linked to serious infections; higher rate of mortality; malignancy and lymphoproliferative disorders; major adverse cardiovascular events; thrombosis; thrombocytopenia, anemia, and neutropenia; lipid elevations; liver enzyme elevations; and gastrointestinal perforations. Libtayo Counseling- I discussed with the patient the risks of Libtayo including but not limited to nausea, vomiting, diarrhea, and bone or muscle pain.  The patient verbalized understanding of the proper use and possible adverse effects of Libtayo.  All of the patient's questions and concerns were addressed. Mirvaso Counseling: Mirvaso is a topical medication which can decrease superficial blood flow where applied. Side effects are uncommon and include stinging, redness and allergic reactions. Topical Clindamycin Counseling: Patient counseled that this medication may cause skin irritation or allergic reactions.  In the event of skin irritation, the patient was advised to reduce the amount of the drug applied or use it less frequently.   The patient verbalized understanding of the proper use and possible adverse effects of clindamycin.  All of the patient's questions and concerns were addressed. Bexarotene Pregnancy And Lactation Text: This medication is Pregnancy Category X and should not be given to women who are pregnant or may become pregnant. This medication should not be used if you are breast feeding. Rifampin Pregnancy And Lactation Text: This medication is Pregnancy Category C and it isn't know if it is safe during pregnancy. It is also excreted in breast milk and should not be used if you are breast feeding. Elidel Pregnancy And Lactation Text: This medication is Pregnancy Category C. It is unknown if this medication is excreted in breast milk. Cimetidine Counseling:  I discussed with the patient the risks of Cimetidine including but not limited to gynecomastia, headache, diarrhea, nausea, drowsiness, arrhythmias, pancreatitis, skin rashes, psychosis, bone marrow suppression and kidney toxicity. Oral Minoxidil Counseling- I discussed with the patient the risks of oral minoxidil including but not limited to shortness of breath, swelling of the feet or ankles, dizziness, lightheadedness, unwanted hair growth and allergic reaction.  The patient verbalized understanding of the proper use and possible adverse effects of oral minoxidil.  All of the patient's questions and concerns were addressed. Colchicine Counseling:  Patient counseled regarding adverse effects including but not limited to stomach upset (nausea, vomiting, stomach pain, or diarrhea).  Patient instructed to limit alcohol consumption while taking this medication.  Colchicine may reduce blood counts especially with prolonged use.  The patient understands that monitoring of kidney function and blood counts may be required, especially at baseline. The patient verbalized understanding of the proper use and possible adverse effects of colchicine.  All of the patient's questions and concerns were addressed. Methotrexate Counseling:  Patient counseled regarding adverse effects of methotrexate including but not limited to nausea, vomiting, abnormalities in liver function tests. Patients may develop mouth sores, rash, diarrhea, and abnormalities in blood counts. The patient understands that monitoring is required including LFT's and blood counts.  There is a rare possibility of scarring of the liver and lung problems that can occur when taking methotrexate. Persistent nausea, loss of appetite, pale stools, dark urine, cough, and shortness of breath should be reported immediately. Patient advised to discontinue methotrexate treatment at least three months before attempting to become pregnant.  I discussed the need for folate supplements while taking methotrexate.  These supplements can decrease side effects during methotrexate treatment. The patient verbalized understanding of the proper use and possible adverse effects of methotrexate.  All of the patient's questions and concerns were addressed. Birth Control Pills Pregnancy And Lactation Text: This medication should be avoided if pregnant and for the first 30 days post-partum. Carac Counseling:  I discussed with the patient the risks of Carac including but not limited to erythema, scaling, itching, weeping, crusting, and pain. Rituxan Pregnancy And Lactation Text: This medication is Pregnancy Category C and it isn't know if it is safe during pregnancy. It is unknown if this medication is excreted in breast milk but similar antibodies are known to be excreted. Hydroxychloroquine Pregnancy And Lactation Text: This medication has been shown to cause fetal harm but it isn't assigned a Pregnancy Risk Category. There are small amounts excreted in breast milk. Itraconazole Counseling:  I discussed with the patient the risks of itraconazole including but not limited to liver damage, nausea/vomiting, neuropathy, and severe allergy.  The patient understands that this medication is best absorbed when taken with acidic beverages such as non-diet cola or ginger ale.  The patient understands that monitoring is required including baseline LFTs and repeat LFTs at intervals.  The patient understands that they are to contact us or the primary physician if concerning signs are noted. Erythromycin Counseling:  I discussed with the patient the risks of erythromycin including but not limited to GI upset, allergic reaction, drug rash, diarrhea, increase in liver enzymes, and yeast infections. Taltz Counseling: I discussed with the patient the risks of ixekizumab including but not limited to immunosuppression, serious infections, worsening of inflammatory bowel disease and drug reactions.  The patient understands that monitoring is required including a PPD at baseline and must alert us or the primary physician if symptoms of infection or other concerning signs are noted. Rinvoq Pregnancy And Lactation Text: Based on animal studies, Rinvoq may cause embryo-fetal harm when administered to pregnant women.  The medication should not be used in pregnancy.  Breastfeeding is not recommended during treatment and for 6 days after the last dose. Zyclara Counseling:  I discussed with the patient the risks of imiquimod including but not limited to erythema, scaling, itching, weeping, crusting, and pain.  Patient understands that the inflammatory response to imiquimod is variable from person to person and was educated regarded proper titration schedule.  If flu-like symptoms develop, patient knows to discontinue the medication and contact us. Azithromycin Pregnancy And Lactation Text: This medication is considered safe during pregnancy and is also secreted in breast milk. Topical Clindamycin Pregnancy And Lactation Text: This medication is Pregnancy Category B and is considered safe during pregnancy. It is unknown if it is excreted in breast milk. Solaraze Counseling:  I discussed with the patient the risks of Solaraze including but not limited to erythema, scaling, itching, weeping, crusting, and pain. Enbrel Counseling:  I discussed with the patient the risks of etanercept including but not limited to myelosuppression, immunosuppression, autoimmune hepatitis, demyelinating diseases, lymphoma, and infections.  The patient understands that monitoring is required including a PPD at baseline and must alert us or the primary physician if symptoms of infection or other concerning signs are noted. Libtayo Pregnancy And Lactation Text: This medication is contraindicated in pregnancy and when breast feeding. Isotretinoin Counseling: Patient should get monthly blood tests, not donate blood, not drive at night if vision affected, not share medication, and not undergo elective surgery for 6 months after tx completed. Side effects reviewed, pt to contact office should one occur. Oral Minoxidil Pregnancy And Lactation Text: This medication should only be used when clearly needed if you are pregnant, attempting to become pregnant or breast feeding. Thalidomide Counseling: I discussed with the patient the risks of thalidomide including but not limited to birth defects, anxiety, weakness, chest pain, dizziness, cough and severe allergy. Eucrisa Counseling: Patient may experience a mild burning sensation during topical application. Eucrisa is not approved in children less than 3 months of age. Taltz Pregnancy And Lactation Text: The risk during pregnancy and breastfeeding is uncertain with this medication. Sarecycline Counseling: Patient advised regarding possible photosensitivity and discoloration of the teeth, skin, lips, tongue and gums.  Patient instructed to avoid sunlight, if possible.  When exposed to sunlight, patients should wear protective clothing, sunglasses, and sunscreen.  The patient was instructed to call the office immediately if the following severe adverse effects occur:  hearing changes, easy bruising/bleeding, severe headache, or vision changes.  The patient verbalized understanding of the proper use and possible adverse effects of sarecycline.  All of the patient's questions and concerns were addressed. Erythromycin Pregnancy And Lactation Text: This medication is Pregnancy Category B and is considered safe during pregnancy. It is also excreted in breast milk. Low Dose Naltrexone Counseling- I discussed with the patient the potential risks and side effects of low dose naltrexone including but not limited to: more vivid dreams, headaches, nausea, vomiting, abdominal pain, fatigue, dizziness, and anxiety. Methotrexate Pregnancy And Lactation Text: This medication is Pregnancy Category X and is known to cause fetal harm. This medication is excreted in breast milk. Odomzo Counseling- I discussed with the patient the risks of Odomzo including but not limited to nausea, vomiting, diarrhea, constipation, weight loss, changes in the sense of taste, decreased appetite, muscle spasms, and hair loss.  The patient verbalized understanding of the proper use and possible adverse effects of Odomzo.  All of the patient's questions and concerns were addressed. Bactrim Counseling:  I discussed with the patient the risks of sulfa antibiotics including but not limited to GI upset, allergic reaction, drug rash, diarrhea, dizziness, photosensitivity, and yeast infections.  Rarely, more serious reactions can occur including but not limited to aplastic anemia, agranulocytosis, methemoglobinemia, blood dyscrasias, liver or kidney failure, lung infiltrates or desquamative/blistering drug rashes. Spironolactone Counseling: Patient advised regarding risks of diarrhea, abdominal pain, hyperkalemia, birth defects (for female patients), liver toxicity and renal toxicity. The patient may need blood work to monitor liver and kidney function and potassium levels while on therapy. The patient verbalized understanding of the proper use and possible adverse effects of spironolactone.  All of the patient's questions and concerns were addressed. Sotyktu Counseling:  I discussed the most common side effects of Sotyktu including: common cold, sore throat, sinus infections, cold sores, canker sores, folliculitis, and acne.  I also discussed more serious side effects of Sotyktu including but not limited to: serious allergic reactions; increased risk for infections such as TB; cancers such as lymphomas; rhabdomyolysis and elevated CPK; and elevated triglycerides and liver enzymes.  Solaraze Pregnancy And Lactation Text: This medication is Pregnancy Category B and is considered safe. There is some data to suggest avoiding during the third trimester. It is unknown if this medication is excreted in breast milk. Siliq Counseling:  I discussed with the patient the risks of Siliq including but not limited to new or worsening depression, suicidal thoughts and behavior, immunosuppression, malignancy, posterior leukoencephalopathy syndrome, and serious infections.  The patient understands that monitoring is required including a PPD at baseline and must alert us or the primary physician if symptoms of infection or other concerning signs are noted. There is also a special program designed to monitor depression which is required with Siliq. Topical Ketoconazole Counseling: Patient counseled that this medication may cause skin irritation or allergic reactions.  In the event of skin irritation, the patient was advised to reduce the amount of the drug applied or use it less frequently.   The patient verbalized understanding of the proper use and possible adverse effects of ketoconazole.  All of the patient's questions and concerns were addressed. Detail Level: Detailed Isotretinoin Pregnancy And Lactation Text: This medication is Pregnancy Category X and is considered extremely dangerous during pregnancy. It is unknown if it is excreted in breast milk. Adbry Counseling: I discussed with the patient the risks of tralokinumab including but not limited to eye infection and irritation, cold sores, injection site reactions, worsening of asthma, allergic reactions and increased risk of parasitic infection.  Live vaccines should be avoided while taking tralokinumab. The patient understands that monitoring is required and they must alert us or the primary physician if symptoms of infection or other concerning signs are noted. Opzelura Counseling:  I discussed with the patient the risks of Opzelura including but not limited to nasopharngitis, bronchitis, ear infection, eosinophila, hives, diarrhea, folliculitis, tonsillitis, and rhinorrhea.  Taken orally, this medication has been linked to serious infections; higher rate of mortality; malignancy and lymphoproliferative disorders; major adverse cardiovascular events; thrombosis; thrombocytopenia, anemia, and neutropenia; and lipid elevations. Low Dose Naltrexone Pregnancy And Lactation Text: Naltrexone is pregnancy category C.  There have been no adequate and well-controlled studies in pregnant women.  It should be used in pregnancy only if the potential benefit justifies the potential risk to the fetus.   Limited data indicates that naltrexone is minimally excreted into breastmilk. Doxepin Counseling:  Patient advised that the medication is sedating and not to drive a car after taking this medication. Patient informed of potential adverse effects including but not limited to dry mouth, urinary retention, and blurry vision.  The patient verbalized understanding of the proper use and possible adverse effects of doxepin.  All of the patient's questions and concerns were addressed. Tremfya Counseling: I discussed with the patient the risks of guselkumab including but not limited to immunosuppression, serious infections, and drug reactions.  The patient understands that monitoring is required including a PPD at baseline and must alert us or the primary physician if symptoms of infection or other concerning signs are noted. Calcipotriene Counseling:  I discussed with the patient the risks of calcipotriene including but not limited to erythema, scaling, itching, and irritation. Otezla Counseling: The side effects of Otezla were discussed with the patient, including but not limited to worsening or new depression, weight loss, diarrhea, nausea, upper respiratory tract infection, and headache. Patient instructed to call the office should any adverse effect occur.  The patient verbalized understanding of the proper use and possible adverse effects of Otezla.  All the patient's questions and concerns were addressed. Sarecycline Pregnancy And Lactation Text: This medication is Pregnancy Category D and not consider safe during pregnancy. It is also excreted in breast milk. Prednisone Counseling:  I discussed with the patient the risks of prolonged use of prednisone including but not limited to weight gain, insomnia, osteoporosis, mood changes, diabetes, susceptibility to infection, glaucoma and high blood pressure.  In cases where prednisone use is prolonged, patients should be monitored with blood pressure checks, serum glucose levels and an eye exam.  Additionally, the patient may need to be placed on GI prophylaxis, PCP prophylaxis, and calcium and vitamin D supplementation and/or a bisphosphonate.  The patient verbalized understanding of the proper use and the possible adverse effects of prednisone.  All of the patient's questions and concerns were addressed. Metronidazole Counseling:  I discussed with the patient the risks of metronidazole including but not limited to seizures, nausea/vomiting, a metallic taste in the mouth, nausea/vomiting and severe allergy. Dapsone Counseling: I discussed with the patient the risks of dapsone including but not limited to hemolytic anemia, agranulocytosis, rashes, methemoglobinemia, kidney failure, peripheral neuropathy, headaches, GI upset, and liver toxicity.  Patients who start dapsone require monitoring including baseline LFTs and weekly CBCs for the first month, then every month thereafter.  The patient verbalized understanding of the proper use and possible adverse effects of dapsone.  All of the patient's questions and concerns were addressed. Ketoconazole Counseling:   Patient counseled regarding improving absorption with orange juice.  Adverse effects include but are not limited to breast enlargement, headache, diarrhea, nausea, upset stomach, liver function test abnormalities, taste disturbance, and stomach pain.  There is a rare possibility of liver failure that can occur when taking ketoconazole. The patient understands that monitoring of LFTs may be required, especially at baseline. The patient verbalized understanding of the proper use and possible adverse effects of ketoconazole.  All of the patient's questions and concerns were addressed. Spironolactone Pregnancy And Lactation Text: This medication can cause feminization of the male fetus and should be avoided during pregnancy. The active metabolite is also found in breast milk. Tranexamic Acid Counseling:  Patient advised of the small risk of bleeding problems with tranexamic acid. They were also instructed to call if they developed any nausea, vomiting or diarrhea. All of the patient's questions and concerns were addressed. Topical Retinoid counseling:  Patient advised to apply a pea-sized amount only at bedtime and wait 30 minutes after washing their face before applying.  If too drying, patient may add a non-comedogenic moisturizer. The patient verbalized understanding of the proper use and possible adverse effects of retinoids.  All of the patient's questions and concerns were addressed. Humira Counseling:  I discussed with the patient the risks of adalimumab including but not limited to myelosuppression, immunosuppression, autoimmune hepatitis, demyelinating diseases, lymphoma, and serious infections.  The patient understands that monitoring is required including a PPD at baseline and must alert us or the primary physician if symptoms of infection or other concerning signs are noted. Sotyktu Pregnancy And Lactation Text: There is insufficient data to evaluate whether or not Sotyktu is safe to use during pregnancy.   It is not known if Sotyktu passes into breast milk and whether or not it is safe to use when breastfeeding.   Opzelura Pregnancy And Lactation Text: There is insufficient data to evaluate drug-associated risk for major birth defects, miscarriage, or other adverse maternal or fetal outcomes.  There is a pregnancy registry that monitors pregnancy outcomes in pregnant persons exposed to the medication during pregnancy.  It is unknown if this medication is excreted in breast milk.  Do not breastfeed during treatment and for about 4 weeks after the last dose. Bactrim Pregnancy And Lactation Text: This medication is Pregnancy Category D and is known to cause fetal risk.  It is also excreted in breast milk. High Dose Vitamin A Counseling: Side effects reviewed, pt to contact office should one occur. Niacinamide Counseling: I recommended taking niacin or niacinamide, also know as vitamin B3, twice daily. Recent evidence suggests that taking vitamin B3 (500 mg twice daily) can reduce the risk of actinic keratoses and non-melanoma skin cancers. Side effects of vitamin B3 include flushing and headache. Tetracycline Counseling: Patient counseled regarding possible photosensitivity and increased risk for sunburn.  Patient instructed to avoid sunlight, if possible.  When exposed to sunlight, patients should wear protective clothing, sunglasses, and sunscreen.  The patient was instructed to call the office immediately if the following severe adverse effects occur:  hearing changes, easy bruising/bleeding, severe headache, or vision changes.  The patient verbalized understanding of the proper use and possible adverse effects of tetracycline.  All of the patient's questions and concerns were addressed. Patient understands to avoid pregnancy while on therapy due to potential birth defects. Otezla Pregnancy And Lactation Text: This medication is Pregnancy Category C and it isn't known if it is safe during pregnancy. It is unknown if it is excreted in breast milk. Adbry Pregnancy And Lactation Text: It is unknown if this medication will adversely affect pregnancy or breast feeding. Hydroquinone Counseling:  Patient advised that medication may result in skin irritation, lightening (hypopigmentation), dryness, and burning.  In the event of skin irritation, the patient was advised to reduce the amount of the drug applied or use it less frequently.  Rarely, spots that are treated with hydroquinone can become darker (pseudoochronosis).  Should this occur, patient instructed to stop medication and call the office. The patient verbalized understanding of the proper use and possible adverse effects of hydroquinone.  All of the patient's questions and concerns were addressed. Opioid Counseling: I discussed with the patient the potential side effects of opioids including but not limited to addiction, altered mental status, and depression. I stressed avoiding alcohol, benzodiazepines, muscle relaxants and sleep aids unless specifically okayed by a physician. The patient verbalized understanding of the proper use and possible adverse effects of opioids. All of the patient's questions and concerns were addressed. They were instructed to flush the remaining pills down the toilet if they did not need them for pain. Doxepin Pregnancy And Lactation Text: This medication is Pregnancy Category C and it isn't known if it is safe during pregnancy. It is also excreted in breast milk and breast feeding isn't recommended. Ketoconazole Pregnancy And Lactation Text: This medication is Pregnancy Category C and it isn't know if it is safe during pregnancy. It is also excreted in breast milk and breast feeding isn't recommended. Calcipotriene Pregnancy And Lactation Text: This medication has not been proven safe during pregnancy. It is unknown if this medication is excreted in breast milk. Metronidazole Pregnancy And Lactation Text: This medication is Pregnancy Category B and considered safe during pregnancy.  It is also excreted in breast milk. Simponi Counseling:  I discussed with the patient the risks of golimumab including but not limited to myelosuppression, immunosuppression, autoimmune hepatitis, demyelinating diseases, lymphoma, and serious infections.  The patient understands that monitoring is required including a PPD at baseline and must alert us or the primary physician if symptoms of infection or other concerning signs are noted. Cephalexin Counseling: I counseled the patient regarding use of cephalexin as an antibiotic for prophylactic and/or therapeutic purposes. Cephalexin (commonly prescribed under brand name Keflex) is a cephalosporin antibiotic which is active against numerous classes of bacteria, including most skin bacteria. Side effects may include nausea, diarrhea, gastrointestinal upset, rash, hives, yeast infections, and in rare cases, hepatitis, kidney disease, seizures, fever, confusion, neurologic symptoms, and others. Patients with severe allergies to penicillin medications are cautioned that there is about a 10% incidence of cross-reactivity with cephalosporins. When possible, patients with penicillin allergies should use alternatives to cephalosporins for antibiotic therapy. Dapsone Pregnancy And Lactation Text: This medication is Pregnancy Category C and is not considered safe during pregnancy or breast feeding. Oxybutynin Counseling:  I discussed with the patient the risks of oxybutynin including but not limited to skin rash, drowsiness, dry mouth, difficulty urinating, and blurred vision. no abdominal pain/no nausea/no constipation/no vomiting/no diarrhea Picato Counseling:  I discussed with the patient the risks of Picato including but not limited to erythema, scaling, itching, weeping, crusting, and pain. Xeljanz Counseling: I discussed with the patient the risks of Xeljanz therapy including increased risk of infection, liver issues, headache, diarrhea, or cold symptoms. Live vaccines should be avoided. They were instructed to call if they have any problems. Cimzia Counseling:  I discussed with the patient the risks of Cimzia including but not limited to immunosuppression, allergic reactions and infections.  The patient understands that monitoring is required including a PPD at baseline and must alert us or the primary physician if symptoms of infection or other concerning signs are noted. Tranexamic Acid Pregnancy And Lactation Text: It is unknown if this medication is safe during pregnancy or breast feeding. Topical Sulfur Applications Counseling: Topical Sulfur Counseling: Patient counseled that this medication may cause skin irritation or allergic reactions.  In the event of skin irritation, the patient was advised to reduce the amount of the drug applied or use it less frequently.   The patient verbalized understanding of the proper use and possible adverse effects of topical sulfur application.  All of the patient's questions and concerns were addressed. High Dose Vitamin A Pregnancy And Lactation Text: High dose vitamin A therapy is contraindicated during pregnancy and breast feeding. Azathioprine Counseling:  I discussed with the patient the risks of azathioprine including but not limited to myelosuppression, immunosuppression, hepatotoxicity, lymphoma, and infections.  The patient understands that monitoring is required including baseline LFTs, Creatinine, possible TPMP genotyping and weekly CBCs for the first month and then every 2 weeks thereafter.  The patient verbalized understanding of the proper use and possible adverse effects of azathioprine.  All of the patient's questions and concerns were addressed. Niacinamide Pregnancy And Lactation Text: These medications are considered safe during pregnancy. Opioid Pregnancy And Lactation Text: These medications can lead to premature delivery and should be avoided during pregnancy. These medications are also present in breast milk in small amounts. Hydroxyzine Counseling: Patient advised that the medication is sedating and not to drive a car after taking this medication.  Patient informed of potential adverse effects including but not limited to dry mouth, urinary retention, and blurry vision.  The patient verbalized understanding of the proper use and possible adverse effects of hydroxyzine.  All of the patient's questions and concerns were addressed. Gabapentin Counseling: I discussed with the patient the risks of gabapentin including but not limited to dizziness, somnolence, fatigue and ataxia. Terbinafine Counseling: Patient counseling regarding adverse effects of terbinafine including but not limited to headache, diarrhea, rash, upset stomach, liver function test abnormalities, itching, taste/smell disturbance, nausea, abdominal pain, and flatulence.  There is a rare possibility of liver failure that can occur when taking terbinafine.  The patient understands that a baseline LFT and kidney function test may be required. The patient verbalized understanding of the proper use and possible adverse effects of terbinafine.  All of the patient's questions and concerns were addressed. 5-Fu Counseling: 5-Fluorouracil Counseling:  I discussed with the patient the risks of 5-fluorouracil including but not limited to erythema, scaling, itching, weeping, crusting, and pain. Minocycline Counseling: Patient advised regarding possible photosensitivity and discoloration of the teeth, skin, lips, tongue and gums.  Patient instructed to avoid sunlight, if possible.  When exposed to sunlight, patients should wear protective clothing, sunglasses, and sunscreen.  The patient was instructed to call the office immediately if the following severe adverse effects occur:  hearing changes, easy bruising/bleeding, severe headache, or vision changes.  The patient verbalized understanding of the proper use and possible adverse effects of minocycline.  All of the patient's questions and concerns were addressed. Xolair Counseling:  Patient informed of potential adverse effects including but not limited to fever, muscle aches, rash and allergic reactions.  The patient verbalized understanding of the proper use and possible adverse effects of Xolair.  All of the patient's questions and concerns were addressed. Ilumya Counseling: I discussed with the patient the risks of tildrakizumab including but not limited to immunosuppression, malignancy, posterior leukoencephalopathy syndrome, and serious infections.  The patient understands that monitoring is required including a PPD at baseline and must alert us or the primary physician if symptoms of infection or other concerning signs are noted. Cellcept Pregnancy And Lactation Text: This medication is Pregnancy Category D and isn't considered safe during pregnancy. It is unknown if this medication is excreted in breast milk. Xelvincenzoz Pregnancy And Lactation Text: This medication is Pregnancy Category D and is not considered safe during pregnancy.  The risk during breast feeding is also uncertain. Cephalexin Pregnancy And Lactation Text: This medication is Pregnancy Category B and considered safe during pregnancy.  It is also excreted in breast milk but can be used safely for shorter doses. Topical Sulfur Applications Pregnancy And Lactation Text: This medication is considered safe during pregnancy and breast feeding secondary to limited systemic absorption. Valtrex Counseling: I discussed with the patient the risks of valacyclovir including but not limited to kidney damage, nausea, vomiting and severe allergy.  The patient understands that if the infection seems to be worsening or is not improving, they are to call. Dutasteride Male Counseling: Dustasteride Counseling:  I discussed with the patient the risks of use of dutasteride including but not limited to decreased libido, decreased ejaculate volume, and gynecomastia. Women who can become pregnant should not handle medication.  All of the patient's questions and concerns were addressed. Cimzia Pregnancy And Lactation Text: This medication crosses the placenta but can be considered safe in certain situations. Cimzia may be excreted in breast milk. Cibinqo Counseling: I discussed with the patient the risks of Cibinqo therapy including but not limited to common cold, nausea, headache, cold sores, increased blood CPK levels, dizziness, UTIs, fatigue, acne, and vomitting. Live vaccines should be avoided.  This medication has been linked to serious infections; higher rate of mortality; malignancy and lymphoproliferative disorders; major adverse cardiovascular events; thrombosis; thrombocytopenia and lymphopenia; lipid elevations; and retinal detachment. Imiquimod Counseling:  I discussed with the patient the risks of imiquimod including but not limited to erythema, scaling, itching, weeping, crusting, and pain.  Patient understands that the inflammatory response to imiquimod is variable from person to person and was educated regarded proper titration schedule.  If flu-like symptoms develop, patient knows to discontinue the medication and contact us. Hydroxyzine Pregnancy And Lactation Text: This medication is not safe during pregnancy and should not be taken. It is also excreted in breast milk and breast feeding isn't recommended. Xolair Pregnancy And Lactation Text: This medication is Pregnancy Category B and is considered safe during pregnancy. This medication is excreted in breast milk. Nsaids Counseling: NSAID Counseling: I discussed with the patient that NSAIDs should be taken with food. Prolonged use of NSAIDs can result in the development of stomach ulcers.  Patient advised to stop taking NSAIDs if abdominal pain occurs.  The patient verbalized understanding of the proper use and possible adverse effects of NSAIDs.  All of the patient's questions and concerns were addressed. Dutasteride Pregnancy And Lactation Text: This medication is absolutely contraindicated in women, especially during pregnancy and breast feeding. Feminization of male fetuses is possible if taking while pregnant. Cyclophosphamide Counseling:  I discussed with the patient the risks of cyclophosphamide including but not limited to hair loss, hormonal abnormalities, decreased fertility, abdominal pain, diarrhea, nausea and vomiting, bone marrow suppression and infection. The patient understands that monitoring is required while taking this medication. Azelaic Acid Counseling: Patient counseled that medicine may cause skin irritation and to avoid applying near the eyes.  In the event of skin irritation, the patient was advised to reduce the amount of the drug applied or use it less frequently.   The patient verbalized understanding of the proper use and possible adverse effects of azelaic acid.  All of the patient's questions and concerns were addressed. Fluconazole Counseling:  Patient counseled regarding adverse effects of fluconazole including but not limited to headache, diarrhea, nausea, upset stomach, liver function test abnormalities, taste disturbance, and stomach pain.  There is a rare possibility of liver failure that can occur when taking fluconazole.  The patient understands that monitoring of LFTs and kidney function test may be required, especially at baseline. The patient verbalized understanding of the proper use and possible adverse effects of fluconazole.  All of the patient's questions and concerns were addressed. Clindamycin Counseling: I counseled the patient regarding use of clindamycin as an antibiotic for prophylactic and/or therapeutic purposes. Clindamycin is active against numerous classes of bacteria, including skin bacteria. Side effects may include nausea, diarrhea, gastrointestinal upset, rash, hives, yeast infections, and in rare cases, colitis. Skyrizi Counseling: I discussed with the patient the risks of risankizumab-rzaa including but not limited to immunosuppression, and serious infections.  The patient understands that monitoring is required including a PPD at baseline and must alert us or the primary physician if symptoms of infection or other concerning signs are noted. Arava Counseling:  Patient counseled regarding adverse effects of Arava including but not limited to nausea, vomiting, abnormalities in liver function tests. Patients may develop mouth sores, rash, diarrhea, and abnormalities in blood counts. The patient understands that monitoring is required including LFTs and blood counts.  There is a rare possibility of scarring of the liver and lung problems that can occur when taking methotrexate. Persistent nausea, loss of appetite, pale stools, dark urine, cough, and shortness of breath should be reported immediately. Patient advised to discontinue Arava treatment and consult with a physician prior to attempting conception. The patient will have to undergo a treatment to eliminate Arava from the body prior to conception. Cibinqo Pregnancy And Lactation Text: It is unknown if this medication will adversely affect pregnancy or breast feeding.  You should not take this medication if you are currently pregnant or planning a pregnancy or while breastfeeding. Wartpeel Counseling:  I discussed with the patient the risks of Wartpeel including but not limited to erythema, scaling, itching, weeping, crusting, and pain. Cellcept Counseling:  I discussed with the patient the risks of mycophenolate mofetil including but not limited to infection/immunosuppression, GI upset, hypokalemia, hypercholesterolemia, bone marrow suppression, lymphoproliferative disorders, malignancy, GI ulceration/bleed/perforation, colitis, interstitial lung disease, kidney failure, progressive multifocal leukoencephalopathy, and birth defects.  The patient understands that monitoring is required including a baseline creatinine and regular CBC testing. In addition, patient must alert us immediately if symptoms of infection or other concerning signs are noted. Cosentyx Counseling:  I discussed with the patient the risks of Cosentyx including but not limited to worsening of Crohn's disease, immunosuppression, allergic reactions and infections.  The patient understands that monitoring is required including a PPD at baseline and must alert us or the primary physician if symptoms of infection or other concerning signs are noted. Protopic Counseling: Patient may experience a mild burning sensation during topical application. Protopic is not approved in children less than 2 years of age. There have been case reports of hematologic and skin malignancies in patients using topical calcineurin inhibitors although causality is questionable. Valtrex Pregnancy And Lactation Text: this medication is Pregnancy Category B and is considered safe during pregnancy. This medication is not directly found in breast milk but it's metabolite acyclovir is present. Nsaids Pregnancy And Lactation Text: These medications are considered safe up to 30 weeks gestation. It is excreted in breast milk. Acitretin Counseling:  I discussed with the patient the risks of acitretin including but not limited to hair loss, dry lips/skin/eyes, liver damage, hyperlipidemia, depression/suicidal ideation, photosensitivity.  Serious rare side effects can include but are not limited to pancreatitis, pseudotumor cerebri, bony changes, clot formation/stroke/heart attack.  Patient understands that alcohol is contraindicated since it can result in liver toxicity and significantly prolong the elimination of the drug by many years. Drysol Counseling:  I discussed with the patient the risks of drysol/aluminum chloride including but not limited to skin rash, itching, irritation, burning. Propranolol Counseling:  I discussed with the patient the risks of propranolol including but not limited to low heart rate, low blood pressure, low blood sugar, restlessness and increased cold sensitivity. They should call the office if they experience any of these side effects. Quinolones Counseling:  I discussed with the patient the risks of fluoroquinolones including but not limited to GI upset, allergic reaction, drug rash, diarrhea, dizziness, photosensitivity, yeast infections, liver function test abnormalities, tendonitis/tendon rupture. Clindamycin Pregnancy And Lactation Text: This medication can be used in pregnancy if certain situations. Clindamycin is also present in breast milk. Albendazole Counseling:  I discussed with the patient the risks of albendazole including but not limited to cytopenia, kidney damage, nausea/vomiting and severe allergy.  The patient understands that this medication is being used in an off-label manner. Glycopyrrolate Counseling:  I discussed with the patient the risks of glycopyrrolate including but not limited to skin rash, drowsiness, dry mouth, difficulty urinating, and blurred vision.

## 2024-12-30 NOTE — ED PROVIDER NOTE - CONSTITUTIONAL NEGATIVE STATEMENT, MLM
Patient instructed on:  NPO solids after 0315, water until 3 hours prior to procedure time.  Bring insurance card(s), photo ID.  No jewelery or body piercing's.  Wear comfortable, loose clothing appropriate for the procedure.  Advised to have someone home with him after the procedure.  Procedural prep instructions received and reviewed.  Aware of medication(s) to take DOS with sip of water and/or hold per anesthesia and surgeon guidelines.   is preferred to stay, but if dropping off and picking up, needs to provide phone number and be available at estimated discharge time.    Patient advised to contact surgeon for any illness concerns    Patient verbalizes understanding.       no fever and no chills.

## 2025-05-23 NOTE — BRIEF OPERATIVE NOTE - TYPE OF ANESTHESIA
[facility]      Cardiology Consult    Kelvin Rudd  1948    May 27, 2025    Referring Physician: PCP   Reason for Referral: murmur and CP     CC: \"sometimes I have chest pain.\"     HPI:  The patient is 77 y.o. male with a past medical history significant for HTN, HLD who presents today per PCP for murmur and chest pain.  Stress test 6/3/24 and echo 4/12/2024. Today, he is accompanied by family. He  reports that \"sometimes he has chest pain not at rest.\" Patient denies exertional chest pain/pressure, dyspnea at rest, WARREN, PND, orthopnea, palpitations, heart racing, fluttering, lightheadedness, dizziness, weight changes, changes in LE edema (when sitting for long periods of time), near syncope and syncope. Routine labs 8/2024 and CBC regularly. He is a non smoker, beer socially, coffee daily. He denies family history of heart disease. Reviewed PSH and allergies.       Past Medical History:   Diagnosis Date    Hyperlipidemia 10/5/2008    Hypertension     Primary osteoarthritis of right wrist 2/16/2017     Past Surgical History:   Procedure Laterality Date    CATARACT REMOVAL  OD 2000,OS 2001    Both eyes     COLONOSCOPY  10/17/2016    dr MENDIOLA\"[place], polyps  repeat  repeat  3 - 5 years     Family History   Problem Relation Age of Onset    High Blood Pressure Father     Cancer Father         PROSTATE    High Blood Pressure Mother      Social History     Tobacco Use    Smoking status: Never    Smokeless tobacco: Never   Vaping Use    Vaping status: Never Used   Substance Use Topics    Alcohol use: No    Drug use: Never       Allergies   Allergen Reactions    No Known Allergies      Current Outpatient Medications   Medication Sig Dispense Refill    carbamide peroxide (DEBROX) 6.5 % otic solution Place 5 drops in ear(s) 2 times daily as needed (earwax) 15 mL 1    polyethylene glycol (GLYCOLAX) 17 GM/SCOOP powder Take 17 g by mouth daily 500 g 5    atorvastatin (LIPITOR) 20 MG tablet Take 1 tablet by mouth 
General

## 2025-05-29 NOTE — H&P PST ADULT - MS EXT PE MLT D E PC
Called patient - left voicemail to call office back to schedule a colonoscopy.    Please transfer call to GI schedulers.   no cyanosis/no pedal edema/no clubbing

## 2025-06-25 NOTE — H&P PST ADULT - NS PRO TALK SOMEONE YN
Pt  to tx suite for port access for CT scan. Port accessed with no blood return noted. Flushed without difficulty no pain or swelling to site. Dressing and alcohol cap applied. Pt to return for de-access.   
Pt to tx suite for port de-access. Port flushed with 20 cc NS with blood return noted and de-accessed. Pt left ambulatory declined discharge instructions   
no

## (undated) DEVICE — DRAPE TOWEL BLUE 17" X 24"

## (undated) DEVICE — ADAPTER CHECK FLO 9FR STERILE

## (undated) DEVICE — POSITIONER STRAP ARMBOARD VELCRO TS-30

## (undated) DEVICE — DRAINAGE BAG URINARY 2L

## (undated) DEVICE — POSITIONER PATIENT SAFETY STRAP 3X60"

## (undated) DEVICE — VENODYNE/SCD SLEEVE CALF MEDIUM

## (undated) DEVICE — SOL IRR POUR H2O 500ML

## (undated) DEVICE — DRAPE COVER SNAP 36X30"

## (undated) DEVICE — SOL IRR BAG NS 0.9% 3000ML

## (undated) DEVICE — GLV 8 PROTEXIS (CREAM) MICRO

## (undated) DEVICE — TUBING TUR 2 PRONG

## (undated) DEVICE — TUBING THERMADX UROLOGY

## (undated) DEVICE — POSITIONER FOAM EGG CRATE ULNAR 2PCS (PINK)

## (undated) DEVICE — TUBING LEVEL ONE NORMOFLO SET

## (undated) DEVICE — IRR BULB PATHFINDER + 10"

## (undated) DEVICE — ELCTR CAUTERY ROLLER BALL 24-28F

## (undated) DEVICE — PRESSURE INFUSOR BAG 3000ML

## (undated) DEVICE — CANISTER DISPOSABLE THIN WALL 3000CC

## (undated) DEVICE — SYR LUER LOK 10CC

## (undated) DEVICE — PACK CYSTO

## (undated) DEVICE — DRAPE C ARM UNIVERSAL

## (undated) DEVICE — FOLEY CATH 2-WAY 18FR 5CC LATEX HYDROGEL

## (undated) DEVICE — WARMING BLANKET UPPER ADULT

## (undated) DEVICE — GLV 7.5 PROTEXIS (CREAM) MICRO

## (undated) DEVICE — LABELS BLANK W PEN

## (undated) DEVICE — GLV 7.5 PROTEXIS (WHITE)

## (undated) DEVICE — Device

## (undated) DEVICE — GOWN XL W TOWEL

## (undated) DEVICE — FOLEY CATH 2-WAY 20F 5CC LATEX LUBRICATH